# Patient Record
Sex: FEMALE | Race: WHITE | Employment: FULL TIME | URBAN - METROPOLITAN AREA
[De-identification: names, ages, dates, MRNs, and addresses within clinical notes are randomized per-mention and may not be internally consistent; named-entity substitution may affect disease eponyms.]

---

## 2020-12-28 ENCOUNTER — HOSPITAL ENCOUNTER (OUTPATIENT)
Age: 50
Discharge: HOME | End: 2020-12-28

## 2020-12-28 LAB — RUBV IGG SER QL: 173.7 IU/ML

## 2020-12-28 PROCEDURE — 86735 MUMPS ANTIBODY: CPT

## 2020-12-28 PROCEDURE — 86481 TB AG RESPONSE T-CELL SUSP: CPT

## 2020-12-28 PROCEDURE — 86765 RUBEOLA ANTIBODY: CPT

## 2020-12-28 PROCEDURE — 86762 RUBELLA ANTIBODY: CPT

## 2020-12-28 PROCEDURE — 86787 VARICELLA-ZOSTER ANTIBODY: CPT

## 2020-12-30 LAB
MEASLES ANTIBODY IGG: 4.15
MUV IGG SER QL: 2.03
VZV IGG SER QL IA: 2.84

## 2020-12-31 LAB — T-SPOT TB TEST: NORMAL

## 2021-04-18 PROBLEM — E06.3 HASHIMOTO'S THYROIDITIS: Status: ACTIVE | Noted: 2021-04-18

## 2021-04-18 PROBLEM — F90.0 ATTENTION DEFICIT HYPERACTIVITY DISORDER (ADHD), PREDOMINANTLY INATTENTIVE TYPE: Status: ACTIVE | Noted: 2021-04-18

## 2021-04-18 PROBLEM — Z82.49 FAMILY HISTORY OF BRAIN ANEURYSM: Status: ACTIVE | Noted: 2021-04-18

## 2021-04-18 PROBLEM — Z91.018 NUT ALLERGY: Status: ACTIVE | Noted: 2021-04-18

## 2021-04-18 PROBLEM — M54.16 LUMBAR BACK PAIN WITH RADICULOPATHY AFFECTING RIGHT LOWER EXTREMITY: Status: ACTIVE | Noted: 2017-10-15

## 2021-04-18 PROBLEM — Z82.69 FAMILY HISTORY OF SYSTEMIC LUPUS ERYTHEMATOSUS: Status: ACTIVE | Noted: 2021-04-18

## 2021-05-07 ENCOUNTER — NURSE TRIAGE (OUTPATIENT)
Dept: OTHER | Facility: CLINIC | Age: 51
End: 2021-05-07

## 2021-05-07 NOTE — TELEPHONE ENCOUNTER
Reason for Disposition   SEVERE dizziness (e.g., unable to stand, requires support to walk, feels like passing out now)    Answer Assessment - Initial Assessment Questions  1. DESCRIPTION: \"Describe your dizziness. \"      \"dizzy and disoriented, yesterday thought I was going to blackout\"    2. LIGHTHEADED: \"Do you feel lightheaded? \" (e.g., somewhat faint, woozy, weak upon standing)      Lightheaded, felt like was going to faint, had ringing in ear    3. VERTIGO: \"Do you feel like either you or the room is spinning or tilting? \" (i.e. vertigo)      No     4. SEVERITY: \"How bad is it? \"  \"Do you feel like you are going to faint? \" \"Can you stand and walk? \"    - MILD - walking normally    - MODERATE - interferes with normal activities (e.g., work, school)     - SEVERE - unable to stand, requires support to walk, feels like passing out now. Severe, when comes on does have to hold on to something to walk    5. ONSET:  \"When did the dizziness begin? \"      Tuesday     6. AGGRAVATING FACTORS: \"Does anything make it worse? \" (e.g., standing, change in head position)        7. HEART RATE: \"Can you tell me your heart rate? \" \"How many beats in 15 seconds? \"  (Note: not all patients can do this)        Standing    8. CAUSE: \"What do you think is causing the dizziness? \"      Unsure    9. RECURRENT SYMPTOM: \"Have you had dizziness before? \" If so, ask: \"When was the last time? \" \"What happened that time? \"      No     10. OTHER SYMPTOMS: \"Do you have any other symptoms? \" (e.g., fever, chest pain, vomiting, diarrhea, bleeding)        Nausea at times but not when dizzy, exhaustion and sleeping 14 hours a day. 11. PREGNANCY: \"Is there any chance you are pregnant? \" \"When was your last menstrual period? \"        No    Protocols used: DIZZINESS-ADULT-[place]    Received call from [facility] at pre-service center [facility]) [place] with Astrid.     Brief description of triage: see above    2nd level triage completed with Lyndsay Segundo Prema Galeana, NP; provider recommends patient be seen today. If no appts available pt to go to [facility] or walk in clinic. (Attempted to reach provider at PCP office 1st, spoke with Megan who stated MD was in with pt and would not be out for >10minutes. No other provider would be available to speak with writer.)    Care advice provided, patient verbalizes understanding; denies any other questions or concerns; instructed to call back for any new or worsening symptoms. Writer provided warm transfer to [facility] [place]. Attention Provider: Thank you for allowing me to participate in the care of your patient. The patient was connected to triage in response to information provided to the E[facility]. Please do not respond through this encounter as the response is not directed to a shared pool.

## 2021-07-28 ENCOUNTER — HOSPITAL ENCOUNTER (OUTPATIENT)
Dept: GENERAL RADIOLOGY | Facility: CLINIC | Age: 51
Discharge: HOME | End: 2021-07-30
Payer: COMMERCIAL

## 2021-07-28 ENCOUNTER — HOSPITAL ENCOUNTER (OUTPATIENT)
Age: 51
Setting detail: SPECIMEN
Discharge: HOME | End: 2021-07-28
Payer: COMMERCIAL

## 2021-07-28 ENCOUNTER — HOSPITAL ENCOUNTER (OUTPATIENT)
Facility: CLINIC | Age: 51
Discharge: HOME | End: 2021-07-30
Payer: COMMERCIAL

## 2021-07-28 DIAGNOSIS — E55.9 VITAMIN D DEFICIENCY: ICD-10-CM

## 2021-07-28 DIAGNOSIS — E78.41 ELEVATED LIPOPROTEIN(A): ICD-10-CM

## 2021-07-28 DIAGNOSIS — F90.0 ATTENTION DEFICIT HYPERACTIVITY DISORDER (ADHD), PREDOMINANTLY INATTENTIVE TYPE: ICD-10-CM

## 2021-07-28 DIAGNOSIS — E06.3 HASHIMOTO'S THYROIDITIS: ICD-10-CM

## 2021-07-28 DIAGNOSIS — M25.552 LEFT HIP PAIN: ICD-10-CM

## 2021-07-28 DIAGNOSIS — Z82.49 FAMILY HISTORY OF BRAIN ANEURYSM: ICD-10-CM

## 2021-07-28 DIAGNOSIS — M25.551 RIGHT HIP PAIN: ICD-10-CM

## 2021-07-28 DIAGNOSIS — Z86.39 HISTORY OF HYPERGLYCEMIA: ICD-10-CM

## 2021-07-28 LAB
ABSOLUTE EOS #: 0.61 K/UL (ref 0–0.44)
ABSOLUTE IMMATURE GRANULOCYTE: 0.05 K/UL (ref 0–0.3)
ABSOLUTE LYMPH #: 2.77 K/UL (ref 1.1–3.7)
ABSOLUTE MONO #: 0.54 K/UL (ref 0.1–1.2)
ALBUMIN SERPL-MCNC: 4 G/DL (ref 3.5–5.2)
ALBUMIN/GLOBULIN RATIO: 1.4 (ref 1–2.5)
ALP BLD-CCNC: 67 U/L (ref 35–104)
ALT SERPL-CCNC: 12 U/L (ref 5–33)
ANION GAP SERPL CALCULATED.3IONS-SCNC: 11 MMOL/L (ref 9–17)
AST SERPL-CCNC: 13 U/L
BASOPHILS # BLD: 1 % (ref 0–2)
BASOPHILS ABSOLUTE: 0.13 K/UL (ref 0–0.2)
BILIRUB SERPL-MCNC: 0.67 MG/DL (ref 0.3–1.2)
BUN BLDV-MCNC: 13 MG/DL (ref 6–20)
BUN/CREAT BLD: NORMAL (ref 9–20)
CALCIUM SERPL-MCNC: 8.8 MG/DL (ref 8.6–10.4)
CHLORIDE BLD-SCNC: 102 MMOL/L (ref 98–107)
CHOLESTEROL/HDL RATIO: 4.8
CHOLESTEROL: 246 MG/DL
CO2: 24 MMOL/L (ref 20–31)
CREAT SERPL-MCNC: 0.79 MG/DL (ref 0.5–0.9)
DIFFERENTIAL TYPE: ABNORMAL
EOSINOPHILS RELATIVE PERCENT: 6 % (ref 1–4)
ESTIMATED AVERAGE GLUCOSE: 103 MG/DL
GFR AFRICAN AMERICAN: >60 ML/MIN
GFR NON-AFRICAN AMERICAN: >60 ML/MIN
GFR SERPL CREATININE-BSD FRML MDRD: NORMAL ML/MIN/{1.73_M2}
GFR SERPL CREATININE-BSD FRML MDRD: NORMAL ML/MIN/{1.73_M2}
GLUCOSE BLD-MCNC: 87 MG/DL (ref 70–99)
HBA1C MFR BLD: 5.2 % (ref 4–6)
HCT VFR BLD CALC: 41.3 % (ref 36.3–47.1)
HDLC SERPL-MCNC: 51 MG/DL
HEMOGLOBIN: 12.9 G/DL (ref 11.9–15.1)
IMMATURE GRANULOCYTES: 1 %
LDL CHOLESTEROL: 171 MG/DL (ref 0–130)
LYMPHOCYTES # BLD: 28 % (ref 24–43)
MCH RBC QN AUTO: 29.9 PG (ref 25.2–33.5)
MCHC RBC AUTO-ENTMCNC: 31.2 G/DL (ref 28.4–34.8)
MCV RBC AUTO: 95.8 FL (ref 82.6–102.9)
MONOCYTES # BLD: 5 % (ref 3–12)
NRBC AUTOMATED: 0 PER 100 WBC
PDW BLD-RTO: 12.7 % (ref 11.8–14.4)
PLATELET # BLD: 319 K/UL (ref 138–453)
PLATELET ESTIMATE: ABNORMAL
PMV BLD AUTO: 10.4 FL (ref 8.1–13.5)
POTASSIUM SERPL-SCNC: 4.4 MMOL/L (ref 3.7–5.3)
RBC # BLD: 4.31 M/UL (ref 3.95–5.11)
RBC # BLD: ABNORMAL 10*6/UL
SEG NEUTROPHILS: 59 % (ref 36–65)
SEGMENTED NEUTROPHILS ABSOLUTE COUNT: 5.83 K/UL (ref 1.5–8.1)
SODIUM BLD-SCNC: 137 MMOL/L (ref 135–144)
TOTAL PROTEIN: 6.8 G/DL (ref 6.4–8.3)
TRIGL SERPL-MCNC: 120 MG/DL
TSH SERPL DL<=0.05 MIU/L-ACNC: 4.67 MIU/L (ref 0.3–5)
VITAMIN D 25-HYDROXY: 15.2 NG/ML (ref 30–100)
VLDLC SERPL CALC-MCNC: ABNORMAL MG/DL (ref 1–30)
WBC # BLD: 9.9 K/UL (ref 3.5–11.3)
WBC # BLD: ABNORMAL 10*3/UL

## 2021-07-28 PROCEDURE — 73502 X-RAY EXAM HIP UNI 2-3 VIEWS: CPT

## 2021-08-03 LAB
CHOLESTEROL/HDL RATIO: 4.6
CHOLESTEROL: 251 MG/DL
GLUCOSE BLD-MCNC: 106 MG/DL (ref 70–99)
HDLC SERPL-MCNC: 55 MG/DL
LDL CHOLESTEROL: 169 MG/DL (ref 0–130)
PATIENT FASTING?: ABNORMAL
TRIGL SERPL-MCNC: 137 MG/DL
VLDLC SERPL CALC-MCNC: ABNORMAL MG/DL (ref 1–30)

## 2021-08-24 ENCOUNTER — TELEPHONE (OUTPATIENT)
Dept: PAIN MANAGEMENT | Age: 51
End: 2021-08-24

## 2021-08-24 NOTE — TELEPHONE ENCOUNTER
Called pt to see if she would like to move her consult appointment on 09/10/2021 to 08/27/2021, pt did not answer, left msg to call the office.

## 2021-08-27 ENCOUNTER — INITIAL CONSULT (OUTPATIENT)
Dept: PAIN MANAGEMENT | Age: 51
End: 2021-08-27
Payer: COMMERCIAL

## 2021-08-27 VITALS
DIASTOLIC BLOOD PRESSURE: 67 MMHG | HEART RATE: 94 BPM | SYSTOLIC BLOOD PRESSURE: 112 MMHG | RESPIRATION RATE: 16 BRPM | BODY MASS INDEX: 30.51 KG/M2 | HEIGHT: 69 IN | WEIGHT: 206 LBS

## 2021-08-27 DIAGNOSIS — M25.551 BILATERAL HIP PAIN: ICD-10-CM

## 2021-08-27 DIAGNOSIS — M47.817 LUMBOSACRAL SPONDYLOSIS WITHOUT MYELOPATHY: ICD-10-CM

## 2021-08-27 DIAGNOSIS — M25.552 BILATERAL HIP PAIN: ICD-10-CM

## 2021-08-27 DIAGNOSIS — M54.16 LUMBAR RADICULOPATHY: Primary | ICD-10-CM

## 2021-08-27 PROCEDURE — 99204 OFFICE O/P NEW MOD 45 MIN: CPT | Performed by: PAIN MEDICINE

## 2021-08-27 ASSESSMENT — ENCOUNTER SYMPTOMS: BACK PAIN: 1

## 2021-08-27 NOTE — PROGRESS NOTES
HPI:     Back Pain  This is a chronic problem. The current episode started more than 1 year ago. The problem occurs constantly. The problem has been gradually worsening since onset. The pain is present in the lumbar spine. The quality of the pain is described as shooting and stabbing. The pain radiates to the right foot. The pain is at a severity of 4/10. The pain is mild. The pain is worse during the night. The symptoms are aggravated by standing, sitting and position. Stiffness is present all day. Associated symptoms include leg pain, numbness, tingling and weakness. She has tried analgesics, heat, home exercises, ice, NSAIDs and muscle relaxant (massage accupuncture) for the symptoms. The treatment provided mild relief. Pain in the low back down the right leg. Also complaining of bilateral hip pain. MRI of the lumbar spine with degenerative changes and disc bulging. X-ray of the hips with some mild degenerative changes and some subchondral cysts on the right. She has been doing physical therapy and massage for many years now. Continues to have lingering pain. Does take Lyrica with some benefit. She has had bursa injections in the past with excellent relief. Patient denies any new neurological symptoms. No bowel or bladder incontinence, no weakness, and no falling. Review of OARRS does not show any aberrant prescription behavior. No past medical history on file.     Past Surgical History:   Procedure Laterality Date    HYSTERECTOMY, VAGINAL         Allergies   Allergen Reactions    Morphine Hives and Swelling     Morphine      Peanut-Containing Drug Products Swelling     walnuts         Current Outpatient Medications:     montelukast (SINGULAIR) 10 MG tablet, Take 1 tablet by mouth nightly, Disp: 14 tablet, Rfl: 0    naproxen (NAPROSYN) 500 MG tablet, Take 1 tablet by mouth 2 times daily (with meals) for 15 days, Disp: 30 tablet, Rfl: 0    pregabalin (LYRICA) 75 MG capsule, Take 1 capsule by mouth 3 times daily for 30 days. Take 1 cap in am and 2 caps in PM, Disp: 90 capsule, Rfl: 0    vitamin D (ERGOCALCIFEROL) 1.25 MG (02894 UT) CAPS capsule, Take 1 tab every Monday and every Friday, Disp: 24 capsule, Rfl: 3    amphetamine-dextroamphetamine (ADDERALL) 20 MG tablet, Take 1 tablet by mouth 2 times daily for 30 days. , Disp: 60 tablet, Rfl: 0    spironolactone (ALDACTONE) 50 MG tablet, Take 1 tablet by mouth 2 times daily, Disp: 180 tablet, Rfl: 1    baclofen (LIORESAL) 10 MG tablet, Take 1-2 tablets by mouth 2 times daily as needed (PRN for back pain, stiffness), Disp: 90 tablet, Rfl: 0    ibuprofen (ADVIL;MOTRIN) 800 MG tablet, Take 1 tablet by mouth every 8 hours as needed for Pain, Disp: 180 tablet, Rfl: 1    levothyroxine (SYNTHROID) 200 MCG tablet, Take 1 tablet by mouth every morning (before breakfast), Disp: 90 tablet, Rfl: 1    Xnesyq-MjRkm-NdWkm-Meth-FA-DHA (PRENATE MINI) 18-0.6-0.4-350 MG CAPS, Take 1 capsule by mouth daily, Disp: , Rfl:     albuterol sulfate  (90 Base) MCG/ACT inhaler, Inhale into the lungs every 4 hours as needed, Disp: , Rfl:     gabapentin (NEURONTIN) 300 MG capsule, Take 1 capsule by mouth nightly for 30 days.  (Patient not taking: Reported on 8/27/2021), Disp: 30 capsule, Rfl: 0    EPINEPHrine (EPIPEN 2-DINO) 0.3 MG/0.3ML SOAJ injection, Inject 0.3 mLs into the muscle once for 1 dose Use as directed for allergic reaction, facial swelling, Disp: 0.3 mL, Rfl: 1    Family History   Problem Relation Age of Onset    Breast Cancer Mother     Other Father        Social History     Socioeconomic History    Marital status: Single     Spouse name: Not on file    Number of children: Not on file    Years of education: Not on file    Highest education level: Not on file   Occupational History    Not on file   Tobacco Use    Smoking status: Never Smoker    Smokeless tobacco: Never Used   Vaping Use    Vaping Use: Never used   Substance and Sexual Activity  Alcohol use: Yes    Drug use: Never    Sexual activity: Not on file   Other Topics Concern    Not on file   Social History Narrative    Not on file     Social Determinants of Health     Financial Resource Strain: Low Risk     Difficulty of Paying Living Expenses: Not hard at all   Food Insecurity: No Food Insecurity    Worried About Running Out of Food in the Last Year: Never true    [address] in the Last Year: Never true   Transportation Needs: No Transportation Needs    Lack of Transportation (Medical): No    Lack of Transportation (Non-Medical): No   Physical Activity:     Days of Exercise per Week:     Minutes of Exercise per Session:    Stress:     Feeling of Stress :    Social Connections:     Frequency of Communication with Friends and Family:     Frequency of Social Gatherings with Friends and Family:     Attends Oriental orthodox Services:     Active Member of Clubs or Organizations:     Attends Club or Organization Meetings:     Marital Status:    Intimate Partner Violence:     Fear of Current or Ex-Partner:     Emotionally Abused:     Physically Abused:     Sexually Abused:        Review of Systems:  Review of Systems   Musculoskeletal: Positive for back pain. Neurological: Positive for numbness, tingling and weakness. Physical Exam:  /67   Pulse 94   Resp 16   Ht 5' 9\" (1.753 m)   Wt 206 lb (93.4 kg)   Breastfeeding No   BMI 30.42 kg/m²     Physical Exam  Constitutional:       Appearance: Normal appearance. Pulmonary:      Effort: Pulmonary effort is normal.   Neurological:      Mental Status: She is alert.    Psychiatric:         Attention and Perception: Attention and perception normal.         Mood and Affect: Mood and affect normal.         Record/Diagnostics Review:    As above, I did independently review the imaging    Orders:  Orders Placed This Encounter   Procedures    Ambulatory referral to Orthopedic Surgery    [place] NJX AA&/STRD TFRML EPI LUMBAR/SACRAL 1 LEVEL    [place] NJX AA&/STRD TFRML EPI LUMBAR/SACRAL EA ADDL       Assessment:  1. Lumbar radiculopathy    2. Bilateral hip pain    3. Lumbosacral spondylosis without myelopathy        Treatment Plan:  DISCUSSION: Treatment options discussed with patient and all questions answered to patient's satisfaction. OARRS Review: Reviewed and acceptable for medications prescribed. TREATMENT OPTIONS:     Discussed different treatment options including continued conservative care such as physical therapy, chiropractic care, acupuncture. Discussed different interventional options such as epidural steroids or medial branch blocks. Also discussed neuromodulation in the form of spinal cord stimulation. Also discussed surgical evaluation. Pain in the low back and legs continues despite conservative measures, may benefit from epidural steroid injections, we'll try the Right L5 and S1 transforaminal approach x 2. In regards to the hips we will have her touch base with orthopedic surgeon, she has had excellent benefit with bursa injections in the past, would consider repeat bursa injections as well. Mehdi Villalba M.D. I have reviewed the chief complaint and history of present illness (including ROS and PFSH) and vital documentation by my staff and I agree with their documentation and have added where applicable.

## 2021-09-03 ENCOUNTER — HOSPITAL ENCOUNTER (OUTPATIENT)
Dept: PAIN MANAGEMENT | Facility: CLINIC | Age: 51
Discharge: HOME | End: 2021-09-03
Payer: COMMERCIAL

## 2021-09-03 VITALS
SYSTOLIC BLOOD PRESSURE: 125 MMHG | HEART RATE: 72 BPM | DIASTOLIC BLOOD PRESSURE: 78 MMHG | BODY MASS INDEX: 30.51 KG/M2 | RESPIRATION RATE: 18 BRPM | WEIGHT: 206 LBS | OXYGEN SATURATION: 98 % | HEIGHT: 69 IN | TEMPERATURE: 98.5 F

## 2021-09-03 DIAGNOSIS — R52 PAIN MANAGEMENT: ICD-10-CM

## 2021-09-03 PROCEDURE — 6360000002 HC RX W HCPCS: Performed by: PAIN MEDICINE

## 2021-09-03 PROCEDURE — 64483 NJX AA&/STRD TFRM EPI L/S 1: CPT | Performed by: PAIN MEDICINE

## 2021-09-03 PROCEDURE — 2500000003 HC RX 250 WO HCPCS: Performed by: PAIN MEDICINE

## 2021-09-03 PROCEDURE — 2580000003 HC RX 258: Performed by: PAIN MEDICINE

## 2021-09-03 PROCEDURE — 64483 NJX AA&/STRD TFRM EPI L/S 1: CPT

## 2021-09-03 PROCEDURE — 6360000004 HC RX CONTRAST MEDICATION: Performed by: PAIN MEDICINE

## 2021-09-03 PROCEDURE — 64484 NJX AA&/STRD TFRM EPI L/S EA: CPT | Performed by: PAIN MEDICINE

## 2021-09-03 RX ORDER — LIDOCAINE HYDROCHLORIDE 10 MG/ML
INJECTION, SOLUTION EPIDURAL; INFILTRATION; INTRACAUDAL; PERINEURAL
Status: COMPLETED | OUTPATIENT
Start: 2021-09-03 | End: 2021-09-03

## 2021-09-03 RX ORDER — DEXAMETHASONE SODIUM PHOSPHATE 10 MG/ML
INJECTION, SOLUTION INTRAMUSCULAR; INTRAVENOUS
Status: COMPLETED | OUTPATIENT
Start: 2021-09-03 | End: 2021-09-03

## 2021-09-03 RX ORDER — MIDAZOLAM HYDROCHLORIDE 2 MG/2ML
INJECTION, SOLUTION INTRAMUSCULAR; INTRAVENOUS
Status: COMPLETED | OUTPATIENT
Start: 2021-09-03 | End: 2021-09-03

## 2021-09-03 RX ADMIN — DEXAMETHASONE SODIUM PHOSPHATE 10 MG: 10 INJECTION, SOLUTION INTRAMUSCULAR; INTRAVENOUS at 11:20

## 2021-09-03 RX ADMIN — MIDAZOLAM HYDROCHLORIDE 2 MG: 1 INJECTION, SOLUTION INTRAMUSCULAR; INTRAVENOUS at 11:13

## 2021-09-03 RX ADMIN — IOHEXOL 10 ML: 180 INJECTION INTRAVENOUS at 11:20

## 2021-09-03 RX ADMIN — WATER 5 ML: 1 INJECTION INTRAMUSCULAR; INTRAVENOUS; SUBCUTANEOUS at 11:19

## 2021-09-03 RX ADMIN — LIDOCAINE HYDROCHLORIDE 5 ML: 10 INJECTION, SOLUTION EPIDURAL; INFILTRATION; INTRACAUDAL at 11:19

## 2021-09-03 ASSESSMENT — PAIN - FUNCTIONAL ASSESSMENT
PAIN_FUNCTIONAL_ASSESSMENT: 0-10
PAIN_FUNCTIONAL_ASSESSMENT: PREVENTS OR INTERFERES SOME ACTIVE ACTIVITIES AND ADLS

## 2021-09-03 ASSESSMENT — PAIN SCALES - GENERAL: PAINLEVEL_OUTOF10: 0

## 2021-09-03 NOTE — H&P
Pain Pre-Op H&P Note    Abigail Ariza MD    HPI: Skip Mireles  presents with back and leg pain. Past Medical History:   Diagnosis Date    Asthma        Past Surgical History:   Procedure Laterality Date    HYSTERECTOMY, VAGINAL         Family History   Problem Relation Age of Onset    Breast Cancer Mother     Other Father        Allergies   Allergen Reactions    Morphine Hives and Swelling     Morphine      Peanut-Containing Drug Products Swelling     walnuts         Current Outpatient Medications:     montelukast (SINGULAIR) 10 MG tablet, Take 1 tablet by mouth nightly, Disp: 14 tablet, Rfl: 0    naproxen (NAPROSYN) 500 MG tablet, Take 1 tablet by mouth 2 times daily (with meals) for 15 days, Disp: 30 tablet, Rfl: 0    pregabalin (LYRICA) 75 MG capsule, Take 1 capsule by mouth 3 times daily for 30 days. Take 1 cap in am and 2 caps in PM, Disp: 90 capsule, Rfl: 0    vitamin D (ERGOCALCIFEROL) 1.25 MG (80960 UT) CAPS capsule, Take 1 tab every Monday and every Friday, Disp: 24 capsule, Rfl: 3    amphetamine-dextroamphetamine (ADDERALL) 20 MG tablet, Take 1 tablet by mouth 2 times daily for 30 days. , Disp: 60 tablet, Rfl: 0    spironolactone (ALDACTONE) 50 MG tablet, Take 1 tablet by mouth 2 times daily, Disp: 180 tablet, Rfl: 1    baclofen (LIORESAL) 10 MG tablet, Take 1-2 tablets by mouth 2 times daily as needed (PRN for back pain, stiffness), Disp: 90 tablet, Rfl: 0    ibuprofen (ADVIL;MOTRIN) 800 MG tablet, Take 1 tablet by mouth every 8 hours as needed for Pain, Disp: 180 tablet, Rfl: 1    levothyroxine (SYNTHROID) 200 MCG tablet, Take 1 tablet by mouth every morning (before breakfast), Disp: 90 tablet, Rfl: 1    Qziyct-ChKgg-RgMev-Meth-FA-DHA (PRENATE MINI) 18-0.6-0.4-350 MG CAPS, Take 1 capsule by mouth daily, Disp: , Rfl:     albuterol sulfate  (90 Base) MCG/ACT inhaler, Inhale into the lungs every 4 hours as needed, Disp: , Rfl:     gabapentin (NEURONTIN) 300 MG capsule, Take

## 2021-09-03 NOTE — OP NOTE
Transforaminal Epidural Steroid Injection:  SURGEON: Agustina Carrillo MD    PRE-OP DIAGNOSIS: M54.17 (lumbosacral neuritis), M54.5 (low back pain)    POST-OP DIAGNOSIS: Same. PROCEDURE PERFORMED:  Right L5 and S1 Lumbar Transforaminal OSVALDO selective nerve root block. Physician confirmed and marked the surgical site. EBL: minimal    CONSENT: Patient has undergone the educational process with this procedure, is aware and fully understands the risks involved: potential damage to any and all body organs including possible bleeding, infection, and nerve injury, allergic reaction and headache. Patient also understands that the procedure will be undertaken in a safe, controlled and monitored setting. Patient recognizes that the benefits may include relief from pain and reduction in the oral use of medications. Patient agreed to proceed. The patient was counseled at length about the risks of demetri Covid-19 during their perioperative period and any recovery window from their procedure. The patient was made aware that demetri Covid-19  may worsen their prognosis for recovering from their procedure  and lend to a higher morbidity and/or mortality risk. All material risks, benefits, and reasonable alternatives including postponing the procedure were discussed. The patient does wish to proceed with the procedure at this time. PREP: The patient was placed in the prone position and padded appropriately. The injection site was prepped with chloroprep and draped appropriately. 5ml of 0.5% lidocaine was used to anesthetize the skin and subcutaneous tissue. PROCEDURE NOTE: A 22 gauge 3.5 inch Pencil-Point needle was then advanced to the Right L5 and S1 neuroforamen using a wide paramedian approach under fluoroscopic guidance. Aspiration was negative for blood, CSF and producing pain.  Contrast Medium: 1 ml of (omnipaque) contrast was then injected and the following was noted: appropriate spread of contrast along the nerve root sheath and adjacent epidural space 4mg Dexamethasone mixed with 1.5 ml of 0.5 % lidocaine was then injected into the nerve root sheath of each of the indicated levels. The needle was withdrawn by the physician and the nurse applied a sterile dressing. The patient tolerated the procedure well. No complications occured. Patient transferred to the recovery room in satisfatory condition. Appropriate written discharge instructions given to the patient.     Alvarez Dick MD

## 2021-09-16 ENCOUNTER — OFFICE VISIT (OUTPATIENT)
Dept: ORTHOPEDIC SURGERY | Age: 51
End: 2021-09-16
Payer: COMMERCIAL

## 2021-09-16 VITALS — RESPIRATION RATE: 18 BRPM | HEIGHT: 69 IN | WEIGHT: 206 LBS | BODY MASS INDEX: 30.51 KG/M2

## 2021-09-16 DIAGNOSIS — M16.10 HIP ARTHRITIS: ICD-10-CM

## 2021-09-16 DIAGNOSIS — M25.559 HIP PAIN: Primary | ICD-10-CM

## 2021-09-16 DIAGNOSIS — G89.29 CHRONIC MIDLINE LOW BACK PAIN WITHOUT SCIATICA: ICD-10-CM

## 2021-09-16 DIAGNOSIS — M51.36 LUMBAR DEGENERATIVE DISC DISEASE: ICD-10-CM

## 2021-09-16 DIAGNOSIS — M54.50 CHRONIC MIDLINE LOW BACK PAIN WITHOUT SCIATICA: ICD-10-CM

## 2021-09-16 PROBLEM — M51.369 LUMBAR DEGENERATIVE DISC DISEASE: Status: ACTIVE | Noted: 2021-09-16

## 2021-09-16 PROCEDURE — 99203 OFFICE O/P NEW LOW 30 MIN: CPT | Performed by: ORTHOPAEDIC SURGERY

## 2021-09-16 NOTE — PROGRESS NOTES
Patient ID: Radha Pickett is a 53 y.o. female    Chief Compliant:  Chief Complaint   Patient presents with    Hip Pain     bilateral        Diagnostic imaging:    AP lateral bilateral hips reviewed from [facility] patient likely with a very subtle hip dysplasia subchondral cystic formation right acetabulum joint spaces fairly well-maintained    MRI report from [facility] region some degenerative disc disease at L4-5 and L5 5 S1 likely some stenosis but did not sound overly impressive    Assessment and Plan:  1. Hip pain    2. Hip arthritis    3. Chronic midline low back pain without sciatica    4. Lumbar degenerative disc disease      Therapeutic right hip injection with IR    Follow up same day    HPI:  This is a 53 y.o. female who presents to the clinic today for bilateral hip evaluation. Patient reports that she has been experiencing right worse than left hip pain, which has been ongoing for the past year    She had lumbar epidural steroid injections a few weeks ago. 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 Past History:    Current Outpatient Medications:     montelukast (SINGULAIR) 10 MG tablet, Take 1 tablet by mouth nightly, Disp: 14 tablet, Rfl: 0    naproxen (NAPROSYN) 500 MG tablet, Take 1 tablet by mouth 2 times daily (with meals) for 15 days, Disp: 30 tablet, Rfl: 0    pregabalin (LYRICA) 75 MG capsule, Take 1 capsule by mouth 3 times daily for 30 days. Take 1 cap in am and 2 caps in PM, Disp: 90 capsule, Rfl: 0    vitamin D (ERGOCALCIFEROL) 1.25 MG (35745 UT) CAPS capsule, Take 1 tab every Monday and every Friday, Disp: 24 capsule, Rfl: 3    amphetamine-dextroamphetamine (ADDERALL) 20 MG tablet, Take 1 tablet by mouth 2 times daily for 30 days. , Disp: 60 tablet, Rfl: 0    spironolactone (ALDACTONE) 50 MG Worried About [address] in the Last Year: Never true    Neeraj of Food in the Last Year: Never true   Transportation Needs: No Transportation Needs    Lack of Transportation (Medical): No    Lack of Transportation (Non-Medical): No   Physical Activity:     Days of Exercise per Week:     Minutes of Exercise per Session:    Stress:     Feeling of Stress :    Social Connections:     Frequency of Communication with Friends and Family:     Frequency of Social Gatherings with Friends and Family:     Attends Hindu Services:     Active Member of Clubs or Organizations:     Attends Club or Organization Meetings:     Marital Status:    Intimate Partner Violence:     Fear of Current or Ex-Partner:     Emotionally Abused:     Physically Abused:     Sexually Abused:      Past Medical History:   Diagnosis Date    Asthma      Past Surgical History:   Procedure Laterality Date    HYSTERECTOMY, VAGINAL       Family History   Problem Relation Age of Onset    Breast Cancer Mother     Other Father         Physical Exam:  Vitals signs and nursing note reviewed. Constitutional:       Appearance: well-developed. HENT:      Head: Normocephalic and atraumatic. Nose: Nose normal.   Eyes:      Conjunctiva/sclera: Conjunctivae normal.   Neck:      Musculoskeletal: Normal range of motion and neck supple. Pulmonary:      Effort: Pulmonary effort is normal. No respiratory distress. Musculoskeletal:      Comments: Normal gait     Skin:     General: Skin is warm and dry. Neurological:      Mental Status: Alert and oriented to person, place, and time. Sensory: No sensory deficit. Psychiatric:         Behavior: Behavior normal.         Thought Content: Thought content normal.        Provider Attestation:  Thelma Lord personally performed the services described in this documentation. All medical record entries made by the scribe were at my direction and in my presence.  I have reviewed the chart and discharge instructions and agree that the records reflect my personal performance and is accurate and complete. Carlotta Maddox MD 9/16/21     Scribe Attestation:  By signing my name below, Antwan Mannles, attest that this documentation has been prepared under the direction and in the presence of Dr. Damaris Cavanaugh. Electronically signed: Faustina Barry, 9/16/21     Please note that this chart was generated using voice recognition Dragon dictation software. Although every effort was made to ensure the accuracy of this automated transcription, some errors in transcription may have occurred.

## 2021-09-17 ENCOUNTER — TELEPHONE (OUTPATIENT)
Dept: INTERVENTIONAL RADIOLOGY/VASCULAR | Age: 51
End: 2021-09-17

## 2021-10-04 ENCOUNTER — HOSPITAL ENCOUNTER (OUTPATIENT)
Dept: INTERVENTIONAL RADIOLOGY/VASCULAR | Age: 51
Discharge: HOME | End: 2021-10-06
Payer: COMMERCIAL

## 2021-10-04 DIAGNOSIS — M25.559 HIP PAIN: ICD-10-CM

## 2021-10-04 DIAGNOSIS — M16.10 HIP ARTHRITIS: ICD-10-CM

## 2021-10-04 PROCEDURE — 2500000003 HC RX 250 WO HCPCS: Performed by: ORTHOPAEDIC SURGERY

## 2021-10-04 PROCEDURE — 6360000004 HC RX CONTRAST MEDICATION: Performed by: ORTHOPAEDIC SURGERY

## 2021-10-04 PROCEDURE — 6360000002 HC RX W HCPCS: Performed by: ORTHOPAEDIC SURGERY

## 2021-10-04 PROCEDURE — 77002 NEEDLE LOCALIZATION BY XRAY: CPT | Performed by: RADIOLOGY

## 2021-10-04 PROCEDURE — 2709999900 IR ARTHR/ASP/INJ MAJOR JT/BURSA RIGHT WO US

## 2021-10-04 PROCEDURE — 20610 DRAIN/INJ JOINT/BURSA W/O US: CPT | Performed by: RADIOLOGY

## 2021-10-04 RX ORDER — BUPIVACAINE HYDROCHLORIDE 5 MG/ML
4 INJECTION, SOLUTION EPIDURAL; INTRACAUDAL ONCE
Status: COMPLETED | OUTPATIENT
Start: 2021-10-04 | End: 2021-10-04

## 2021-10-04 RX ORDER — METHYLPREDNISOLONE ACETATE 80 MG/ML
80 INJECTION, SUSPENSION INTRA-ARTICULAR; INTRALESIONAL; INTRAMUSCULAR; SOFT TISSUE ONCE
Status: COMPLETED | OUTPATIENT
Start: 2021-10-04 | End: 2021-10-04

## 2021-10-04 RX ORDER — LIDOCAINE HYDROCHLORIDE 10 MG/ML
4 INJECTION, SOLUTION INFILTRATION; PERINEURAL ONCE
Status: COMPLETED | OUTPATIENT
Start: 2021-10-04 | End: 2021-10-04

## 2021-10-04 RX ADMIN — BUPIVACAINE HYDROCHLORIDE 20 MG: 5 INJECTION, SOLUTION EPIDURAL; INTRACAUDAL; PERINEURAL at 10:23

## 2021-10-04 RX ADMIN — LIDOCAINE HYDROCHLORIDE 4 ML: 10 INJECTION, SOLUTION EPIDURAL; INFILTRATION; INTRACAUDAL at 10:22

## 2021-10-04 RX ADMIN — METHYLPREDNISOLONE ACETATE 80 MG: 80 INJECTION, SUSPENSION INTRA-ARTICULAR; INTRALESIONAL; INTRAMUSCULAR; SOFT TISSUE at 10:23

## 2021-10-04 RX ADMIN — IOHEXOL 50 ML: 240 INJECTION, SOLUTION INTRATHECAL; INTRAVASCULAR; INTRAVENOUS; ORAL at 09:47

## 2021-10-04 ASSESSMENT — PAIN SCALES - GENERAL: PAINLEVEL_OUTOF10: 2

## 2021-10-04 NOTE — PROGRESS NOTES
RT hip prepped draped. Numbed and accessed by Dr Lauren Diallo. Injected as per MAR.  Needle removed and band aid applied Tolerated well Verbalizes understanding of discharge instructions

## 2021-10-04 NOTE — BRIEF OP NOTE
Brief Postoperative Note    Shayla Cervantes  YOB: 1970  [id number]    Pre-operative Diagnosis: Right hip pain    Post-operative Diagnosis: Same    Procedure: Fluoro guided therapeutic right hip injection    Anesthesia: Local    Surgeons/Assistants: Shar    Estimated Blood Loss: less than 50     Complications: None    Specimens: Was Not Obtained      Electronically signed by Elham Abreu MD on 10/4/2021 at 1:07 PM

## 2021-11-23 ENCOUNTER — HOSPITAL ENCOUNTER (OUTPATIENT)
Dept: GENERAL RADIOLOGY | Facility: CLINIC | Age: 51
Discharge: HOME | End: 2021-11-25
Payer: COMMERCIAL

## 2021-11-23 ENCOUNTER — HOSPITAL ENCOUNTER (OUTPATIENT)
Facility: CLINIC | Age: 51
Discharge: HOME | End: 2021-11-25
Payer: COMMERCIAL

## 2021-11-23 DIAGNOSIS — M79.672 LEFT FOOT PAIN: ICD-10-CM

## 2021-11-23 DIAGNOSIS — M53.3 SACRAL BACK PAIN: ICD-10-CM

## 2021-11-23 DIAGNOSIS — M79.642 LEFT HAND PAIN: ICD-10-CM

## 2021-11-23 DIAGNOSIS — M79.671 RIGHT FOOT PAIN: ICD-10-CM

## 2021-11-23 DIAGNOSIS — M79.641 PAIN IN RIGHT HAND: ICD-10-CM

## 2021-11-23 PROCEDURE — 73130 X-RAY EXAM OF HAND: CPT

## 2021-11-23 PROCEDURE — 73630 X-RAY EXAM OF FOOT: CPT

## 2021-11-23 PROCEDURE — 72202 X-RAY EXAM SI JOINTS 3/> VWS: CPT

## 2021-11-29 ENCOUNTER — HOSPITAL ENCOUNTER (OUTPATIENT)
Age: 51
Setting detail: SPECIMEN
Discharge: HOME | End: 2021-11-29

## 2021-11-29 LAB
-: ABNORMAL
AMORPHOUS: ABNORMAL
BACTERIA: ABNORMAL
BILIRUBIN URINE: NEGATIVE
CASTS UA: ABNORMAL /LPF (ref 0–2)
COLOR: YELLOW
COMMENT UA: ABNORMAL
CREATININE URINE: 209.9 MG/DL (ref 28–217)
CRYSTALS, UA: ABNORMAL /HPF
EPITHELIAL CELLS UA: ABNORMAL /HPF (ref 0–5)
GLUCOSE URINE: NEGATIVE
HCT VFR BLD CALC: 39.3 % (ref 36.3–47.1)
HEMOGLOBIN: 12.2 G/DL (ref 11.9–15.1)
HIV AG/AB: NONREACTIVE
KETONES, URINE: NEGATIVE
LEUKOCYTE ESTERASE, URINE: NEGATIVE
MCH RBC QN AUTO: 30.7 PG (ref 25.2–33.5)
MCHC RBC AUTO-ENTMCNC: 31 G/DL (ref 28.4–34.8)
MCV RBC AUTO: 99 FL (ref 82.6–102.9)
MUCUS: ABNORMAL
NITRITE, URINE: NEGATIVE
NRBC AUTOMATED: 0 PER 100 WBC
OTHER OBSERVATIONS UA: ABNORMAL
PDW BLD-RTO: 13.9 % (ref 11.8–14.4)
PH UA: 5.5 (ref 5–8)
PLATELET # BLD: 366 K/UL (ref 138–453)
PMV BLD AUTO: 9.7 FL (ref 8.1–13.5)
PROTEIN UA: NEGATIVE
RBC # BLD: 3.97 M/UL (ref 3.95–5.11)
RBC UA: ABNORMAL /HPF (ref 0–2)
RENAL EPITHELIAL, UA: ABNORMAL /HPF
SPECIFIC GRAVITY UA: 1.03 (ref 1–1.03)
TOTAL PROTEIN, URINE: 22 MG/DL
TRICHOMONAS: ABNORMAL
TURBIDITY: ABNORMAL
URINE HGB: ABNORMAL
URINE TOTAL PROTEIN CREATININE RATIO: 0.1 (ref 0–0.2)
UROBILINOGEN, URINE: NORMAL
WBC # BLD: 14.7 K/UL (ref 3.5–11.3)
WBC UA: ABNORMAL /HPF (ref 0–5)
YEAST: ABNORMAL

## 2021-11-30 LAB
ALBUMIN (CALCULATED): 4.5 G/DL (ref 3.2–5.2)
ALBUMIN PERCENT: 66 % (ref 45–65)
ALBUMIN SERPL-MCNC: 4.3 G/DL (ref 3.5–5.2)
ALP BLD-CCNC: 69 U/L (ref 35–104)
ALPHA 1 PERCENT: 2 % (ref 3–6)
ALPHA 2 PERCENT: 10 % (ref 6–13)
ALPHA-1-GLOBULIN: 0.2 G/DL (ref 0.1–0.4)
ALPHA-2-GLOBULIN: 0.7 G/DL (ref 0.5–0.9)
ALT SERPL-CCNC: 14 U/L (ref 5–33)
AST SERPL-CCNC: 22 U/L
BETA GLOBULIN: 0.7 G/DL (ref 0.5–1.1)
BETA PERCENT: 10 % (ref 11–19)
C-REACTIVE PROTEIN: <3 MG/L (ref 0–5)
COMPLEMENT C3: 117 MG/DL (ref 90–180)
COMPLEMENT C4: 22 MG/DL (ref 10–40)
CREAT SERPL-MCNC: 0.81 MG/DL (ref 0.5–0.9)
GAMMA GLOBULIN %: 11 % (ref 9–20)
GAMMA GLOBULIN: 0.7 G/DL (ref 0.5–1.5)
GFR AFRICAN AMERICAN: >60 ML/MIN
GFR NON-AFRICAN AMERICAN: >60 ML/MIN
GFR SERPL CREATININE-BSD FRML MDRD: NORMAL ML/MIN/{1.73_M2}
GFR SERPL CREATININE-BSD FRML MDRD: NORMAL ML/MIN/{1.73_M2}
HEPATITIS B SURFACE ANTIGEN: NONREACTIVE
HEPATITIS C ANTIBODY: NONREACTIVE
PATHOLOGIST: ABNORMAL
PROTEIN ELECTROPHORESIS, SERUM: ABNORMAL
RHEUMATOID FACTOR: <10 IU/ML
SEDIMENTATION RATE, ERYTHROCYTE: 10 MM (ref 0–30)
THYROXINE, FREE: 1.15 NG/DL (ref 0.93–1.7)
TOTAL PROT. SUM,%: 99 % (ref 98–102)
TOTAL PROT. SUM: 6.8 G/DL (ref 6.3–8.2)
TOTAL PROTEIN: 6.7 G/DL (ref 6.4–8.3)
TSH SERPL DL<=0.05 MIU/L-ACNC: 6.9 MIU/L (ref 0.3–5)
URIC ACID: 4.3 MG/DL (ref 2.4–5.7)
VITAMIN D 25-HYDROXY: 16.6 NG/ML (ref 30–100)

## 2021-12-01 LAB
ANTI DNA DOUBLE STRANDED: 2.7 IU/ML
ANTI-NUCLEAR ANTIBODY (ANA): NEGATIVE
CCP IGG ANTIBODIES: 1.8 U/ML (ref 0–7)
COMPLEMENT TOTAL (CH50): 39.2 U/ML (ref 38.7–89.9)
ENA ANTIBODIES SCREEN: 0.1 U/ML
G-6-PD, QUANT: 13.4 U/G HB (ref 9.9–16.6)
HLA B27: NEGATIVE
QUANTI TB GOLD PLUS: NEGATIVE
QUANTI TB1 MINUS NIL: 0 IU/ML (ref 0–0.34)
QUANTI TB2 MINUS NIL: 0 IU/ML (ref 0–0.34)
QUANTIFERON MITOGEN: 0.92 IU/ML
QUANTIFERON NIL: 0.03 IU/ML

## 2021-12-03 ENCOUNTER — OFFICE VISIT (OUTPATIENT)
Dept: INTERNAL MEDICINE CLINIC | Age: 51
End: 2021-12-03
Payer: COMMERCIAL

## 2021-12-03 VITALS
BODY MASS INDEX: 30.96 KG/M2 | SYSTOLIC BLOOD PRESSURE: 116 MMHG | OXYGEN SATURATION: 97 % | HEIGHT: 69 IN | WEIGHT: 209 LBS | DIASTOLIC BLOOD PRESSURE: 72 MMHG | HEART RATE: 73 BPM

## 2021-12-03 DIAGNOSIS — M54.16 LUMBAR BACK PAIN WITH RADICULOPATHY AFFECTING RIGHT LOWER EXTREMITY: ICD-10-CM

## 2021-12-03 DIAGNOSIS — Z12.11 SCREENING FOR MALIGNANT NEOPLASM OF COLON: ICD-10-CM

## 2021-12-03 DIAGNOSIS — F90.0 ATTENTION DEFICIT HYPERACTIVITY DISORDER (ADHD), PREDOMINANTLY INATTENTIVE TYPE: ICD-10-CM

## 2021-12-03 DIAGNOSIS — Z00.00 ENCOUNTER FOR MEDICAL EXAMINATION TO ESTABLISH CARE: Primary | ICD-10-CM

## 2021-12-03 DIAGNOSIS — H92.02 ACUTE OTALGIA, LEFT: ICD-10-CM

## 2021-12-03 DIAGNOSIS — E06.3 HASHIMOTO'S THYROIDITIS: ICD-10-CM

## 2021-12-03 PROCEDURE — 99204 OFFICE O/P NEW MOD 45 MIN: CPT | Performed by: NURSE PRACTITIONER

## 2021-12-03 RX ORDER — FLUTICASONE PROPIONATE 50 MCG
2 SPRAY, SUSPENSION (ML) NASAL DAILY
Qty: 16 G | Refills: 0
Start: 2021-12-03

## 2021-12-03 RX ORDER — LEVOTHYROXINE SODIUM 175 UG/1
175 TABLET ORAL
Qty: 90 TABLET | Refills: 1 | Status: SHIPPED | OUTPATIENT
Start: 2021-12-03

## 2021-12-03 RX ORDER — DEXTROAMPHETAMINE SACCHARATE, AMPHETAMINE ASPARTATE, DEXTROAMPHETAMINE SULFATE AND AMPHETAMINE SULFATE 5; 5; 5; 5 MG/1; MG/1; MG/1; MG/1
20 TABLET ORAL 2 TIMES DAILY
Qty: 60 TABLET | Refills: 0 | Status: SHIPPED | OUTPATIENT
Start: 2021-12-03 | End: 2022-01-15 | Stop reason: SDUPTHER

## 2021-12-03 RX ORDER — CETIRIZINE HYDROCHLORIDE 10 MG/1
10 TABLET ORAL DAILY
Qty: 30 TABLET | Refills: 0
Start: 2021-12-03 | End: 2022-01-02

## 2021-12-03 RX ORDER — IBUPROFEN 800 MG/1
800 TABLET ORAL EVERY 8 HOURS PRN
Qty: 180 TABLET | Refills: 1 | Status: SHIPPED | OUTPATIENT
Start: 2021-12-03 | End: 2022-01-31

## 2021-12-03 ASSESSMENT — ENCOUNTER SYMPTOMS
COLOR CHANGE: 0
SORE THROAT: 0
TROUBLE SWALLOWING: 0
RHINORRHEA: 1
WHEEZING: 0
CHEST TIGHTNESS: 0
DIARRHEA: 0
NAUSEA: 0
VOMITING: 0
SHORTNESS OF BREATH: 0
COUGH: 0
CONSTIPATION: 0
BACK PAIN: 1
ABDOMINAL PAIN: 0

## 2021-12-03 ASSESSMENT — PATIENT HEALTH QUESTIONNAIRE - PHQ9
SUM OF ALL RESPONSES TO PHQ9 QUESTIONS 1 & 2: 0
2. FEELING DOWN, DEPRESSED OR HOPELESS: 0
1. LITTLE INTEREST OR PLEASURE IN DOING THINGS: 0
SUM OF ALL RESPONSES TO PHQ QUESTIONS 1-9: 0

## 2021-12-03 NOTE — PROGRESS NOTES
Visit Information    Have you changed or started any medications since your last visit including any over-the-counter medicines, vitamins, or herbal medicines? no   Are you having any side effects from any of your medications? -  no  Have you stopped taking any of your medications? Is so, why? -  no    Have you seen any other physician or provider since your last visit? Yes - Records Obtained  Have you had any other diagnostic tests since your last visit? Yes - Records Obtained  Have you been seen in the emergency room and/or had an admission to a hospital since we last saw you? No  Have you had your routine dental cleaning in the past 6 months? no    Have you activated your [employer] account? If not, what are your barriers? Yes     Patient Care Team:  TAMARA Rivera Do, CNP as PCP - General (Nurse Practitioner)    Medical History Review  Past Medical, Family, and Social History reviewed and does contribute to the patient presenting condition    Health Maintenance   Topic Date Due    DTaP/Tdap/Td vaccine (1 - Tdap) Never done    Colon cancer screen colonoscopy  Never done    Shingles Vaccine (1 of 2) Never done    Breast cancer screen  01/14/2022    COVID-19 Vaccine (3 - Booster for Shah Peter series) 05/02/2022    Diabetes screen  07/28/2024    Lipid screen  08/03/2026    Flu vaccine  Completed    Hepatitis C screen  Completed    HIV screen  Completed    Hepatitis A vaccine  Aged Out    Hepatitis B vaccine  Aged Out    Hib vaccine  Aged Out    Meningococcal (ACWY) vaccine  Aged Out    Pneumococcal 0-64 years Vaccine  Aged Out         [id number] Patient Note/History and Physical    Date of patient's visit: 12/3/2021    Name:  Fior Dobson      YOB: 1970    Patient Care Team:  TAMARA Rivera Do, CNP as PCP - General (Nurse Practitioner)    REASON FOR VISIT:   Establish care. HISTORY OF PRESENTING ILLNESS:    History was obtained from the patient.      Kelsey ARMENDARIZ History     Socioeconomic History    Marital status: Single     Spouse name: None    Number of children: None    Years of education: None    Highest education level: None   Occupational History    None   Tobacco Use    Smoking status: Never Smoker    Smokeless tobacco: Never Used   Vaping Use    Vaping Use: Never used   Substance and Sexual Activity    Alcohol use: Yes     Comment: OCCASIONAL    Drug use: Never    Sexual activity: None   Other Topics Concern    None   Social History Narrative    None     Social Determinants of Health     Financial Resource Strain: Low Risk     Difficulty of Paying Living Expenses: Not hard at all   Food Insecurity: No Food Insecurity    Worried About Running Out of Food in the Last Year: Never true    Neeraj of Food in the Last Year: Never true   Transportation Needs: No Transportation Needs    Lack of Transportation (Medical): No    Lack of Transportation (Non-Medical):  No   Physical Activity:     Days of Exercise per Week: Not on file    Minutes of Exercise per Session: Not on file   Stress:     Feeling of Stress : Not on file   Social Connections:     Frequency of Communication with Friends and Family: Not on file    Frequency of Social Gatherings with Friends and Family: Not on file    Attends Jainism Services: Not on file    Active Member of [employer] or Organizations: Not on file    Attends Club or Organization Meetings: Not on file    Marital Status: Not on file   Intimate Partner Violence:     Fear of Current or Ex-Partner: Not on file    Emotionally Abused: Not on file    Physically Abused: Not on file    Sexually Abused: Not on file   Housing Stability:     Unable to Pay for Housing in the Last Year: Not on file    Number of [place] in the Last Year: Not on file    Unstable Housing in the Last Year: Not on file         REVIEW OF SYSTEMS:   Review of Systems   Constitutional: Negative for chills, diaphoresis, fatigue, fever and unexpected weight change. HENT: Positive for congestion, ear pain and rhinorrhea. Negative for postnasal drip, sneezing, sore throat and trouble swallowing. Eyes: Negative for visual disturbance. Respiratory: Negative for cough, chest tightness, shortness of breath and wheezing. Cardiovascular: Negative for chest pain. Gastrointestinal: Negative for abdominal pain, constipation, diarrhea, nausea and vomiting. Endocrine: Negative for polydipsia, polyphagia and polyuria. Genitourinary: Negative for difficulty urinating, dysuria, flank pain, frequency and urgency. Musculoskeletal: Positive for arthralgias (follows with rheumatology for r/o lupus and fibromyalgia), back pain and myalgias. Skin: Negative for color change and rash. Neurological: Negative for dizziness, tremors, syncope, weakness and numbness. Psychiatric/Behavioral: Negative for confusion, decreased concentration (well controlled with adderall), hallucinations and suicidal ideas. PHYSICAL EXAM:      Vitals:    12/03/21 0953   BP: 116/72   Pulse: 73   SpO2: 97%   Weight: 209 lb (94.8 kg)   Height: 5' 9\" (1.753 m)       Physical Exam  Vitals reviewed. Constitutional:       Appearance: Normal appearance. HENT:      Head: Normocephalic. Right Ear: Tympanic membrane, ear canal and external ear normal.      Left Ear: Tympanic membrane, ear canal and external ear normal.   Cardiovascular:      Rate and Rhythm: Normal rate and regular rhythm. Pulses: Normal pulses. Heart sounds: Normal heart sounds. Pulmonary:      Effort: Pulmonary effort is normal.      Breath sounds: Normal breath sounds. Abdominal:      General: Bowel sounds are normal.      Palpations: Abdomen is soft. Tenderness: There is no right CVA tenderness or left CVA tenderness. Musculoskeletal:         General: No swelling, tenderness or deformity. Normal range of motion. Skin:     General: Skin is warm and dry.    Neurological: General: No focal deficit present. Mental Status: She is alert and oriented to person, place, and time. Psychiatric:         Mood and Affect: Mood normal.         Behavior: Behavior normal.         Thought Content: Thought content normal.         Judgment: Judgment normal.          LABORATORY FINDINGS:    CBC:   Lab Results   Component Value Date    WBC 14.7 11/29/2021    HGB 12.2 11/29/2021     11/29/2021     BMP:    Lab Results   Component Value Date     07/28/2021    K 4.4 07/28/2021     07/28/2021    CO2 24 07/28/2021    BUN 13 07/28/2021    CREATININE 0.81 11/29/2021    GLUCOSE 106 08/03/2021     Hemoglobin A1C:   Lab Results   Component Value Date    LABA1C 5.2 07/28/2021     Lipid profile:   Lab Results   Component Value Date    CHOL 251 08/03/2021    TRIG 137 08/03/2021    HDL 55 08/03/2021     Thyroid functions:   Lab Results   Component Value Date    TSH 6.90 11/29/2021      Hepatic functions:   Lab Results   Component Value Date    ALT 14 11/29/2021    AST 22 11/29/2021    PROT 6.7 11/29/2021    BILITOT 0.67 07/28/2021    LABALBU 4.3 11/29/2021       ASSESSMENT AND PLAN:     1. Encounter for medical examination to establish care  - previous labs reviewed and discussed with patient    2. Acute otalgia, left  - treat seasonal allergies   - fluticasone (FLONASE) 50 MCG/ACT nasal spray; 2 sprays by Each Nostril route daily  Dispense: 16 g; Refill: 0  - cetirizine (ZYRTEC) 10 MG tablet; Take 1 tablet by mouth daily  Dispense: 30 tablet; Refill: 0    3. Hashimoto's thyroiditis  - elevated TSH, reduce synthroid, uncontrolled thyroid may be source of hair loss  - levothyroxine (SYNTHROID) 175 MCG tablet; Take 1 tablet by mouth every morning (before breakfast)  Dispense: 90 tablet; Refill: 1  - TSH With Reflex Ft4; Future    4.  Attention deficit hyperactivity disorder (ADHD), predominantly inattentive type  - well controlled with medication, on current dose for over 10 years  - amphetamine-dextroamphetamine (ADDERALL) 20 MG tablet; Take 1 tablet by mouth 2 times daily for 30 days. Dispense: 60 tablet; Refill: 0    5. Lumbar back pain with radiculopathy affecting right lower extremity  - ibuprofen (ADVIL;MOTRIN) 800 MG tablet; Take 1 tablet by mouth every 8 hours as needed for Pain  Dispense: 180 tablet; Refill: 1    6. Screening for malignant neoplasm of colon  - Cologuard (For External Results Only); Future      INSTRUCTIONS:   Return in about 6 weeks (around 1/14/2022). Kelsey received counseling onthe following healthy behaviors: nutrition, exercise and medication adherence    Reviewed prior labs and healthmaintenance. Discussed use, benefit, and side effects of prescribed medications. Barriers tomedication compliance addressed. All patient questions answered. Patient voiced understanding. Patient given educational materials - see patient instructions    TAMARA Bettencourt - CNP   SARIKA WILBURN [facility]  12/3/2021, 11:10 AM    Please note that this chart was generated using voice recognition Dragon dictation software. Although every effort was made to ensure the accuracy of this automatedtranscription, some errors in transcription may have occurred.

## 2021-12-20 ENCOUNTER — OFFICE VISIT (OUTPATIENT)
Dept: INTERNAL MEDICINE CLINIC | Age: 51
End: 2021-12-20
Payer: COMMERCIAL

## 2021-12-20 ENCOUNTER — NURSE TRIAGE (OUTPATIENT)
Dept: OTHER | Facility: CLINIC | Age: 51
End: 2021-12-20

## 2021-12-20 VITALS
BODY MASS INDEX: 31.46 KG/M2 | HEIGHT: 69 IN | WEIGHT: 212.4 LBS | DIASTOLIC BLOOD PRESSURE: 88 MMHG | SYSTOLIC BLOOD PRESSURE: 120 MMHG

## 2021-12-20 DIAGNOSIS — M16.11 PRIMARY OSTEOARTHRITIS OF RIGHT HIP: Primary | ICD-10-CM

## 2021-12-20 PROCEDURE — 99213 OFFICE O/P EST LOW 20 MIN: CPT | Performed by: INTERNAL MEDICINE

## 2021-12-20 RX ORDER — HYDROCODONE BITARTRATE AND ACETAMINOPHEN 5; 325 MG/1; MG/1
1 TABLET ORAL EVERY 8 HOURS PRN
Qty: 15 TABLET | Refills: 0 | Status: SHIPPED | OUTPATIENT
Start: 2021-12-20 | End: 2021-12-25

## 2021-12-20 NOTE — PROGRESS NOTES
Subjective:      Patient ID: Kristan Kahn is a 49 y.o. female. HPI       HPI   1) Location/Symptom - Right Knee and Hip Pain   2) Timing/Onset: Couple of Months , had Injection in Right Hip by Dr Liz Riggs   3) Context/Setting: was in [facility], did lots of walking   4) Quality: dull aching   5) Duration: continuous   6) Modifying Factors: Worse with standing and walking   7) Severity: moderate   Xray right hip 7/21    Moderate asymmetric degenerative changes of the right hip       Has DJD and Disc Bulge in back with spinal stenosis   Patient is requesting referral to a new orthopedic surgeon. Review of Systems   Constitutional: Negative for activity change, appetite change, chills, diaphoresis, fatigue and fever. HENT: Negative for congestion, dental problem, drooling and ear discharge. Eyes: Negative for pain, discharge, redness and itching. Respiratory: Negative for apnea, cough, choking, chest tightness and shortness of breath. Cardiovascular: Negative for chest pain and leg swelling. Gastrointestinal: Negative for abdominal distention, abdominal pain, blood in stool, constipation and diarrhea. Endocrine: Negative for cold intolerance and heat intolerance. Genitourinary: Negative for difficulty urinating, dysuria, enuresis, flank pain and frequency. Musculoskeletal: Positive for arthralgias (Right hip pain, right knee pain. ). Negative for back pain, gait problem and joint swelling. Skin: Negative for color change, pallor and rash. Neurological: Negative for dizziness, facial asymmetry, light-headedness, numbness and headaches. Psychiatric/Behavioral: Negative for agitation, behavioral problems, confusion, decreased concentration and dysphoric mood. Objective:   Physical Exam  Constitutional:       Appearance: She is well-developed. She is obese. She is not diaphoretic. HENT:      Head: Normocephalic and atraumatic. Mouth/Throat:      Pharynx: No oropharyngeal exudate.    Eyes: General: No scleral icterus. Right eye: No discharge. Left eye: No discharge. Conjunctiva/sclera: Conjunctivae normal.      Pupils: Pupils are equal, round, and reactive to light. Neck:      Thyroid: No thyromegaly. Vascular: No JVD. Trachea: No tracheal deviation. Cardiovascular:      Rate and Rhythm: Normal rate. Heart sounds: Normal heart sounds. No murmur heard. No gallop. Pulmonary:      Effort: Pulmonary effort is normal. No respiratory distress. Breath sounds: Normal breath sounds. No stridor. No wheezing or rales. Chest:      Chest wall: No tenderness. Abdominal:      General: Bowel sounds are normal. There is no distension. Palpations: Abdomen is soft. Tenderness: There is no abdominal tenderness. There is no guarding or rebound. Musculoskeletal:         General: Tenderness (Positive Madan test on Right side ) present. Normal range of motion. Cervical back: Normal range of motion and neck supple. Neurological:      Mental Status: She is alert and oriented to person, place, and time. Assessment / Plan:   1. Primary osteoarthritis of right hip  - [facility]  - HYDROcodone-acetaminophen (NORCO) 5-325 MG per tablet; Take 1 tablet by mouth every 8 hours as needed for Pain for up to 5 days. Intended supply: 30 days  Dispense: 15 tablet; Refill: C/ Kassie 47, DO, Orthopedic Surgery, [address]      · Return in about 6 weeks (around 1/31/2022). · Reviewed prior labs and health maintenance. · Discussed use, benefit, and side effects of prescribed medications. Barriers to medication compliance addressed. All patient questions answered. Pt voiced understanding. Gerrianne Cranker, MD [facility]  12/26/2021, 5:54 PM    Please note that this chart was generated using voice recognition Dragon dictation software.   Although every effort was made to ensure the accuracy of this automated transcription, some errors in transcription may have occurred. Visit Information    Have you changed or started any medications since your last visit including any over-the-counter medicines, vitamins, or herbal medicines? no   Are you having any side effects from any of your medications? -  no  Have you stopped taking any of your medications? Is so, why? -  no    Have you seen any other physician or provider since your last visit? No  Have you had any other diagnostic tests since your last visit? Yes - Records Requested  Have you been seen in the emergency room and/or had an admission to a hospital since we last saw you? No  Have you had your routine dental cleaning in the past 6 months? no    Have you activated your [employer] account? If not, what are your barriers?  Yes     Patient Care Team:  TAMARA Rivera Do, CNP as PCP - General (Nurse Practitioner)  TAMARA Rivera Do, CNP as PCP - [facility] Sandhya Arrieta Provider    Medical History Review  Past Medical, Family, and Social History reviewed and does not contribute to the patient presenting condition    Health Maintenance   Topic Date Due    DTaP/Tdap/Td vaccine (1 - Tdap) Never done    Colon cancer screen colonoscopy  Never done    Shingles Vaccine (1 of 2) Never done    Breast cancer screen  01/14/2022    COVID-19 Vaccine (3 - Booster for Shah Peter series) 05/02/2022    Diabetes screen  07/28/2024    Lipid screen  08/03/2026    Flu vaccine  Completed    Hepatitis C screen  Completed    HIV screen  Completed    Hepatitis A vaccine  Aged Out    Hepatitis B vaccine  Aged Out    Hib vaccine  Aged Out    Meningococcal (ACWY) vaccine  Aged Out    Pneumococcal 0-64 years Vaccine  Aged Out

## 2021-12-20 NOTE — TELEPHONE ENCOUNTER
Received call from [address] at [facility] with [employer] Complaint. Subjective: Caller states \"swelling in right knee\"     Current Symptoms: swelling in right knee, pain,    Onset: 1 month ago; gradual    Associated Symptoms: NA    Pain Severity: 8/10; sharp, throbbing; constant    Temperature: denies  n/a    What has been tried: Motrin 800mg, very little relief, knee brace     LMP: NA Pregnant: NA    Recommended disposition: be seen in the office today, Did advise of U[facility] or walk in clinic if no appointments available. Care advice provided, patient verbalizes understanding; denies any other questions or concerns; instructed to call back for any new or worsening symptoms. Writer provided warm transfer to [facility] for appointment scheduling     Attention Provider: Thank you for allowing me to participate in the care of your patient. The patient was connected to triage in response to information provided to the ECC/PSC. Please do not respond through this encounter as the response is not directed to a shared pool.           Reason for Disposition   SEVERE pain (e.g., excruciating, unable to walk)    Protocols used: KNEE PAIN-ADULT-OH

## 2021-12-25 ENCOUNTER — APPOINTMENT (OUTPATIENT)
Dept: GENERAL RADIOLOGY | Age: 51
End: 2021-12-25
Payer: COMMERCIAL

## 2021-12-25 ENCOUNTER — HOSPITAL ENCOUNTER (EMERGENCY)
Age: 51
Discharge: HOME | End: 2021-12-26
Attending: EMERGENCY MEDICINE
Payer: COMMERCIAL

## 2021-12-25 ENCOUNTER — ANCILLARY PROCEDURE (OUTPATIENT)
Dept: EMERGENCY DEPT | Age: 51
End: 2021-12-25
Payer: COMMERCIAL

## 2021-12-25 DIAGNOSIS — M25.551 HIP PAIN, ACUTE, RIGHT: ICD-10-CM

## 2021-12-25 DIAGNOSIS — M25.561 ACUTE PAIN OF RIGHT KNEE: Primary | ICD-10-CM

## 2021-12-25 DIAGNOSIS — Z82.49 FAMILY HISTORY OF BRAIN ANEURYSM: ICD-10-CM

## 2021-12-25 LAB
ANION GAP SERPL CALCULATED.3IONS-SCNC: 14 MMOL/L (ref 9–17)
BUN BLDV-MCNC: 13 MG/DL (ref 6–20)
BUN/CREAT BLD: ABNORMAL (ref 9–20)
C-REACTIVE PROTEIN: 4.1 MG/L (ref 0–5)
CALCIUM SERPL-MCNC: 9.7 MG/DL (ref 8.6–10.4)
CHLORIDE BLD-SCNC: 100 MMOL/L (ref 98–107)
CO2: 23 MMOL/L (ref 20–31)
CREAT SERPL-MCNC: 0.87 MG/DL (ref 0.5–0.9)
GFR AFRICAN AMERICAN: >60 ML/MIN
GFR NON-AFRICAN AMERICAN: >60 ML/MIN
GFR SERPL CREATININE-BSD FRML MDRD: ABNORMAL ML/MIN/{1.73_M2}
GFR SERPL CREATININE-BSD FRML MDRD: ABNORMAL ML/MIN/{1.73_M2}
GLUCOSE BLD-MCNC: 111 MG/DL (ref 70–99)
POTASSIUM SERPL-SCNC: 3.9 MMOL/L (ref 3.7–5.3)
SODIUM BLD-SCNC: 137 MMOL/L (ref 135–144)
URIC ACID: 4 MG/DL (ref 2.4–5.7)

## 2021-12-25 PROCEDURE — 99284 EMERGENCY DEPT VISIT MOD MDM: CPT

## 2021-12-25 PROCEDURE — 80048 BASIC METABOLIC PNL TOTAL CA: CPT

## 2021-12-25 PROCEDURE — 2500000003 HC RX 250 WO HCPCS: Performed by: STUDENT IN AN ORGANIZED HEALTH CARE EDUCATION/TRAINING PROGRAM

## 2021-12-25 PROCEDURE — 86140 C-REACTIVE PROTEIN: CPT

## 2021-12-25 PROCEDURE — 85652 RBC SED RATE AUTOMATED: CPT

## 2021-12-25 PROCEDURE — 96372 THER/PROPH/DIAG INJ SC/IM: CPT

## 2021-12-25 PROCEDURE — 84550 ASSAY OF BLOOD/URIC ACID: CPT

## 2021-12-25 PROCEDURE — 6360000002 HC RX W HCPCS: Performed by: STUDENT IN AN ORGANIZED HEALTH CARE EDUCATION/TRAINING PROGRAM

## 2021-12-25 PROCEDURE — 96374 THER/PROPH/DIAG INJ IV PUSH: CPT

## 2021-12-25 PROCEDURE — 76882 US LMTD JT/FCL EVL NVASC XTR: CPT

## 2021-12-25 PROCEDURE — 73562 X-RAY EXAM OF KNEE 3: CPT

## 2021-12-25 PROCEDURE — 85025 COMPLETE CBC W/AUTO DIFF WBC: CPT

## 2021-12-25 PROCEDURE — 6370000000 HC RX 637 (ALT 250 FOR IP): Performed by: STUDENT IN AN ORGANIZED HEALTH CARE EDUCATION/TRAINING PROGRAM

## 2021-12-25 PROCEDURE — 20605 DRAIN/INJ JOINT/BURSA W/O US: CPT

## 2021-12-25 PROCEDURE — 73502 X-RAY EXAM HIP UNI 2-3 VIEWS: CPT

## 2021-12-25 RX ORDER — FENTANYL CITRATE 50 UG/ML
50 INJECTION, SOLUTION INTRAMUSCULAR; INTRAVENOUS ONCE
Status: COMPLETED | OUTPATIENT
Start: 2021-12-25 | End: 2021-12-26

## 2021-12-25 RX ORDER — BUPIVACAINE HYDROCHLORIDE 5 MG/ML
30 INJECTION, SOLUTION EPIDURAL; INTRACAUDAL ONCE
Status: COMPLETED | OUTPATIENT
Start: 2021-12-25 | End: 2021-12-25

## 2021-12-25 RX ORDER — FENTANYL CITRATE 50 UG/ML
100 INJECTION, SOLUTION INTRAMUSCULAR; INTRAVENOUS ONCE
Status: DISCONTINUED | OUTPATIENT
Start: 2021-12-25 | End: 2021-12-25

## 2021-12-25 RX ORDER — ORPHENADRINE CITRATE 30 MG/ML
60 INJECTION INTRAMUSCULAR; INTRAVENOUS ONCE
Status: COMPLETED | OUTPATIENT
Start: 2021-12-25 | End: 2021-12-25

## 2021-12-25 RX ORDER — ACETAMINOPHEN 500 MG
1000 TABLET ORAL ONCE
Status: COMPLETED | OUTPATIENT
Start: 2021-12-25 | End: 2021-12-25

## 2021-12-25 RX ADMIN — BUPIVACAINE HYDROCHLORIDE 150 MG: 5 INJECTION, SOLUTION PERINEURAL at 22:43

## 2021-12-25 RX ADMIN — ACETAMINOPHEN 1000 MG: 500 TABLET ORAL at 20:50

## 2021-12-25 RX ADMIN — ORPHENADRINE CITRATE 60 MG: 60 INJECTION INTRAMUSCULAR; INTRAVENOUS at 22:01

## 2021-12-25 ASSESSMENT — ENCOUNTER SYMPTOMS
SHORTNESS OF BREATH: 0
ABDOMINAL PAIN: 0
SORE THROAT: 0
RHINORRHEA: 0
DIARRHEA: 0
CONSTIPATION: 0
COUGH: 0

## 2021-12-25 ASSESSMENT — PAIN DESCRIPTION - PAIN TYPE
TYPE: ACUTE PAIN

## 2021-12-25 ASSESSMENT — PAIN SCALES - GENERAL
PAINLEVEL_OUTOF10: 8
PAINLEVEL_OUTOF10: 7
PAINLEVEL_OUTOF10: 9
PAINLEVEL_OUTOF10: 0

## 2021-12-25 ASSESSMENT — PAIN DESCRIPTION - LOCATION
LOCATION: HIP;KNEE
LOCATION: HIP;KNEE
LOCATION: LEG

## 2021-12-25 ASSESSMENT — PAIN DESCRIPTION - ORIENTATION
ORIENTATION: RIGHT

## 2021-12-25 NOTE — Clinical Note
Maryann Veronica was seen and treated in our emergency department on 12/25/2021. She may return to work on 12/27/2021. If you have any questions or concerns, please don't hesitate to call.       Arlene Pelletier, DO

## 2021-12-26 VITALS
TEMPERATURE: 97.5 F | OXYGEN SATURATION: 95 % | DIASTOLIC BLOOD PRESSURE: 77 MMHG | RESPIRATION RATE: 18 BRPM | SYSTOLIC BLOOD PRESSURE: 135 MMHG | HEART RATE: 88 BPM

## 2021-12-26 LAB
ABSOLUTE EOS #: 0 K/UL (ref 0–0.44)
ABSOLUTE IMMATURE GRANULOCYTE: 0.15 K/UL (ref 0–0.3)
ABSOLUTE LYMPH #: 0.59 K/UL (ref 1.1–3.7)
ABSOLUTE MONO #: 0.15 K/UL (ref 0.1–1.2)
BASOPHILS # BLD: 1 % (ref 0–2)
BASOPHILS ABSOLUTE: 0.15 K/UL (ref 0–0.2)
DIFFERENTIAL TYPE: ABNORMAL
EOSINOPHILS RELATIVE PERCENT: 0 % (ref 1–4)
HCT VFR BLD CALC: 43.1 % (ref 36.3–47.1)
HEMOGLOBIN: 13.9 G/DL (ref 11.9–15.1)
IMMATURE GRANULOCYTES: 1 %
LYMPHOCYTES # BLD: 4 % (ref 24–43)
MCH RBC QN AUTO: 31 PG (ref 25.2–33.5)
MCHC RBC AUTO-ENTMCNC: 32.3 G/DL (ref 28.4–34.8)
MCV RBC AUTO: 96 FL (ref 82.6–102.9)
MONOCYTES # BLD: 1 % (ref 3–12)
MORPHOLOGY: NORMAL
NRBC AUTOMATED: 0 PER 100 WBC
PDW BLD-RTO: 12.9 % (ref 11.8–14.4)
PLATELET # BLD: 313 K/UL (ref 138–453)
PLATELET ESTIMATE: ABNORMAL
PMV BLD AUTO: 9.6 FL (ref 8.1–13.5)
RBC # BLD: 4.49 M/UL (ref 3.95–5.11)
RBC # BLD: ABNORMAL 10*6/UL
SEDIMENTATION RATE, ERYTHROCYTE: 29 MM (ref 0–30)
SEG NEUTROPHILS: 93 % (ref 36–65)
SEGMENTED NEUTROPHILS ABSOLUTE COUNT: 13.76 K/UL (ref 1.5–8.1)
WBC # BLD: 14.8 K/UL (ref 3.5–11.3)
WBC # BLD: ABNORMAL 10*3/UL

## 2021-12-26 PROCEDURE — 6370000000 HC RX 637 (ALT 250 FOR IP): Performed by: STUDENT IN AN ORGANIZED HEALTH CARE EDUCATION/TRAINING PROGRAM

## 2021-12-26 PROCEDURE — 6360000002 HC RX W HCPCS: Performed by: STUDENT IN AN ORGANIZED HEALTH CARE EDUCATION/TRAINING PROGRAM

## 2021-12-26 RX ORDER — CYCLOBENZAPRINE HCL 5 MG
5 TABLET ORAL 2 TIMES DAILY PRN
Qty: 5 TABLET | Refills: 0 | Status: SHIPPED | OUTPATIENT
Start: 2021-12-26 | End: 2022-01-25

## 2021-12-26 RX ORDER — PREDNISONE 20 MG/1
40 TABLET ORAL DAILY
Qty: 10 TABLET | Refills: 0 | Status: SHIPPED | OUTPATIENT
Start: 2021-12-26 | End: 2021-12-31

## 2021-12-26 RX ORDER — PREDNISONE 20 MG/1
60 TABLET ORAL ONCE
Status: COMPLETED | OUTPATIENT
Start: 2021-12-26 | End: 2021-12-26

## 2021-12-26 RX ADMIN — PREDNISONE 60 MG: 20 TABLET ORAL at 00:18

## 2021-12-26 RX ADMIN — FENTANYL CITRATE 50 MCG: 50 INJECTION INTRAMUSCULAR; INTRAVENOUS at 00:18

## 2021-12-26 ASSESSMENT — ENCOUNTER SYMPTOMS
CHEST TIGHTNESS: 0
ABDOMINAL DISTENTION: 0
COUGH: 0
CHOKING: 0
BLOOD IN STOOL: 0
DIARRHEA: 0
EYE ITCHING: 0
APNEA: 0
ABDOMINAL PAIN: 0
EYE PAIN: 0
COLOR CHANGE: 0
CONSTIPATION: 0
EYE DISCHARGE: 0
BACK PAIN: 0
SHORTNESS OF BREATH: 0
EYE REDNESS: 0

## 2021-12-26 ASSESSMENT — PAIN SCALES - GENERAL: PAINLEVEL_OUTOF10: 8

## 2021-12-26 NOTE — ED PROVIDER NOTES
[address]   Emergency Department  Emergency Medicine Attending Sign-out     Care of Justin Harvey was assumed from previous attending Dr. Alesia Meade and is being seen for Leg Pain (pain raidiates from knee to groin)  . The patient's initial evaluation and plan have been discussed with the prior provider who initially evaluated the patient. Attestation  I was available and discussed any additional care issues that arose and coordinated the management plans with the resident(s) caring for the patient during my duty period. Any areas of disagreement with resident's documentation of care or procedures are noted on the chart. I was personally present for the key portions of any/all procedures, during my duty period. I have documented in the chart those procedures where I was not present during the key portions. BRIEF PATIENT SUMMARY/MDM COURSE PER INITIAL PROVIDER:   RECENT VITALS:     Temp: 97.5 °F (36.4 °C),  Pulse: 88, Resp: 18, BP: 135/77, SpO2: 95 %    This patient is a 49 y.o. Female with atraumatic knee pain in a patient w/ h/o rheumatoid. Patient does have significant swelling but no erythema or warmth. Per initial physician, arthrocentesis attempted but unsuccesful and therefore checking basic labs., If inflammatory labs elevated plan is to reach out to ortho.      DIAGNOSTICS/MEDICATIONS:     MEDICATIONS GIVEN:  ED Medication Orders (From admission, onward)    Start Ordered     Status Ordering Provider    12/26/21 0030 12/26/21 0015  predniSONE (DELTASONE) tablet 60 mg  ONCE         Last MAR action: Given - by Eve Richardson on 12/26/21 at [address], [facility]    12/25/21 2315 12/25/21 2305  fentaNYL (SUBLIMAZE) injection 50 mcg  ONCE         Last MAR action: Given - by [facility] KAVIN MARTIN on 12/26/21 at [address], [facility]    12/25/21 2145 12/25/21 2144  bupivacaine (MARCAINE) 0.5 % injection 150 mg  ONCE         Last MAR action: Given - by Eve Richardson on 12/25/21 at [facility] 9881, [place] F    12/25/21 2145 12/25/21 2144  orphenadrine (NORFLEX) injection 60 mg  ONCE         Last MAR action: Given - by Olivia Ramon on 12/25/21 at [facility] 930, [address] F    12/25/21 2100 12/25/21 2048  acetaminophen (TYLENOL) tablet 1,000 mg  ONCE         Last MAR action: Given - by Olivia Ramon on 12/25/21 at [facility] [address] Reviewed   CBC WITH AUTO DIFFERENTIAL - Abnormal; Notable for the following components:       Result Value    WBC 14.8 (*)     Immature Granulocytes 1 (*)     Seg Neutrophils 93 (*)     Lymphocytes 4 (*)     Monocytes 1 (*)     Eosinophils % 0 (*)     Segs Absolute 13.76 (*)     Absolute Lymph # 0.59 (*)     All other components within normal limits   BASIC METABOLIC PANEL W/ REFLEX TO MG FOR LOW K - Abnormal; Notable for the following components:    Glucose 111 (*)     All other components within normal limits   SEDIMENTATION RATE   C-REACTIVE PROTEIN   URIC ACID       RADIOLOGY  XR HIP RIGHT (2-3 VIEWS)    Result Date: 12/25/2021  EXAMINATION: TWO XRAY VIEWS OF THE RIGHT HIP 12/25/2021 8:10 pm COMPARISON: None. HISTORY: ORDERING SYSTEM PROVIDED HISTORY: worsening right hip pain TECHNOLOGIST PROVIDED HISTORY: worsening right hip pain Reason for Exam: pain to rt hip and rt knee hx of steroid injection FINDINGS: Frontal and lateral view radiographs of the right hip were obtained. Bone mineralization is normal.  There is no evidence of acute or healing fracture or destructive osseous abnormality. Joint relationships are maintained. There is moderate right hip osteoarthritis in a superolateral distribution, noting osteophyte formation with productive change and joint space narrowing. No appreciable soft tissue abnormality. Moderate right hip osteoarthritis. No acute fracture or hip dislocation. XR KNEE RIGHT (3 VIEWS)    Result Date: 12/25/2021  EXAMINATION: THREE XRAY VIEWS OF THE RIGHT KNEE 12/25/2021 8:10 pm COMPARISON: None.  HISTORY: ORDERING SYSTEM PROVIDED HISTORY: worsening pain, previous steroid injection, pain at medial and lateral aspect TECHNOLOGIST PROVIDED HISTORY: worsening pain, previous steroid injection, pain at medial and lateral aspect Reason for Exam: pain to rt hip and rt knee hx of steroid injection FINDINGS: Frontal, lateral, and oblique view radiographs of the rightknee were obtained. Bone mineralization is normal.  The osseous structures are intact without acute fracture or destructive abnormality. Joint relationships are maintained. No significant degenerative findings. No joint effusion. No appreciable soft tissue abnormality. Unremarkable right knee. POC US EXTREMITY NON VASC LIMITED    Result Date: 12/25/2021  POCUS_Soft_Tissue:     Exam Information:         Exam type:  Clinically indicated     Indication(s) for Exam:         The exam was performed with the following indications[de-identified]  Soft tissue pain         Other Indication(s):  knee pain     Views obtained/location & Images Saved for These Views:         Multiple sonographic views were obtained in the following specific location:  Right knee     Findings:         Exam of the above structures revealed the following findings[de-identified]  Normal exam     Interpretation:         Normal limited soft tissue ultrasound     Confirmatory study:         What confirmatory study was done?:  Not applicable Electronically signed by Monie Jones on Saturday, December 25, 2021 at 11:18 PM      OUTSTANDING TASKS / ADDITIONAL COMMENTS   1. Labs    Paitents ESR and CRP wnl. Decision to d/c with prednisone burst and f/u with rheumatology.      Sj Harmon MD  Emergency Medicine Attending  [facility]        Nidia Roman MD  12/26/21 2825

## 2021-12-26 NOTE — ED NOTES
Bed: 06  Expected date:   Expected time:   Means of arrival:   Comments:     Shruthi Espitia RN  12/25/21 2037

## 2021-12-26 NOTE — ED PROVIDER NOTES
[facility]     Emergency Department     Faculty Note/ Attestation      Pt Name: Brayan Granados                                       MRN: [id number]  [place] 1970  Date of evaluation: 12/25/2021    Patients PCP:    TAMARA Dee - CNP    Attestation  I performed a history and physical examination of the patient and discussed management with the resident. I reviewed the residents note and agree with the documented findings and plan of care. Any areas of disagreement are noted on the chart. I was personally present for the key portions of any procedures. I have documented in the chart those procedures where I was not present during the key portions. I have reviewed the emergency nurses triage note. I agree with the chief complaint, past medical history, past surgical history, allergies, medications, social and family history as documented unless otherwise noted below. For Physician Assistant/ Nurse Practitioner cases/documentation I have personally evaluated this patient and have completed at least one if not all key elements of the E/M (history, physical exam, and MDM). Additional findings are as noted. Initial Screens:        Oli Coma Scale  Eye Opening: Spontaneous  Best Verbal Response: Oriented  Best Motor Response: Obeys commands  Oli Coma Scale Score: 15    Vitals:    Vitals:    12/25/21 1951   BP: 109/71   Pulse: 85   Resp: 18   Temp: 97.5 °F (36.4 °C)   TempSrc: Oral   SpO2: 99%       CHIEF COMPLAINT       Chief Complaint   Patient presents with    Leg Pain     pain raidiates from knee to groin       The pt is a 48 YO F with hypothyroid and chronic low back pian with radiculopathy and chronic R hip pain. The pt had received a steroid injection of the knee and hip and has increasing pain since that time. There is no trauma pain has worsened woke this AM increasing pain of the R knee and R hip.           EMERGENCY DEPARTMENT COURSE: -------------------------  BP: 109/71, Temp: 97.5 °F (36.4 °C), Pulse: 85, Resp: 18  Physical Exam  Constitutional:       Appearance: She is well-developed. She is not diaphoretic. HENT:      Head: Normocephalic and atraumatic. Right Ear: External ear normal.      Left Ear: External ear normal.   Eyes:      General: No scleral icterus. Right eye: No discharge. Left eye: No discharge. Neck:      Trachea: No tracheal deviation. Pulmonary:      Effort: Pulmonary effort is normal. No respiratory distress. Breath sounds: No stridor. Musculoskeletal:      Cervical back: Normal range of motion. Comments: Decreased ROM and pain with tenderness of the knee. Skin:     General: Skin is warm and dry. Neurological:      Mental Status: She is alert and oriented to person, place, and time. Coordination: Coordination normal.   Psychiatric:         Behavior: Behavior normal.         Comments  The patient has a traumatic knee pain with decreased ROM and a steroid injection 5 weeks prior will need a knee tap. ED Course as of 12/25/21 2331   Sat Dec 25, 2021   2105 Patient's rheumatologist is Dr. Truddie Crigler per patient  [MA]   2130 XR KNEE RIGHT (3 VIEWS)  FINDINGS:  Frontal, lateral, and oblique view radiographs of the rightknee were obtained.     Bone mineralization is normal.  The osseous structures are intact without  acute fracture or destructive abnormality. Joint relationships are  maintained. No significant degenerative findings. No joint effusion. No  appreciable soft tissue abnormality.     IMPRESSION:  Unremarkable right knee. [MA]   2139 XR HIP RIGHT (2-3 VIEWS)  FINDINGS:  Frontal and lateral view radiographs of the right hip were obtained.     Bone mineralization is normal.  There is no evidence of acute or healing  fracture or destructive osseous abnormality. Joint relationships are  maintained.   There is moderate right hip osteoarthritis in a superolateral  distribution, noting osteophyte formation with productive change and joint  space narrowing. No appreciable soft tissue abnormality.     IMPRESSION:  Moderate right hip osteoarthritis. No acute fracture or hip dislocation. [MA]   2140 Discuss results with patient. Bed side performed do see a small fluid collection discussed with patient that we will do an arthrocentesis and sent for testing [MA]   2303 Attempted knee aspiration and was able to reach the joint capsule although there was no fluid able to be aspirated. Did provide 1 cc of bupivacaine for relief. Due to patient having continued significant pain will obtain lab work to include CBC, BMP, CRP, sed rate, uric acid. Obtain IV access. Will provide fentanyl for symptomatic relief  [MA]      ED Course User Index  [MA] Paras Hankins DO   Patient signed out to Dr. Ravi Smith to follow the inflamitory markers if elevated will need ortho consult    Ronda Sadler DO,, DO, RDMS.   Attending Emergency Physician          Ronda Sadler DO  12/25/21 4721

## 2021-12-26 NOTE — ED PROVIDER NOTES
[facility]  ED  Emergency Department Encounter  Emergency Medicine Resident     Pt Name: Devonda Phalen  MRN: [id number]  [place] 1970  Date of evaluation: 12/25/21  PCP:  TAMARA Almaguer CNP    CHIEF COMPLAINT       Chief Complaint   Patient presents with    Leg Pain     pain raidiates from knee to groin       HISTORY OFPRESENT ILLNESS  (Location/Symptom, Timing/Onset, Context/Setting, Quality, Duration, Modifying Letta Little.)      Devonda Phalen is a 49 y.o. female with past medical history significant for hypothyroidism and chronic right back pain with chronic right hip and right knee pain which has been worsening over the past 5 weeks. Patient received a steroid injection in her right knee and right hip per her rheumatologist 5 weeks ago which normally relieves her pain although her pain has been increasing over the past 5 weeks since then. This evening patient states that she awoke with worsening knee pain and hip pain. States that the pain is sharp in nature, moderate to severe in nature. Did take Tylenol at home with minimal relief. Patient states that Tylenol normally relieves her pain. This morning she did attempt to take a Norco as well which did not touch her pain. States that deep achy sharp pain in the right knee and right hip. Patient was at work although came in to be evaluated because she is unable to bear weight on the knee or hip. Is having difficulty ambulating. Did not have any injury that she knows, no known trauma. Has recently traveled to [place] in which she flew although does not have any calf tenderness, has had some calf cramping. Denies any numbness or tingling in the right foot with exception of pinky toes which is chronic due to lumbar radiculopathy which patient has had previously. Denies any weakness of the foot. PAST MEDICAL / SURGICAL / SOCIAL / FAMILY HISTORY      has a past medical history of Asthma.      has a past surgical history that includes Hysterectomy, vaginal and bladder suspension. Social:  reports that she has never smoked. She has never used smokeless tobacco. She reports current alcohol use. She reports that she does not use drugs. Family Hx:   Family History   Problem Relation Age of Onset    Breast Cancer Mother     Other Father         Allergies:  Morphine and [place] [macadamia nut oil]    Home Medications:  Prior to Admission medications    Medication Sig Start Date End Date Taking? Authorizing Provider   predniSONE (DELTASONE) 20 MG tablet Take 2 tablets by mouth daily for 5 days 12/26/21 12/31/21 Yes Jeremiah Cheers, DO   cyclobenzaprine (FLEXERIL) 5 MG tablet Take 1 tablet by mouth 2 times daily as needed for Muscle spasms 12/26/21  Yes Jeremiah Cheers, DO   levothyroxine (SYNTHROID) 175 MCG tablet Take 1 tablet by mouth every morning (before breakfast) 12/3/21   TAMARA Bush CNP   fluticasone [facility]) 50 MCG/ACT nasal spray 2 sprays by Each Nostril route daily 12/3/21   TAMARA Bush CNP   cetirizine (ZYRTEC) 10 MG tablet Take 1 tablet by mouth daily 12/3/21 1/2/22  TAMARA Bush CNP   amphetamine-dextroamphetamine (ADDERALL) 20 MG tablet Take 1 tablet by mouth 2 times daily for 30 days.  12/3/21 1/2/22  TAMARA Bush CNP   ibuprofen (ADVIL;MOTRIN) 800 MG tablet Take 1 tablet by mouth every 8 hours as needed for Pain 12/3/21   TAMARA Bush CNP   naproxen (NAPROSYN) 500 MG tablet Take 1 tablet by mouth 2 times daily (with meals) for 15 days 8/19/21 12/20/21  TAMARA Oleary CNP   vitamin D (ERGOCALCIFEROL) 1.25 MG (13361 UT) CAPS capsule Take 1 tab every Monday and every Friday 8/11/21   TAMARA Oleary CNP   baclofen (LIORESAL) 10 MG tablet Take 1-2 tablets by mouth 2 times daily as needed (PRN for back pain, stiffness) 7/14/21   TAMARA Oleary CNP   EPINEPHrine (EPIPEN 2-DINO) 0.3 MG/0.3ML SOAJ injection Inject 0.3 mLs into the muscle once for 1 dose Use as directed for allergic reaction, facial swelling 4/18/21 12/3/21  Fabrizio Rousseau, APRN - CNP   Flullf-VsLdz-IjLoo-Meth-FA-DHA (PRENATE MINI) 18-0.6-0.4-350 MG CAPS Take 1 capsule by mouth daily 3/25/21   Historical Provider, MD   albuterol sulfate  (90 Base) MCG/ACT inhaler Inhale into the lungs every 4 hours as needed    Historical Provider, MD       REVIEW OFSYSTEMS    (2-9 systems for level 4, 10 or more for level 5)      Review of Systems   Constitutional: Negative for activity change, chills and fever. HENT: Negative for congestion, rhinorrhea and sore throat. Respiratory: Negative for cough and shortness of breath. Cardiovascular: Negative for chest pain and palpitations. Gastrointestinal: Negative for abdominal pain, constipation and diarrhea. Genitourinary:        No Loss of bowel or bladder continence   Musculoskeletal:        Right knee and hip pain   Skin: Negative for rash and wound. Neurological: Negative for dizziness and headaches. PHYSICAL EXAM   (up to 7 for level 4, 8 or more forlevel 5)      INITIAL VITALS:   Vitals:    12/26/21 0000   BP: 135/77   Pulse:    Resp:    Temp:    SpO2: 95%        Physical Exam  Constitutional:       General: She is not in acute distress. Appearance: She is not ill-appearing, toxic-appearing or diaphoretic. HENT:      Head: Normocephalic and atraumatic. Nose: Nose normal.      Mouth/Throat:      Mouth: Mucous membranes are moist.      Pharynx: Oropharynx is clear. Eyes:      General:         Right eye: No discharge. Left eye: No discharge. Extraocular Movements: Extraocular movements intact. Pupils: Pupils are equal, round, and reactive to light. Cardiovascular:      Rate and Rhythm: Normal rate and regular rhythm. Pulses: Normal pulses. Heart sounds: Normal heart sounds. Pulmonary:      Effort: Pulmonary effort is normal. No respiratory distress.       Breath sounds: Normal breath sounds. Musculoskeletal:      Cervical back: Normal range of motion. No rigidity. Right lower leg: No edema. Left lower leg: No edema. Comments: Left lower extremity within normal limits, no acute abnormalities. Right lower extremity notable for significant tenderness palpation of the lateral and medial aspect of the knee joint. Mild effusion noted, no erythema or edema noted. Patient painful through range of motion. Unable to bear weight on the right knee. Patient right hip is atraumatic, no significant tenderness to palpation painful through range of motion of right hip as well. Dorsalis pedis and posterior tibial pulse 2+ and palpable. Able to move all 5 toes. Good capillary refill in all 5 toes. Ankle able to move through full range of motion. Skin:     General: Skin is warm and dry. Neurological:      General: No focal deficit present. Mental Status: She is alert and oriented to person, place, and time. Psychiatric:         Mood and Affect: Mood normal.           DIFFERENTIAL  DIAGNOSIS       DDX: Septic joint versus crystal arthropathy versus arthritis versus fracture versus dislocation versus    Initial MDM/Plan: 49 y.o. female who presents with reports of 5 weeks of worsening right knee and right hip pain since patient received steroid injection per her rheumatologist. Upon my initial examination patient is resting comfortably in the cot, in no acute distress, no respiratory distress, speaking full sentences. Vital signs upon arrival are within normal limits including patient being afebrile, nontachycardic, nontachypneic, nontoxic-appearing. Patient has a GCS of 15 is alert, oriented, answering all questions appropriately. Obtain x-ray imaging to rule out occult fracture or dislocation or osseous abnormality. Pending negative imaging will discuss with patient plan for arthrocentesis for concerns for septic joint.       EMERGENCY DEPARTMENT COURSE:  ED Course as of 12/26/21 0058 American >60   GFR Comment        GFR Staging NOT REPORTED  negative [MA]   0056 Discussed with laboratory work-up with patient. Discussed that there is no signs of infection currently. Will provide prednisone burst for concerns for rheumatic flare. Discussed strict follow-up with primary care and rheumatologist.  Patient is compliant and understanding of this. Strict return precautions provided. Patient expresses understanding of discharge instructions and is able to repeat strict return precautions back to me. Patient discharged in stable condition after remained vitally stable throughout emergency department stay. [MA]      ED Course User Index  [MA] Jaime Ugarte,         DIAGNOSTIC RESULTS / EMERGENCYDEPARTMENT COURSE / MDM     LABS:  Labs Reviewed   CBC WITH AUTO DIFFERENTIAL - Abnormal; Notable for the following components:       Result Value    WBC 14.8 (*)     Immature Granulocytes 1 (*)     Seg Neutrophils 93 (*)     Lymphocytes 4 (*)     Monocytes 1 (*)     Eosinophils % 0 (*)     Segs Absolute 13.76 (*)     Absolute Lymph # 0.59 (*)     All other components within normal limits   BASIC METABOLIC PANEL W/ REFLEX TO MG FOR LOW K - Abnormal; Notable for the following components:    Glucose 111 (*)     All other components within normal limits   SEDIMENTATION RATE   C-REACTIVE PROTEIN   URIC ACID         RADIOLOGY:  XR HIP RIGHT (2-3 VIEWS)    Result Date: 12/25/2021  EXAMINATION: TWO XRAY VIEWS OF THE RIGHT HIP 12/25/2021 8:10 pm COMPARISON: None. HISTORY: ORDERING SYSTEM PROVIDED HISTORY: worsening right hip pain TECHNOLOGIST PROVIDED HISTORY: worsening right hip pain Reason for Exam: pain to rt hip and rt knee hx of steroid injection FINDINGS: Frontal and lateral view radiographs of the right hip were obtained. Bone mineralization is normal.  There is no evidence of acute or healing fracture or destructive osseous abnormality. Joint relationships are maintained.   There is moderate right hip osteoarthritis in a superolateral distribution, noting osteophyte formation with productive change and joint space narrowing. No appreciable soft tissue abnormality. Moderate right hip osteoarthritis. No acute fracture or hip dislocation. XR KNEE RIGHT (3 VIEWS)    Result Date: 12/25/2021  EXAMINATION: THREE XRAY VIEWS OF THE RIGHT KNEE 12/25/2021 8:10 pm COMPARISON: None. HISTORY: ORDERING SYSTEM PROVIDED HISTORY: worsening pain, previous steroid injection, pain at medial and lateral aspect TECHNOLOGIST PROVIDED HISTORY: worsening pain, previous steroid injection, pain at medial and lateral aspect Reason for Exam: pain to rt hip and rt knee hx of steroid injection FINDINGS: Frontal, lateral, and oblique view radiographs of the rightknee were obtained. Bone mineralization is normal.  The osseous structures are intact without acute fracture or destructive abnormality. Joint relationships are maintained. No significant degenerative findings. No joint effusion. No appreciable soft tissue abnormality. Unremarkable right knee.      POC US EXTREMITY NON VASC LIMITED    Result Date: 12/25/2021  POCUS_Soft_Tissue:     Exam Information:         Exam type:  Clinically indicated     Indication(s) for Exam:         The exam was performed with the following indications[de-identified]  Soft tissue pain         Other Indication(s):  knee pain     Views obtained/location & Images Saved for These Views:         Multiple sonographic views were obtained in the following specific location:  Right knee     Findings:         Exam of the above structures revealed the following findings[de-identified]  Normal exam     Interpretation:         Normal limited soft tissue ultrasound     Confirmatory study:         What confirmatory study was done?:  Not applicable Electronically signed by Milena Larkin on Saturday, December 25, 2021 at 11:18 PM            PROCEDURES:  Arthrocentesis    Date/Time: 12/25/2021 11:09 PM  Performed by: Slime Turcios   Authorized by: Astrid Gtz DO     Consent:     Consent obtained:  Verbal    Consent given by:  Patient    Risks discussed:  Bleeding, infection, pain and incomplete drainage    Alternatives discussed:  No treatment  Location:     Location:  Knee    Knee:  R knee  Anesthesia (see MAR for exact dosages): Anesthesia method:  Local infiltration    Local anesthetic:  Bupivacaine 0.5% w/o epi  Procedure details:     Preparation: Patient was prepped and draped in usual sterile fashion      Needle gauge: 21 G. Ultrasound guidance: visualized with ultrasound. Approach:  Inferior    Aspirate amount:  0 cc     Intraarticular injection: injected 1 cc of bupivicaine     Post-procedure details:     Dressing:  Adhesive bandage    Patient tolerance of procedure: Tolerated well, no immediate complications  Comments:      Was unable to obtain aspirate to be tested despite successfully entering joint space, 1 cc of bupivacaine injected into the joint for local relief          CONSULTS:  None      FINAL IMPRESSION      1. Acute pain of right knee    2. Hip pain, acute, right          DISPOSITION / PLAN     DISPOSITION  Decision to discharge       PATIENT REFERRED TO:  TAMARA Santo - CNP  [place] [facility] [address] [place] [address]  [phone]    Call   As needed    [facility]  [address] [place] [address]  [phone]    As needed, If symptoms worsen    [facility] Ortiz  [address], [address] [facility]    Call   for post emergency department follow up with your Rheumatologist      DISCHARGE MEDICATIONS:  New Prescriptions    CYCLOBENZAPRINE (FLEXERIL) 5 MG TABLET    Take 1 tablet by mouth 2 times daily as needed for Muscle spasms    PREDNISONE (DELTASONE) 20 MG TABLET    Take 2 tablets by mouth daily for 5 days       Arlene Pelletier DO  Emergency Medicine Resident    (Please note that portions of this note were completed with a voice recognition

## 2021-12-29 ENCOUNTER — OFFICE VISIT (OUTPATIENT)
Dept: INTERNAL MEDICINE CLINIC | Age: 51
End: 2021-12-29
Payer: COMMERCIAL

## 2021-12-29 ENCOUNTER — HOSPITAL ENCOUNTER (OUTPATIENT)
Dept: PHYSICAL THERAPY | Age: 51
Setting detail: THERAPIES SERIES
Discharge: HOME | End: 2021-12-29
Payer: COMMERCIAL

## 2021-12-29 VITALS
SYSTOLIC BLOOD PRESSURE: 118 MMHG | HEART RATE: 83 BPM | OXYGEN SATURATION: 95 % | DIASTOLIC BLOOD PRESSURE: 84 MMHG | WEIGHT: 202 LBS | BODY MASS INDEX: 29.83 KG/M2 | TEMPERATURE: 97.9 F

## 2021-12-29 DIAGNOSIS — M25.561 CHRONIC PAIN OF RIGHT KNEE: ICD-10-CM

## 2021-12-29 DIAGNOSIS — M16.11 PRIMARY OSTEOARTHRITIS OF RIGHT HIP: Primary | ICD-10-CM

## 2021-12-29 DIAGNOSIS — G89.29 CHRONIC PAIN OF RIGHT KNEE: ICD-10-CM

## 2021-12-29 PROCEDURE — 99213 OFFICE O/P EST LOW 20 MIN: CPT | Performed by: NURSE PRACTITIONER

## 2021-12-29 PROCEDURE — 97162 PT EVAL MOD COMPLEX 30 MIN: CPT

## 2021-12-29 PROCEDURE — 1111F DSCHRG MED/CURRENT MED MERGE: CPT | Performed by: NURSE PRACTITIONER

## 2021-12-29 RX ORDER — HYDROCODONE BITARTRATE AND ACETAMINOPHEN 5; 325 MG/1; MG/1
1 TABLET ORAL EVERY 4 HOURS PRN
Qty: 42 TABLET | Refills: 0 | Status: SHIPPED | OUTPATIENT
Start: 2021-12-29 | End: 2022-01-05

## 2021-12-29 NOTE — PROGRESS NOTES
Visit Information    Have you changed or started any medications since your last visit including any over-the-counter medicines, vitamins, or herbal medicines? no   Have you stopped taking any of your medications? Is so, why? -  no  Are you having any side effects from any of your medications? - no    Have you seen any other physician or provider since your last visit?  no   Have you had any other diagnostic tests since your last visit? yes -    Have you been seen in the emergency room and/or had an admission in a hospital since we last saw you?  yes -    Have you had your routine dental cleaning in the past 6 months?  yes -      Do you have an active MyChart account? If no, what is the barrier? Yes    Patient Care Team:  TAMARA Santo CNP as PCP - General (Nurse Practitioner)  TAMARA Santo CNP as PCP - [facility]    Medical History Review  Past Medical, Family, and Social History reviewed and does not contribute to the patient presenting condition    Health Maintenance   Topic Date Due    DTaP/Tdap/Td vaccine (1 - Tdap) Never done    Colon cancer screen colonoscopy  Never done    Shingles Vaccine (1 of 2) Never done    Breast cancer screen  01/14/2022    COVID-19 Vaccine (3 - Booster for Shah Peter series) 05/02/2022    Diabetes screen  07/28/2024    Lipid screen  08/03/2026    Flu vaccine  Completed    Hepatitis C screen  Completed    HIV screen  Completed    Hepatitis A vaccine  Aged Out    Hepatitis B vaccine  Aged Out    Hib vaccine  Aged Out    Meningococcal (ACWY) vaccine  Aged Out    Pneumococcal 0-64 years Vaccine  Aged Out            Post-Discharge Transitional Care Management Services or Hospital Follow Up      Alicia Arriaga   YOB: 1970    Date of Office Visit:  12/29/2021  Date of Hospital Admission: 12/25/21  Date of Hospital Discharge: 12/26/21  Risk of hospital readmission (high >=14%.  Medium >=10%) :No data recorded    Care management risk score Rising risk (score 2-5) and Complex Care (Scores >=6): 0     Non face to face  following discharge, date last encounter closed (first attempt may have been earlier): *No documented post hospital discharge outreach found in the last 14 days    Call initiated 2 business days of discharge: *No response recorded in the last 14 days    Patient Active Problem List   Diagnosis    Nut allergy    Attention deficit hyperactivity disorder (ADHD), predominantly inattentive type    Hashimoto's thyroiditis    Family history of brain aneurysm    Family history of systemic lupus erythematosus    Chronic midline low back pain without sciatica    Hip pain    Hip arthritis    Lumbar degenerative disc disease       Allergies   Allergen Reactions    Morphine Hives and Swelling     Morphine      [place] [Macadamia Nut Oil] Swelling       Medications listed as ordered at the time of discharge from hospital     Medication List          Accurate as of December 29, 2021  9:54 AM. If you have any questions, ask your nurse or doctor. START taking these medications    HYDROcodone-acetaminophen 5-325 MG per tablet  Commonly known as: Lorcet  Take 1 tablet by mouth every 4 hours as needed for Pain for up to 7 days. Intended supply: 7 days. Take lowest dose possible to manage pain  Started by: TAMARA Mauro CNP        CONTINUE taking these medications    albuterol sulfate  (90 Base) MCG/ACT inhaler     amphetamine-dextroamphetamine 20 MG tablet  Commonly known as: ADDERALL  Take 1 tablet by mouth 2 times daily for 30 days.      baclofen 10 MG tablet  Commonly known as: LIORESAL  Take 1-2 tablets by mouth 2 times daily as needed (PRN for back pain, stiffness)     cetirizine 10 MG tablet  Commonly known as: ZYRTEC  Take 1 tablet by mouth daily     cyclobenzaprine 5 MG tablet  Commonly known as: FLEXERIL  Take 1 tablet by mouth 2 times daily as needed for Muscle spasms     EPINEPHrine 0.3 MG/0.3ML Soaj injection  Commonly known as: EpiPen 2-Romeo  Inject 0.3 mLs into the muscle once for 1 dose Use as directed for allergic reaction, facial swelling     fluticasone 50 MCG/ACT nasal spray  Commonly known as: FLONASE  2 sprays by Each Nostril route daily     ibuprofen 800 MG tablet  Commonly known as: ADVIL;MOTRIN  Take 1 tablet by mouth every 8 hours as needed for Pain     levothyroxine 175 MCG tablet  Commonly known as: SYNTHROID  Take 1 tablet by mouth every morning (before breakfast)     naproxen 500 MG tablet  Commonly known as: NAPROSYN  Take 1 tablet by mouth 2 times daily (with meals) for 15 days     predniSONE 20 MG tablet  Commonly known as: DELTASONE  Take 2 tablets by mouth daily for 5 days     Prenate Mini 18-0.6-0.4-350 MG Caps     vitamin D 1.25 MG (80654 UT) Caps capsule  Commonly known as: ERGOCALCIFEROL  Take 1 tab every Monday and every Friday           Where to Get Your Medications      You can get these medications from any pharmacy    Bring a paper prescription for each of these medications  · HYDROcodone-acetaminophen 5-325 MG per tablet           Medications marked \"taking\" at this time  Outpatient Medications Marked as Taking for the 12/29/21 encounter (Office Visit) with TAMARA Dee CNP   Medication Sig Dispense Refill    HYDROcodone-acetaminophen (LORCET) 5-325 MG per tablet Take 1 tablet by mouth every 4 hours as needed for Pain for up to 7 days. Intended supply: 7 days.  Take lowest dose possible to manage pain 42 tablet 0    predniSONE (DELTASONE) 20 MG tablet Take 2 tablets by mouth daily for 5 days 10 tablet 0    cyclobenzaprine (FLEXERIL) 5 MG tablet Take 1 tablet by mouth 2 times daily as needed for Muscle spasms 5 tablet 0    levothyroxine (SYNTHROID) 175 MCG tablet Take 1 tablet by mouth every morning (before breakfast) 90 tablet 1    fluticasone (FLONASE) 50 MCG/ACT nasal spray 2 sprays by Each Nostril route daily 16 g 0    cetirizine (ZYRTEC) 10 MG tablet Take 1 standing on leg. She is scheduled for PT later today. Patient is also scheduled with orthopedic (Dr. Sharol Lombard) on 1/3/21. Planned follow up with rheumatologist,  Dr. Lizbet Hercules is 1/15/21. A comprehensive review of systems was negative except for what was noted in the HPI. Vitals:    12/29/21 0916   BP: 118/84   Pulse: 83   Temp: 97.9 °F (36.6 °C)   SpO2: 95%   Weight: 202 lb (91.6 kg)     Body mass index is 29.83 kg/m². Wt Readings from Last 3 Encounters:   12/29/21 202 lb (91.6 kg)   12/20/21 212 lb 6.4 oz (96.3 kg)   12/03/21 209 lb (94.8 kg)     BP Readings from Last 3 Encounters:   12/29/21 118/84   12/26/21 135/77   12/20/21 120/88        Physical Exam:  General Appearance: alert and oriented to person, place and time, well developed and well- nourished, in no acute distress  Skin: warm and dry, no rash or erythema  Head: normocephalic and atraumatic  Eyes: pupils equal, round, and reactive to light, extraocular eye movements intact, conjunctivae normal  ENT: tympanic membrane, external ear and ear canal normal bilaterally, nose without deformity, nasal mucosa and turbinates normal without polyps  Neck: supple and non-tender without mass, no thyromegaly or thyroid nodules, no cervical lymphadenopathy  Pulmonary/Chest: clear to auscultation bilaterally- no wheezes, rales or rhonchi, normal air movement, no respiratory distress  Cardiovascular: normal rate, regular rhythm, normal S1 and S2, no murmurs, rubs, clicks, or gallops, distal pulses intact, no carotid bruits  Abdomen: soft, non-tender, non-distended, normal bowel sounds, no masses or organomegaly  Extremities: no cyanosis, clubbing or edema  Musculoskeletal: + Madan test R hip, impaired ROM and altered gait. Endorses lateral patellar tenderness. Mild swelling noted, no erythema appreciated      Assessment/Plan:  1.  Primary osteoarthritis of right hip  - continue PT and follow up with ortho  - HYDROcodone-acetaminophen (LORCET) 5-325 MG per tablet; Take 1 tablet by mouth every 4 hours as needed for Pain for up to 7 days. Intended supply: 7 days. Take lowest dose possible to manage pain  Dispense: 42 tablet; Refill: 0  - VT DISCHARGE MEDS RECONCILED W/ CURRENT OUTPATIENT MED LIST    2.  Chronic pain of right knee  - planned follow up with rheumatologist and orthopedic        Medical Decision Making: moderate complexity

## 2021-12-29 NOTE — PROGRESS NOTES
[facility] Outpatient Physical Therapy  [address] [place] [address]. [address]         Phone: [phone]       Fax: [phone]    Physical Therapy Lower Extremity Evaluation    Date:  2021  Patient: Dayami Lockwood  : 1970  MRN: [id number]  Physician: Josiane Álvarez MD     Insurance: [email or web address] [facility] 75 EMP  Medical Diagnosis: Primary OA of right hip (M16.11)   Clinical Diagnosis: (R) hip pain with mobility deficits   Onset date:  2021 Next Dr's appt.: TBD  Visit Count:   Cancel/No Show: 0/0    Subjective:   CC: (R) hip and knee pain   HPI: (2021) Patient stated that her current symptoms within the (R) knee developed in 2021 while working. No isolated incident or trauma reported. Early in the month of November, she stated that her (R) hip pain began to develop. Followed up with rheumatologist at the end of November/pain injection within the (R) hip joint and (R) knee joint/pain became worse in both joints. She took a family vacation in [place] x 1 week/increase pain even more so. Followed up with PCP on 2021 upon her return from [place]. Referral to orthropedics 2021. ER @ [facility] on 2021 - X-ray and US on the (R) knee/attempt to drain the knee but unsuccessful. Saw PCP/NP this morning and currently off work for 10 days. PMHx: [] Unremarkable [] Diabetes [] HTN  [] Pacemaker   [] MI/Heart Problems [] Cancer [] Arthritis [x] Other: Asthma              [] Refer to full medical chart  In EPIC     Comorbidities:   [] Obesity [] Dialysis  [] Other:   [x] Asthma/COPD [] Dementia [] Other:   [] Stroke [] Sleep apnea [] Other:   [] Vascular disease [] Rheumatic disease [] Other:     Tests: [x] X-Ray: (R) knee - Unremarkable right knee.   (R) hip - Moderate right hip osteoarthritis.  No acute fracture or hip dislocation    [] MRI:  [] Other: Medications: [] Refer to full medical record [] None [] Other:  Allergies:      [] Refer to full medical record [] None [] Other:    Function:  Hand Dominance  [x] Right  [] Left  Working:  [] Normal Duty  [] Light Duty  [x] Off D/T Condition  [] Retired    [] Not Employed    []  Disability  [] Other:           Return to work:   Job/ADL Description: Pharmacy Technician @ [employer]      ADL/IADL Previous level of function Current level of function Who currently assists the patient with task   Bathing  [x] Independent  [] Assist [x] Independent  [] Assist    Dress/grooming [x] Independent  [] Assist [x] Independent  [] Assist    Transfer/mobility [x] Independent  [] Assist [x] Independent  [] Assist    Feeding [x] Independent  [] Assist [x] Independent  [] Assist    Toileting [x] Independent  [] Assist [x] Independent  [] Assist    Driving [x] Independent  [] Assist [x] Independent  [] Assist    Housekeeping [x] Independent  [] Assist [x] Independent  [] Assist    Grocery shop/meal prep [x] Independent  [] Assist [x] Independent  [] Assist      Lives alone in a one story house with one step outside without handrails. Gait Prior level of function Current level of function    [x] Independent  [] Assist [x] Independent  [] Assist   Device: [x] Independent [x] Independent    [] Straight Cane [] Quad cane [] Straight Cane [] Quad cane    [] Standard walker [] Rolling walker   [] 4 wheeled walker [] Standard walker [] Rolling walker   [] 4 wheeled walker    [] Wheelchair [] Wheelchair     Pain:  [x] Yes  [] No Location: (R) hip anterior region/lateral region   Pain Rating: (0-10 scale) 8/2/8 current/10 with pain medication.  Constant,stabbing,throbbing, shooting, sharp and achy   Pain altered Tx:  [] Yes  [x] No  Action:  Symptoms:  [] Improving [x] Worsening [] Same  Better:  [x] AM    [] PM    [x] Sit    [] Rise/Sit    []Stand    [] Walk    [] Lying    [] Other:  Worse: [] AM    [x] PM    [] Sit    [x] Rise/Sit    [x]Stand    [x] Walk    [x] Lying    [x] Bend                             [] Valsalva    [] Other:  Sleep: [] OK    [x] Disturbed - currently sleeping in a recliner    Pain:  [x] Yes  [] No Location: (R) knee lateral region and anterior/inferior region of the patella. Pain Rating: (0-10 scale) 7/2/5 current/10  Pain altered Tx:  [] Yes  [x] No  Action:  Symptoms:  [] Improving [x] Worsening [] Same  Better:  [x] AM    [] PM    [x] Sit    [] Rise/Sit    []Stand    [] Walk    [] Lying    [] Other:  Worse: [] AM    [] PM    [] Sit    [x] Rise/Sit    [x]Stand    [x] Walk    [x] Lying    [x] Bend                             [] Valsalva    [] Other:  Sleep: [] OK    [x] Disturbed - currently sleeping in a recliner    Objective  Observation/Palpation   Normal Deficit Comments   Observation [] [x] More weight on the (L) than (R) with (R) knee flexed on the (R) forefoot(R) genu varus slight ,    Posture  [] []    Palpation [] [x] Tender to the touch (R) greater trochanter region and anterior/groin region. Tender to the touch lateral joint line of the knee and anterior/inferior region of the patella.    Edema [] []    Scar [] []    Other [] []      Range of Motion  Spine Range of Motion  Lumbar  WNL in all planes     Right Lower Extremity - Active ROM  Hip flexion  100 with pain @ end range  Hip extension  WNL  Hip abduction  15 with pain @ end range  Hip adduction  10 with pain @ end range  Hip ER  WNL   Hip IR  10 with pain @ end range  Knee flexion  115 with pain @ end range  Knee extension  Lacking 30 with pain @ end range  Dorsiflexion  WNL   Plantarflexion  WNL   Inversion  WNL   Eversion  WNL     Right Lower Extremity - Passive ROM  Hip flexion  Unable to go beyond active motion   Hip abduction  Unable to go beyond active motion   Hip adduction  Unable to go beyond active motion   Hip IR  Unable to go beyond active motion   Knee flexion  Unable to go beyond active motion   Knee extension  Unable to go beyond active motion     Left Lower Extremity - Active ROM  Hip flexion  WNL   Hip extension  WNL  Hip abduction  WNL   Hip adduction  WNL   Hip ER  WNL   Hip IR  WNL   Knee flexion  WNL   Knee extension  WNL   Dorsiflexion  WNL   Plantarflexion  WNL   Inversion  WNL   Eversion  WNL     Strength  Right Lower Extremity - Strength  Hip flexion  2/5 MMT  Hip extension  3/5 MMT  Hip abduction  2/5 MMT  Hip adduction  2/5 MMT  Hip ER  3/5 MMT  Hip IR  2/5 MMT  Knee flexion  2/5 MMT  Knee extension  2/5 MMT  Dorsiflexion  5/5 MMT  Plantarflexion  5/5 MMT  Inversion  5/5 MMT  Eversion  5/5 MMT    Left Lower Extremity - Strength  Hip flexion  5/5 MMT  Hip extension  5/5 MMT  Hip abduction  5/5 MMT  Hip adduction  5/5 MMT  Hip ER  5/5 MMT  Hip IR  5/5 MMT  Knee flexion  5/5 MMT  Knee extension  5/5 MMT  Dorsiflexion  5/5 MMT  Plantarflexion  5/5 MMT  Inversion  5/5 MMT  Eversion  5/5 MMT    Additional Measures    Normal Deficit Comments   Sensation   [] []    Reflexes   [] []    Gross motor   [] []    Flexibility    [] [] Hamstring/quad tightness - N/A given limited motion(s) at the knee and hip with pain at end range    Special Tests   [] [x] (+) JHONATAN Test on the (R) hip    Other   [] []      Gait Assessment  Assistive device: None   Comments: Limited (R) hip/knee motion noted     Balance  N/A - unequal weight bearing noted with double limb support    Assessment  Patient presented with a moderate to severe irritability within the (R) hip. Limited active motion with the exception of hip extension and ER. Unable to passively move the joint past the active motions. (+) JHONATAN test was noted. These clinical findings are indicative of (+) hip OA which correspond to plain films taken. No other underlying issues were apparent. However, given the increased pain level it was difficult to fully assess. Plan to further assess the joint mechanics as the symptoms improve.  Patient would continue to benefit from skilled services that included an aquatic program to help \"unload\" the joint to improve her pain level and motion. Problems  [x] ? Pain: 8/10 (R) hip     [x] ? ROM: (R) hip actively/unable to assess passively     [x] ? Strength:  2/5 MMT    [x] ? Function:  LEFS = 48 points   [x] ? Balance: N/A  [x] Postural Deviations: more weight on the (L) than (R) with (R) knee flexed on the (R) forefoot, (R) genu varus slight    [x] Gait Deviations: limited (R) hip and knee motions  [] Other:      Goals  STG: (to be met in 9 treatments)  1. Pt will report an average pain level of  6-7/10 within the (R) hip at rest/ADLs. 2. Pt will demonstrate improved motion within all involved planes to assist with (I) function. LTG: (to be met in 18 treatments)  1. Pt will report an average pain level of  4-5/10 within the (R) hip at rest/ADLs. 2. Pt will demonstrate full motion within all involved planes to assist with (I) function. 3. Pt will demonstrate improved strength within all involved planes to assist with (I) function. 4. LEFS improved by 9 points (Christophe Herron [employer] 112)  5. Pt will demonstrate independence with her home exercise program.             Patient goals: Decreased pain, increase ROM     Functional Assessment Used: LEFS  Current Status: Score: 48 points   Goal Status: Score: 57 points     Evaluation Complexity: Moderate     History: Asthma  Exam: Pain level 8/10, limited motion(s) within the (R) hip   Clinical Presentation: Pt's symptoms are evolving  Barriers to Learning: None    Rehab Potential:  [x] Good  [] Fair  [] Poor   Suggested Professional Referral:  [x] No  [] Yes:  Barriers to Goal Achievement[de-identified]  [x] No  [] Yes:  Domestic Concerns:  [x] No  [] Yes:    Pt. Education:  [x] Plans/Goals, Risks/Benefits discussed  [] Home exercise program    Method of Education: [] Verbal  [] Demo  [] Written  Comprehension of Education:  [] Verbalizes understanding. [] Demonstrates understanding. [] Needs Review. [] Demonstrates/verbalizes understanding of HEP/Ed previously given.     Treatment Plan:  [] Therapeutic Exercise   86421  [] Iontophoresis: 4 mg/mL Dexamethasone Sodium Phosphate  mAmin  13147   [] Therapeutic Activity  05381 [] Vasopneumatic cold with compression  99162    [] Gait Training   21347 [] Ultrasound   49309   [] Neuromuscular Re-education  47071 [] Electrical Stimulation Unattended  09936   [] Manual Therapy  74100 [] Electrical Stimulation Attended  88972   [x] Instruction in HEP  [] Lumbar/Cervical Traction  60736   [x] Aquatic Therapy   70767 [] Cold/hotpack    [] Massage   24361      [] Dry Needling, 1 or 2 muscles  10230   [] Biofeedback, first 15 minutes   38280  [] Biofeedback, additional 15 minutes   08993 [] Dry Needling, 3 or more muscles  28330     []  Medication allergies reviewed for use of    Dexamethasone Sodium Phosphate 4mg/ml     with iontophoresis treatments. Pt is not allergic. Frequency:  2 x/week for 18 visits    Todays Treatment:  Modalities:   Precautions:  Exercises:  Exercise Reps/ Time Weight/ Level Comments                                 Other:    Specific Instructions for next treatment: Initiate an aquatic program for pain control/motion. Treatment Charges: Mins Units   [x] Evaluation       []  Low       [x]  Moderate       []  High 60 1   []  Modalities     []  Ther Exercise     []  Manual Therapy     []  Ther Activities     []  Aquatics     []  Neuromuscular     []  Gait Training     []  Dry Needling           1-2 muscles     []  Dry Needling           3 or more          muscles     [] Vasocompression     []  Other         TOTAL TREATMENT TIME: 60    Time in: 2626  Time Out: 9090    Electronically signed by: Ashley Mann PT DPT    Physician Signature:________________________________Date:__________________  By signing above or cosigning this note, I have reviewed this plan of care and certify a need for medically necessary rehabilitation services.      *PLEASE SIGN ABOVE AND FAX BACK ALL PAGES*

## 2022-01-03 ENCOUNTER — OFFICE VISIT (OUTPATIENT)
Dept: ORTHOPEDIC SURGERY | Age: 52
End: 2022-01-03
Payer: COMMERCIAL

## 2022-01-03 VITALS — HEIGHT: 69 IN | WEIGHT: 212 LBS | BODY MASS INDEX: 31.4 KG/M2

## 2022-01-03 DIAGNOSIS — M16.10 HIP ARTHRITIS: Primary | ICD-10-CM

## 2022-01-03 DIAGNOSIS — Z01.818 PRE-OP TESTING: ICD-10-CM

## 2022-01-03 PROCEDURE — 99214 OFFICE O/P EST MOD 30 MIN: CPT | Performed by: ORTHOPAEDIC SURGERY

## 2022-01-03 ASSESSMENT — ENCOUNTER SYMPTOMS
NAUSEA: 0
CONSTIPATION: 0
DIARRHEA: 0
COUGH: 0

## 2022-01-03 NOTE — PROGRESS NOTES
[address]  [address] [facility] Mely Leblanc  Dept: [phone]  Dept Fax: [phone]        Ambulatory Follow Up      Subjective:   Elray Goldberg is a 49y.o. year old female who presents to our office today for routine followup regarding her   1. Hip arthritis    2. Pre-op testing    . Chief Complaint   Patient presents with    Follow-up     R hip pain        HPI-Patient is here today for a second opinion of right hip pain. Patient has been experiencing right worse than left hip pain, which has been ongoing for the past year. 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 Patient has exhausted all non-operative treatment with physical therapy, home exercise program, and activity modification. She feels like its time to proceed with a surgery due to her pain. Review of Systems   Constitutional: Negative for chills and fever. Respiratory: Negative for cough. Gastrointestinal: Negative for constipation, diarrhea and nausea. Musculoskeletal: Positive for arthralgias (right hip). Negative for gait problem, joint swelling and myalgias. Neurological: Negative for dizziness, weakness and numbness. I have reviewed the CC, HPI, ROS, PMH, FHX, Social History, and if not present in this note, I have reviewed in the patient's chart. I agree with the documentation provided by other staff and have reviewed their documentation prior to providing my signature indicating agreement. Objective :   Ht 5' 9\" (1.753 m)   Wt 212 lb (96.2 kg)   BMI 31.31 kg/m²  Body mass index is 31.31 kg/m². General: Elray Goldberg is a 49 y.o. female who is alert and oriented and sitting comfortably in our office. Ortho Exam  MS:  Patient ambulates with mild shortening weightbearing phase and antalgic gait to the Right lower extremity. There is no erythema, warmth, skin lesions, signs of infection. Positive hip logroll and Stinchfield test is noted. Patient has full range of motion of the Right knee and ankle. Motor, sensory, and vascular examination to the Right lower extremity is intact without focal deficits. Patient has full range of motion of the lumbosacral spine. Neuro: alert and oriented to person and place. Eyes: Extra-ocular muscles intact  Mouth: Oral mucosa moist. No perioral lesions  Pulm: Respirations unlabored and regular. Symmetric chest excursion without outward deformity is noted. Skin: warm, well perfused  Psych:   Patient has good fund of knowledge and displays understanging of exam, diagnosis, and plan. Radiology:     XR HIP RIGHT (2-3 VIEWS)    Result Date: 12/25/2021  EXAMINATION: TWO XRAY VIEWS OF THE RIGHT HIP 12/25/2021 8:10 pm COMPARISON: None. HISTORY: ORDERING SYSTEM PROVIDED HISTORY: worsening right hip pain TECHNOLOGIST PROVIDED HISTORY: worsening right hip pain Reason for Exam: pain to rt hip and rt knee hx of steroid injection FINDINGS: Frontal and lateral view radiographs of the right hip were obtained. Bone mineralization is normal.  There is no evidence of acute or healing fracture or destructive osseous abnormality. Joint relationships are maintained. There is moderate right hip osteoarthritis in a superolateral distribution, noting osteophyte formation with productive change and joint space narrowing. No appreciable soft tissue abnormality. Moderate right hip osteoarthritis. No acute fracture or hip dislocation. XR KNEE RIGHT (3 VIEWS)    Result Date: 12/25/2021  EXAMINATION: THREE XRAY VIEWS OF THE RIGHT KNEE 12/25/2021 8:10 pm COMPARISON: None.  HISTORY: ORDERING SYSTEM PROVIDED HISTORY: worsening pain, previous steroid injection, pain at medial and lateral aspect TECHNOLOGIST PROVIDED HISTORY: worsening pain, previous steroid injection, pain at medial and lateral aspect Reason for Exam: pain to rt hip and rt knee hx of steroid injection FINDINGS: Frontal, lateral, and oblique view radiographs of the rightknee were obtained. Bone mineralization is normal.  The osseous structures are intact without acute fracture or destructive abnormality. Joint relationships are maintained. No significant degenerative findings. No joint effusion. No appreciable soft tissue abnormality. Unremarkable right knee. POC US EXTREMITY NON VASC LIMITED    Result Date: 12/25/2021  POCUS_Soft_Tissue:     Exam Information:         Exam type:  Clinically indicated     Indication(s) for Exam:         The exam was performed with the following indications[de-identified]  Soft tissue pain         Other Indication(s):  knee pain     Views obtained/location & Images Saved for These Views:         Multiple sonographic views were obtained in the following specific location:  Right knee     Findings:         Exam of the above structures revealed the following findings[de-identified]  Normal exam     Interpretation:         Normal limited soft tissue ultrasound     Confirmatory study:         What confirmatory study was done?:  Not applicable Electronically signed by Carly Pelaez on Saturday, December 25, 2021 at 11:18 PM      Assessment:      1. Hip arthritis    2. Pre-op testing       Plan:      Discussed etiology and natural history of right hip arthritis. The treatment options may include oral anti-inflammatories, bracing, injections, advanced imaging, activity modification, physical therapy and/or surgical intervention. The patient would like to proceed with right total hip repalcement. The patient will follow up two weeks post op. Will keep her off work in the meantime. We discussed that the patient should call us with any concerns or questions. Follow up:Return for Two weeks post op. No orders of the defined types were placed in this encounter.         Orders Placed This Encounter   Procedures    MRSA DNA Probe, Nasal Bilateral Nares     Standing Status:   Future     Standing Expiration Date:   1/5/2023    XR CHEST (2 VW)     Standing Status:   Future     Standing Expiration Date:   1/24/2023     Order Specific Question:   Reason for exam:     Answer:   pre-op    Hemoglobin A1C     Standing Status:   Future     Standing Expiration Date:   1/4/2023    Comprehensive Metabolic Panel     Standing Status:   Future     Standing Expiration Date:   5/4/2022    CBC     Standing Status:   Future     Standing Expiration Date:   1/5/2023    Urinalysis     Standing Status:   Future     Standing Expiration Date:   1/5/2023    EKG 12 Lead     Standing Status:   Future     Standing Expiration Date:   1/5/2023     Order Specific Question:   Reason for Exam?     Answer:   Pre-op     I, Oleg Reynoso RN am scribing for and in the presence of Dr. Nahum Jackman  1/4/2022 1:08 PM    I have reviewed and made changes accordingly to the work scribed by Oleg Reynoso Rn. The documentation accurately reflects work and decisions made by me. I have also reviewed documentation completed by clinical staff. Nahum Jackman DO, [facility]  1/4/2022 1:08 PM    This note is created with the assistance of a speech recognition program.  While intending to generate a document that actually reflects the content of the visit, the document can still have some errors including those of syntax and sound a like substitutions which may escape proof reading.  In such instances, actual meaning can be extrapolated by contextual diversion      All details of the procedure, as well as risks, benefits and alternatives, including the option of non operative versus operative treatment were discussed.   The patient understands that risks of the surgery include but are not limited to: bleeding, malunion/nonunion, loss of fixation, loss of reduction, hardware failure, angular or rotational deformity, length discrepancy, limp, transfusion, skin blistering or breakdown, progressive post traumatic degenerative joint disease, possible need for further surgery, bone grafting, infection, nerve injury, paralysis, numbness, blood vessel injury, excessive scaring, wound complication or breakdown, failure of symptoms to improve or actual deterioration in condition, significant acute and/or chronic pain, possible need for amputation, permanent loss of motion, and permanent loss of function. As well as the general complications of anesthesia, which include but are not limited to: myocardial infarction and/or heart attack, stroke, multi organ system failure or even possible death, prolonged hospital stay, blood clots, pulmonary embolism, abnormal reaction to medication, visual and neurological disturbances, constipation, ischemic bowel, bowel obstruction, bowel perforation, ileus and mental status changes. No guarantees were made.       Electronically signed by Baldev Oakley DO, FAOAO on 1/4/2022 at 1:08 PM

## 2022-01-03 NOTE — LETTER
Dr. Courtney Arechiga  [address] [facility] [address]  Dept: [phone]  Dept Fax: [phone]        1/4/22    Patient: Aden Boateng  YOB: 1970    Dear TAMARA Garcia CNP,    I had the pleasure of seeing one of your patients, Ra Devlin today in the office. Below are the relevant portions of my assessment and plan of care. IMPRESSION:  1. Hip arthritis    2. Pre-op testing      PLAN:  Discussed etiology and natural history of right hip arthritis. The treatment options may include oral anti-inflammatories, bracing, injections, advanced imaging, activity modification, physical therapy and/or surgical intervention. The patient would like to proceed with right total hip repalcement. The patient will follow up two weeks post op. Will keep her off work in the meantime. We discussed that the patient should call us with any concerns or questions. Thank you for allowing me to participate in the care of this patient. I will keep you updated on this patient's follow up and I look forward to serving you and your patients again in the future. Please don't hesitate to contact me at my mobile number [phone].         Ronal Antonio, Dennie Pata

## 2022-01-04 NOTE — PROGRESS NOTES
[address] [facility] J Exercise Log  Aquatic, Hip & DLS Program- Phase 1    Date of Eval: 12/29/2021                            Primary PT: Nichole Johnson PT, DPT  Diagnosis: Primary OA of right hip (M16.11)   Things to Focus On (goals): pain control, ROM    Surgical Precautions:  Medical Precautions: asthma   [] C-9 dates  [] Occ Med   [] Medicare     Name: Caden Caceres       Date        Visit #        Walk F/L/R        Marching        Squats        Step-Ups F/L        Step Down F/L        Heel-toe raises        SLR F/L/R        Hip/Knee Flex/Ext        F/L Lunges                Kickboard Ex. Iso Abd.         Push-pull        Paddling                UE Format:        Horiz Abd/Add        IR/ER (wipers)        Alt Flex/Ext        Alt Press Down        Abd/Add                [place]:        [facility]        X-Country                Balance        SLS                Stretches        Achllies        Hamstring                Cool Down        Pain Rating

## 2022-01-07 RX ORDER — ONDANSETRON 4 MG/1
4 TABLET, ORALLY DISINTEGRATING ORAL 3 TIMES DAILY PRN
Qty: 21 TABLET | Refills: 0 | Status: SHIPPED | OUTPATIENT
Start: 2022-01-07 | End: 2022-01-13 | Stop reason: SDUPTHER

## 2022-01-11 ENCOUNTER — HOSPITAL ENCOUNTER (OUTPATIENT)
Dept: PHYSICAL THERAPY | Age: 52
Setting detail: THERAPIES SERIES
Discharge: HOME | End: 2022-01-11

## 2022-01-11 NOTE — FLOWSHEET NOTE
[x] [facility]) - [facility]  [address] [facility] [address]  [place]: [phone]   F: [phone]     Physical Therapy Cancel/No Show note    Date: 2022  Patient: Byron Santana  : 1970  MRN: [id number]    Visit Count:   Cancels/No Shows to date: 0/3    For today's appointment patient:    []  Cancelled    [] Rescheduled appointment    [x] No-show     Reason given by patient:    []  Patient ill    []  Conflicting appointment    [] No transportation      [] Conflict with work    [] No reason given    [] Weather related    [] [id number]    [x] Other:      Comments:   Called patient to leave patient message and voice mail box is full. Patient will be DC due to 3 \"no shows\" for therapy.       [] Next appointment was confirmed    Electronically signed by: Robbin Bernabe PTA

## 2022-01-11 NOTE — PROGRESS NOTES
[x] [facility]) @ [facility]  [address] [facility] [address] , [address] [place] [facility]  Phone [phone]  Fax [phone]    Physical Therapy Discharge Note    Date: 2022      Patient: Shelda Spurling  : 1970  MRN:  [id number]  Physician: Radha Sykes MD      Diagnosis: Primary OA of right hip (M16.11)  Onset Date: 2021    Rehab Diagnosis: (R) hip pain with mobility deficits   Total visits attended: 1  Cancels/No shows: 0/3  Date of initial visit: 2021                  [] Patient recovered from conditions. Treatment goals were met. [] Patient received maximum benefit. No further therapy indicated at this time. [] Patient demonstrated improvement from condition with  ** Of  ** Short term goals met. []Patient demonstrated improvement from condition with **   Of **  Long term goals met. [] Patient to continue exercise/home instructions independently. [] Therapy interrupted due to:    [x] Patient has 2 or more no shows/cancels, is discontinued per our policy. [] Patient has completed prescribed number of treatment sessions. [] Other:      Pain level at evaluation was    8   /10 and at discharge was   8    /10    It Is My Understanding That The:  [] Patient returned to work. [] Patient demonstrated improved level of function. [] Patient returned to previous functional level. [] Patient's current functional status is unknown due to no-shows  [] Other:     Recommendations/Comments: Three consecutive \"no-shows\" following her evaluation. Pt was contacted regarding this matter but did not return our call. No treatment was provided.      Treatment Included:     [] Therapeutic Exercise   51162  [] Iontophoresis: 4 mg/mL Dexamethasone Sodium Phosphate  mAmin  36170   [] Therapeutic Activity  89116 [] Vasopneumatic cold with compression  55876    [] Gait Training   [phone] [] Ultrasound   [id number]   [] Neuromuscular Re-education  26739 [] Electrical Stimulation Unattended  19895   [] Manual Therapy  13623 [] Electrical Stimulation Attended  [id number]   [] Instruction in HEP  [] Lumbar/Cervical Traction  [id number]   [] Aquatic Therapy   [id number] [] Cold/hotpack    [] Massage   [id number]      [] Dry Needling, 1 or 2 muscles  97037   [] Biofeedback, first 15 minutes   31006  [] Biofeedback, additional 15 minutes   43095 [] Dry Needling, 3 or more muscles  96829                If you have any questions or concerns regarding this patient's care, please contact us.    Thank you for your referral.      Electronically signed by: Dheeraj Argueta PT  DPT

## 2022-01-13 ENCOUNTER — TELEMEDICINE (OUTPATIENT)
Dept: INTERNAL MEDICINE CLINIC | Age: 52
End: 2022-01-13
Payer: COMMERCIAL

## 2022-01-13 DIAGNOSIS — R05.8 PRODUCTIVE COUGH: ICD-10-CM

## 2022-01-13 DIAGNOSIS — U07.1 COVID-19 VIRUS INFECTION: Primary | ICD-10-CM

## 2022-01-13 DIAGNOSIS — J45.909 ASTHMA, UNSPECIFIED ASTHMA SEVERITY, UNSPECIFIED WHETHER COMPLICATED, UNSPECIFIED WHETHER PERSISTENT: ICD-10-CM

## 2022-01-13 DIAGNOSIS — R11.0 NAUSEA: ICD-10-CM

## 2022-01-13 DIAGNOSIS — R21 RASH: ICD-10-CM

## 2022-01-13 PROCEDURE — 99213 OFFICE O/P EST LOW 20 MIN: CPT | Performed by: NURSE PRACTITIONER

## 2022-01-13 RX ORDER — ONDANSETRON 4 MG/1
4 TABLET, ORALLY DISINTEGRATING ORAL 3 TIMES DAILY PRN
Qty: 30 TABLET | Refills: 0 | Status: SHIPPED | OUTPATIENT
Start: 2022-01-13 | End: 2022-04-12 | Stop reason: SDUPTHER

## 2022-01-13 RX ORDER — BENZONATATE 100 MG/1
CAPSULE ORAL
COMMUNITY
Start: 2022-01-05 | End: 2022-01-25

## 2022-01-13 RX ORDER — PREDNISONE 20 MG/1
40 TABLET ORAL DAILY
Qty: 10 TABLET | Refills: 0 | Status: SHIPPED | OUTPATIENT
Start: 2022-01-13 | End: 2022-01-18

## 2022-01-13 RX ORDER — GUAIFENESIN 600 MG/1
600 TABLET, EXTENDED RELEASE ORAL 2 TIMES DAILY
Qty: 30 TABLET | Refills: 0 | Status: SHIPPED | OUTPATIENT
Start: 2022-01-13 | End: 2022-01-25

## 2022-01-13 ASSESSMENT — ENCOUNTER SYMPTOMS
SINUS PRESSURE: 1
VOMITING: 1
ABDOMINAL PAIN: 0
SORE THROAT: 0
NAUSEA: 1
WHEEZING: 0
CHEST TIGHTNESS: 0
DIARRHEA: 1
TROUBLE SWALLOWING: 0
COUGH: 1

## 2022-01-13 NOTE — PATIENT INSTRUCTIONS
Patient Education        Learning About Coronavirus [phone]  What is coronavirus (COVID-19)? COVID-19 is a disease caused by a type of coronavirus. This illness was first found in December 2019. It has since spread worldwide. Coronaviruses are a large group of viruses. They cause the common cold. They also cause more serious illnesses like Middle East respiratory syndrome (MERS) and severe acute respiratory syndrome (SARS). COVID-19 is caused by a novel coronavirus. That means it's a new type that has not been seen in people before. What are the symptoms? COVID-19 symptoms may include:  · Fever. · Cough. · Trouble breathing. · Chills or repeated shaking with chills. · Muscle and body aches. · Headache. · Sore throat. · New loss of taste or smell. · Vomiting. · Diarrhea. In severe cases, COVID-19 can cause pneumonia and make it hard to breathe without help from a machine. It can cause death. How is it diagnosed? COVID-19 is diagnosed with a viral test. This may also be called a PCR test or antigen test. It looks for evidence of the virus in your breathing passages or lungs (respiratory system). The test is most often done on a sample from the nose, throat, or lungs. It's sometimes done on a sample of saliva. One way a sample is collected is by putting a long swab into the back of your nose. How is it treated? Mild cases of COVID-19 can be treated at home. Serious cases need treatment in the hospital. Treatment may include medicines to reduce symptoms, plus breathing support such as oxygen therapy or a ventilator. Some people may be placed on their belly to help their oxygen levels. Treatments that may help people who have COVID-19 include:  Antiviral medicines. These medicines treat viral infections. Remdesivir is an example. Immune-based therapy. These medicines help the immune system fight COVID-19. Examples include monoclonal antibodies. Blood thinners.   These medicines help prevent blood clots. People with severe illness are at risk for blood clots. How can you protect yourself and others? · Get vaccinated. · Avoid sick people. · Cover your mouth with a tissue when you cough or sneeze. · Wash your hands often, especially after you cough or sneeze. Use soap and water, and scrub for at least 20 seconds. If soap and water aren't available, use an alcohol-based hand . · Avoid touching your mouth, nose, and eyes. Be sure to follow all instructions from the [facility] and your local health authorities. Here are some examples of specific precautions you may need to take. · If you are not fully vaccinated:  ? Wear a mask if you have to go to public areas. ? Avoid crowds and try to stay at least 6 feet away from other people. · If you have been exposed to the virus and are not fully vaccinated:  ? Stay home. Don't go to school, work, or public areas. And don't use public transportation, ride-shares, or taxis unless you have no choice. ? Wear a mask if you have to go to public areas, like the pharmacy. · Even if you're fully vaccinated, there's still a small chance you can get and spread COVID-19. If you live in an area where COVID-19 is spreading quickly, wear a mask if you have to go to indoor public areas. You might also want to wear a mask in crowded outdoor areas if you:  ? Have certain health conditions. ? Live with someone who has a compromised immune system. ? Live with someone who is not fully vaccinated. · If you have been exposed and you are fully vaccinated:  ? Talk to your doctor. You may need a COVID-19 test.  ? Wear a mask in public indoor spaces for 14 days or until you test negative for COVID-19. If you're sick:  · Leave your home only if you need to get medical care. But call the doctor's office first so they know you're coming. And wear a mask. · Wear a mask whenever you're around other people. · Limit contact with pets and people in your home.  If possible, stay in a separate bedroom and use a separate bathroom. · Clean and disinfect your home every day. Use household  and disinfectant wipes or sprays. Take special care to clean things that you touch with your hands. How can you self-isolate when you have COVID-19? If you have COVID-19, there are things you can do to help avoid spreading the virus to others. · Limit contact with people in your home. If possible, stay in a separate bedroom and use a separate bathroom. · Wear a mask when you are around other people. · If you have to leave home, avoid crowds and try to stay at least 6 feet away from other people. · Avoid contact with pets and other animals. · Cover your mouth and nose with a tissue when you cough or sneeze. Then throw it in the trash right away. · Wash your hands often, especially after you cough or sneeze. Use soap and water, and scrub for at least 20 seconds. If soap and water aren't available, use an alcohol-based hand . · Don't share personal household items. These include bedding, towels, cups and glasses, and eating utensils. · [address] in the warmest water allowed for the fabric type, and dry it completely. It's okay to wash other people's laundry with yours. · Clean and disinfect your home. Use household  and disinfectant wipes or sprays. When should you call for help? Call 911 anytime you think you may need emergency care. For example, call if you have life-threatening symptoms, such as:    · You have severe trouble breathing. (You can't talk at all.)     · You have constant chest pain or pressure.     · You are severely dizzy or lightheaded.     · You are confused or can't think clearly.     · You have pale, gray, or blue-colored skin or lips.     · You pass out (lose consciousness) or are very hard to wake up. Call your doctor now or seek immediate medical care if:    · You have moderate trouble breathing.  (You can't speak a full sentence.)     · You are coughing up blood (more than about 1 teaspoon).     · You have signs of low blood pressure. These include feeling lightheaded; being too weak to stand; and having cold, pale, clammy skin. Watch closely for changes in your health, and be sure to contact your doctor if:    · Your symptoms get worse.     · You are not getting better as expected.     · You have new or worse symptoms of anxiety, depression, nightmares, or flashbacks. Call before you go to the doctor's office. Follow their instructions. And wear a mask. Current as of: July 1, 2021               Content Version: 13.1  © 2006-2021 [employer]. Care instructions adapted under license by [facility] [address] If you have questions about a medical condition or this instruction, always ask your healthcare professional. [address] any warranty or liability for your use of this information.

## 2022-01-13 NOTE — PROGRESS NOTES
2022    TELEHEALTH EVALUATION -- Audio/Visual (During RGENU-71 public health emergency)    HPI:    Claudene Easterly (:  1970) has requested an audio/video evaluation for the following concern(s):      Last   diagnosed with Covid, results reviewed in Care Everywhere, symptoms are still bad and she has developed an itchy rash on back. She has tried Benadryl cream and hydrocortisone cream, oral Benadryl with no effect. No new products. Other symptoms include cough productive of thick yellow sputum, N/V/D, body aches, low grade fever around 99. She is using zofran which helps, would like refill. States she is drinking a lot of Gatorade. Review of Systems   Constitutional: Positive for fatigue and fever (T99). Negative for chills. HENT: Positive for congestion and sinus pressure. Negative for sore throat and trouble swallowing. Respiratory: Positive for cough. Negative for chest tightness and wheezing. Yellow sputum   Cardiovascular: Negative for chest pain, palpitations and leg swelling. Gastrointestinal: Positive for diarrhea, nausea and vomiting. Negative for abdominal pain. Musculoskeletal: Positive for myalgias. Skin: Positive for rash. Neurological: Positive for headaches. Prior to Visit Medications    Medication Sig Taking?  Authorizing Provider   guaiFENesin (MUCINEX) 600 MG extended release tablet Take 1 tablet by mouth 2 times daily for 15 days Yes TAMARA Galarza CNP   predniSONE (DELTASONE) 20 MG tablet Take 2 tablets by mouth daily for 5 days Yes TAMARA Galarza CNP   ondansetron (ZOFRAN-ODT) 4 MG disintegrating tablet Take 1 tablet by mouth 3 times daily as needed for Nausea or Vomiting Yes TAMARA Galarza CNP   benzonatate (TESSALON) 100 MG capsule take 1 capsule by mouth every 6 hours if needed for cough  Historical Provider, MD   cyclobenzaprine (FLEXERIL) 5 MG tablet Take 1 tablet by mouth 2 times daily as needed for Muscle spasms Julio Cesar Aleman,    levothyroxine (SYNTHROID) 175 MCG tablet Take 1 tablet by mouth every morning (before breakfast)  TAMARA Clark - CNP   fluticasone (FLONASE) 50 MCG/ACT nasal spray 2 sprays by Each Nostril route daily  TAMARA Clark - CNP   amphetamine-dextroamphetamine (ADDERALL) 20 MG tablet Take 1 tablet by mouth 2 times daily for 30 days.   TAMARA Clark - CNP   ibuprofen (ADVIL;MOTRIN) 800 MG tablet Take 1 tablet by mouth every 8 hours as needed for Pain  TAMARA Clark - CNP   vitamin D (ERGOCALCIFEROL) 1.25 MG (46547 UT) CAPS capsule Take 1 tab every Monday and every Friday  TAMARA Gustafson - CNP   baclofen (LIORESAL) 10 MG tablet Take 1-2 tablets by mouth 2 times daily as needed (PRN for back pain, stiffness)  TAMARA Gustafson CNP   EPINEPHrine (EPIPEN 2-DINO) 0.3 MG/0.3ML SOAJ injection Inject 0.3 mLs into the muscle once for 1 dose Use as directed for allergic reaction, facial swelling  TAMARA Gustafson - CNP   Iivxik-EiZld-TkRpi-Meth-FA-DHA (PRENATE MINI) 18-0.6-0.4-350 MG CAPS Take 1 capsule by mouth daily  Historical Provider, MD   albuterol sulfate  (90 Base) MCG/ACT inhaler Inhale into the lungs every 4 hours as needed  Historical Provider, MD       Social History     Tobacco Use    Smoking status: Never Smoker    Smokeless tobacco: Never Used   Vaping Use    Vaping Use: Never used   Substance Use Topics    Alcohol use: Yes     Comment: OCCASIONAL    Drug use: Never        Allergies   Allergen Reactions    Morphine Hives and Swelling     Morphine      [place] [Macadamia Nut Oil] Swelling   ,   Past Medical History:   Diagnosis Date    Asthma    ,   Social History     Tobacco Use    Smoking status: Never Smoker    Smokeless tobacco: Never Used   Vaping Use    Vaping Use: Never used   Substance Use Topics    Alcohol use: Yes     Comment: OCCASIONAL    Drug use: Never       PHYSICAL EXAMINATION:  Patient was not physically examined as this is a virtual visit. Physical findings limited and were observed by video. [ INSTRUCTIONS:  \"[x]\" Indicates a positive item  \"[]\" Indicates a negative item  -- DELETE ALL ITEMS NOT EXAMINED]  Vital Signs: (As obtained by patient/caregiver or practitioner observation)    Patient-Reported Vitals 1/13/2022   Patient-Reported Weight 209   Patient-Reported Height 59   Patient-Reported Pulse 90   Patient-Reported Temperature 99.0   Patient-Reported SpO2 97        Constitutional: [x] Appears well-developed and well-nourished [] No apparent distress      [x] Abnormal- obese  Mental status  [x] Alert and awake  [] Oriented to person/place/time []Able to follow commands      Eyes:  EOM    []  Normal  [] Abnormal-  Sclera  [x]  Normal  [] Abnormal -         Discharge [x]  None visible  [] Abnormal -    HENT:   [x] Normocephalic, atraumatic. [] Abnormal   [] Mouth/Throat: Mucous membranes are moist.     External Ears [x] Normal  [] Abnormal-     Neck: [x] No visualized mass     Pulmonary/Chest: [] Respiratory effort normal.  [x] No visualized signs of difficulty breathing or respiratory distress        [x] Abnormal- coughing   Musculoskeletal:   [] Normal gait with no signs of ataxia         [x] Normal range of motion of neck        [] Abnormal-       Neurological:        [x] No Facial Asymmetry (Cranial nerve 7 motor function) (limited exam to video visit)          [] No gaze palsy        [] Abnormal-         Skin:        [x] No significant exanthematous lesions or discoloration noted on facial skin         [x] Abnormal- erythematous rash to back, appears flat         Psychiatric:       [x] Normal Affect [] No Hallucinations        [] Abnormal-     Other pertinent observable physical exam findings-     ASSESSMENT/PLAN:  Visit Diagnoses and Associated Orders     COVID-19 virus infection    -  Primary    Advised on continued symptomatic treatment, rest, fluids.     predniSONE (DELTASONE) 20 MG tablet [2996]           Rash Likely viral rash, but will give short course of Prednisone. predniSONE (DELTASONE) 20 MG tablet [6496]           Nausea        Advised to continue drinking fluids, use zofran prn    ondansetron (ZOFRAN-ODT) 4 MG disintegrating tablet [11850]           Productive cough        Add Mucinex, advised to drink plenty of fluids    guaiFENesin (MUCINEX) 600 MG extended release tablet [85602]           Asthma, unspecified asthma severity, unspecified whether complicated, unspecified whether persistent        predniSONE (DELTASONE) 20 MG tablet [6496]           ORDERS WITHOUT AN ASSOCIATED DIAGNOSIS    benzonatate (TESSALON) 100 MG capsule [988]            Return if symptoms worsen or fail to improve. Ana Huff is a 49 y.o. female being evaluated by a Virtual Visit (video visit) encounter to address concerns as mentioned above. A caregiver was present when appropriate. Due to this being a TeleHealth encounter (During [address] emergency), evaluation of the following organ systems was limited: Vitals/Constitutional/EENT/Resp/CV/GI//MS/Neuro/Skin/Heme-Lymph-Imm. Pursuant to the emergency declaration under the [address] [place] [address], [address] [place] [address] and the [employer] and [employer] Act, this Virtual Visit was conducted with patient's (and/or legal guardian's) consent, to reduce the patient's risk of exposure to COVID-19 and provide necessary medical care. The patient (and/or legal guardian) has also been advised to contact this office for worsening conditions or problems, and seek emergency medical treatment and/or call 911 if deemed necessary. Patient identification was verified at the start of the visit: Yes    Total time spent on this encounter: 21 minutes    Services were provided through a video synchronous discussion virtually to substitute for in-person clinic visit.  Patient and provider were located at their individual homes.    --Marysol Dwyer, TAMARA - CNP on 1/13/2022 at 12:01 PM    An electronic signature was used to authenticate this note.

## 2022-01-15 DIAGNOSIS — F90.0 ATTENTION DEFICIT HYPERACTIVITY DISORDER (ADHD), PREDOMINANTLY INATTENTIVE TYPE: ICD-10-CM

## 2022-01-17 RX ORDER — DEXTROAMPHETAMINE SACCHARATE, AMPHETAMINE ASPARTATE, DEXTROAMPHETAMINE SULFATE AND AMPHETAMINE SULFATE 5; 5; 5; 5 MG/1; MG/1; MG/1; MG/1
20 TABLET ORAL 2 TIMES DAILY
Qty: 60 TABLET | Refills: 0 | Status: SHIPPED | OUTPATIENT
Start: 2022-01-17 | End: 2022-03-04 | Stop reason: SDUPTHER

## 2022-01-17 NOTE — TELEPHONE ENCOUNTER
Called patient and left  Voice mail that script has been printed and signed and ready for  in the office.

## 2022-01-20 RX ORDER — SODIUM CHLORIDE, SODIUM LACTATE, POTASSIUM CHLORIDE, CALCIUM CHLORIDE 600; 310; 30; 20 MG/100ML; MG/100ML; MG/100ML; MG/100ML
1000 INJECTION, SOLUTION INTRAVENOUS CONTINUOUS
Status: CANCELLED | OUTPATIENT
Start: 2022-01-20

## 2022-01-25 ENCOUNTER — HOSPITAL ENCOUNTER (OUTPATIENT)
Dept: GENERAL RADIOLOGY | Age: 52
Discharge: HOME | End: 2022-01-27
Payer: COMMERCIAL

## 2022-01-25 ENCOUNTER — HOSPITAL ENCOUNTER (OUTPATIENT)
Dept: PREADMISSION TESTING | Age: 52
Discharge: HOME | End: 2022-01-29
Payer: COMMERCIAL

## 2022-01-25 VITALS
OXYGEN SATURATION: 100 % | TEMPERATURE: 97.3 F | SYSTOLIC BLOOD PRESSURE: 123 MMHG | RESPIRATION RATE: 20 BRPM | BODY MASS INDEX: 30.81 KG/M2 | HEIGHT: 69 IN | HEART RATE: 83 BPM | DIASTOLIC BLOOD PRESSURE: 80 MMHG | WEIGHT: 208 LBS

## 2022-01-25 DIAGNOSIS — Z01.818 PRE-OP TESTING: ICD-10-CM

## 2022-01-25 LAB
-: ABNORMAL
ALBUMIN SERPL-MCNC: 4.5 G/DL (ref 3.5–5.2)
ALBUMIN/GLOBULIN RATIO: 1.5 (ref 1–2.5)
ALP BLD-CCNC: 75 U/L (ref 35–104)
ALT SERPL-CCNC: 21 U/L (ref 5–33)
AMORPHOUS: ABNORMAL
ANION GAP SERPL CALCULATED.3IONS-SCNC: 12 MMOL/L (ref 9–17)
AST SERPL-CCNC: 14 U/L
BACTERIA: ABNORMAL
BILIRUB SERPL-MCNC: 0.51 MG/DL (ref 0.3–1.2)
BILIRUBIN URINE: NEGATIVE
BUN BLDV-MCNC: 13 MG/DL (ref 6–20)
BUN/CREAT BLD: ABNORMAL (ref 9–20)
CALCIUM SERPL-MCNC: 9.5 MG/DL (ref 8.6–10.4)
CASTS UA: ABNORMAL /LPF (ref 0–2)
CASTS UA: ABNORMAL /LPF (ref 0–2)
CHLORIDE BLD-SCNC: 97 MMOL/L (ref 98–107)
CO2: 25 MMOL/L (ref 20–31)
COLOR: YELLOW
COMMENT UA: ABNORMAL
CREAT SERPL-MCNC: 0.86 MG/DL (ref 0.5–0.9)
CRYSTALS, UA: ABNORMAL /HPF
EPITHELIAL CELLS UA: ABNORMAL /HPF (ref 0–5)
ESTIMATED AVERAGE GLUCOSE: 117 MG/DL
GFR AFRICAN AMERICAN: >60 ML/MIN
GFR NON-AFRICAN AMERICAN: >60 ML/MIN
GFR SERPL CREATININE-BSD FRML MDRD: ABNORMAL ML/MIN/{1.73_M2}
GFR SERPL CREATININE-BSD FRML MDRD: ABNORMAL ML/MIN/{1.73_M2}
GLUCOSE BLD-MCNC: 94 MG/DL (ref 70–99)
GLUCOSE URINE: NEGATIVE
HBA1C MFR BLD: 5.7 % (ref 4–6)
HCT VFR BLD CALC: 43.7 % (ref 36.3–47.1)
HEMOGLOBIN: 13.5 G/DL (ref 11.9–15.1)
KETONES, URINE: NEGATIVE
LEUKOCYTE ESTERASE, URINE: NEGATIVE
MCH RBC QN AUTO: 30.1 PG (ref 25.2–33.5)
MCHC RBC AUTO-ENTMCNC: 30.9 G/DL (ref 28.4–34.8)
MCV RBC AUTO: 97.3 FL (ref 82.6–102.9)
MUCUS: ABNORMAL
NITRITE, URINE: NEGATIVE
NRBC AUTOMATED: 0 PER 100 WBC
OTHER OBSERVATIONS UA: ABNORMAL
PDW BLD-RTO: 13.2 % (ref 11.8–14.4)
PH UA: 5.5 (ref 5–8)
PLATELET # BLD: 348 K/UL (ref 138–453)
PMV BLD AUTO: 8.9 FL (ref 8.1–13.5)
POTASSIUM SERPL-SCNC: 4 MMOL/L (ref 3.7–5.3)
PROTEIN UA: NEGATIVE
RBC # BLD: 4.49 M/UL (ref 3.95–5.11)
RBC UA: ABNORMAL /HPF (ref 0–2)
RENAL EPITHELIAL, UA: ABNORMAL /HPF
SODIUM BLD-SCNC: 134 MMOL/L (ref 135–144)
SPECIFIC GRAVITY UA: 1.03 (ref 1–1.03)
TOTAL PROTEIN: 7.6 G/DL (ref 6.4–8.3)
TRICHOMONAS: ABNORMAL
TURBIDITY: ABNORMAL
URINE HGB: NEGATIVE
UROBILINOGEN, URINE: NORMAL
WBC # BLD: 11 K/UL (ref 3.5–11.3)
WBC UA: ABNORMAL /HPF (ref 0–5)
YEAST: ABNORMAL

## 2022-01-25 PROCEDURE — 87641 MR-STAPH DNA AMP PROBE: CPT

## 2022-01-25 PROCEDURE — 93005 ELECTROCARDIOGRAM TRACING: CPT

## 2022-01-25 PROCEDURE — 71046 X-RAY EXAM CHEST 2 VIEWS: CPT

## 2022-01-25 PROCEDURE — 85027 COMPLETE CBC AUTOMATED: CPT

## 2022-01-25 PROCEDURE — 83036 HEMOGLOBIN GLYCOSYLATED A1C: CPT

## 2022-01-25 PROCEDURE — 80053 COMPREHEN METABOLIC PANEL: CPT

## 2022-01-25 PROCEDURE — 81001 URINALYSIS AUTO W/SCOPE: CPT

## 2022-01-25 PROCEDURE — 36415 COLL VENOUS BLD VENIPUNCTURE: CPT

## 2022-01-25 RX ORDER — HYDROCODONE BITARTRATE AND ACETAMINOPHEN 5; 325 MG/1; MG/1
1 TABLET ORAL EVERY 6 HOURS PRN
Status: ON HOLD | COMMUNITY
End: 2022-02-14 | Stop reason: HOSPADM

## 2022-01-25 ASSESSMENT — PAIN DESCRIPTION - LOCATION: LOCATION: HIP

## 2022-01-25 ASSESSMENT — PAIN SCALES - GENERAL: PAINLEVEL_OUTOF10: 6

## 2022-01-25 ASSESSMENT — PAIN DESCRIPTION - ONSET: ONSET: ON-GOING

## 2022-01-25 ASSESSMENT — PAIN DESCRIPTION - FREQUENCY: FREQUENCY: CONTINUOUS

## 2022-01-25 ASSESSMENT — PAIN DESCRIPTION - DESCRIPTORS: DESCRIPTORS: CONSTANT;ACHING

## 2022-01-25 ASSESSMENT — PAIN - FUNCTIONAL ASSESSMENT: PAIN_FUNCTIONAL_ASSESSMENT: PREVENTS OR INTERFERES WITH ALL ACTIVE AND SOME PASSIVE ACTIVITIES

## 2022-01-25 ASSESSMENT — PAIN DESCRIPTION - PAIN TYPE: TYPE: CHRONIC PAIN

## 2022-01-25 ASSESSMENT — PAIN DESCRIPTION - ORIENTATION: ORIENTATION: RIGHT

## 2022-01-25 ASSESSMENT — PAIN DESCRIPTION - PROGRESSION: CLINICAL_PROGRESSION: GRADUALLY WORSENING

## 2022-01-25 NOTE — H&P
History and Physical    Pt Name: Elida Castillo  MRN: [id number]  YOB: 1970  Date of evaluation: 1/25/2022  Primary Care Physician: TAMARA Pinto CNP    SUBJECTIVE:   History of Chief Complaint:    Elida Castillo is a 49 y.o. female who presents for PAT appointment. Patient complains of right hip pain, arthritis. She has failed conservative treatment to include injections that only provided short term relief and has opted for surgical intervention. Patient has been scheduled for HIP TOTAL ARTHROPLASTY - RIGHT. Allergies  is allergic to morphine and [place]nut [macadamia nut oil]. Medications  Prior to Admission medications    Medication Sig Start Date End Date Taking? Authorizing Provider   HYDROcodone-acetaminophen (NORCO) 5-325 MG per tablet Take 1 tablet by mouth every 6 hours as needed for Pain. Yes Historical Provider, MD   amphetamine-dextroamphetamine (ADDERALL) 20 MG tablet Take 1 tablet by mouth 2 times daily for 30 days.  1/17/22 2/16/22 Yes Suze Dominguez MD   ondansetron (ZOFRAN-ODT) 4 MG disintegrating tablet Take 1 tablet by mouth 3 times daily as needed for Nausea or Vomiting 1/13/22  Yes TAMARA Bliss CNP   levothyroxine (SYNTHROID) 175 MCG tablet Take 1 tablet by mouth every morning (before breakfast) 12/3/21  Yes TAMARA Pinto CNP   fluticasone (FLONASE) 50 MCG/ACT nasal spray 2 sprays by Each Nostril route daily 12/3/21  Yes TAMARA Pinto CNP   ibuprofen (ADVIL;MOTRIN) 800 MG tablet Take 1 tablet by mouth every 8 hours as needed for Pain 12/3/21  Yes TAMARA Pinto CNP   vitamin D (ERGOCALCIFEROL) 1.25 MG (22630 UT) CAPS capsule Take 1 tab every Monday and every Friday  Patient taking differently: Take 50,000 Units by mouth Take 1 tab every Monday and every Friday 8/11/21  Yes TAMARA Mcmanus CNP   baclofen (LIORESAL) 10 MG tablet Take 1-2 tablets by mouth 2 times daily as needed (PRN for back pain, stiffness) 7/14/21  Yes Idalia Miller TAMARA - CNP   Hdzdly-HdFgm-UqGzb-Meth-FA-DHA (PRENATE MINI) 18-0.6-0.4-350 MG CAPS Take 1 capsule by mouth daily 3/25/21  Yes Historical Provider, MD   albuterol sulfate  (90 Base) MCG/ACT inhaler Inhale into the lungs every 4 hours as needed   Yes Historical Provider, MD   EPINEPHrine (EPIPEN 2-DINO) 0.3 MG/0.3ML SOAJ injection Inject 0.3 mLs into the muscle once for 1 dose Use as directed for allergic reaction, facial swelling 4/18/21 12/3/21  TAMARA Currie CNP     Past Medical History    has a past medical history of Asthma, COVID-19, COVID-19, Hip arthritis, Hypothyroidism, Lumbar disc disease, Lupus ([address] Under care of team, Under care of team, and Under care of team.  Past Surgical History   has a past surgical history that includes Hysterectomy, vaginal; bladder suspension; and Hysterectomy. Social History   reports that she has never smoked. She has never used smokeless tobacco.    reports current alcohol use. rare/social   reports no history of drug use. Marital Status single  Children 2  Occupation [employer]  Family History  Family Status   Relation Name Status    Mother  (Not Specified)    Father  Other        brain aneurysm      family history includes Breast Cancer in her mother; Other in her father.     Review of Systems:  CONSTITUTIONAL:   negative for fevers, chills, fatigue and malaise    EYES:   negative for double vision, blurred vision and photophobia    HEENT:   negative for tinnitus, epistaxis and sore throat     RESPIRATORY:   negative for cough, shortness of breath, wheezing     CARDIOVASCULAR:   negative for chest pain, palpitations, syncope, edema     GASTROINTESTINAL:   negative for nausea, vomiting     GENITOURINARY:   negative for incontinence     MUSCULOSKELETAL:   +arthralgias    NEUROLOGICAL:   Negative for weakness and tingling  negative for headaches and dizziness     PSYCHIATRIC:   negative for anxiety       OBJECTIVE:   VITALS:  height is 5' 9\" (1.753 m) and weight is 208 lb (94.3 kg). Her temporal temperature is 97.3 °F (36.3 °C). Her blood pressure is 123/80 and her pulse is 83. Her respiration is 20 and oxygen saturation is 100%. CONSTITUTIONAL:alert & oriented x 3, no acute distress. Friendly. SKIN:  Warm and dry, no rashes on exposed areas of skin   HEAD:  Normocephalic, atraumatic   EYES: EOMs intact. PERRL. EARS:  Equal bilaterally, no edema or thickening, skin is intact without lumps or lesions. No discharge. Hearing grossly WNL. NOSE:  Nares patent. No rhinorrhea   MOUTH/THROAT:  Mucous membranes moist, tongue is pink, uvula midline, teeth appear to be intact, s/p recent right upper dental extraction- area without erythema, edema or drainage   NECK:supple, full ROM  LUNGS: Respirations even and non-labored. Clear to auscultation bilaterally, no wheezes, rales, or rhonchi. CARDIOVASCULAR: Regular rate and rhythm, no murmurs/rubs/gallops   ABDOMEN: soft, non-tender, non-distended, bowel sounds active x 4   EXTREMITIES: No edema bilateral lower extremities. No varicosities bilateral lower extremities. NEUROLOGIC: CN II-XII are grossly intact. Gait not assessed.      Testing:   EK22  Labs pending: drawn 2022   Chest XRay:  22  MRSA nasal swab: 22  IMPRESSIONS:   Right hip arthritis  PLANS:   HIP TOTAL ARTHROPLASTY - RIGHT    TAMARA MASCORRO CNP  Electronically signed 2022 at 9:53 AM

## 2022-01-25 NOTE — PROGRESS NOTES
Anesthesia Focused Assessment    Hx of anesthesia complications:  no  Family hx of anesthesia complications:  no      Prior + Covid-19 test? 1/5/22  Symptoms/Hospitalization?:cough, body aches, fatigue, headache, vomiting, temp 99, \"covid rash\" for which she completed 5 day steroid  Patient vaccinated: yes  Also had covid at beginning of pandemic, reports she was more ill then. (Not vaccinated yet at that time)      STOP-BANG Sleep Apnea Questionnaire    SNORE loudly (heard through closed doors)? No  TIRED, fatigued, sleepy during daytime? No  OBSERVED stopping breathing during sleep? No  High blood PRESSURE or being treated? No    BMI over 35? No  AGE over 48? No  NECK circumference over 16\"? No  GENDER (male)? No             Total zero  High risk 5-8  Intermediate risk 3-4  Low risk 0-2    ----------------------------------------------------------------------------------------------------------------------  EARL:                                             no  If yes, machine?:                              Type 1 DM:                                   no  T2DM:                                           no    Coronary Artery Disease:             no  Hypertension:                               no  Defib / AICD / Pacemaker:           no    Renal Failure:                               no  If yes, on dialysis? :                           Active smoker:                              no  Drinks Alcohol:                              no  Illicit drugs:                                    no    Dentition:                                      S/p recent dental extraction on the right. Dentist: Sulma Garay. Cleared. Past Medical History:   Diagnosis Date    Asthma     COVID-19 01/05/2022    cough, body aches, fatigue, headache, vomiting, temp 99. X 6 DAYS   TESTED AT [place] [address].     COVID-19 10/2020    symptoms X 19 days    Hip arthritis     Hypothyroidism     Lumbar disc disease  [facility]upus ([facility] [address]     Under care of team 01/25/2022    PCP - Haydee Chen CNP - LAST VISIT -  1/11/22    Under care of team 01/25/2022    RHEUMATOLOGY - DR. Hannah Jain - LASY VISIT 12/2021    Under care of team 01/25/2022    ORTHO - DR. BROWN - LAST VISIT 1/2022     Patient was evaluated in PAT & anesthesia guidelines were applied. NPO guidelines, medication instructions were reviewed with patient. Time of arrival per surgeon    Patient was sent for post PAT anesthesia interview for covid + history. ADDENDUM: Per anesthesia, ok to proceed with surgery as scheduled. PCP clearance pending. Has appt 1/31/22. Appt with rheumatology 1/26/22 for clearance also.    [facility]- Dr. Faviola Rivera)                                                                                              TAMARA Pastrana - CNP  Electronically signed 1/25/2022 at 9:53 AM

## 2022-01-26 ENCOUNTER — TELEPHONE (OUTPATIENT)
Dept: INTERNAL MEDICINE CLINIC | Age: 52
End: 2022-01-26

## 2022-01-26 LAB
EKG ATRIAL RATE: 74 BPM
EKG P AXIS: 50 DEGREES
EKG P-R INTERVAL: 160 MS
EKG Q-T INTERVAL: 400 MS
EKG QRS DURATION: 82 MS
EKG QTC CALCULATION (BAZETT): 444 MS
EKG R AXIS: -9 DEGREES
EKG T AXIS: 30 DEGREES
EKG VENTRICULAR RATE: 74 BPM
MRSA, DNA, NASAL: NEGATIVE
SPECIMEN DESCRIPTION: NORMAL

## 2022-01-26 PROCEDURE — 93010 ELECTROCARDIOGRAM REPORT: CPT | Performed by: INTERNAL MEDICINE

## 2022-01-26 NOTE — TELEPHONE ENCOUNTER
Medical surgical clearance request received 01/26/22    Surgeon: DR Tano Queen    Procedure: Hip total arthroplasty right    Date of Procedure: 02/08/2022  Last appt: 12/29/21    Next appt: 1/31/2022    PATs received:    [x] yes   [] no

## 2022-01-31 ENCOUNTER — OFFICE VISIT (OUTPATIENT)
Dept: INTERNAL MEDICINE CLINIC | Age: 52
End: 2022-01-31
Payer: COMMERCIAL

## 2022-01-31 VITALS
HEIGHT: 69 IN | HEART RATE: 89 BPM | BODY MASS INDEX: 31.93 KG/M2 | WEIGHT: 215.6 LBS | SYSTOLIC BLOOD PRESSURE: 110 MMHG | DIASTOLIC BLOOD PRESSURE: 78 MMHG | OXYGEN SATURATION: 98 %

## 2022-01-31 DIAGNOSIS — M16.10 HIP ARTHRITIS: Primary | ICD-10-CM

## 2022-01-31 DIAGNOSIS — Z01.818 PREOPERATIVE CLEARANCE: Primary | ICD-10-CM

## 2022-01-31 DIAGNOSIS — E06.3 HASHIMOTO'S THYROIDITIS: ICD-10-CM

## 2022-01-31 DIAGNOSIS — M16.10 HIP ARTHRITIS: ICD-10-CM

## 2022-01-31 DIAGNOSIS — E55.9 VITAMIN D DEFICIENCY: ICD-10-CM

## 2022-01-31 DIAGNOSIS — R73.03 PREDIABETES: ICD-10-CM

## 2022-01-31 DIAGNOSIS — Z12.31 ENCOUNTER FOR SCREENING MAMMOGRAM FOR BREAST CANCER: ICD-10-CM

## 2022-01-31 PROCEDURE — 99214 OFFICE O/P EST MOD 30 MIN: CPT | Performed by: NURSE PRACTITIONER

## 2022-01-31 RX ORDER — ASCORBIC ACID, CHOLECALCIFEROL, .ALPHA.-TOCOPHEROL ACETATE, DL-, PYRIDOXINE HYDROCHLORIDE, FOLIC ACID, CYANOCOBALAMIN, BIOTIN, CALCIUM CARBONATE, FERROUS ASPARTO GLYCINATE, IRON, POTASSIUM IODIDE, MAGNESIUM OXIDE, DOCONEXENT AND LOWBUSH BLUEBERRY 60; 1000; 10; 26; 400; 13; 280; 80; 9; 9; 150; 25; 350; 25; 600 MG/1; [IU]/1; [IU]/1; MG/1; UG/1; UG/1; UG/1; MG/1; MG/1; MG/1; UG/1; MG/1; MG/1; MG/1; UG/1
1 CAPSULE, GELATIN COATED ORAL DAILY
Qty: 30 CAPSULE | Refills: 2 | Status: SHIPPED | OUTPATIENT
Start: 2022-01-31 | End: 2022-04-12

## 2022-01-31 RX ORDER — ERGOCALCIFEROL 1.25 MG/1
CAPSULE ORAL
Qty: 30 CAPSULE | Refills: 1 | Status: SHIPPED | OUTPATIENT
Start: 2022-01-31 | End: 2022-07-14 | Stop reason: DRUGHIGH

## 2022-01-31 RX ORDER — AMOXICILLIN 500 MG/1
CAPSULE ORAL
COMMUNITY
Start: 2022-01-20 | End: 2022-01-31

## 2022-01-31 ASSESSMENT — ENCOUNTER SYMPTOMS
WHEEZING: 0
CHEST TIGHTNESS: 0
DIARRHEA: 0
SHORTNESS OF BREATH: 0
NAUSEA: 0
CONSTIPATION: 0
COUGH: 0
SORE THROAT: 0
RHINORRHEA: 0
TROUBLE SWALLOWING: 0
ABDOMINAL PAIN: 0
COLOR CHANGE: 0
VOMITING: 0

## 2022-01-31 NOTE — PROGRESS NOTES
Visit Information    Have you changed or started any medications since your last visit including any over-the-counter medicines, vitamins, or herbal medicines? no   Are you having any side effects from any of your medications? -  no  Have you stopped taking any of your medications? Is so, why? -  no    Have you seen any other physician or provider since your last visit? Yes - Records Requested  Have you had any other diagnostic tests since your last visit? Yes - Records Requested  Have you been seen in the emergency room and/or had an admission to a hospital since we last saw you? No  Have you had your routine dental cleaning in the past 6 months? no    Have you activated your [employer] account? If not, what are your barriers?  Yes     Patient Care Team:  TAMARA Seals CNP as PCP - General (Nurse Practitioner)  TAMARA Seals CNP as PCP [facility]    Medical History Review  Past Medical, Family, and Social History reviewed and does not contribute to the patient presenting condition    Health Maintenance   Topic Date Due    Pneumococcal 0-64 years Vaccine (1 of 2 - PPSV23) Never done    DTaP/Tdap/Td vaccine (1 - Tdap) Never done    Colon cancer screen colonoscopy  Never done    Shingles Vaccine (1 of 2) Never done    Breast cancer screen  01/14/2022    COVID-19 Vaccine (3 - Booster for [place] series) 04/02/2022    Depression Screen  12/03/2022    A1C test (Diabetic or Prediabetic)  01/25/2023    Lipid screen  08/03/2026    Flu vaccine  Completed    Hepatitis C screen  Completed    HIV screen  Completed    Hepatitis A vaccine  Aged Out    Hepatitis B vaccine  Aged Out    Hib vaccine  Aged Out    Meningococcal (ACWY) vaccine  Aged Out         [address] [address]  Dept: [phone]  Dept Fax: [phone]    OfficeProgress/Follow Up Note  Date of patient's visit: 1/31/2022  Patient's Name:  Ab Lyles Date of (ZOFRAN-ODT) 4 MG disintegrating tablet Take 1 tablet by mouth 3 times daily as needed for Nausea or Vomiting 30 tablet 0    levothyroxine (SYNTHROID) 175 MCG tablet Take 1 tablet by mouth every morning (before breakfast) 90 tablet 1    fluticasone (FLONASE) 50 MCG/ACT nasal spray 2 sprays by Each Nostril route daily 16 g 0    baclofen (LIORESAL) 10 MG tablet Take 1-2 tablets by mouth 2 times daily as needed (PRN for back pain, stiffness) 90 tablet 0    EPINEPHrine (EPIPEN 2-DINO) 0.3 MG/0.3ML SOAJ injection Inject 0.3 mLs into the muscle once for 1 dose Use as directed for allergic reaction, facial swelling 0.3 mL 1    albuterol sulfate  (90 Base) MCG/ACT inhaler Inhale into the lungs every 4 hours as needed       No current facility-administered medications for this visit. ALLERGIES:      Allergies   Allergen Reactions    Morphine Hives and Swelling     Morphine      [place] [Macadamia Nut Oil] Swelling         SOCIAL HISTORY    Reviewed and no change from previous record. Jarvis Low  reports that she has never smoked. She has never used smokeless tobacco.    FAMILY HISTORY:    Reviewed and No change from previous visit    REVIEW OF SYSTEMS:    Review of Systems   Constitutional: Negative for chills, diaphoresis, fatigue, fever and unexpected weight change. HENT: Negative for congestion, postnasal drip, rhinorrhea, sneezing, sore throat and trouble swallowing. Eyes: Negative for visual disturbance. Respiratory: Negative for cough, chest tightness, shortness of breath and wheezing. Cardiovascular: Negative for chest pain. Gastrointestinal: Negative for abdominal pain, constipation, diarrhea, nausea and vomiting. Endocrine: Negative for polydipsia, polyphagia and polyuria. Genitourinary: Negative for difficulty urinating, dysuria, flank pain, frequency and urgency. Musculoskeletal: Positive for arthralgias (R hip, R knee) and gait problem.    Skin: Negative for color change and rash.   Neurological: Negative for dizziness, tremors, syncope, weakness and numbness. Psychiatric/Behavioral: Negative for confusion and hallucinations. PHYSICAL EXAM:      Vitals:    01/31/22 0931   BP: 110/78   Site: Left Upper Arm   Position: Sitting   Pulse: 89   SpO2: 98%   Weight: 215 lb 9.6 oz (97.8 kg)   Height: 5' 9\" (1.753 m)     BP Readings from Last 3 Encounters:   01/31/22 110/78   01/25/22 123/80   12/29/21 118/84      Wt Readings from Last 3 Encounters:   01/31/22 215 lb 9.6 oz (97.8 kg)   01/25/22 208 lb (94.3 kg)   01/03/22 212 lb (96.2 kg)       Physical Exam  Vitals reviewed. Constitutional:       Appearance: Normal appearance. HENT:      Head: Normocephalic. Cardiovascular:      Rate and Rhythm: Normal rate and regular rhythm. Pulses: Normal pulses. Heart sounds: Normal heart sounds. Pulmonary:      Effort: Pulmonary effort is normal.      Breath sounds: Normal breath sounds. Abdominal:      General: Bowel sounds are normal.      Palpations: Abdomen is soft. Musculoskeletal:         General: No swelling, tenderness or deformity. Skin:     General: Skin is warm and dry. Neurological:      General: No focal deficit present. Mental Status: She is alert and oriented to person, place, and time. Psychiatric:         Mood and Affect: Mood normal.         Behavior: Behavior normal.         Thought Content:  Thought content normal.         Judgment: Judgment normal.          LABORATORY FINDINGS:    CBC:  Lab Results   Component Value Date    WBC 11.0 01/25/2022    HGB 13.5 01/25/2022     01/25/2022       BMP:    Lab Results   Component Value Date     01/25/2022    K 4.0 01/25/2022    CL 97 01/25/2022    CO2 25 01/25/2022    BUN 13 01/25/2022    CREATININE 0.86 01/25/2022    GLUCOSE 94 01/25/2022       HEMOGLOBIN A1C:   Lab Results   Component Value Date    LABA1C 5.7 01/25/2022       FASTING LIPID PANEL:  Lab Results   Component Value Date    CHOL 251 (H) 08/03/2021    HDL 55 08/03/2021    TRIG 137 08/03/2021       ASSESSMENT AND PLAN:      1. Preoperative clearance/ 2. Hip arthritis  - planned R total hip arthroplasty with Dr. Wilber Gipson 2/8/22  -  Patient is cleared with intermediate risk  - RCRI calculation class 1 risk, 3.9%    3. Hashimoto's thyroiditis  - stable, compliant with synthroid 175 mcg    4. Prediabetes  - HgA1C 5.7  - educated on importance of healthy low carb diet and daily exercise as tolerated to help reduce risk of DM    5. Vitamin D deficiency  - Ycobqz-SlQqm-XqEwi-Meth-FA-DHA (PRENATE MINI) 18-0.6-0.4-350 MG CAPS; Take 1 capsule by mouth daily  Dispense: 30 capsule; Refill: 2  - vitamin D (ERGOCALCIFEROL) 1.25 MG (21017 UT) CAPS capsule; Take 1 tab every Monday and every Friday  Dispense: 30 capsule; Refill: 1    6. Encounter for screening mammogram for breast cancer  - [facility] Digital Screen Bilateral [[id number]]; Future      FOLLOW UP AND INSTRUCTIONS:   Return if symptoms worsen or fail to improve. Kelsey received counseling on the following healthy behaviors: nutrition, exercise and medication adherence    Discussed use, benefit, and side effects of prescribed medications. Barriers to medication compliance addressed. All patient questions answered. Patient voiced understanding. Patient given educational materials - see patient instructions    TAMARA Geronimo - CNP   [facility]  1/31/2022, 11:04 AM    Please note that this chart was generated using voice recognition Dragon dictation software. Although everyeffort was made to ensure the accuracy of this automated transcription, some errors in transcription may have occurred.

## 2022-02-01 ENCOUNTER — TELEPHONE (OUTPATIENT)
Dept: ORTHOPEDIC SURGERY | Age: 52
End: 2022-02-01

## 2022-02-01 NOTE — TELEPHONE ENCOUNTER
If her dentist, oral surgeon feel she would be super low infection risk by delaying the molar then it would be fine to proceed with joint replacement.

## 2022-02-01 NOTE — TELEPHONE ENCOUNTER
Patient is scheduled for [place] 2/8/22 she went to the dentist and had a tooth pulled they referred her to see an oral surgeon for a back molar that is cracked and has a filling in it to be pulled. She hasn't been working and doesn't have the money to get it done right now and doesn't want this to delay her surgery. She needs to be able to work to pay for it and has not been working right now. She is wanting to know if she still can have surgery?

## 2022-02-04 ENCOUNTER — PATIENT MESSAGE (OUTPATIENT)
Dept: INTERNAL MEDICINE CLINIC | Age: 52
End: 2022-02-04

## 2022-02-14 ENCOUNTER — ANESTHESIA (OUTPATIENT)
Dept: OPERATING ROOM | Age: 52
End: 2022-02-14
Payer: COMMERCIAL

## 2022-02-14 ENCOUNTER — HOSPITAL ENCOUNTER (OUTPATIENT)
Age: 52
Discharge: HOME | End: 2022-02-15
Attending: ORTHOPAEDIC SURGERY | Admitting: ORTHOPAEDIC SURGERY
Payer: COMMERCIAL

## 2022-02-14 ENCOUNTER — APPOINTMENT (OUTPATIENT)
Dept: GENERAL RADIOLOGY | Age: 52
End: 2022-02-14
Attending: ORTHOPAEDIC SURGERY
Payer: COMMERCIAL

## 2022-02-14 ENCOUNTER — ANESTHESIA EVENT (OUTPATIENT)
Dept: OPERATING ROOM | Age: 52
End: 2022-02-14
Payer: COMMERCIAL

## 2022-02-14 VITALS — DIASTOLIC BLOOD PRESSURE: 55 MMHG | SYSTOLIC BLOOD PRESSURE: 99 MMHG | OXYGEN SATURATION: 100 %

## 2022-02-14 DIAGNOSIS — Z96.641 S/P TOTAL RIGHT HIP ARTHROPLASTY: ICD-10-CM

## 2022-02-14 DIAGNOSIS — G89.18 POST-OP PAIN: Primary | ICD-10-CM

## 2022-02-14 PROBLEM — M16.11 ARTHRITIS OF RIGHT HIP: Status: ACTIVE | Noted: 2021-09-16

## 2022-02-14 LAB
ABO/RH: NORMAL
ANTIBODY SCREEN: NEGATIVE
ARM BAND NUMBER: NORMAL
EXPIRATION DATE: NORMAL

## 2022-02-14 PROCEDURE — 6370000000 HC RX 637 (ALT 250 FOR IP): Performed by: ANESTHESIOLOGY

## 2022-02-14 PROCEDURE — 86850 RBC ANTIBODY SCREEN: CPT

## 2022-02-14 PROCEDURE — 6360000002 HC RX W HCPCS: Performed by: ORTHOPAEDIC SURGERY

## 2022-02-14 PROCEDURE — 7100000001 HC PACU RECOVERY - ADDTL 15 MIN: Performed by: ORTHOPAEDIC SURGERY

## 2022-02-14 PROCEDURE — 3700000001 HC ADD 15 MINUTES (ANESTHESIA): Performed by: ORTHOPAEDIC SURGERY

## 2022-02-14 PROCEDURE — 6360000002 HC RX W HCPCS: Performed by: ANESTHESIOLOGY

## 2022-02-14 PROCEDURE — 2580000003 HC RX 258: Performed by: STUDENT IN AN ORGANIZED HEALTH CARE EDUCATION/TRAINING PROGRAM

## 2022-02-14 PROCEDURE — 86901 BLOOD TYPING SEROLOGIC RH(D): CPT

## 2022-02-14 PROCEDURE — 6370000000 HC RX 637 (ALT 250 FOR IP): Performed by: STUDENT IN AN ORGANIZED HEALTH CARE EDUCATION/TRAINING PROGRAM

## 2022-02-14 PROCEDURE — 97530 THERAPEUTIC ACTIVITIES: CPT

## 2022-02-14 PROCEDURE — 2580000003 HC RX 258: Performed by: ANESTHESIOLOGY

## 2022-02-14 PROCEDURE — 3209999900 FLUORO FOR SURGICAL PROCEDURES

## 2022-02-14 PROCEDURE — 36415 COLL VENOUS BLD VENIPUNCTURE: CPT

## 2022-02-14 PROCEDURE — 3700000000 HC ANESTHESIA ATTENDED CARE: Performed by: ORTHOPAEDIC SURGERY

## 2022-02-14 PROCEDURE — 73502 X-RAY EXAM HIP UNI 2-3 VIEWS: CPT

## 2022-02-14 PROCEDURE — C1776 JOINT DEVICE (IMPLANTABLE): HCPCS | Performed by: ORTHOPAEDIC SURGERY

## 2022-02-14 PROCEDURE — 86900 BLOOD TYPING SEROLOGIC ABO: CPT

## 2022-02-14 PROCEDURE — C1713 ANCHOR/SCREW BN/BN,TIS/BN: HCPCS | Performed by: ORTHOPAEDIC SURGERY

## 2022-02-14 PROCEDURE — 6360000002 HC RX W HCPCS: Performed by: NURSE ANESTHETIST, CERTIFIED REGISTERED

## 2022-02-14 PROCEDURE — 6360000002 HC RX W HCPCS

## 2022-02-14 PROCEDURE — 2720000010 HC SURG SUPPLY STERILE: Performed by: ORTHOPAEDIC SURGERY

## 2022-02-14 PROCEDURE — 3600000015 HC SURGERY LEVEL 5 ADDTL 15MIN: Performed by: ORTHOPAEDIC SURGERY

## 2022-02-14 PROCEDURE — 27130 TOTAL HIP ARTHROPLASTY: CPT | Performed by: ORTHOPAEDIC SURGERY

## 2022-02-14 PROCEDURE — 1200000000 HC SEMI PRIVATE

## 2022-02-14 PROCEDURE — 2500000003 HC RX 250 WO HCPCS: Performed by: NURSE ANESTHETIST, CERTIFIED REGISTERED

## 2022-02-14 PROCEDURE — 7100000000 HC PACU RECOVERY - FIRST 15 MIN: Performed by: ORTHOPAEDIC SURGERY

## 2022-02-14 PROCEDURE — 6360000002 HC RX W HCPCS: Performed by: STUDENT IN AN ORGANIZED HEALTH CARE EDUCATION/TRAINING PROGRAM

## 2022-02-14 PROCEDURE — 97166 OT EVAL MOD COMPLEX 45 MIN: CPT

## 2022-02-14 PROCEDURE — 2709999900 HC NON-CHARGEABLE SUPPLY: Performed by: ORTHOPAEDIC SURGERY

## 2022-02-14 PROCEDURE — 3600000005 HC SURGERY LEVEL 5 BASE: Performed by: ORTHOPAEDIC SURGERY

## 2022-02-14 PROCEDURE — 2500000003 HC RX 250 WO HCPCS: Performed by: STUDENT IN AN ORGANIZED HEALTH CARE EDUCATION/TRAINING PROGRAM

## 2022-02-14 PROCEDURE — 97162 PT EVAL MOD COMPLEX 30 MIN: CPT

## 2022-02-14 DEVICE — FEMORAL HEAD Ø 28 SIZE M
Type: IMPLANTABLE DEVICE | Site: HIP | Status: FUNCTIONAL
Brand: MECTACER BIOLOX DELTA FEMORAL BALL HEAD

## 2022-02-14 DEVICE — HC PE LINER 28 / DME
Type: IMPLANTABLE DEVICE | Site: HIP | Status: FUNCTIONAL
Brand: DOUBLE MOBILITY LINER

## 2022-02-14 DEVICE — DOUBLE MOBILITY ACETABULAR SHELL Ø50
Type: IMPLANTABLE DEVICE | Site: HIP | Status: FUNCTIONAL
Brand: MPACT DOUBLE MOBILITY SHELLS

## 2022-02-14 DEVICE — MASTERLOC CEMENTLESS TI COATED LAT STEM # 6
Type: IMPLANTABLE DEVICE | Site: HIP | Status: FUNCTIONAL
Brand: MASTERLOC FEMORAL STEMS

## 2022-02-14 RX ORDER — PHENYLEPHRINE HCL IN 0.9% NACL 1 MG/10 ML
SYRINGE (ML) INTRAVENOUS PRN
Status: DISCONTINUED | OUTPATIENT
Start: 2022-02-14 | End: 2022-02-14 | Stop reason: SDUPTHER

## 2022-02-14 RX ORDER — ALBUTEROL SULFATE 90 UG/1
2 AEROSOL, METERED RESPIRATORY (INHALATION) EVERY 4 HOURS PRN
Status: DISCONTINUED | OUTPATIENT
Start: 2022-02-14 | End: 2022-02-15 | Stop reason: HOSPADM

## 2022-02-14 RX ORDER — OXYCODONE HYDROCHLORIDE AND ACETAMINOPHEN 5; 325 MG/1; MG/1
1 TABLET ORAL EVERY 6 HOURS PRN
Qty: 28 TABLET | Refills: 0 | Status: SHIPPED | OUTPATIENT
Start: 2022-02-14 | End: 2022-02-21

## 2022-02-14 RX ORDER — ONDANSETRON 2 MG/ML
INJECTION INTRAMUSCULAR; INTRAVENOUS
Status: COMPLETED
Start: 2022-02-14 | End: 2022-02-14

## 2022-02-14 RX ORDER — TRANEXAMIC ACID 100 MG/ML
INJECTION, SOLUTION INTRAVENOUS
Status: DISCONTINUED
Start: 2022-02-14 | End: 2022-02-14

## 2022-02-14 RX ORDER — SODIUM CHLORIDE 0.9 % (FLUSH) 0.9 %
10 SYRINGE (ML) INJECTION EVERY 12 HOURS SCHEDULED
Status: DISCONTINUED | OUTPATIENT
Start: 2022-02-14 | End: 2022-02-14 | Stop reason: HOSPADM

## 2022-02-14 RX ORDER — SCOLOPAMINE TRANSDERMAL SYSTEM 1 MG/1
1 PATCH, EXTENDED RELEASE TRANSDERMAL ONCE
Status: DISCONTINUED | OUTPATIENT
Start: 2022-02-14 | End: 2022-02-14

## 2022-02-14 RX ORDER — ASPIRIN 81 MG/1
81 TABLET ORAL 2 TIMES DAILY
Qty: 84 TABLET | Refills: 0 | Status: SHIPPED | OUTPATIENT
Start: 2022-02-14 | End: 2022-07-14

## 2022-02-14 RX ORDER — OXYCODONE HYDROCHLORIDE 5 MG/1
10 TABLET ORAL EVERY 6 HOURS PRN
Status: DISCONTINUED | OUTPATIENT
Start: 2022-02-14 | End: 2022-02-15 | Stop reason: HOSPADM

## 2022-02-14 RX ORDER — SCOLOPAMINE TRANSDERMAL SYSTEM 1 MG/1
1 PATCH, EXTENDED RELEASE TRANSDERMAL ONCE
Status: DISCONTINUED | OUTPATIENT
Start: 2022-02-14 | End: 2022-02-15 | Stop reason: HOSPADM

## 2022-02-14 RX ORDER — PROMETHAZINE HYDROCHLORIDE 25 MG/ML
6.25 INJECTION, SOLUTION INTRAMUSCULAR; INTRAVENOUS
Status: DISCONTINUED | OUTPATIENT
Start: 2022-02-14 | End: 2022-02-14 | Stop reason: HOSPADM

## 2022-02-14 RX ORDER — FENTANYL CITRATE 50 UG/ML
50 INJECTION, SOLUTION INTRAMUSCULAR; INTRAVENOUS EVERY 5 MIN PRN
Status: DISCONTINUED | OUTPATIENT
Start: 2022-02-14 | End: 2022-02-14 | Stop reason: HOSPADM

## 2022-02-14 RX ORDER — ACETAMINOPHEN 500 MG
1000 TABLET ORAL
Status: DISCONTINUED | OUTPATIENT
Start: 2022-02-14 | End: 2022-02-14 | Stop reason: HOSPADM

## 2022-02-14 RX ORDER — MIDAZOLAM HYDROCHLORIDE 1 MG/ML
INJECTION INTRAMUSCULAR; INTRAVENOUS PRN
Status: DISCONTINUED | OUTPATIENT
Start: 2022-02-14 | End: 2022-02-14 | Stop reason: SDUPTHER

## 2022-02-14 RX ORDER — TRAMADOL HYDROCHLORIDE 50 MG/1
50 TABLET ORAL
Status: DISCONTINUED | OUTPATIENT
Start: 2022-02-14 | End: 2022-02-14

## 2022-02-14 RX ORDER — ASPIRIN 81 MG/1
81 TABLET ORAL 2 TIMES DAILY
Status: DISCONTINUED | OUTPATIENT
Start: 2022-02-14 | End: 2022-02-15 | Stop reason: HOSPADM

## 2022-02-14 RX ORDER — SODIUM CHLORIDE, SODIUM LACTATE, POTASSIUM CHLORIDE, CALCIUM CHLORIDE 600; 310; 30; 20 MG/100ML; MG/100ML; MG/100ML; MG/100ML
INJECTION, SOLUTION INTRAVENOUS CONTINUOUS
Status: DISCONTINUED | OUTPATIENT
Start: 2022-02-14 | End: 2022-02-14

## 2022-02-14 RX ORDER — LIDOCAINE HYDROCHLORIDE 20 MG/ML
INJECTION, SOLUTION EPIDURAL; INFILTRATION; INTRACAUDAL; PERINEURAL PRN
Status: DISCONTINUED | OUTPATIENT
Start: 2022-02-14 | End: 2022-02-14 | Stop reason: SDUPTHER

## 2022-02-14 RX ORDER — VANCOMYCIN HYDROCHLORIDE 1 G/20ML
INJECTION, POWDER, LYOPHILIZED, FOR SOLUTION INTRAVENOUS PRN
Status: DISCONTINUED | OUTPATIENT
Start: 2022-02-14 | End: 2022-02-14 | Stop reason: HOSPADM

## 2022-02-14 RX ORDER — DOCUSATE SODIUM 100 MG/1
100 CAPSULE, LIQUID FILLED ORAL 2 TIMES DAILY PRN
Qty: 60 CAPSULE | Refills: 0 | Status: SHIPPED | OUTPATIENT
Start: 2022-02-14 | End: 2022-04-12

## 2022-02-14 RX ORDER — GABAPENTIN 400 MG/1
800 CAPSULE ORAL ONCE
Status: DISCONTINUED | OUTPATIENT
Start: 2022-02-14 | End: 2022-02-14

## 2022-02-14 RX ORDER — SODIUM CHLORIDE 0.9 % (FLUSH) 0.9 %
10 SYRINGE (ML) INJECTION EVERY 12 HOURS SCHEDULED
Status: DISCONTINUED | OUTPATIENT
Start: 2022-02-14 | End: 2022-02-15 | Stop reason: HOSPADM

## 2022-02-14 RX ORDER — DEXAMETHASONE SODIUM PHOSPHATE 10 MG/ML
10 INJECTION, SOLUTION INTRAMUSCULAR; INTRAVENOUS ONCE
Status: DISCONTINUED | OUTPATIENT
Start: 2022-02-14 | End: 2022-02-14

## 2022-02-14 RX ORDER — ONDANSETRON 2 MG/ML
4 INJECTION INTRAMUSCULAR; INTRAVENOUS
Status: COMPLETED | OUTPATIENT
Start: 2022-02-14 | End: 2022-02-14

## 2022-02-14 RX ORDER — SODIUM CHLORIDE 0.9 % (FLUSH) 0.9 %
10 SYRINGE (ML) INJECTION PRN
Status: DISCONTINUED | OUTPATIENT
Start: 2022-02-14 | End: 2022-02-15 | Stop reason: HOSPADM

## 2022-02-14 RX ORDER — LIDOCAINE HYDROCHLORIDE 10 MG/ML
1 INJECTION, SOLUTION EPIDURAL; INFILTRATION; INTRACAUDAL; PERINEURAL
Status: DISCONTINUED | OUTPATIENT
Start: 2022-02-14 | End: 2022-02-14 | Stop reason: HOSPADM

## 2022-02-14 RX ORDER — ACETAMINOPHEN 325 MG/1
650 TABLET ORAL EVERY 6 HOURS
Status: DISCONTINUED | OUTPATIENT
Start: 2022-02-14 | End: 2022-02-15 | Stop reason: HOSPADM

## 2022-02-14 RX ORDER — FENTANYL CITRATE 50 UG/ML
25 INJECTION, SOLUTION INTRAMUSCULAR; INTRAVENOUS EVERY 5 MIN PRN
Status: DISCONTINUED | OUTPATIENT
Start: 2022-02-14 | End: 2022-02-14 | Stop reason: HOSPADM

## 2022-02-14 RX ORDER — SODIUM CHLORIDE 9 MG/ML
INJECTION, SOLUTION INTRAVENOUS CONTINUOUS
Status: DISCONTINUED | OUTPATIENT
Start: 2022-02-14 | End: 2022-02-15 | Stop reason: HOSPADM

## 2022-02-14 RX ORDER — VANCOMYCIN HYDROCHLORIDE 1 G/20ML
INJECTION, POWDER, LYOPHILIZED, FOR SOLUTION INTRAVENOUS
Status: DISCONTINUED
Start: 2022-02-14 | End: 2022-02-14

## 2022-02-14 RX ORDER — DEXTROAMPHETAMINE SACCHARATE, AMPHETAMINE ASPARTATE, DEXTROAMPHETAMINE SULFATE AND AMPHETAMINE SULFATE 5; 5; 5; 5 MG/1; MG/1; MG/1; MG/1
20 TABLET ORAL 2 TIMES DAILY
Status: DISCONTINUED | OUTPATIENT
Start: 2022-02-14 | End: 2022-02-15 | Stop reason: HOSPADM

## 2022-02-14 RX ORDER — GABAPENTIN 300 MG/1
300 CAPSULE ORAL NIGHTLY
Status: DISCONTINUED | OUTPATIENT
Start: 2022-02-14 | End: 2022-02-15 | Stop reason: HOSPADM

## 2022-02-14 RX ORDER — SODIUM CHLORIDE 9 MG/ML
25 INJECTION, SOLUTION INTRAVENOUS PRN
Status: DISCONTINUED | OUTPATIENT
Start: 2022-02-14 | End: 2022-02-14 | Stop reason: HOSPADM

## 2022-02-14 RX ORDER — SODIUM CHLORIDE 9 MG/ML
INJECTION, SOLUTION INTRAVENOUS CONTINUOUS
Status: DISCONTINUED | OUTPATIENT
Start: 2022-02-14 | End: 2022-02-14

## 2022-02-14 RX ORDER — SODIUM CHLORIDE 9 MG/ML
25 INJECTION, SOLUTION INTRAVENOUS PRN
Status: DISCONTINUED | OUTPATIENT
Start: 2022-02-14 | End: 2022-02-15 | Stop reason: HOSPADM

## 2022-02-14 RX ORDER — TRAMADOL HYDROCHLORIDE 50 MG/1
50 TABLET ORAL EVERY 8 HOURS
Status: DISCONTINUED | OUTPATIENT
Start: 2022-02-14 | End: 2022-02-14

## 2022-02-14 RX ORDER — KETOROLAC TROMETHAMINE 30 MG/ML
15 INJECTION, SOLUTION INTRAMUSCULAR; INTRAVENOUS EVERY 6 HOURS
Status: DISCONTINUED | OUTPATIENT
Start: 2022-02-14 | End: 2022-02-15 | Stop reason: HOSPADM

## 2022-02-14 RX ORDER — CELECOXIB 200 MG/1
200 CAPSULE ORAL
Status: DISCONTINUED | OUTPATIENT
Start: 2022-02-14 | End: 2022-02-14

## 2022-02-14 RX ORDER — OXYCODONE HYDROCHLORIDE 5 MG/1
5 TABLET ORAL EVERY 6 HOURS PRN
Status: DISCONTINUED | OUTPATIENT
Start: 2022-02-14 | End: 2022-02-15 | Stop reason: HOSPADM

## 2022-02-14 RX ORDER — PROPOFOL 10 MG/ML
INJECTION, EMULSION INTRAVENOUS CONTINUOUS PRN
Status: DISCONTINUED | OUTPATIENT
Start: 2022-02-14 | End: 2022-02-14 | Stop reason: SDUPTHER

## 2022-02-14 RX ORDER — SODIUM CHLORIDE 0.9 % (FLUSH) 0.9 %
10 SYRINGE (ML) INJECTION PRN
Status: DISCONTINUED | OUTPATIENT
Start: 2022-02-14 | End: 2022-02-14 | Stop reason: HOSPADM

## 2022-02-14 RX ADMIN — TRANEXAMIC ACID 1000 MG: 1 INJECTION, SOLUTION INTRAVENOUS at 14:21

## 2022-02-14 RX ADMIN — FENTANYL CITRATE 50 MCG: 50 INJECTION INTRAMUSCULAR; INTRAVENOUS at 15:59

## 2022-02-14 RX ADMIN — ACETAMINOPHEN 650 MG: 325 TABLET ORAL at 21:10

## 2022-02-14 RX ADMIN — HYDROMORPHONE HYDROCHLORIDE 0.5 MG: 1 INJECTION, SOLUTION INTRAMUSCULAR; INTRAVENOUS; SUBCUTANEOUS at 22:18

## 2022-02-14 RX ADMIN — Medication 100 MCG: at 14:07

## 2022-02-14 RX ADMIN — Medication 100 MCG: at 13:42

## 2022-02-14 RX ADMIN — HYDROMORPHONE HYDROCHLORIDE 0.5 MG: 1 INJECTION, SOLUTION INTRAMUSCULAR; INTRAVENOUS; SUBCUTANEOUS at 18:19

## 2022-02-14 RX ADMIN — FENTANYL CITRATE 50 MCG: 50 INJECTION INTRAMUSCULAR; INTRAVENOUS at 15:40

## 2022-02-14 RX ADMIN — TRANEXAMIC ACID 1000 MG: 1 INJECTION, SOLUTION INTRAVENOUS at 12:58

## 2022-02-14 RX ADMIN — GABAPENTIN 300 MG: 300 CAPSULE ORAL at 21:11

## 2022-02-14 RX ADMIN — KETOROLAC TROMETHAMINE 15 MG: 30 INJECTION, SOLUTION INTRAMUSCULAR; INTRAVENOUS at 17:18

## 2022-02-14 RX ADMIN — CEFAZOLIN SODIUM 2000 MG: 10 INJECTION, POWDER, FOR SOLUTION INTRAVENOUS at 21:10

## 2022-02-14 RX ADMIN — ACETAMINOPHEN 1000 MG: 500 TABLET ORAL at 10:12

## 2022-02-14 RX ADMIN — OXYCODONE HYDROCHLORIDE 10 MG: 5 TABLET ORAL at 23:33

## 2022-02-14 RX ADMIN — Medication 50 MCG: at 13:34

## 2022-02-14 RX ADMIN — PROPOFOL 50 MCG/KG/MIN: 10 INJECTION, EMULSION INTRAVENOUS at 12:57

## 2022-02-14 RX ADMIN — SODIUM CHLORIDE: 9 INJECTION, SOLUTION INTRAVENOUS at 17:12

## 2022-02-14 RX ADMIN — Medication 100 MCG: at 13:46

## 2022-02-14 RX ADMIN — CEFAZOLIN 2000 MG: 10 INJECTION, POWDER, FOR SOLUTION INTRAVENOUS at 12:53

## 2022-02-14 RX ADMIN — ASPIRIN 81 MG: 81 TABLET, COATED ORAL at 21:11

## 2022-02-14 RX ADMIN — Medication 200 MCG: at 13:55

## 2022-02-14 RX ADMIN — FENTANYL CITRATE 50 MCG: 50 INJECTION INTRAMUSCULAR; INTRAVENOUS at 16:35

## 2022-02-14 RX ADMIN — OXYCODONE HYDROCHLORIDE 10 MG: 5 TABLET ORAL at 17:18

## 2022-02-14 RX ADMIN — ONDANSETRON 4 MG: 2 INJECTION, SOLUTION INTRAMUSCULAR; INTRAVENOUS at 16:40

## 2022-02-14 RX ADMIN — SODIUM CHLORIDE, POTASSIUM CHLORIDE, SODIUM LACTATE AND CALCIUM CHLORIDE: 600; 310; 30; 20 INJECTION, SOLUTION INTRAVENOUS at 12:35

## 2022-02-14 RX ADMIN — MIDAZOLAM 2 MG: 1 INJECTION INTRAMUSCULAR; INTRAVENOUS at 12:36

## 2022-02-14 RX ADMIN — LIDOCAINE HYDROCHLORIDE 50 MG: 20 INJECTION, SOLUTION EPIDURAL; INFILTRATION; INTRACAUDAL; PERINEURAL at 12:57

## 2022-02-14 RX ADMIN — ONDANSETRON 4 MG: 2 INJECTION, SOLUTION INTRAMUSCULAR; INTRAVENOUS at 13:00

## 2022-02-14 RX ADMIN — ONDANSETRON 4 MG: 2 INJECTION INTRAMUSCULAR; INTRAVENOUS at 16:40

## 2022-02-14 RX ADMIN — Medication 150 MCG: at 13:49

## 2022-02-14 ASSESSMENT — PULMONARY FUNCTION TESTS
PIF_VALUE: 0
PIF_VALUE: 1
PIF_VALUE: 0
PIF_VALUE: 1
PIF_VALUE: 0
PIF_VALUE: 1
PIF_VALUE: 0
PIF_VALUE: 1
PIF_VALUE: 0
PIF_VALUE: 1

## 2022-02-14 ASSESSMENT — PAIN SCALES - GENERAL
PAINLEVEL_OUTOF10: 7
PAINLEVEL_OUTOF10: 5
PAINLEVEL_OUTOF10: 5
PAINLEVEL_OUTOF10: 7
PAINLEVEL_OUTOF10: 0
PAINLEVEL_OUTOF10: 8
PAINLEVEL_OUTOF10: 7
PAINLEVEL_OUTOF10: 10
PAINLEVEL_OUTOF10: 7
PAINLEVEL_OUTOF10: 10
PAINLEVEL_OUTOF10: 4
PAINLEVEL_OUTOF10: 4

## 2022-02-14 ASSESSMENT — PAIN DESCRIPTION - PAIN TYPE
TYPE: SURGICAL PAIN
TYPE: SURGICAL PAIN
TYPE: CHRONIC PAIN

## 2022-02-14 ASSESSMENT — PAIN DESCRIPTION - ORIENTATION
ORIENTATION: RIGHT

## 2022-02-14 ASSESSMENT — ENCOUNTER SYMPTOMS: SHORTNESS OF BREATH: 0

## 2022-02-14 ASSESSMENT — PAIN DESCRIPTION - FREQUENCY: FREQUENCY: CONTINUOUS

## 2022-02-14 ASSESSMENT — PAIN DESCRIPTION - LOCATION
LOCATION: HIP

## 2022-02-14 ASSESSMENT — PAIN DESCRIPTION - DESCRIPTORS
DESCRIPTORS: ACHING;BURNING;SHARP
DESCRIPTORS: SHARP;ACHING

## 2022-02-14 ASSESSMENT — PAIN DESCRIPTION - ONSET: ONSET: SUDDEN

## 2022-02-14 NOTE — H&P
History and Physical Update    Pt Name: Tahir Cook  MRN: [id number]  YOB: 1970  Date of evaluation: 2/14/2022      [x] I have reviewed the H&P found in Epic by Stephanie Yung CNP dated 1/25/22 used for an Interval History and Physical note. [x] I have examined Tahir Cook a 49 y.o., female who is scheduled for an anterior approach right total hip arthroplasty by Dr. Yosvany Early due to right hip arthritis. The patient denies health changes since her appointment with Stephanie Yung CNP on 1/25/22. Pt denies fever, chills, productive cough, SOB, chest pain, open sores, rashes, and wounds. Pt denies history of diabetes. A1C was 5.7% on 1/25/22. Motrin was last taken 2 weeks ago. Vitamin D was last taken on 2/11/22. Prenate mini was last taken on 2/12/22. \"Patient is cleared with intermediate risk\" per progress note by Sissy Loaiza CNP dated 1/31/22 in Epic. Vital signs: /76   Pulse 89   Temp 97.7 °F (36.5 °C) (Temporal)   Resp 16   Ht 5' 9\" (1.753 m)   Wt 208 lb (94.3 kg)   SpO2 99%   BMI 30.72 kg/m²      Allergies:  Morphine and [place] [macadamia nut oil]    Past medical history, surgical history, social history, and family history were reviewed and updated in EPIC as indicated. Medications:    Prior to Admission medications    Medication Sig Start Date End Date Taking? Authorizing Provider   vitamin D (ERGOCALCIFEROL) 1.25 MG (08468 UT) CAPS capsule Take 1 tab every Monday and every Friday 1/31/22  Yes TAMARA Benton CNP   HYDROcodone-acetaminophen (NORCO) 5-325 MG per tablet Take 1 tablet by mouth every 6 hours as needed for Pain.    Yes Historical Provider, MD   levothyroxine (SYNTHROID) 175 MCG tablet Take 1 tablet by mouth every morning (before breakfast) 12/3/21  Yes TAMARA Benton CNP   albuterol sulfate  (90 Base) MCG/ACT inhaler Inhale into the lungs every 4 hours as needed   Yes Historical Provider, MD Ctklbw-JrCkj-QeZcu-Meth-FA-DHA (PRENATE MINI) 18-0.6-0.4-350 MG CAPS Take 1 capsule by mouth daily 1/31/22   TAMARA Moya CNP   amphetamine-dextroamphetamine (ADDERALL) 20 MG tablet Take 1 tablet by mouth 2 times daily for 30 days. 1/17/22 2/16/22  Alayna Vivas MD   ondansetron (ZOFRAN-ODT) 4 MG disintegrating tablet Take 1 tablet by mouth 3 times daily as needed for Nausea or Vomiting 1/13/22   TAMARA Harvey CNP   fluticasone (FLONASE) 50 MCG/ACT nasal spray 2 sprays by Each Nostril route daily 12/3/21   TAMARA Moya CNP   baclofen (LIORESAL) 10 MG tablet Take 1-2 tablets by mouth 2 times daily as needed (PRN for back pain, stiffness) 7/14/21   TAMARA Cheatham CNP   EPINEPHrine (EPIPEN 2-DINO) 0.3 MG/0.3ML SOAJ injection Inject 0.3 mLs into the muscle once for 1 dose Use as directed for allergic reaction, facial swelling 4/18/21 1/31/22  TAMARA Cheatham CNP       This is a 49 y.o. female who is pleasant, cooperative, alert and oriented x 3, in no acute distress. Obese. Heart: Regular rate and rhythm without murmur, gallop, or rub. Lungs: Normal respiratory effort, unlabored, and clear to auscultation without wheezes or rales bilaterally. Abdomen: Soft, nontender, nondistended with active bowel sounds. Pedal pulses: are palpable bilaterally. Labs:  Recent Labs     01/25/22  1002   HGB 13.5   HCT 43.7   WBC 11.0   MCV 97.3      *   K 4.0   CL 97*   CO2 25   BUN 13   CREATININE 0.86   GLUCOSE 94   AST 14   ALT 21   LABALBU 4.5       No results for input(s): COVID19 in the last 720 hours.       TAMARA Swanson CNP   Electronically signed 2/14/2022 at 9:47 AM    TAMARA Gutierrez CNP   Nurse Practitioner   General Surgery   H&P      Signed   Date of Service:  1/25/2022  8:30 AM         Related encounter: Pre-Admission Testing Visit from 1/25/2022 in [facility] Collapse All          Show:Clear all  [x]Manual[x]Template[x]Copied    Added by:  [x]TAMARA Ford CNP      []Héctor for details    History and Physical     Pt Name: Latoya Licea  MRN: [id number]  YOB: 1970  Date of evaluation: 1/25/2022  Primary Care Physician: TAMARA Gr CNP     SUBJECTIVE:   History of Chief Complaint:    Latoya Licea is a 49 y.o. female who presents for PAT appointment. Patient complains of right hip pain, arthritis. She has failed conservative treatment to include injections that only provided short term relief and has opted for surgical intervention. Patient has been scheduled for HIP TOTAL ARTHROPLASTY - RIGHT. Allergies  is allergic to morphine and [place] [macadamia nut oil]. Medications  Home Medications           Prior to Admission medications    Medication Sig Start Date End Date Taking? Authorizing Provider   HYDROcodone-acetaminophen (NORCO) 5-325 MG per tablet Take 1 tablet by mouth every 6 hours as needed for Pain. Yes Historical Provider, MD   amphetamine-dextroamphetamine (ADDERALL) 20 MG tablet Take 1 tablet by mouth 2 times daily for 30 days.  1/17/22 2/16/22 Yes Greyson Chacko MD   ondansetron (ZOFRAN-ODT) 4 MG disintegrating tablet Take 1 tablet by mouth 3 times daily as needed for Nausea or Vomiting 1/13/22   Yes TAMARA Leal CNP   levothyroxine (SYNTHROID) 175 MCG tablet Take 1 tablet by mouth every morning (before breakfast) 12/3/21   Yes TAMARA Gr CNP   fluticasone (FLONASE) 50 MCG/ACT nasal spray 2 sprays by Each Nostril route daily 12/3/21   Yes TAMARA Gr CNP   ibuprofen (ADVIL;MOTRIN) 800 MG tablet Take 1 tablet by mouth every 8 hours as needed for Pain 12/3/21   Yes TAMARA Gr CNP   vitamin D (ERGOCALCIFEROL) 1.25 MG (94262 UT) CAPS capsule Take 1 tab every Monday and every Friday  Patient taking differently: Take 50,000 Units by mouth Take 1 tab every Monday and every Friday 8/11/21   Yes TAMARA Perry CNP baclofen (LIORESAL) 10 MG tablet Take 1-2 tablets by mouth 2 times daily as needed (PRN for back pain, stiffness) 7/14/21   Yes TAMARA Leblanc CNP   Qwrknd-AtLss-AqWcw-Meth-FA-DHA (PRENATE MINI) 18-0.6-0.4-350 MG CAPS Take 1 capsule by mouth daily 3/25/21   Yes Historical Provider, MD   albuterol sulfate  (90 Base) MCG/ACT inhaler Inhale into the lungs every 4 hours as needed     Yes Historical Provider, MD   EPINEPHrine (EPIPEN 2-DINO) 0.3 MG/0.3ML SOAJ injection Inject 0.3 mLs into the muscle once for 1 dose Use as directed for allergic reaction, facial swelling 4/18/21 12/3/21   TAMARA Leblanc CNP         Past Medical History    has a past medical history of Asthma, COVID-19, COVID-19, Hip arthritis, Hypothyroidism, Lumbar disc disease, Lupus ([address] Under care of team, Under care of team, and Under care of team.  Past Surgical History   has a past surgical history that includes Hysterectomy, vaginal; bladder suspension; and Hysterectomy. Social History   reports that she has never smoked. She has never used smokeless tobacco.    reports current alcohol use. rare/social   reports no history of drug use. Marital Status single  Children 2  Occupation pharmacy tech  Family History        Family Status   Relation Name Status    Mother   (Not Specified)    Father   Other         brain aneurysm       family history includes Breast Cancer in her mother; Other in her father.      Review of Systems:  CONSTITUTIONAL:   negative for fevers, chills, fatigue and malaise    EYES:   negative for double vision, blurred vision and photophobia    HEENT:   negative for tinnitus, epistaxis and sore throat     RESPIRATORY:   negative for cough, shortness of breath, wheezing     CARDIOVASCULAR:   negative for chest pain, palpitations, syncope, edema     GASTROINTESTINAL:   negative for nausea, vomiting     GENITOURINARY:   negative for incontinence     MUSCULOSKELETAL:   +arthralgias    NEUROLOGICAL:   Negative for weakness and tingling  negative for headaches and dizziness     PSYCHIATRIC:   negative for anxiety        OBJECTIVE:   VITALS:  height is 5' 9\" (1.753 m) and weight is 208 lb (94.3 kg). Her temporal temperature is 97.3 °F (36.3 °C). Her blood pressure is 123/80 and her pulse is 83. Her respiration is 20 and oxygen saturation is 100%. CONSTITUTIONAL:alert & oriented x 3, no acute distress. Friendly. SKIN:  Warm and dry, no rashes on exposed areas of skin   HEAD:  Normocephalic, atraumatic   EYES: EOMs intact. PERRL. EARS:  Equal bilaterally, no edema or thickening, skin is intact without lumps or lesions. No discharge. Hearing grossly WNL. NOSE:  Nares patent. No rhinorrhea   MOUTH/THROAT:  Mucous membranes moist, tongue is pink, uvula midline, teeth appear to be intact, s/p recent right upper dental extraction- area without erythema, edema or drainage   NECK:supple, full ROM  LUNGS: Respirations even and non-labored. Clear to auscultation bilaterally, no wheezes, rales, or rhonchi. CARDIOVASCULAR: Regular rate and rhythm, no murmurs/rubs/gallops   ABDOMEN: soft, non-tender, non-distended, bowel sounds active x 4   EXTREMITIES: No edema bilateral lower extremities. No varicosities bilateral lower extremities. NEUROLOGIC: CN II-XII are grossly intact. Gait not assessed.       Testing:   EK22  Labs pending: drawn 2022   Chest XRay:  22  MRSA nasal swab: 22  IMPRESSIONS:   Right hip arthritis  PLANS:   HIP TOTAL ARTHROPLASTY - RIGHT     TAMARA MASCORRO - CNP  Electronically signed 2022 at 9:53 AM               Cosigned by: York Bamberger, DO at 2022 10:10 AM       Revision History                            Routing History

## 2022-02-14 NOTE — ANESTHESIA PRE PROCEDURE
Department of Anesthesiology  Preprocedure Note       Name:  Vivian Escobedo   Age:  49 y.o.  :  1970                                          MRN:  [id number]         Date:  2022      Surgeon: Ok Floor):  Daniella Manzo DO    Procedure: Procedure(s):  RIGHT HIP TOTAL ARTHROPLASTY ANTERIOR APPROACH - MEDACTA    Medications prior to admission:   Prior to Admission medications    Medication Sig Start Date End Date Taking? Authorizing Provider   vitamin D (ERGOCALCIFEROL) 1.25 MG (94297 UT) CAPS capsule Take 1 tab every Monday and every 22  Yes TAMARA Geronimo CNP   HYDROcodone-acetaminophen (NORCO) 5-325 MG per tablet Take 1 tablet by mouth every 6 hours as needed for Pain. Yes Historical Provider, MD   levothyroxine (SYNTHROID) 175 MCG tablet Take 1 tablet by mouth every morning (before breakfast) 12/3/21  Yes TAMARA Geronimo CNP   albuterol sulfate  (90 Base) MCG/ACT inhaler Inhale into the lungs every 4 hours as needed   Yes Historical Provider, MD   Mncusj-PeKip-GiLzu-Meth-FA-DHA (PRENATE MINI) 18-0.6-0.4-350 MG CAPS Take 1 capsule by mouth daily 22   TAMARA Geronimo CNP   amphetamine-dextroamphetamine (ADDERALL) 20 MG tablet Take 1 tablet by mouth 2 times daily for 30 days.  22  Yvette Goldstein MD   ondansetron (ZOFRAN-ODT) 4 MG disintegrating tablet Take 1 tablet by mouth 3 times daily as needed for Nausea or Vomiting 22   TAMARA Rowe CNP   fluticasone (FLONASE) 50 MCG/ACT nasal spray 2 sprays by Each Nostril route daily 12/3/21   TAMARA Geronimo CNP   baclofen (LIORESAL) 10 MG tablet Take 1-2 tablets by mouth 2 times daily as needed (PRN for back pain, stiffness) 21   TAMARA Ramesh CNP   EPINEPHrine (EPIPEN 2-DINO) 0.3 MG/0.3ML SOAJ injection Inject 0.3 mLs into the muscle once for 1 dose Use as directed for allergic reaction, facial swelling 21  TAMARA Ramesh - CNP       Current medications:    Current Facility-Administered Medications   Medication Dose Route Frequency Provider Last Rate Last Admin    0.9 % sodium chloride infusion   IntraVENous Continuous Nas Quiles MD        lactated ringers infusion   IntraVENous Continuous Nas Quiles MD        sodium chloride flush 0.9 % injection 10 mL  10 mL IntraVENous 2 times per day Valery Walker MD        sodium chloride flush 0.9 % injection 10 mL  10 mL IntraVENous PRN Valery Walker MD        0.9 % sodium chloride infusion  25 mL IntraVENous PRN Valery Walker MD        lidocaine PF 1 % injection 1 mL  1 mL IntraDERmal Once PRN Valery Walker MD        acetaminophen (TYLENOL) tablet 1,000 mg  1,000 mg Oral 30 Min Pre-Op Dharmesh Boards, DO        celecoxib (CELEBREX) capsule 200 mg  200 mg Oral 30 Min Pre-Op Dharmesh Boards, DO        dexamethasone (PF) (DECADRON) injection 10 mg  10 mg IntraVENous Once Dharmesh Boards, DO        gabapentin (NEURONTIN) capsule 800 mg  800 mg Oral Once Dharmesh Boards, DO        scopolamine (TRANSDERM-SCOP) transdermal patch 1 patch  1 patch TransDERmal Once Dharmesh Boards, DO        traMADol Mary Alice Body) tablet 50 mg  50 mg Oral 30 Min Pre-Op Dharmesh Boards, DO        tranexamic acid (CYKLOKAPRON) 1,000 mg in dextrose 5 % 100 mL IVPB  1,000 mg IntraVENous Once Dharmesh Boards, DO        tranexamic acid (CYKLOKAPRON) 1,000 mg in dextrose 5 % 100 mL IVPB  1,000 mg IntraVENous Once Dharmesh Boards, DO           Allergies:     Allergies   Allergen Reactions    Morphine Hives and Swelling     Morphine      [place] [Macadamia Nut Oil] Swelling       Problem List:    Patient Active Problem List   Diagnosis Code    Nut allergy Z91.018    Attention deficit hyperactivity disorder (ADHD), predominantly inattentive type F90.0    Hashimoto's thyroiditis E06.3    Family history of brain aneurysm Z82.49    Family history of systemic lupus erythematosus Z82.69    Chronic midline low back pain without sciatica M54.50, G89.29    Hip pain M25.559    Hip arthritis M16.10    Lumbar degenerative disc disease M51.36    Prediabetes R73.03       Past Medical History:        Diagnosis Date    Asthma     COVID-19 2022    cough, body aches, fatigue, headache, vomiting, temp 99. X 6 DAYS   TESTED AT [address] [address]  COVID-19 10/2020    symptoms X 19 days    Hip arthritis     Hypothyroidism     Lumbar disc disease     Lupus ([address]     Under care of team 2022    PCP - Gabriela Dixon CNP - LAST VISIT -  22    Under care of team 2022    RHEUMATOLOGY - DR. Mary Iqbal - [facility] VISIT 2021    Under care of team 2022    ORTHO - DR. BROWN - LAST VISIT 2022       Past Surgical History:        Procedure Laterality Date    BLADDER SUSPENSION      at 31 yo during hysterectomy    HYSTERECTOMY      HYSTERECTOMY, VAGINAL      at 31 yo for h/o prolapsed uterus       Social History:    Social History     Tobacco Use    Smoking status: Never Smoker    Smokeless tobacco: Never Used   Substance Use Topics    Alcohol use: Yes     Comment: \"A COUPLE ONCE A MONTH OR SO.\"                                Counseling given: Not Answered      Vital Signs (Current):   Vitals:    22 0915   BP: 134/76   Pulse: 89   Resp: 16   Temp: 97.7 °F (36.5 °C)   TempSrc: Temporal   SpO2: 99%   Weight: 208 lb (94.3 kg)   Height: 5' 9\" (1.753 m)                                              BP Readings from Last 3 Encounters:   22 134/76   22 110/78   22 123/80       NPO Status: Time of last liquid consumption:                         Time of last solid consumption:                         Date of last liquid consumption: 22                        Date of last solid food consumption: 22    BMI:   Wt Readings from Last 3 Encounters:   22 208 lb (94.3 kg)   22 215 lb 9.6 oz (97.8 kg)   22 208 lb (94.3 kg)     Body mass index is 30.72 kg/m².     CBC:   Lab Results Component Value Date    WBC 11.0 01/25/2022    RBC 4.49 01/25/2022    HGB 13.5 01/25/2022    HCT 43.7 01/25/2022    MCV 97.3 01/25/2022    RDW 13.2 01/25/2022     01/25/2022       CMP:   Lab Results   Component Value Date     01/25/2022    K 4.0 01/25/2022    CL 97 01/25/2022    CO2 25 01/25/2022    BUN 13 01/25/2022    CREATININE 0.86 01/25/2022    GFRAA >60 01/25/2022    LABGLOM >60 01/25/2022    GLUCOSE 94 01/25/2022    PROT 7.6 01/25/2022    CALCIUM 9.5 01/25/2022    BILITOT 0.51 01/25/2022    ALKPHOS 75 01/25/2022    AST 14 01/25/2022    ALT 21 01/25/2022       POC Tests: No results for input(s): POCGLU, POCNA, POCK, POCCL, POCBUN, POCHEMO, POCHCT in the last 72 hours. Coags: No results found for: PROTIME, INR, APTT    HCG (If Applicable): No results found for: PREGTESTUR, PREGSERUM, HCG, HCGQUANT     ABGs: No results found for: PHART, PO2ART, NFJ6JHY, OHC3JRB, BEART, X3QVBOQV     Type & Screen (If Applicable):  No results found for: LABABO, LABRH    Drug/Infectious Status (If Applicable):  Lab Results   Component Value Date    HEPCAB NONREACTIVE 11/29/2021       COVID-19 Screening (If Applicable): No results found for: COVID19        Anesthesia Evaluation    Airway: Mallampati: I  TM distance: >3 FB   Neck ROM: full  Mouth opening: > = 3 FB Dental:          Pulmonary:   (+) asthma:     (-) shortness of breath                           Cardiovascular:                      Neuro/Psych:   (+) neuromuscular disease:,             GI/Hepatic/Renal:             Endo/Other:    (+) : arthritis:., .                 Abdominal:             Vascular:           Other Findings:             Anesthesia Plan      spinal     ASA 3                                 Ana Cronin MD   2/14/2022

## 2022-02-14 NOTE — PROGRESS NOTES
Occupational Therapy   Occupational Therapy Initial Assessment  Date: 2022   Patient Name: Linsey Rodriguez  MRN: [id number]     : 1970    RN reports patient is medically stable for therapy treatment this date. Chart reviewed prior to treatment and patient is agreeable for therapy. All lines intact and patient positioned comfortably at end of treatment. All patient needs addressed prior to ending therapy session. Date of Service: 2022    Discharge Recommendations:     OT Equipment Recommendations  Equipment Needed: Yes (CTA)    Assessment   Performance deficits / Impairments: Decreased functional mobility ; Decreased ADL status; Decreased endurance;Decreased high-level IADLs;Decreased sensation;Decreased safe awareness;Decreased balance;Decreased cognition;Decreased posture  Assessment: Pt will require a reassessment of functional mobility and ADL transfers, pt unable to attempt at this time d/t uncontrolled pain and low BP. Pt currently would benefit from continued skilled OT Services while IP to increase I and safety during functional mobility  Prognosis: Good  Decision Making: Medium Complexity  OT Education: OT Role;Transfer Training;Energy Conservation;Plan of Care;ADL Adaptive Strategies;Precautions  Patient Education: fall prevention/call light use, OT POC, OT role in acute care safety in function, benefits of being oob, pursed lip breathing tech, recommendations for continued therapy  REQUIRES OT FOLLOW UP: Yes  Activity Tolerance  Activity Tolerance: Patient limited by pain;Treatment limited secondary to medical complications (free text)  Activity Tolerance: poor, pt limited by excruciating pain and low BP at this time in PACU. Therapist came back to room after pt transfers to new room on floor, pt complaining of uncontrolled pain, writhing in bed, RN in room to give pain medication, not appropriate for therapy/further mobility at this time.   Safety Devices  Safety Devices in place: Yes  Type of devices: Nurse notified; Bed alarm in place;Gait belt;Call light within reach; Patient at risk for falls; Left in bed (RN in room at end of session)           Patient Diagnosis(es): The primary encounter diagnosis was Post-op pain. A diagnosis of S/P total right hip arthroplasty was also pertinent to this visit. has a past medical history of Asthma, COVID-19, COVID-19, Hip arthritis, Hypothyroidism, Lumbar disc disease, Lupus ([address] Under care of team, Under care of team, and Under care of team.   has a past surgical history that includes Hysterectomy, vaginal; bladder suspension; and Hysterectomy. R [place] - Dr. Galindo Wero       Restrictions  Restrictions/Precautions  Restrictions/Precautions: General Precautions,Weight Bearing,Fall Risk  Required Braces or Orthoses?: No  Lower Extremity Weight Bearing Restrictions  Right Lower Extremity Weight Bearing: Weight Bearing As Tolerated  Position Activity Restriction  Other position/activity restrictions: L UE IV, B thigh high, No SLR, No external roatation, No Hip extension    Subjective   General  Chart Reviewed: Yes  Patient assessed for rehabilitation services?: Yes  Family / Caregiver Present: No  Subjective  Subjective: Pt resting in bed in PACU, agreeable to OT eval. Completed MMT, and social functional portion on eval, pt complaining of excruciating pain and has low BP, RN in room to give pain medications reporting that pt will be transferred to room on the floor. Attempted to see pt once in room on the floor, pt reporting increased uncontrolled pain at this time. RN in room giving pain medication, not appropriate for therapy at this time. Patient Currently in Pain: Yes  Comments / Details: Pt reporting uncontrolled pain in R hip, did not rate.  RN aware    Social/Functional History  Social/Functional History  Lives With: Alone (Son will be staying with her as needed)  Type of Home: Apartment  Home Layout: One level  Home Access: Stairs to enter without rails  Entrance Stairs - Number of Steps: 1  Bathroom Shower/Tub: Tub/Shower unit  Bathroom Toilet: Standard  Bathroom Equipment: Grab bars in shower (vanity is close for support as needed)  Home Equipment: Rolling walker,4 wheeled walker  ADL Assistance: [address]: [address] [facility] Responsibilities: Yes  Ambulation Assistance: Independent  Transfer Assistance: Independent  Active : Yes  Occupation: Full time employment  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 Functional Mobility  Functional Mobility Comments: Not safe or appropriate at this time         ADL  Feeding: Setup  Grooming: Setup;Stand by assistance (seated)  UE Bathing: Setup;Stand by assistance  LE Bathing: Setup; Moderate assistance  UE Dressing: Setup;Minimal assistance (to tie/adjust hosp gown seated on EOB)  LE Dressing: Setup;Maximum assistance (to donning B socks supine in bed)  Toileting: Maximum assistance  Additional Comments: Pt is currently limited by severe pain at this time. Tone RUE  RUE Tone: Normotonic  Tone LUE  LUE Tone: Normotonic  Coordination  Movements Are Fluid And Coordinated: Yes     Bed mobility  Scooting: Stand by assistance  Comment: Pt's BP assessed supine this date prior to attempts at mobility. BP was 109/65mmHg (map 78). Pt attempted scooting up in bed following supine ther ex, pt demonstrating good use of BUE for assist throughout.  Goal was to get sitting EOB this date, however pt attempted initiation of bed mobility and reported excruciating pain in RLE, requesting pain meds prior to further mobility. Writer notifed RN and agreed w/ pt to reassess following transfer to floor and once pt received more pain meds. Transfers  Sit to stand: Unable to assess  Stand to sit: Unable to assess  Transfer Comments: Not safe or appropriate at this time, low BP and uncontrolled pain. Cognition  Overall Cognitive Status: Exceptions  Arousal/Alertness: Appropriate responses to stimuli  Following Commands: Follows multistep commands with repitition; Follows multistep commands with increased time  Attention Span: Appears intact  Memory: Appears intact  Safety Judgement: Decreased awareness of need for assistance;Decreased awareness of need for safety  Insights: Decreased awareness of deficits  Initiation: Requires cues for some  Sequencing: Requires cues for some        Sensation  Overall Sensation Status: Impaired (Pt reports chronic numbness/tingling in lateral RLE from knee down d/t chronic back issues)        LUE AROM (degrees)  LUE AROM : WFL  RUE AROM (degrees)  RUE AROM : WFL  LUE Strength  Gross LUE Strength: WFL  LUE Strength Comment: ~ 4/5  RUE Strength  Gross RUE Strength: WFL  RUE Strength Comment: ~ 4/5                   Plan   Plan  Times per week: 5-6x/wk 1-2x/day as Robel Olivares  Current Treatment Recommendations: Strengthening,Endurance Training,Balance Training,Functional Mobility Training,Safety Education & Training,Pain Management,Home Management Training,Self-Care / ADL,Equipment Evaluation, Education, & procurement,Patient/Caregiver Education & Training                          AM-PAC Score        AM-PAC Inpatient Daily Activity Raw Score: 16 (02/14/22 1740)  AM-PAC Inpatient ADL T-Scale Score : 35.96 (02/14/22 1740)  ADL Inpatient CMS 0-100% Score: 53.32 (02/14/22 1740)  ADL Inpatient CMS G-Code Modifier : CK (02/14/22 1740)        Goals  Short term goals  Time Frame for Short term goals: By discharge, pt to demo  Short term goal 1: tolerance for reassessment of functional mobility and ADL transfers when appropriate to add goal to POC. Short term goal 2: bed mobility to SBA with use of bedrails as needed. Short term goal 3: UB ADLs to Set up and LB ADLs to Min A with use of AD/AE as needed. Short term goal 4: toileting to CGA with use of AD/grab bars as needed. Short term goal 5: increased B UE strength by 1/2 grade to assist with functional tasks/I with B UE HEP with use of handouts as needed. Long term goals  Long term goal 1: Pt to be I with fall prevention education, EC/WS tech, recommendations for AE, Moises hose wear/car and safe car transfers with use of handouts as needed. Patient Goals   Patient goals : To reduce pain and go home!        Therapy Time   Individual Concurrent Group Co-treatment   Time In 8778         Time Out 1632         Minutes 27          tx time: 13 min          Karen Meade, OT

## 2022-02-14 NOTE — PROGRESS NOTES
Physical Therapy    Facility/Department: [facility]  Initial Assessment - Day of Surgery     NAME: Alexei Marr  : 1970  MRN: [id number]    Date of Service: 2022   RN reports patient is medically stable for therapy treatment this date. Chart reviewed prior to treatment and patient is agreeable for therapy. All lines intact and patient positioned comfortably at end of treatment. All patient needs addressed prior to ending therapy session. H&P: R [place] - anterior approach 22, Dr. Leon Apo     Discharge Recommendations: CTA   Patient would benefit from continued therapy after discharge   PT Equipment Recommendations  Equipment Needed: No (Pt had RW delivered.)    Assessment   Body structures, Functions, Activity limitations: Decreased functional mobility ; Decreased ROM; Decreased strength;Decreased endurance; Increased pain  Assessment: Pt tolerated PT eval poor. Writer checked on pt twice this date, pt unable to participate in functional mobility d/t significant pain in RLE. Pt writhing in pain and pale in color upon ck back this evening. Pt educated on importance of performing ther ex throughout the night w/ fair return demo. Pt will require a reassessment in the morning to address all mobility. Pt would benefit from continued skilled physical therapy in order to address ROM and strength deficits s/p R [place]. Specific instructions for Next Treatment: REASSESS MOBILITY  Prognosis: Good  Decision Making: Medium Complexity  PT Education: Goals;PT Role;Plan of Care;General Safety; Energy Conservation;Home Exercise Program;Precautions  Patient Education: Pt educated on: purpose of acute PT eval, importance of continued mobility throughout admission, general safety awareness, [place] supine ther ex, [place] anterior approach precautions, paced breathing for pain control, pursed lip breathing, and PT POC. Pt verbalized fair understanding.   REQUIRES PT FOLLOW UP: Yes  Activity Tolerance  Activity Tolerance: Treatment limited secondary to medical complications (free text); Patient limited by pain  Activity Tolerance: Writer checked back w/ pt upon arrival to floor and RN Vazquez Julio reporting pt still in excruciating pain and going to be receiving more pain meds at this time. RN states pt not appropriate for therapy upon ck back, states pt will be staying overnight. Writer spoke w/ pt upon ck back to update pt to PT POC and pt pale in color, writhing in pain. RN present. Patient Diagnosis(es): The primary encounter diagnosis was Post-op pain. A diagnosis of S/P total right hip arthroplasty was also pertinent to this visit. has a past medical history of Asthma, COVID-19, COVID-19, Hip arthritis, Hypothyroidism, Lumbar disc disease, Lupus ([facility] [address] Under care of team, Under care of team, and Under care of team.   has a past surgical history that includes Hysterectomy, vaginal; bladder suspension; and Hysterectomy. Restrictions  Restrictions/Precautions  Restrictions/Precautions: General Precautions,Weight Bearing,Fall Risk  Required Braces or Orthoses?: No  Lower Extremity Weight Bearing Restrictions  Right Lower Extremity Weight Bearing: Weight Bearing As Tolerated  Position Activity Restriction  Other position/activity restrictions: L UE IV, B thigh high, No SLR, No external roatation, No Hip extension  Vision/Hearing  Vision: Within Functional Limits  Hearing: Within functional limits     Subjective  General  Patient assessed for rehabilitation services?: Yes  Response To Previous Treatment: Not applicable  Family / Caregiver Present: Yes (friend present at bedside throughout)  Follows Commands: Within Functional Limits  General Comment  Comments: RN and pt agreeable to therapy. Pt supine in bed upon arrival.  Pt pleasant and cooperative throughout. Subjective  Subjective: Pt reporting 7/10 pain VAS in RLE at time of PT ARNIE longoria notified.   Pain Screening  Patient Currently in Pain: Yes Orientation  Orientation  Overall Orientation Status: Within Functional Limits  Social/Functional History  Social/Functional History  Lives With: Alone (Son will be staying with her as needed)  Type of Home: Apartment  Home Layout: One level  Home Access: Stairs to enter without rails  Entrance Stairs - Number of Steps: 1  Bathroom Shower/Tub: Tub/Shower unit  Bathroom Toilet: Standard  Bathroom Equipment: Grab bars in shower (vanity is close for support as needed)  Home Equipment: Rolling walker,4 wheeled walker  ADL Assistance: Independent  Homemaking Assistance: Independent  Homemaking Responsibilities: Yes  Ambulation Assistance: Independent  Transfer Assistance: Independent  Active : Yes  Occupation: Full time employment  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  Additional Comments: Pt denies any recent falls  Cognition   Cognition  Overall Cognitive Status: Exceptions  Arousal/Alertness: Appropriate responses to stimuli  Following Commands: Follows multistep commands with repitition; Follows multistep commands with increased time  Attention Span: Appears intact  Memory: Appears intact  Safety Judgement: Decreased awareness of need for assistance;Decreased awareness of need for safety  Insights: Decreased awareness of deficits  Initiation: Requires cues for some  Sequencing: Requires cues for some    Objective     Observation/Palpation  Observation: water resistant dressing on R hip  Edema: none    PROM RLE (degrees)  RLE PROM: WFL  AROM RLE (degrees)  RLE General AROM: exceptions: hip 0-40 degrees, knee WFL, ankle WFL  AROM LLE (degrees)  LLE AROM : WFL  AROM RUE (degrees)  RUE AROM : WFL  AROM LUE (degrees)  LUE AROM : WFL  Strength RLE  Comment: Quad set+, glute set+  Strength LLE  Strength LLE: WFL  Comment: Grossly 4+/5  Strength RUE  Comment: See OT assessment for detail  Strength LUE  Comment: See OT assessment for detail  Tone RLE  RLE Tone: Normotonic  Tone LLE  LLE Tone: Normotonic  Motor Control  Gross Motor?: WFL  Sensation  Overall Sensation Status: Impaired (Pt reports chronic numbness/tingling in lateral RLE from knee down d/t chronic back issues)  Bed mobility  Scooting: Stand by assistance  Comment: Pt's BP assessed supine this date prior to attempts at mobility. BP was 109/65mmHg (map 78). Pt attempted scooting up in bed following supine ther ex, pt demonstrating good use of BUE for assist throughout. Goal was to get sitting EOB this date, however pt attempted initiation of bed mobility and reported excruciating pain in RLE, requesting pain meds prior to further mobility. Writer notifed RN and agreed w/ pt to reassess following transfer to floor and once pt received more pain meds. Transfers  Comment: DIAMOND this date. Pt in excruciating pain in RLE, not appropriate for OOB activity at this time. Ambulation  Ambulation?: No (DIAMOND this date. Pt in excruciating pain in RLE, not appropriate for OOB activity at this time.)  Stairs/Curb  Stairs?: No     Balance  Comments: DIAMOND balance this date d/t excruciating pain in RLE. Exercises  Quad Sets: x 10  Heelslides: x 10  Gluteal Sets: x 10  Ankle Pumps: x 10  Comments: Pt requiring mod verbal cueing for proper pacing and paced breathing for pain control throughout ther ex this date w/ fair return demo. Pt tolerated ther ex poor, limited by significant pain.      Plan   Plan  Times per week: BID; 7x/week  Specific instructions for Next Treatment: REASSESS MOBILITY  Current Treatment Recommendations: Strengthening,ROM,Balance Training,Functional Mobility Training,Transfer Training,Endurance Training,Gait Training,Stair training,Safety Education & Training,Home Exercise Program,Equipment Evaluation, Education, & procurement,Patient/Caregiver Education & Training,Neuromuscular Re-education,Pain Management  Safety Devices  Type of devices: Bed alarm in place,Call light within reach,Nurse notified,Left in bed  Restraints  Initially in place: No    AM-PAC Score  AM-PAC Inpatient Mobility Raw Score : 12 (02/14/22 1721)  AM-PAC Inpatient T-Scale Score : 35.33 (02/14/22 1721)  Mobility Inpatient CMS 0-100% Score: 68.66 (02/14/22 1721)  Mobility Inpatient CMS G-Code Modifier : CL (02/14/22 1721)        Goals  Short term goals  Time Frame for Short term goals: 12 visits  Short term goal 1: Pt to demonstrate bed mobility Evelyne  Short term goal 2: REASSESS TRANSFERS/GAIT WHEN APPROPRIATE  Short term goal 3: Pt to be indep w/ [place] HEP  Patient Goals   Patient goals : Less pain       Therapy Time   Individual Concurrent Group Co-treatment   Time In 1625         Time Out 1655         Minutes 30           Treatment time: 10 minutes       Co-treatment with OT warranted first time up day of surgery. Cotx due to potential risk of decreased sensation, muscle control and proprioception from spinal epidural and/or regional block. Decreased safety and independence requiring 2 skilled therapy professionals to address individual discipline's goals.      Steph Hall, PT

## 2022-02-14 NOTE — PROGRESS NOTES
Faxed over order for FWW to [facility] at [facility] and pt does not have DME coverage and the FWW would be $125 OOP. Pt stated she has a walker at home. INSURANCE ID NUMBER WAS INCORRECT AND HAS BEEN CORRECTED. THE FWW IS COVERED AND WILL BE DELIVERED TODAY.

## 2022-02-14 NOTE — ANESTHESIA PROCEDURE NOTES
Spinal Block    Patient location during procedure: OR  Start time: 2/14/2022 12:40 PM  End time: 2/14/2022 12:50 PM  Reason for block: primary anesthetic  Staffing  Performed: anesthesiologist   Anesthesiologist: Mariel Mendoza MD  Preanesthetic Checklist  Completed: patient identified, IV checked, site marked, risks and benefits discussed, surgical consent, monitors and equipment checked, pre-op evaluation, timeout performed, anesthesia consent given, oxygen available and patient being monitored  Spinal Block  Patient position: sitting  Prep: Betadine  Patient monitoring: continuous pulse ox and frequent blood pressure checks  Approach: midline  Location: L2/L3  Guidance: paresthesia technique  Provider prep: sterile gloves and mask  Local infiltration: lidocaine  Agent: bupivacaine  Dose: 1.8  Dose: 1.8  Needle  Needle type: Pencan   Needle gauge: 24 G  Needle length: 4 in  Assessment  Sensory level: T8  Swirl obtained: Yes  CSF: clear  Attempts: 2  Hemodynamics: stable

## 2022-02-14 NOTE — OP NOTE
Operative Note      Patient: Kyle Salazar  YOB: 1970  MRN: [id number]    Date of Procedure: 2/14/2022    Pre-Op Diagnosis: Right hip osteoarthritis    Post-Op Diagnosis: Right hip osteoarthritis       Procedure(s):  RIGHT HIP TOTAL ARTHROPLASTY ANTERIOR APPROACH - [facility]    Surgeon(s):  Kavon Franks DO    Assistant:   Resident: Dinh Amaral DO    Anesthesia: General    Estimated Blood Loss (mL): 600 mL    Fluids: 800 mL crystalloid    Complications: None    Specimens:   * No specimens in log *    Implants:  Implant Name Type Inv. Item Serial No.  Lot No. LRB No. Used Action   SHELL ACET OD50MM LNR DME DBL MOBILITY MPACT - [id number]  SHELL ACET OD50MM LNR DME DBL MOBILITY MPACT  [employer] [facility] [id number] Right 1 Implanted   LINER ACET SZ DME SHELL 50MM HD 28MM HIP HIGHCROSS DBL - [id number]  LINER ACET SZ DME SHELL 50MM HD 28MM HIP HIGHCROSS DBL  [employer] [facility] [id number] Right 1 Implanted   STEM FEM SZ 6 LAT HIP MECTAGRIP CEMENTLESS FLAT DBL TAPR - [id number]  STEM FEM SZ 6 LAT HIP MECTAGRIP CEMENTLESS FLAT DBL TAPR  [employer] [facility] [id number] Right 1 Implanted   HEAD FEM SZ M [id number] FOR QUADRA HIP SYS BIOLOX DELT - [id number]  HEAD FEM SZ M [id number] FOR QUADRA HIP SYS BIOLOX DELT  [employer] [facility] [id number] Right 1 Implanted         Drains: * No LDAs found *    Findings: Right hip osteoarthritis    Detailed Description of Procedure:     OPERATIVE SUMMARY: The patient was met in the preoperative holding area where  written consent was confirmed, the operative site was marked, and all  questions were answered to the patient and family's satisfaction. A spinal block was performed by anesthesia prior to the procedure. They were then taken to the operative  suite and laid on the table in supine position. The operative leg was then placed into a  modified table attachment which is proprietary specific for the implant  company.  The other leg was rested on a Isaacs stand followed by a perineal post. All bony prominences were  appropriately protected. The operative  site was then prepped and draped in sterile fashion. Tranexamic acid and  antibiotics were administered prior to incision. A standard operative timeout  was then performed with all team members present and in agreement. An  Incision of the skin and subcutaneous fat was then made using a #10 blade beginning approximately 2 cm  distal and 2cm lateral to the ASIS which coursed towards the lateral  aspect of the knee over and along the trajectory of the TFL. Downs elevator was utilized to expose  the boundaries of the tensor fascia dominick. Using a new #10 blade we then gently incised  the fascia down to the muscle fibers at which point Allis clamps were attached  to the fascia itself and finger dissection was performed to lateralize the  TFL from the medial aspect of the investing fascia. We then encountered the interval  between the rectus femoris and the gluteus medius. The ascending branch of the lateral  circumflex artery and 2 veins were identified and coagulated using aquamantis. Precapsular fat pad of  the proximal femur was excised. Appropriate retractors were placed and the capsule was  exposed. A standard capsulectomy was performed. Retractors were then placed  intra-articular about the femoral neck. Adequate visualization of the proximal femur and the  shoulders of the femoral neck were exposed. A reciprocating saw was then  positioned along the femoral neck and positioning confirmed by fluoroscopy. Our proximal femoral neck cut was then performed under direct visualization. A second cut was made to allow for a napkin ring excision of the femoral neck  and ease of removal of the femoral head. The acetabulum was then exposed and appropriate retractors were  placed to allow adequate exposure. Labrum and pulvinar tissue was excised using bovie cautery.  Successive reaming was performed to medialize and expand the acetabulum with appropriate version and inclination  using fluoroscopy. Excellent bleeding bone was found after a final reamer and a cup 1mm larger was chosen for implantation. We elected to use a dual mobility cup. The cup was then inserted into place using an  and a  mallet. Excellent pitch change was appreciated once the cup was fully seated. We then turned our attention to the stem placement. Appropriate retractors were placed and a superior and inferior release of the  capsule was performed along the femur. Staged capsular relaease was performed to allow adequate mobility and visualization of the proximal femur, with digital confirmation ensuring no impingement of the greater trochanter posterior to the acetabulum. After appropriate external rotation the leg was brought into extension and  adduction which allowed excellent visualization of the proximal femur. A box  osteotome and rattail canal finder allowed canal identification and initial lateralization. Successive broaches were inserted by mallet with careful attention to anterversion appropriate coronal alignment with some further lateralization. Appropriate sized stem was identified when the trial would no longer advanced and a trial reduction was then performed. Flouroscopy was utilized to confirm implant postiioning, leg length, and offset. The leg was removed from the foot attachement and stability was confirmed after assessing extremes of motion and shuck test. Thourough irrigation was performed and final implants were placed with final radiographic confirmation of appropriate positioning and neck length. A 3 minute  dilute Betadine lavage was performed followed by thorough irrigation. A second  dose of tranexamic acid was then administered by the anesthesia team. Layered closure was performed beginning with vicryl and running barbed suture to the TFL fascia. Subcutaneous  Tissue was closed using a 0 Vicryl followed by a 2-0 running Stratafix.  Dermabond skin  glue was then applied followed by a silver island dressing.  The patient was then safely transferred to the hospital bed and taken to PACU in stable condition without complications.      Implants: Medacta size 6 femoral stem, Size 50 dual mobility acetabular cup, size 50 outer shell femoral head, size 28 inner shell femoral head     Dr. Kishan Pacheco was present and participating for the entirety of the case.      --------------------------------------------------------------  Kwan Dickey DO, PGY-5  [facility] [address] [facility]   2:51 PM 02/14/22    Electronically signed by Kwan Dickey DO, DO on 2/14/2022 at 2:44 PM

## 2022-02-14 NOTE — DISCHARGE INSTR - COC
Continuity of Care Form    Patient Name: Zita Ruff   :  1970  MRN:  [id number]    Admit date:  2022  Discharge date:  2/15/22    Code Status Order: No Order   Advance Directives:   [facility] Documentation       Date/Time Healthcare Directive Type of Healthcare Directive Copy in [address] [facility] [address] Agent's Name Healthcare Agent's Phone Number    [phone] No, patient does not have an advance directive for healthcare treatment -- -- -- -- --            Admitting Physician:  Leila Workman DO  PCP: TAMARA Myrick - CNP    Discharging Nurse: Chantelle Spear RN  [address] Unit/Room#: [address] [facility]  Discharging Unit Phone Number: [phone]    Emergency Contact:   Extended Emergency Contact Information  Primary Emergency Contact:  Nazia Jordan, [address] Phone: [phone]  Relation: Child    Past Surgical History:  Past Surgical History:   Procedure Laterality Date    BLADDER SUSPENSION      at 29 yo during hysterectomy    HYSTERECTOMY      HYSTERECTOMY, VAGINAL      at 29 yo for h/o prolapsed uterus       Immunization History:   Immunization History   Administered Date(s) Administered    COVID-19, Pfizer Purple top, DILUTE for use, 12+ yrs, 30mcg/0.3mL dose 10/12/2021, 2021    Influenza Virus Vaccine 10/28/2014, 2019, 2020, 11/10/2021       Active Problems:  Patient Active Problem List   Diagnosis Code    Nut allergy Z91.018    Attention deficit hyperactivity disorder (ADHD), predominantly inattentive type F90.0    Hashimoto's thyroiditis E06.3    Family history of brain aneurysm Z82.49    Family history of systemic lupus erythematosus Z82.69    Chronic midline low back pain without sciatica M54.50, G89.29    Hip pain M25.559    Hip arthritis M16.10    Lumbar degenerative disc disease M51.36    Prediabetes R73.03       Isolation/Infection:   Isolation            No Isolation          Patient Infection Status None to display            Nurse Assessment:  Last Vital Signs: /76   Pulse 89   Temp 97.7 °F (36.5 °C) (Temporal)   Resp 16   SpO2 99%     Last documented pain score (0-10 scale):    Last Weight:   Wt Readings from Last 1 Encounters:   01/31/22 215 lb 9.6 oz (97.8 kg)     Mental Status:  oriented, alert, coherent, logical, and thought processes intact    IV Access:  - None    Nursing Mobility/ADLs:  Walking   Assisted  Transfer  Assisted  Bathing  Assisted  Dressing  Assisted  Toileting  Assisted  Feeding  Independent  Med Admin  Independent  Med Delivery   whole    Wound Care Documentation and Therapy:        Elimination:  Continence: Bowel: Yes  Bladder: Yes  Urinary Catheter: None   Colostomy/Ileostomy/Ileal Conduit: No       Date of Last BM: 2/14/22  No intake or output data in the 24 hours ending 02/14/22 0928  No intake/output data recorded. Safety Concerns: At Risk for Falls    Impairments/Disabilities:      None    Nutrition Therapy:  Current Nutrition Therapy:   - Oral Diet:  General    Routes of Feeding: Oral  Liquids: Thin Liquids  Daily Fluid Restriction: no  Last Modified Barium Swallow with Video (Video Swallowing Test): not done    Treatments at the Time of Hospital Discharge:   Respiratory Treatments: NA  Oxygen Therapy:  is not on home oxygen therapy. Ventilator:    - No ventilator support    Rehab Therapies: Physical Therapy  Weight Bearing Status/Restrictions: No weight bearing restirctions  Other Medical Equipment (for information only, NOT a DME order):  walker  Other Treatments: physical therapy 2-3 times a week.     Patient's personal belongings (please select all that are sent with patient):  None    RN SIGNATURE:  Electronically signed by Ramon Underwood RN on 2/15/22 at 9:38 AM EST    CASE MANAGEMENT/SOCIAL WORK SECTION    Inpatient Status Date: ***    Readmission Risk Assessment Score:  Readmission Risk              Risk of Unplanned Readmission:  0 Discharging to Facility/ Agency   Name: [address] [place] [address], [facility]. [phone]      Dialysis Facility (if applicable)   Name:  Address:  Dialysis Schedule:  Phone:  Fax:    / signature: Electronically signed by Ree Peralta RN on 2/14/22 at 9:28 AM EST    PHYSICIAN SECTION    Prognosis: Good    Condition at Discharge: Stable    Rehab Potential (if transferring to Rehab): Good    Recommended Labs or Other Treatments After Discharge: Weight-bear as tolerated right lower extremity. Keep right hip incision clean and dry. Physical therapy and Occupational Therapy to evaluate and treat. Physician Certification: I certify the above information and transfer of Agatha Ramirez  is necessary for the continuing treatment of the diagnosis listed and that she requires Home Care for less 30 days.      Update Admission H&P: No change in H&P    PHYSICIAN SIGNATURE:  Electronically signed by Lorrie Murillo DO on 2/15/22 at 1:50 PM EST

## 2022-02-14 NOTE — PLAN OF CARE
Problem: Pain:  Goal: Pain level will decrease  Description: Pain level will decrease  Outcome: Ongoing  Goal: Control of acute pain  Description: Control of acute pain  Outcome: Ongoing  Goal: Control of chronic pain  Description: Control of chronic pain  Outcome: Ongoing     Problem: Discharge Planning:  Goal: Knowledge of discharge instructions  Description: Knowledge of discharge instructions  Outcome: Ongoing     Problem: Infection - Surgical Site:  Goal: Signs of wound healing will improve  Description: Signs of wound healing will improve  Outcome: Ongoing     Problem: Mobility - Impaired:  Goal: Achieve maximum mobility level  Description: Achieve maximum mobility level  Outcome: Ongoing     Problem: Pain - Acute:  Goal: Pain level will decrease  Description: Pain level will decrease  Outcome: Ongoing

## 2022-02-14 NOTE — ANESTHESIA PROCEDURE NOTES
Spinal Block    Patient location during procedure: OR  Start time: 2/14/2022 12:50 PM  End time: 2/14/2022 12:56 PM  Reason for block: primary anesthetic  Staffing  Anesthesiologist: Lucian Yeung MD  Preanesthetic Checklist  Completed: patient identified, IV checked, site marked, risks and benefits discussed, surgical consent, monitors and equipment checked, pre-op evaluation, timeout performed, anesthesia consent given, oxygen available and patient being monitored  Spinal Block  Patient position: sitting  Prep: Betadine  Patient monitoring: cardiac monitor, continuous pulse ox and frequent blood pressure checks  Approach: right paramedian  Location: L3/L4  Provider prep: mask and sterile gloves  Local infiltration: lidocaine  Agent: bupivacaine  Dose: 1.8  Dose: 1.8  Needle  Needle type: pencil-tip   Needle gauge: 25 G  Assessment  CSF: clear  Hemodynamics: stable

## 2022-02-14 NOTE — ANESTHESIA POSTPROCEDURE EVALUATION
Department of Anesthesiology  Postprocedure Note    Patient: Dahiana Mcgovern  MRN: [id number]  YOB: 1970  Date of evaluation: 2/14/2022  Time:  4:53 PM     Procedure Summary     Date: 02/14/22 Room / Location: [address] - INPATIENT    Anesthesia Start: 7765 Anesthesia Stop: 5840    Procedure: RIGHT HIP TOTAL ARTHROPLASTY ANTERIOR APPROACH - [facility] (Right Hip) Diagnosis: (DX RIGHT HIP ARTHRITIS)    Surgeons: Emma Bhat DO Responsible Provider: Mini Small MD    Anesthesia Type: Spinal ASA Status: 3          Anesthesia Type: Spinal    Genny Phase I: Genny Score: 7    Genny Phase II:      Last vitals: Reviewed and per EMR flowsheets.        Anesthesia Post Evaluation    Complications: no

## 2022-02-14 NOTE — PLAN OF CARE
PROTOCOLS  NURSING IMPLEMENTED    TOTAL JOINT DVT/PE  VENOUS THROMBOEMBOLISM PROPHYLAXIS  (Nursing Automatically Implement)    Ahmed Hamman  [id number]  [unfilled]  2/13/22    YES DVT RISK FACTOR SCORE YES MAJOR BLEEDING RISK FACTORS SCORE     [x] 48years old or greater (1)   [] Hx. Easy Bleeding (1)      [] Heart failure (2)   [] NSAID Use in Last 5 Days (2)      [] Varicose veins - Hx. (1)   [] Gastrointestinal or Genitourinary bleeding in Last 14 Days (2)      [] Myocardial Infarction - Hx. (1)         [] Cancer - Hx. (2)         [] Atrial fibrillation - Hx. (1)         [] Ischemic Stroke - Hx. (1)         [] Diabetes Mellitus - Hx. (1)         [] Previous DVT/PE - Hx.  (2)         [] Hormone Replacement Therapy (1)         [] Obesity (1)         [] Paralysis (1)         [] Pregnancy (1)         [] Smoking (1)                   [] Thromophilia (1)   []   Mild to Moderate Bleeding (2)      [x] Total Hip Arthroplasty (1)   [] Active Bleeding (4)      [] Family history of PE or DVT? (4) (Consider the following labs to test for presence of inhibitor deficiency state:) Factor V Leiden, Prothrombin Gene Mutation, Protein S Deficiency, Protien C Deficiency, Antithrombin Deficiency   [] Malignant Hypertension (2)        [] Thrombocytopenia 20k to 100k (2)        [] Thrombocytopenia less than 20k (4)        [] Bleeding Diathesis (4)        [] \"Bloody Stick\" Epidural or Spinal (2)     TOTAL DVT SCORE   TOTAL BLEEDING SCORE      [x] CLASS A   Standard Risk DVT (0-3)    [x] CLASS X Standard Risk Bleeding (0-4)      [] CLASS B Elevated Risk DVT (greater than 3)    [] CLASS Y High Risk Bleeding (greater than 4)     FINAL MATRIX (e.g. AY)       *If allergic to ASA use Warfarin  *BY patient consider no treatment  **Consider venous filter with high risk PE  **If on Coumadin pre-op, then restart night of surgery      [x]  DVT Prophylaxis: Class AX, AY    Ecotrin 81 mg by mouth BID starting day of surgery for 6 weeks for all total joints. (If allergic to aspirin, give eliquis 2.5mg BID X 14 days (knees) or 35 days  (hips) starting first day postop at 0600). []  DVT Prophylaxis:  Class BX (delicia choice)    []Eliquis 2.5mg BID x 14 days (knees) or 35 days (hips) starting first day postop      at 0600      [] Lovenox 40 mg subcu daily starting first day postop at 0600 x 14 days                             (knees) or 35 days (hips)    Ecotrin 81 mg PO BID-start when Lovenox is finished. (Teach injection prior to discharge.)       [] Xarelto 10 mg by mouth daily starting first day postop at 0600     14 days (knees) or 35 days (hips). If creatinine clearance less than 30 mL/min, give Lovenox 30 mg subcu   Daily starting first day postop at 0600. Ecotrin 81 mg PO BID-starting   when Lovenox is finished. (Teach injection prior to      discharge.)  (For discharge fill throughout the 10 mg daily with no    refills.)      []  DVT Prophylaxis:  Class BY (delicia choice)               []  Eliquis 2.5mg BID x 14 days (knees) or 35 days (hips) starting first day                              postop at 0600        []  Ecotrin 81 mg PO BID x 6weeks (all joints)      []  Lovenox 40mg SQ daily to start at 0600 first day post-Op day. 14 days for knees, 35 days for hips    Ecotrin 81 mg PO BID - start when Lovenox is finished. (Teach injection prior to discharge.)       [] Xarelto 10 mg PO daily starting first day Post-Op at 0600    14 days (knees) or 35 days (hips)    If Creatinine Clearance less than 30ml/min, give Lovenox 30 mg    SQ daily starting first day Post-Op at 0600 x 14 days (knees) or 35   days (hips). Ecotrin 81 mg PO BID - start when Lovenox is finished.     (Teach injection prior to discharge.)  (For discharge fill throughout the   10 mg daily #32 with no refills.)      Electronically signed by Tam Larose DO, DO on 2/13/2022 at 10:26 PM

## 2022-02-15 VITALS
DIASTOLIC BLOOD PRESSURE: 52 MMHG | SYSTOLIC BLOOD PRESSURE: 94 MMHG | OXYGEN SATURATION: 93 % | HEART RATE: 75 BPM | HEIGHT: 69 IN | WEIGHT: 208 LBS | TEMPERATURE: 98.4 F | RESPIRATION RATE: 12 BRPM | BODY MASS INDEX: 30.81 KG/M2

## 2022-02-15 LAB
ABSOLUTE EOS #: 0.1 K/UL (ref 0–0.44)
ABSOLUTE IMMATURE GRANULOCYTE: 0.07 K/UL (ref 0–0.3)
ABSOLUTE LYMPH #: 2.23 K/UL (ref 1.1–3.7)
ABSOLUTE MONO #: 0.89 K/UL (ref 0.1–1.2)
ALBUMIN SERPL-MCNC: 3.4 G/DL (ref 3.5–5.2)
ALBUMIN/GLOBULIN RATIO: ABNORMAL (ref 1–2.5)
ALP BLD-CCNC: 55 U/L (ref 35–104)
ALT SERPL-CCNC: 13 U/L (ref 5–33)
ANION GAP SERPL CALCULATED.3IONS-SCNC: 9 MMOL/L (ref 9–17)
AST SERPL-CCNC: 22 U/L
BASOPHILS # BLD: 1 % (ref 0–2)
BASOPHILS ABSOLUTE: 0.08 K/UL (ref 0–0.2)
BILIRUB SERPL-MCNC: 0.65 MG/DL (ref 0.3–1.2)
BUN BLDV-MCNC: 12 MG/DL (ref 6–20)
BUN/CREAT BLD: 16 (ref 9–20)
CALCIUM SERPL-MCNC: 7.9 MG/DL (ref 8.6–10.4)
CHLORIDE BLD-SCNC: 101 MMOL/L (ref 98–107)
CO2: 25 MMOL/L (ref 20–31)
CREAT SERPL-MCNC: 0.74 MG/DL (ref 0.5–0.9)
DIFFERENTIAL TYPE: ABNORMAL
EOSINOPHILS RELATIVE PERCENT: 1 % (ref 1–4)
GFR AFRICAN AMERICAN: >60 ML/MIN
GFR NON-AFRICAN AMERICAN: >60 ML/MIN
GFR SERPL CREATININE-BSD FRML MDRD: ABNORMAL ML/MIN/{1.73_M2}
GFR SERPL CREATININE-BSD FRML MDRD: ABNORMAL ML/MIN/{1.73_M2}
GLUCOSE BLD-MCNC: 110 MG/DL (ref 70–99)
HCT VFR BLD CALC: 28.2 % (ref 36.3–47.1)
HEMOGLOBIN: 8.9 G/DL (ref 11.9–15.1)
IMMATURE GRANULOCYTES: 1 %
LYMPHOCYTES # BLD: 18 % (ref 24–43)
MCH RBC QN AUTO: 30.9 PG (ref 25.2–33.5)
MCHC RBC AUTO-ENTMCNC: 31.6 G/DL (ref 28.4–34.8)
MCV RBC AUTO: 97.9 FL (ref 82.6–102.9)
MONOCYTES # BLD: 7 % (ref 3–12)
NRBC AUTOMATED: 0 PER 100 WBC
PDW BLD-RTO: 13.4 % (ref 11.8–14.4)
PLATELET # BLD: 247 K/UL (ref 138–453)
PLATELET ESTIMATE: ABNORMAL
PMV BLD AUTO: 9.2 FL (ref 8.1–13.5)
POTASSIUM SERPL-SCNC: 4.1 MMOL/L (ref 3.7–5.3)
RBC # BLD: 2.88 M/UL (ref 3.95–5.11)
RBC # BLD: ABNORMAL 10*6/UL
SEG NEUTROPHILS: 72 % (ref 36–65)
SEGMENTED NEUTROPHILS ABSOLUTE COUNT: 9.01 K/UL (ref 1.5–8.1)
SODIUM BLD-SCNC: 135 MMOL/L (ref 135–144)
TOTAL PROTEIN: 5.7 G/DL (ref 6.4–8.3)
WBC # BLD: 12.4 K/UL (ref 3.5–11.3)
WBC # BLD: ABNORMAL 10*3/UL

## 2022-02-15 PROCEDURE — 6360000002 HC RX W HCPCS: Performed by: STUDENT IN AN ORGANIZED HEALTH CARE EDUCATION/TRAINING PROGRAM

## 2022-02-15 PROCEDURE — 6360000002 HC RX W HCPCS: Performed by: ORTHOPAEDIC SURGERY

## 2022-02-15 PROCEDURE — 97530 THERAPEUTIC ACTIVITIES: CPT

## 2022-02-15 PROCEDURE — 36415 COLL VENOUS BLD VENIPUNCTURE: CPT

## 2022-02-15 PROCEDURE — 97110 THERAPEUTIC EXERCISES: CPT

## 2022-02-15 PROCEDURE — 2580000003 HC RX 258: Performed by: STUDENT IN AN ORGANIZED HEALTH CARE EDUCATION/TRAINING PROGRAM

## 2022-02-15 PROCEDURE — 6370000000 HC RX 637 (ALT 250 FOR IP): Performed by: STUDENT IN AN ORGANIZED HEALTH CARE EDUCATION/TRAINING PROGRAM

## 2022-02-15 PROCEDURE — 80053 COMPREHEN METABOLIC PANEL: CPT

## 2022-02-15 PROCEDURE — 97116 GAIT TRAINING THERAPY: CPT

## 2022-02-15 PROCEDURE — 85025 COMPLETE CBC W/AUTO DIFF WBC: CPT

## 2022-02-15 PROCEDURE — 97535 SELF CARE MNGMENT TRAINING: CPT

## 2022-02-15 RX ADMIN — OXYCODONE HYDROCHLORIDE 10 MG: 5 TABLET ORAL at 10:31

## 2022-02-15 RX ADMIN — ACETAMINOPHEN 650 MG: 325 TABLET ORAL at 07:55

## 2022-02-15 RX ADMIN — HYDROMORPHONE HYDROCHLORIDE 0.5 MG: 1 INJECTION, SOLUTION INTRAMUSCULAR; INTRAVENOUS; SUBCUTANEOUS at 02:38

## 2022-02-15 RX ADMIN — HYDROMORPHONE HYDROCHLORIDE 0.5 MG: 1 INJECTION, SOLUTION INTRAMUSCULAR; INTRAVENOUS; SUBCUTANEOUS at 06:40

## 2022-02-15 RX ADMIN — KETOROLAC TROMETHAMINE 15 MG: 30 INJECTION, SOLUTION INTRAMUSCULAR; INTRAVENOUS at 02:35

## 2022-02-15 RX ADMIN — LEVOTHYROXINE SODIUM 175 MCG: 0.15 TABLET ORAL at 06:40

## 2022-02-15 RX ADMIN — SODIUM CHLORIDE: 9 INJECTION, SOLUTION INTRAVENOUS at 00:15

## 2022-02-15 RX ADMIN — ASPIRIN 81 MG: 81 TABLET, COATED ORAL at 07:55

## 2022-02-15 RX ADMIN — OXYCODONE HYDROCHLORIDE 10 MG: 5 TABLET ORAL at 04:27

## 2022-02-15 RX ADMIN — ACETAMINOPHEN 650 MG: 325 TABLET ORAL at 02:35

## 2022-02-15 RX ADMIN — KETOROLAC TROMETHAMINE 15 MG: 30 INJECTION, SOLUTION INTRAMUSCULAR; INTRAVENOUS at 09:46

## 2022-02-15 RX ADMIN — CEFAZOLIN SODIUM 2000 MG: 10 INJECTION, POWDER, FOR SOLUTION INTRAVENOUS at 04:29

## 2022-02-15 ASSESSMENT — PAIN DESCRIPTION - PROGRESSION
CLINICAL_PROGRESSION: GRADUALLY IMPROVING
CLINICAL_PROGRESSION: GRADUALLY WORSENING

## 2022-02-15 ASSESSMENT — PAIN SCALES - GENERAL
PAINLEVEL_OUTOF10: 5
PAINLEVEL_OUTOF10: 5
PAINLEVEL_OUTOF10: 4
PAINLEVEL_OUTOF10: 4
PAINLEVEL_OUTOF10: 2
PAINLEVEL_OUTOF10: 7
PAINLEVEL_OUTOF10: 3
PAINLEVEL_OUTOF10: 2
PAINLEVEL_OUTOF10: 4
PAINLEVEL_OUTOF10: 4

## 2022-02-15 ASSESSMENT — PAIN DESCRIPTION - DESCRIPTORS
DESCRIPTORS: ACHING;BURNING
DESCRIPTORS: ACHING;BURNING

## 2022-02-15 ASSESSMENT — PAIN DESCRIPTION - ORIENTATION
ORIENTATION: RIGHT

## 2022-02-15 ASSESSMENT — PAIN DESCRIPTION - PAIN TYPE
TYPE: SURGICAL PAIN

## 2022-02-15 ASSESSMENT — PAIN - FUNCTIONAL ASSESSMENT
PAIN_FUNCTIONAL_ASSESSMENT: PREVENTS OR INTERFERES SOME ACTIVE ACTIVITIES AND ADLS
PAIN_FUNCTIONAL_ASSESSMENT: PREVENTS OR INTERFERES SOME ACTIVE ACTIVITIES AND ADLS

## 2022-02-15 ASSESSMENT — PAIN DESCRIPTION - ONSET
ONSET: SUDDEN
ONSET: SUDDEN

## 2022-02-15 ASSESSMENT — PAIN DESCRIPTION - FREQUENCY
FREQUENCY: CONTINUOUS
FREQUENCY: CONTINUOUS

## 2022-02-15 ASSESSMENT — PAIN DESCRIPTION - LOCATION
LOCATION: HIP

## 2022-02-15 NOTE — CARE COORDINATION
Met with patient and friend at bedside. Lives alone and son will be staying with pt for a few days. Pt works full time as pharmacy tech at [employer]. V's. Ortho Coordinator's note reviewed. POD #1 rt total hip. Walker at bedside. Pt will D/C home today with [facility] and [facility] informed of D/C. Office will pull PEARL from [employer] op appointment at Dr. Camillia Goltz office is 2-21 at 10:45am.    The Plan for Transition of Care is related to the following treatment goals: PT/OT S/P rt hip replacement    The Patient and/or patient representative friend was provided with a choice of provider and agrees   with the discharge plan. [x] Yes [] No    Freedom of choice list was provided with basic dialogue that supports the patient's individualized plan of care/goals, treatment preferences and shares the quality data associated with the providers.  [x] Yes [] No

## 2022-02-15 NOTE — PROGRESS NOTES
Occupational Therapy  Facility/Department: [facility]  Daily Treatment Note  NAME: Steven Chopra  : 1970  MRN: [id number]    Date of Service: 2/15/2022    Discharge Recommendations:  Patient would benefit from continued therapy after discharge   Due to recent hospitalization and medical condition, pt would benefit from additional therapy at time of discharge to ensure safety. Please refer to the AM-PAC score for current functional status. Assessment   Performance deficits / Impairments: Decreased functional mobility ; Decreased ADL status; Decreased endurance;Decreased high-level IADLs;Decreased sensation;Decreased safe awareness;Decreased balance;Decreased cognition;Decreased posture  Assessment: Pt progressing towards goals and pain is well controlled. Pt needs a moderate amount of assist with LB care and is IND with UB care. Pt is SBA with transfers/mobility using RW. Pt would benefit from continued skilled OT services to increase I and safety during functional tasks to return [facility] at [facility] as able. Prognosis: Good  OT Education: Home Exercise Program;Precautions;Transfer Training;Energy Conservation  Patient Education: benefits of being oob, pursed lip breathing tech, recommendations for continued therapy, ll prevention/call light use, OT POC, OT role in acute care,safety in function  Access Code: [email or web address]  URL: [email or web address]  Date: 02/15/2022  Prepared by:    Exercises  Seated Elbow Flexion with Self-Anchored Resistance - 1 x daily - 7 x weekly - 3 sets - 10 reps  Seated Shoulder Horizontal Abduction with Resistance - 1 x daily - 7 x weekly - 3 sets - 10 reps  Seated Shoulder Diagonal Pulls with Resistance - 1 x daily - 7 x weekly - 3 sets - 10 reps  Seated Shoulder Flexion with Self-Anchored Resistance - 1 x daily - 7 x weekly - 3 sets - 10 reps  Seated Bilateral Shoulder External Rotation with Resistance - 1 x daily - 7 x weekly - 3 sets - 10 reps    Patient Education  Total Hip Replacement Handout  Anterior Hip Precautions Handout  Anterior Hip Precautions  Pain Management  Activity and Movement    Access Code: [email or web address]  URL: [email or web address]  Date: 02/15/2022  Prepared by:    Patient Education  Understanding Energy Conservation  Energy Conservation During Daily Tasks  How to Prevent Falls  Check for Safety    REQUIRES OT FOLLOW UP: Yes  Activity Tolerance  Activity Tolerance: Patient Tolerated treatment well  Activity Tolerance: Good  Safety Devices  Type of devices: Left in bed;Nurse notified;Call light within reach;Gait belt         Patient Diagnosis(es): The primary encounter diagnosis was Post-op pain. A diagnosis of S/P total right hip arthroplasty was also pertinent to this visit. has a past medical history of Asthma, COVID-19, COVID-19, Hip arthritis, Hypothyroidism, Lumbar disc disease, Lupus ([address] Under care of team, Under care of team, and Under care of team.   has a past surgical history that includes Hysterectomy, vaginal; bladder suspension; and Hysterectomy. Restrictions  Restrictions/Precautions  Restrictions/Precautions: General Precautions,Weight Bearing,Fall Risk,Up as Tolerated  Required Braces or Orthoses?: No  Lower Extremity Weight Bearing Restrictions  Right Lower Extremity Weight Bearing: Weight Bearing As Tolerated  Position Activity Restriction  Other position/activity restrictions: L UE IV, B thigh high, No SLR, No external roatation, No Hip extension  Subjective   General  Chart Reviewed: Yes  Patient assessed for rehabilitation services?: Yes  Response to previous treatment: Patient with no complaints from previous session  Family / Caregiver Present: No  Subjective  Subjective: Pt in chair agreeable to shower. Orientation  Orientation  Overall Orientation Status: Within Normal Limits  Objective    ADL  UE Bathing: Setup; Independent  LE Bathing: Setup;Minimal assistance  UE Dressing: Setup; Independent  LE Dressing: Setup; Moderate assistance  Additional Comments: Pt showered this date using [address] HEX 4 surgical soap. Balance  Sitting Balance: Independent  Standing Balance: Stand by assistance  Functional Mobility  Functional - Mobility Device: Rolling Walker  Activity: To/from bathroom  Assist Level: Stand by assistance  Functional Mobility Comments: Min cues for safety, proper step sequence. Shower Transfers  Shower - Transfer From: Refugia Gambler - Transfer Type: To and From  Shower - Transfer To: Shower seat with back  Shower - Technique: Ambulating  Shower Transfers: Stand by assistance;Contact Guard  Shower Transfers Comments: Min cues for [employer].   Bed mobility  Sit to Supine: Modified independent  Transfers  Stand Step Transfers: Stand by assistance  Sit to stand: Stand by assistance  Stand to sit: Stand by assistance  Transfer Comments: Min cues for safety                       Cognition  Overall Cognitive Status: [facility]  Safety Judgement: Decreased awareness of need for assistance;Decreased awareness of need for safety  Insights: Decreased awareness of deficits     Perception  Overall Perceptual Status: WFL                 LUE AROM (degrees)  LUE AROM : WFL  RUE AROM (degrees)  RUE AROM : WFL                 Plan   Plan  Times per week: 5-6x/wk 1-2x/day as [facility]  Current Treatment Recommendations: Strengthening,Endurance Training,Balance Training,Functional Mobility Training,Safety Education & Training,Pain Management,Home Management Training,Self-Care / ADL,Equipment Evaluation, Education, & procurement,Patient/Caregiver Education & Training           AM-PAC Score        AM-PAC Inpatient Daily Activity Raw Score: 20 (02/15/22 0912)  AM-PAC Inpatient ADL T-Scale Score : 42.03 (02/15/22 0912)  ADL Inpatient CMS 0-100% Score: 38.32 (02/15/22 0912)  ADL Inpatient CMS G-Code Modifier : CJ (02/15/22 0912)    Goals  Short term goals  Time Frame for Short term goals: By discharge, pt to demo  Short term goal 1: ADL transfers and functional mobility to Mod I with use of AD as needed. Short term goal 2: bed mobility to [employer] use of bedrails as needed. Short term goal 3: UB ADLs to Set up and LB ADLs to Min A with use of AD/AE as needed. Short term goal 4: toileting to CGA with use of AD/grab bars as needed. Short term goal 5: increased B UE strength by 1/2 grade to assist with functional tasks/I with B UE HEP with use of handouts as needed. Long term goals  Long term goal 1: Pt to be I with fall prevention education, EC/WS tech, recommendations for AE, Moises hose wear/car and safe car transfers with use of handouts as needed. Patient Goals   Patient goals : To reduce pain and go home!        Therapy Time   Individual Concurrent Group Co-treatment   Time In 0900         Time Out 0944         Minutes 1 [facility] 270, KAREN

## 2022-02-15 NOTE — CARE COORDINATION
DC Planning     Notified Brandie from [facility] pt should dc home today. Home care agency will pull data electronically. Elizabeth Gonsales NEEDS SIGNED.

## 2022-02-15 NOTE — PROGRESS NOTES
Patient ambulated to the bathroom with walker and one assist.  Patient tolerated it well. Will continue to monitor.

## 2022-02-15 NOTE — FLOWSHEET NOTE
Brief visit with patient. Patient reports doing \"much better\"   offered ministry of presence and prayer. Patient expressed gratitude.  available as needed for emotional support and spiritual care. 02/15/22 1143   Encounter Summary   Services provided to: Patient   Referral/Consult From: [facility] Family members   Continue Visiting   (2/15/22)   Complexity of Encounter Low   Length of Encounter 15 minutes   Routine   Type Follow up   Assessment Calm; Approachable; Hopeful   Intervention Prayer;Sustaining presence/ Ministry of presence   Outcome Expressed gratitude

## 2022-02-15 NOTE — PROGRESS NOTES
Physical Therapy    Facility/Department: [facility]  Initial Assessment - POD #1    NAME: Vivian Escobedo  : 1970  MRN: [id number]    Date of Service: 2/15/2022   RN Dulce Maria Ferrer reports patient is medically stable for therapy treatment this date. Chart reviewed prior to treatment and patient is agreeable for therapy. All lines intact and patient positioned comfortably at end of treatment. All patient needs addressed prior to ending therapy session. H&P: R BROOKLYN, anterior approach 22 - Dr. Wilber Gipson     Discharge Recommendations: Safe for discharge home   Patient would benefit from continued therapy after discharge     PT Equipment Recommendations  Equipment Needed: No (Pt has RW)    Assessment   Body structures, Functions, Activity limitations: Decreased functional mobility ; Decreased ROM; Decreased strength;Decreased endurance; Increased pain;Decreased safe awareness;Decreased balance  Assessment: Reassessment performed this date to assess mobility. Pt tolerated session well and demonstrating good steadiness throughout ambulation and stair negotiation this date. Pt safe for discharge home this date, RN notified. Pt would benefit from continued skilled physical therapy to address ROM, strength, and gait deficits s/p R BROOK[place]N in order to return to PLOF. Prognosis: Good  Decision Making: Medium Complexity  PT Education: Goals;PT Role;Plan of Care;General Safety; Energy Conservation;Home Exercise Program;Precautions  Patient Education: Pt educated on: purpose of acute PT eval, importance of continued mobility throughout admission, general safety awareness, BROOKLYN supine ther ex, BROOK[place]N anterior approach precautions, paced breathing for pain control, pursed lip breathing, safe transfers and ambulation w/ RW, proper stair negotiation pattern s/p R BR[place]N, and PT POC. Pt verbalized fair understanding.   REQUIRES PT FOLLOW UP: Yes  Activity Tolerance  Activity Tolerance: Patient Tolerated treatment well       Patient Diagnosis(es): The primary encounter diagnosis was Post-op pain. A diagnosis of S/P total right hip arthroplasty was also pertinent to this visit. has a past medical history of Asthma, COVID-19, COVID-19, Hip arthritis, Hypothyroidism, Lumbar disc disease, Lupus ([address] Under care of team, Under care of team, and Under care of team.   has a past surgical history that includes Hysterectomy, vaginal; bladder suspension; Hysterectomy; and Total hip arthroplasty (Right, 2/14/2022). Restrictions  Restrictions/Precautions  Restrictions/Precautions: General Precautions,Weight Bearing,Fall Risk  Required Braces or Orthoses?: No  Lower Extremity Weight Bearing Restrictions  Right Lower Extremity Weight Bearing: Weight Bearing As Tolerated  Position Activity Restriction  Other position/activity restrictions: L UE IV, B thigh high, No SLR, No external roatation, No Hip extension  Vision/Hearing  Vision: Within Functional Limits  Hearing: Within functional limits     Subjective  General  Patient assessed for rehabilitation services?: Yes  Response To Previous Treatment: Not applicable  Family / Caregiver Present: No  Follows Commands: Within Functional Limits  General Comment  Comments: RN and pt agreeable to therapy. Pt supine in bed upon arrival.  Pt pleasant and cooperative throughout.   Subjective  Subjective: Pt reporting feeling much better this date, denies pain at time of PT eval.  Pain Screening  Patient Currently in Pain: Yes        Orientation  Orientation  Overall Orientation Status: Within Functional Limits  Social/Functional History  Social/Functional History  Lives With: Alone (Son will be staying with her as needed)  Type of Home: Apartment  Home Layout: One level  Home Access: Stairs to enter without rails  Entrance Stairs - Number of Steps: 1  Bathroom Shower/Tub: Tub/Shower unit  Bathroom Toilet: Standard  Bathroom Equipment: Grab bars in shower (vanity is close for support as needed)  Home Equipment: Rolling walker,4 wheeled walker  ADL Assistance: [address]: Independent  Homemaking Responsibilities: Yes  Ambulation Assistance: Independent  Transfer Assistance: Independent  Active : Yes  Occupation: Full time employment  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[address] [place] [address]: Watching Tv  Additional Comments: Pt denies any recent falls  Cognition   Cognition  Overall Cognitive Status: WFL  Safety Judgement: Decreased awareness of need for assistance;Decreased awareness of need for safety    Objective     Observation/Palpation  Posture: Good  Observation: water resistant dressing on R hip    PROM RLE (degrees)  RLE PROM: WFL  AROM RLE (degrees)  RLE AROM: WFL  AROM LLE (degrees)  LLE AROM : WFL  AROM RUE (degrees)  RUE AROM : WFL  AROM LUE (degrees)  LUE AROM : WFL  Strength RLE  Comment: Quad set+, glute set+  Strength LLE  Strength LLE: WFL  Comment: Grossly 4+/5  Strength RUE  Comment: See OT assessment for detail  Strength LUE  Comment: See OT assessment for detail  Tone RLE  RLE Tone: Normotonic  Tone LLE  LLE Tone: Normotonic  Motor Control  Gross Motor?: WFL  Sensation  Overall Sensation Status: Impaired (Pt reports chronic numbness/tingling in lateral RLE from knee down d/t chronic back issues)  Bed mobility  Supine to Sit: Stand by assistance  Sit to Supine:  (Not assessed this date. Pt retired in bedside chair upon writer's exit.)  Scooting: Stand by assistance  Comment: Pt w/o significant difficulty throughout bed mobility this date, demonstrating good ability to maintain No SLR precautions throughout w/ min verbal cueing. Pt denying any dizziness/lightheadedness upon sitting EOB this date. Transfers  Sit to Stand: Contact guard assistance  Stand to sit: Contact guard assistance  Comment: Pt performed 3 STS transfers throughout session this date demonstrating good steadiness throughout.   Pt requiring min verbal cueing for proper hand placement throughout transfers w/ RW w/ good return demo. Upon standing EOB initially, pt performed pre-gait standing marches to assess quad control prior to initiating ambulation w/ good steadiness noted. Pt denying any dizziness/lightheadedness upon standing. Pt demonstrating fair eccentric quad control throughout stand>sit transfers this date w/ min verbal cueing. Ambulation  Ambulation?: Yes  More Ambulation?: No  Ambulation 1  Surface: level tile  Device: Rolling Walker  Assistance: Contact guard assistance  Quality of Gait: step-to pattern, slow sandra  Gait Deviations: Slow Sandra;Decreased step height  Distance: 150ft x 2  Comments: Pt ambulating w/ fair steadiness this date demonstrating step-to pattern w/ good return of proper gait sequence s/p R BROOK[place]N. Pt requiring min verbal cueing to maintain OSIEL within RW w/ good return demo. Stairs/Curb  Stairs?: Yes  Stairs  # Steps : 10  Stairs Height: 6\"  Rails: Bilateral  Device: No Device  Assistance: Contact guard assistance  Comment: Pt ascending/descending 10 steps this date w/ B handrails demonstrating good return of proper stair negotiation pattern s/p R BROOK[place]N. Balance  Posture: Good  Sitting - Static: Good  Sitting - Dynamic: Good;-  Standing - Static: Fair;+  Standing - Dynamic: Fair;+  Single Leg Stance R Le  Single Leg Stance L Le  Comments: Standing balance assessed w/ RW  Exercises  Quad Sets: x 10  Heelslides: x 10  Gluteal Sets: x 10  Ankle Pumps: x 10  Comments: Pt demonstrating good return of ther ex this date w/ good tolerance throughout.      Plan   Plan  Times per week: BID; 7x/week  Specific instructions for Next Treatment: REASSESS MOBILITY  Current Treatment Recommendations: Strengthening,ROM,Balance Training,Functional Mobility Training,Transfer Training,Endurance Training,Gait Training,Stair training,Safety Education & Training,Home Exercise Program,Equipment Evaluation, Education, & procurement,Patient/Caregiver Education & Mona Esteban Re-education,Pain Management  Safety Devices  Type of devices: Call light within reach,Gait belt,Nurse notified,Left in chair  Restraints  Initially in place: No    AM-PAC Score  AM-PAC Inpatient Mobility Raw Score : 20 (02/15/22 1106)  AM-PAC Inpatient T-Scale Score : 47.67 (02/15/22 1106)  Mobility Inpatient CMS 0-100% Score: 35.83 (02/15/22 1106)  Mobility Inpatient CMS G-Code Modifier : CJ (02/15/22 1106)       Functional Outcome Measure-   Single Leg Stance Test:  0 sec. (<5 sec.= fall risk)    Goals  Short term goals  Time Frame for Short term goals: 12 visits  Short term goal 1: Pt to demonstrate bed mobility Evelyne  Short term goal 2: Pt to demonstrate transfers w/ RW Evelyne  Short term goal 3: Pt to ambulate at least 150ft w/ RW Evelyne  Short term goal 4: Pt to ascend/descend 2 steps w/o handrails CGA  Short term goal 5: Pt to be indep w/ BROOKLYN HEP  Patient Goals   Patient goals :  To go home       Therapy Time   Individual Concurrent Group Co-treatment   Time In 0805         Time Out 2183         Minutes 29+10 = 39 total            Additional 10 minutes added for chart review and RN communication     Treatment time: 23 minutes     Olesya Alvarenga PT

## 2022-02-15 NOTE — PROGRESS NOTES
Occupational Therapy   Occupational Therapy Re-Assessment  Date: 2/15/2022   Patient Name: Aden Boateng  MRN: [id number]     : 1970    RN Rios Campos reports patient is medically stable for therapy treatment this date. Chart reviewed prior to treatment and patient is agreeable for therapy. All lines intact and patient positioned comfortably at end of treatment. All patient needs addressed prior to ending therapy session. Date of Service: 2/15/2022    Assessment   Performance deficits / Impairments: Decreased functional mobility ; Decreased ADL status; Decreased endurance;Decreased high-level IADLs;Decreased sensation;Decreased safe awareness;Decreased balance;Decreased cognition;Decreased posture  Assessment: Pt would benefit from continued skilled OT services to increase I and safety during functional tasks to return home at [facility] as able. Prognosis: Good  OT Education: OT Role;Transfer Training;Energy Conservation;Plan of Care;ADL Adaptive Strategies;Precautions  Patient Education: benefits of being oob, pursed lip breathing tech, recommendations for continued therapy, ll prevention/call light use, OT POC, OT role in acute care,safety in function  REQUIRES OT FOLLOW UP: Yes  Activity Tolerance  Activity Tolerance: Patient Tolerated treatment well  Activity Tolerance: Good  Safety Devices  Type of devices: Nurse notified;Gait belt;Call light within reach; Patient at risk for falls; Left in chair           Patient Diagnosis(es): The primary encounter diagnosis was Post-op pain. A diagnosis of S/P total right hip arthroplasty was also pertinent to this visit. has a past medical history of Asthma, COVID-19, COVID-19, Hip arthritis, Hypothyroidism, Lumbar disc disease, Lupus ([facility] [address] Under care of team, Under care of team, and Under care of team.   has a past surgical history that includes Hysterectomy, vaginal; bladder suspension; and Hysterectomy.      PER H&P:  R [place] - anterior approach 22, Dr. Frank Luong Restrictions  Restrictions/Precautions  Restrictions/Precautions: General Precautions,Weight Bearing,Fall Risk  Required Braces or Orthoses?: No  Lower Extremity Weight Bearing Restrictions  Right Lower Extremity Weight Bearing: Weight Bearing As Tolerated  Position Activity Restriction  Other position/activity restrictions: L UE IV, B thigh high, No SLR, No external roatation, No Hip extension    Subjective   General  Chart Reviewed: Yes  Patient assessed for rehabilitation services?: Yes  Family / Caregiver Present: No  Subjective  Subjective: Pt resting in bed, agreeable to OT eval  Patient Currently in Pain: Denies  Comments / Details: Pt reporting pain is well controlled this AM.    Social/Functional History  Social/Functional History  Lives With: Alone (Son will be staying with her as needed)  Type of Home: Apartment  Home Layout: One level  Home Access: Stairs to enter without rails  Entrance Stairs - Number of Steps: 1  Bathroom Shower/Tub: Tub/Shower unit  Bathroom Toilet: Standard  Bathroom Equipment: Grab bars in shower (vanity is close for support as needed)  Home Equipment: Rolling walker,4 wheeled walker  ADL Assistance: Independent  Homemaking Assistance: Independent  Homemaking Responsibilities: Yes  Ambulation Assistance: Independent  Transfer Assistance: Independent  Active : Yes  Occupation: Full time employment  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[place] [address]: Watching Tv  Additional Comments: Pt denies any recent falls       Objective   Vision: Within Functional Limits  Hearing: Within functional limits    Orientation  Overall Orientation Status: Within Functional Limits  Observation/Palpation  Posture: Good  Observation: water resistant dressing on R hip  Edema: slight in R LE       Balance  Sitting Balance: Stand by assistance  Standing Balance: Contact guard assistance (with RW, Min A x2 for first  AM after sx)  Standing Balance  Time: standing ~ 5-6 min  Activity: functional mobility, stairs  Comment: Pt completed 5 steps x2 with B handrails and Min x2 staff assist for safety and balance. Pt verbalized/demo'd good understanding of step sequencing on stairs. Functional Mobility  Functional - Mobility Device: Rolling Walker  Activity:  (bed to door, door down hallway to therapy gym, up and down stair tree and back to room to bedside chair)  Assist Level: Contact guard assistance  Functional Mobility Comments: Pt given Min verbal cues/tactile assist for RW safety, step sequence with RW, pacing self, scanning environment, and upright posture all to increase safety. Toilet Transfers  Toilet Transfer: Unable to assess  Toilet Transfers Comments: Pt with no needs at this time       ADL  Feeding: Setup  Grooming: Setup;Stand by assistance (seated)  UE Bathing: Setup;Stand by assistance  LE Bathing: Moderate assistance;Setup  UE Dressing: Setup;Minimal assistance  LE Dressing: Setup; Moderate assistance (Pt able to assist with donning B Moises bradleye long sitting in bed once up past ankle. Max A for donning B socks)  Toileting: Minimal assistance       Tone RUE  RUE Tone: Normotonic  Tone LUE  LUE Tone: Normotonic  Coordination  Movements Are Fluid And Coordinated: Yes        Bed mobility  Rolling to Right: Stand by assistance  Supine to Sit: Stand by assistance  Sit to Supine: Unable to assess (Pt agreeable to sitting in bedside chair at end of session)  Scooting: Stand by assistance  Comment: Pt with good use of bedrails, Min verbal cues for maintaining No SLR Precautions, pacing self all to increase ease/safety. Transfers  Sit to stand: Contact guard assistance  Stand to sit: Contact guard assistance  Transfer Comments: Pt given Min verbal cues for pacing self, controlled stand to sit, squaring self/AD up to surface and reaching back prior to sitting, RW safety all to increase safety and reduce risk of falls.         Cognition  Overall Cognitive Status: [facility] Judgement: Decreased awareness of need for assistance;Decreased awareness of need for safety  Insights: Decreased awareness of deficits        Sensation  Overall Sensation Status: Impaired (Pt reports chronic numbness/tingling in lateral RLE from knee down d/t chronic back issues)        LUE AROM (degrees)  LUE AROM : WFL  RUE AROM (degrees)  RUE AROM : WFL  LUE Strength  Gross LUE Strength: WFL  LUE Strength Comment: ~ 4/5  RUE Strength  Gross RUE Strength: WFL  RUE Strength Comment: ~ 4/5                   Plan   Plan  Times per week: 5-6x/wk 1-2x/day as Beatriz Zarate  Current Treatment Recommendations: Strengthening,Endurance Training,Balance Training,Functional Mobility Training,Safety Education & Training,Pain Management,Home Management Training,Self-Care / ADL,Equipment Evaluation, Education, & procurement,Patient/Caregiver Education & Training                          AM-PAC Score        AM-[facility] Inpatient Daily Activity Raw Score: 17 (02/15/22 0906)  AM-PAC Inpatient ADL T-Scale Score : 37.26 (02/15/22 0906)  ADL Inpatient CMS 0-100% Score: 50.11 (02/15/22 0906)  ADL Inpatient CMS G-Code Modifier : CK (02/15/22 0906)        Goals  Short term goals  Time Frame for Short term goals: By discharge, pt to demo  Short term goal 1: ADL transfers and functional mobility to Mod I with use of AD as needed. (modified at reassessment by Jt Kelsey)  Short term goal 2: bed mobility to Mod Iwith use of bedrails as needed. (Modified at reassessment by Jt Kelsey)  Short term goal 3: UB ADLs to Set up and LB ADLs to Min A with use of AD/AE as needed. Short term goal 4: toileting to CGA with use of AD/grab bars as needed. Short term goal 5: increased B UE strength by 1/2 grade to assist with functional tasks/I with B UE HEP with use of handouts as needed.   Long term goals  Long term goal 1: Pt to be I with fall prevention education, EC/WS tech, recommendations for AE, Moises hose wear/car and safe car transfers with use of handouts as needed. Patient Goals   Patient goals : To reduce pain and go home! Therapy Time   Individual Concurrent Group Co-treatment   Time In 0744         Time Out 0823         Minutes 39          tx time: 25 min     Co-treatment with PT warranted first time up day of surgery. Cotx due to potential risk of decreased sensation, muscle control and proprioception from spinal epidural and/or regional block. Decreased safety and independence requiring 2 skilled therapy professionals to address individual discipline's goals.           Alexandru Pace, OT

## 2022-02-15 NOTE — PROGRESS NOTES
Orthopedic Progress Note    Patient:  Linsey Rodriguez  YOB: 1970     49 y.o. female    Subjective:  Patient seen and examined  No complaints or concerns  No issue overnight  Pain controlled  Denies fever, HA, CP, SOB    Vitals reviewed, afebrile    Objective:   Vitals:    02/15/22 1030   BP: (!) 94/52   Pulse: 75   Resp: 12   Temp: 98.4 °F (36.9 °C)   SpO2: 93%     Gen: NAD, cooperative     Cardiovascular: regular rate, no dependent edema    Respiratory: No acute respiratory distress    RLE: Optifoam dressing c/d/i. Thresa Abed Able to ROM hip 0 - 70 degrees without pain. Compartments soft. 2+ DP pulse. TA/EHL/FHL/GS motor intact. Deep and Superficial Peroneal/Saphenous/Sural SILT. Recent Labs     02/15/22  0316   WBC 12.4*   HGB 8.9*   HCT 28.2*         K 4.1   BUN 12   CREATININE 0.74   GLUCOSE 110*      Meds: aspirin    See rec for complete list    Impression 49 y.o. female being seen for    - Right total hip arthroplasty, POD1     Plan    - Hb: 8.9 (2/15)   - Weightbearing status: weight bearing as tolerated  - Complete post-op antibiotics  - Keep extremity elevated, clean, dry, and intact. Maintain optifoam dressing on until follow up  - Diet: general diet   - Strict ice and elevation for pain/swelling  - DVT ppx: 81 mg BID    - Pain control PO/IV Medication. Attempt to Wean IV medications.    - Encourage Incentive Spirometry use  - Work with PT/OT for mobilization  - Plan for discharge today; follow up with Dr. Luis Angel Alexander as scheduled    Electronically signed by Bre Kennedy DO 11:31 AM 2/15/2022

## 2022-02-15 NOTE — PROGRESS NOTES
Ortho Coordinator Daily Rounding Note    Admission Date:   2/14/2022 Jeremiah Jane is a 49 y.o.  female    Post Op Day # 1 RTHA    Labs:         Recent Labs     02/15/22  0316   WBC 12.4*   HGB 8.9*          Recent Labs     02/15/22  0316      K 4.1      CO2 25   BUN 12   CREATININE 0.74   GLUCOSE 110*         Met with:     Patient  Patient's LOC:   alert and oriented X3  Patient progress   GOOD  Patient's Pain:   Patient rates pain 4  Oral Intake:    good  Output:    adequate   [place] in:   no  ON-Q:    no    Walker/DME:  Walker/DME needed:  Yes  DME needed:    walker   DME Agency:    ORDERED AND DELIVERED TO PT 02/14/2022 AND AT BEDSIDE. Anticoagulation:  Anticoagulation:  81MG ASA BIS  Prescription in chart:  yes     Continuity of Care: The [address] form is on the chart. The [address] form is not signed by the physician. Discharge Planning:  Confirmed with patient that discharge plan is Home. Agency/Facility chosen: [address] [place] [address]. Awaiting pre-certification no  Anticipated discharge date: 02/15/2022. PT WAS JUST OUT OF THE SHOWER WITH OT, DOING VERY WELL, WILL DISCHARGE HOME TODAY. Patient's questions and concerns were answered. Actively listened and provided reassurance.      Electronically signed by: Michael Goodman RN on 2/15/2022 at 10:27 AM

## 2022-02-15 NOTE — PLAN OF CARE
Problem: Pain:  Goal: Pain level will decrease  Description: Pain level will decrease  2/15/2022 1442 by Jung Jane RN  Outcome: Completed  2/15/2022 0733 by Jung Jane RN  Outcome: Ongoing  Goal: Control of acute pain  Description: Control of acute pain  2/15/2022 1442 by Jung Jane RN  Outcome: Completed  2/15/2022 0733 by Jung Jane RN  Outcome: Ongoing  Goal: Control of chronic pain  Description: Control of chronic pain  2/15/2022 1442 by Jung Jane RN  Outcome: Completed  2/15/2022 0733 by Jung Jane RN  Outcome: Ongoing     Problem: Discharge Planning:  Goal: Knowledge of discharge instructions  Description: Knowledge of discharge instructions  2/15/2022 1442 by Jung Jane RN  Outcome: Completed  2/15/2022 0733 by Jung Jane RN  Outcome: Ongoing     Problem: Infection - Surgical Site:  Goal: Signs of wound healing will improve  Description: Signs of wound healing will improve  2/15/2022 1442 by Jung Jane RN  Outcome: Completed  2/15/2022 0733 by Jung Jane RN  Outcome: Ongoing     Problem: Mobility - Impaired:  Goal: Achieve maximum mobility level  Description: Achieve maximum mobility level  2/15/2022 1442 by Jung Jane RN  Outcome: Completed  2/15/2022 0733 by Jung Jane RN  Outcome: Ongoing     Problem: Pain - Acute:  Goal: Pain level will decrease  Description: Pain level will decrease  2/15/2022 1442 by Jung Jane RN  Outcome: Completed  2/15/2022 0733 by Jung Jane RN  Outcome: Ongoing     Problem: Falls - Risk of:  Goal: Will remain free from falls  Description: Will remain free from falls  2/15/2022 1442 by Jung Jane RN  Outcome: Completed  2/15/2022 0733 by Jung Jane RN  Outcome: Ongoing  Goal: Absence of physical injury  Description: Absence of physical injury  2/15/2022 1442 by Jung Jane RN  Outcome: Completed  2/15/2022 0733 by Jung Jane RN  Outcome: Ongoing

## 2022-02-15 NOTE — PLAN OF CARE
Problem: Pain:  Goal: Pain level will decrease  Description: Pain level will decrease  2/15/2022 0733 by Cassidy Yeh RN  Outcome: Ongoing  2/14/2022 2207 by Cecelia Daniel RN  Outcome: Ongoing  Goal: Control of acute pain  Description: Control of acute pain  2/15/2022 0733 by Cassidy Yeh RN  Outcome: Ongoing  2/14/2022 2207 by Cecelia Daniel RN  Outcome: Ongoing  Goal: Control of chronic pain  Description: Control of chronic pain  2/15/2022 0733 by Cassidy Yeh RN  Outcome: Ongoing  2/14/2022 2207 by Cecelia Daniel RN  Outcome: Ongoing     Problem: Discharge Planning:  Goal: Knowledge of discharge instructions  Description: Knowledge of discharge instructions  Outcome: Ongoing     Problem: Infection - Surgical Site:  Goal: Signs of wound healing will improve  Description: Signs of wound healing will improve  Outcome: Ongoing     Problem: Mobility - Impaired:  Goal: Achieve maximum mobility level  Description: Achieve maximum mobility level  Outcome: Ongoing     Problem: Pain - Acute:  Goal: Pain level will decrease  Description: Pain level will decrease  2/15/2022 0733 by Cassidy Yeh RN  Outcome: Ongoing  2/14/2022 2207 by Cecelia Daniel RN  Outcome: Ongoing     Problem: Falls - Risk of:  Goal: Will remain free from falls  Description: Will remain free from falls  Outcome: Ongoing  Goal: Absence of physical injury  Description: Absence of physical injury  Outcome: Ongoing

## 2022-02-16 ENCOUNTER — CARE COORDINATION (OUTPATIENT)
Dept: OTHER | Facility: CLINIC | Age: 52
End: 2022-02-16

## 2022-02-16 NOTE — CARE COORDINATION
[facility] Transitions Initial Follow Up Call    Call within 2 business days of discharge: Yes    Patient: Loan Aragon Patient : 1970   MRN: [id number]  Reason for Admission: Arthritis of right hip; s/p right total hip arthroplasty  Discharge Date: 2/15/22 RARS: Readmission Risk Score: 9.7 ( )      Last Discharge [facility]       Complaint Diagnosis Description Type Department Provider    22  Post-op pain . .. Admission (Discharged) [facility], DO           Spoke with: Patient, Fiordaliza Almanza    Transitions of Care Initial Call    Was this an external facility discharge? No Discharge Facility: [address] [place] [address] to be reviewed by the provider   Additional needs identified to be addressed with provider: No  none             Method of communication with provider : none      Advance Care Planning:   Does patient have an Advance Directive: not on file. Was this a readmission? No  Patient stated reason for admission: right hip replacement  Patients top risk factors for readmission: medical condition-pre-diabetes; s/p total right hip replacement    Care Transition Nurse (CTN) contacted the patient by telephone to perform post hospital discharge assessment. Verified name and  with patient as identifiers. Provided introduction to self, and explanation of the CTN role. CTN reviewed discharge instructions, medical action plan and red flags with patient who verbalized understanding. Patient given an opportunity to ask questions and does not have any further questions or concerns at this time. Were discharge instructions available to patient? Yes. Reviewed appropriate site of care based on symptoms and resources available to patient including: PCP, Specialist, Benefits related nurse triage line, When to call 911 and [address]. The patient agrees to contact the PCP office for questions related to their healthcare.      Medication reconciliation was performed with patient, who verbalizes understanding of administration of home medications. Advised obtaining a 90-day supply of all daily and as-needed medications. Covid Risk Education     Educated patient about risk for severe COVID-19 due to risk factors according to CDC guidelines. ACM reviewed discharge instructions, medical action plan and red flag symptoms with the patient who verbalized understanding. Discussed COVID vaccination status: Yes. Education provided on COVID-19 vaccination as appropriate. Discussed exposure protocols and quarantine with CDC Guidelines. Patient was given an opportunity to verbalize any questions and concerns and agrees to contact ACM or health care provider for questions related to their healthcare. Reviewed and educated patient on any new and changed medications related to discharge diagnosis. Was patient discharged with a pulse oximeter? No     Pt states she is getting there; states just finished PT, so pain level is 7/10 and is due soon for percocet. ACM educated pt to take pain med approx 1 hour prior to therapy. Pt reports is taking percocet every 6 hours now for pain. Pt states surgical site dressing is in place, dry and intact; denies any s/sx of infection: fever, increases pain, redness, swelling, warmth, drainage at incision. Pt denies any calf tenderness, redness or swelling; ACM educated pt on s/sx of dvt and to notify surgeon. Pt states she has been walking every hour; denies any falls; and keeps leg elevated and using ice every 2 hours; ACM encouraged pt to use ice hourly to decrease the post-op swelling. Pt states PT will be coming 3x per week, with first visit today; denies needs for OT. Pt states has been using spirometer every 2hours; ACM encouraged pt to use every hour and educated pt on s/sx of PE, including chest pain or shortness of breath and to notify surgeon/pcp.  Pt has mark hose and states took them off to sleep last night and has not put them on yet today. Pt states she is staying along and her friend is coming over soon to assist her to get mark hose on. Pt denies any problems with urination;states has not had bowel movement since surgery. ACM educated pt on use of colace, fiber rich foods, increased water intake and pt verbalized understanding. Pt states she has had some postop nausea and appetite not the best; is taking zofran with relief; ACM encouraged pt to eat 3-5 small meals per day. Pt states she will be off work through May 8, but hopes to return sooner; has completed mela paperwork. Pt denies any further questions, needs or concerns; is agreeable to [facility] follow up outreach. AC provided contact information. Plan for follow-up call in 1-2 days based on severity of symptoms and risk factors.   Plan for next call: symptom management-pain, s/sx of infection   self management-bowel movements, nausea, nutritional/fluid intake  medication management-any changed meds or med questions      Non-face-to-face services provided:  Scheduled appointment with Lizzy nicholas f/u appt with surgeon on 2/21  Obtained and reviewed discharge summary and/or continuity of care documents  Education of patient/family/caregiver/guardian to support self-management-red flags and appropriate sites of care, including PCP, specialist, nurse access line, when to go to urgent care/ED and when to call 911 for life threatening emergencies and pt verbalized understanding  Assessment and support for treatment adherence and medication management-ACM reviewed meds with pt and answered all questions    Care Transitions 24 Hour Call    Do you have any ongoing symptoms?: Yes  Patient-reported symptoms: Pain (Comment: Right hip pain)  Do you have a copy of your discharge instructions?: Yes  Do you have all of your prescriptions and are they filled?: Yes  Have you been contacted by a [address]?: No  Have you scheduled your follow up appointment?: Yes  How are you going to get to your appointment?: Car - family or friend to transport  Were you discharged with any Home Care or Post Acute Services: Yes  Post Acute Services: Home Health (Comment: PT 3x week)  Patient DME: Julianna Thompson, Other  Other Patient DME: Pt states she is getting a shower chair today and will be ordering a toilet riser and reacher device  Do you have support at home?: Alone  Do you feel like you have everything you need to keep you well at home?: Yes  Are you an active caregiver in your home?: No  Care Transitions Interventions     Other Services:  (Comment: 2/16-sent pt handouts on fiber/high iron foods)        Goals      Conditions and Symptoms      I will schedule office visits, as directed by my provider. I will keep my appointment or reschedule if I have to cancel. I will notify my provider of any barriers to my plan of care. I will notify my provider of any symptoms that indicate a worsening of my condition. Barriers: lack of education  Plan for overcoming my barriers: work with ACM  Confidence: 8/10  Anticipated Goal Completion Date: 3/16/22    2/16/22 - Pt has f/u appt with surgeon on 2/21/22              Follow Up  Future Appointments   Date Time Provider Nataliya Caal   2/21/2022 10:45 AM Jessica Sarkar PA-C ORTHO [address] MSN, RN   Ambulatory Care Manager  Associate Care Management  Cell [phone]  [email or web address] Include Location In Plan?: No Detail Level: Zone Include Location In Plan?: Yes

## 2022-02-17 DIAGNOSIS — M16.11 ARTHRITIS OF RIGHT HIP: Primary | ICD-10-CM

## 2022-02-18 ENCOUNTER — CARE COORDINATION (OUTPATIENT)
Dept: OTHER | Facility: CLINIC | Age: 52
End: 2022-02-18

## 2022-02-18 NOTE — CARE COORDINATION
[facility] Transitions Follow Up Call    2022    Patient: Pattie Livingston  Patient : 1970   MRN: [id number]  Reason for Admission: Arthritis of right hip; s/p right total hip arthroplasty  Discharge Date: 2/15/22 RARS: Readmission Risk Score: 9.7 ( )    Spoke with: noone    ACM attempted to reach patient for follow up call regarding inpatient discharge 2/15/22. HIPAA compliant message left requesting a return phone call at patients convenience. Will continue to follow. Follow Up  Future Appointments   Date Time Provider Nataliya Caal   2022 10:45 AM LONNIE Romano [address] MSN, RN   Ambulatory Care Manager  Associate Care Management  Cell [phone]  [email or web address]

## 2022-02-21 ENCOUNTER — OFFICE VISIT (OUTPATIENT)
Dept: ORTHOPEDIC SURGERY | Age: 52
End: 2022-02-21

## 2022-02-21 VITALS — BODY MASS INDEX: 30.81 KG/M2 | HEIGHT: 69 IN | WEIGHT: 208 LBS

## 2022-02-21 DIAGNOSIS — Z96.641 S/P TOTAL RIGHT HIP ARTHROPLASTY: ICD-10-CM

## 2022-02-21 DIAGNOSIS — M16.11 ARTHRITIS OF RIGHT HIP: Primary | ICD-10-CM

## 2022-02-21 PROCEDURE — 99024 POSTOP FOLLOW-UP VISIT: CPT | Performed by: PHYSICIAN ASSISTANT

## 2022-02-21 RX ORDER — HYDROCODONE BITARTRATE AND ACETAMINOPHEN 5; 325 MG/1; MG/1
1 TABLET ORAL EVERY 8 HOURS PRN
Qty: 21 TABLET | Refills: 0 | Status: SHIPPED | OUTPATIENT
Start: 2022-02-21 | End: 2022-02-21

## 2022-02-21 RX ORDER — HYDROCODONE BITARTRATE AND ACETAMINOPHEN 5; 325 MG/1; MG/1
1 TABLET ORAL EVERY 8 HOURS PRN
Qty: 21 TABLET | Refills: 0 | Status: SHIPPED | OUTPATIENT
Start: 2022-02-21 | End: 2022-02-28

## 2022-02-21 ASSESSMENT — ENCOUNTER SYMPTOMS
VOMITING: 0
COLOR CHANGE: 0
SHORTNESS OF BREATH: 0
COUGH: 0

## 2022-02-21 NOTE — PROGRESS NOTES
[address]  [address] [facility] [address] [address]  Dept: [phone]  Dept Fax: [phone]        Postoperative follow-up note    Subjective:   Nicholas June is a 49y.o. year old female who presents to our office today for postoperative followup regarding her   1. Arthritis of right hip    2. S/P total right hip arthroplasty        Chief Complaint   Patient presents with    Post-Op Check     02/14/2022 - RT [place] anterior approach       Date of Surgery: 2/14/2022   1 week post-op    Nicholas June  is a 49y.o. year old female who presents to our office today for postoperative follow up after having undergone a right total hip arthroplasty with the anterior approach completed by Dr. Jen Hernández DO on 14 2022 2/14/2022. The patient denies fevers, chills, nausea, vomiting, diarrhea. The patient has started home physical therapy. Patient was initially scheduled for right total hip arthroplasty on 2/8/2022 but it was postponed until 2/14/2022 therefore she is only 1 week postoperative at this time. Patient notes that she is doing very well and has been taking Percocet 5-325 mg 1 tablet 2-3 times daily for her postoperative discomfort. She notes that she is completing home physical therapy at this time. Patient states that she would like to transition to a medication that is less potent if possible. She denies significant discomfort at this time. She denies numbness and or tingling in the right lower extremity at this time. Review of Systems   Constitutional: Negative for activity change and fever. HENT: Negative for sneezing. Respiratory: Negative for cough and shortness of breath. Cardiovascular: Negative for chest pain. Gastrointestinal: Negative for vomiting. Musculoskeletal: Positive for arthralgias (Mild right hip). Negative for joint swelling and myalgias. Skin: Negative for color change. Neurological: Negative for weakness and numbness. Psychiatric/Behavioral: Negative for sleep disturbance. Objective :   General: Milagro Greenfield is a 49 y.o. female who is alert and oriented and sitting comfortably in our office. Ortho Exam  MS: The patient ambulates with a walker and a moderate limp noted to the Right lower extremity. The patient arrived with the surgical dressings intact on the anterior aspect of the Right hip. After removal of the dressings, evaluation of the Right hip reveals a healing incision with mild eschar and superficial skin glue intact. There is no erythema, ecchymosis, incision drainage, new skin lesions or signs of infection present on the Right hip. Localized swelling noted over the TFL muscle on the lateral aspect of the hip. The patient is able to complete a seated march test with moderate weakness noted to the surgical leg. There is mild lower extremity edema noted on Right . There is no tenderness palpation noted to the calf on the affected leg. Negative Homans' sign. Sensation is intact to light touch to the Right lower extremity without focal deficits present. DP pulse 2+, PT pulse 2+. The skin is noted to be warm with brisk capillary refill on the Right foot. Neuro: alert. oriented  Eyes: Extra-ocular muscles intact  Mouth: Oral mucosa moist. No perioral lesions  Pulm: Respirations unlabored and regular. Skin: warm, well perfused  Psych:   Patient has good fund of knowledge and displays understanging of exam, diagnosis, and plan. Radiology:    XR HIP RIGHT (2-3 VIEWS)    Result Date: 2/21/2022  History:   Status post Left  total hip arthroplasty Findings:    Low AP pelvis, AP Left  hip, cross table lateral xrays Left hip done in the office today shows total hip arthroplasty in good position without signs complication. Leg lengths are approximate. Components are in good position without signs of loosening.   No evidence of fracture, subluxation, dislocation, radiopaque foreign body, radiopaque foreign tumor is noted. Impression: Left total hip arthroplasty in good position without complications noted. XR HIP 2-3 VW W PELVIS RIGHT    Result Date: 2/14/2022  EXAMINATION: ONE XRAY VIEW OF THE PELVIS AND TWO XRAY VIEWS RIGHT HIP 2/14/2022 3:43 pm COMPARISON: Two-view study of the right hip from 12/25/2021 HISTORY: ORDERING SYSTEM PROVIDED HISTORY: Post op, in PACU TECHNOLOGIST PROVIDED HISTORY: Post op, in PACU Reason for Exam: Post op Right Hip. Best cross table lateral.  Mult attempts made. FINDINGS: Orthopedic hardware in place status post right total hip arthroplasty. Hardware appears to be in satisfactory position; assessment is somewhat limited on the cross-table lateral view. Adjacent soft tissue lucency noted. Somewhat limited cross-table lateral; status post right total hip arthroplasty, as above. Assessment:      1. Arthritis of right hip    2. S/P total right hip arthroplasty         Plan:       The patient is currently 1 week postoperative after undergoing a right total hip arthroplasty with the anterior approach with Dr. Shelby Conley DO on 2/14/2022. I personally reviewed the x-ray images from today of the patient's right and compared them to post-operative pictures  from 2/14/2022. The right hip hardware is in place with proper alignment and no signs of loosening. At this time I would like the patient to continue home physical therapy working on range of motion, lower extremity strengthing and gait training over the next 4 weeks. The patient was given a referral to begin outpatient physical therapy in approximately 2 to 3 weeks. She notes that she would like to continue home therapy for short amount of time until she feels more comfortable and has transportation to help her get to therapy.       The patient was instructed not to submerge her right lower extremity in any bodies of water until the surgical incisions are completely healed in approximately 4 weeks. The patient was instructed to place an ABD pad along the proximal aspect of the surgical incision to limit skin friction from the abdominal pannus and the proximal aspect of the incision. The patient can shower but was instructed to keep the surgical incisions clean and dry otherwise. The patient may continue taking prescribed pain medication and beginning to decrease the number of doses per day and bridge with Ibuprofen or Tylenol as needed. At the patient's request she was given Norco instead of a refill of the Percocet. Medication was sent to her pharmacy today. The patient will follow-up in 4 weeks with Dr. Aki Olivares or sooner if needed. The patient was instructed to call the office with any questions or concerns. She voiced her understanding. Follow up: Return in about 4 weeks (around 3/21/2022) for post-operative follow up with Dr Aki Olivares. Orders Placed This Encounter   Medications    DISCONTD: HYDROcodone-acetaminophen (NORCO) 5-325 MG per tablet     Sig: Take 1 tablet by mouth every 8 hours as needed for Pain for up to 7 days. Dispense:  21 tablet     Refill:  0     Reduce doses taken as pain becomes manageable    HYDROcodone-acetaminophen (NORCO) 5-325 MG per tablet     Sig: Take 1 tablet by mouth every 8 hours as needed for Pain for up to 7 days.      Dispense:  21 tablet     Refill:  0     Reduce doses taken as pain becomes manageable       Orders Placed This Encounter   Procedures   [address].     Referral Priority:   Routine     Referral Type:   Eval and Treat     Referral Reason:   Specialty Services Required     Requested Specialty:   Physical Therapy     Number of Visits Requested:   1       This note is created with the assistance of a speech recognition program.  While intending to generate a document that actually reflects the content of the visit, the document can still have some errors including those of syntax and sound a like substitutions which may escape proof reading. In such instances, actual meaning can be extrapolated by contextual diversion.      Electronically signed by Dayanara Chopra PA-C on 2/21/2022 at 5:24 PM

## 2022-02-23 ENCOUNTER — CARE COORDINATION (OUTPATIENT)
Dept: OTHER | Facility: CLINIC | Age: 52
End: 2022-02-23

## 2022-02-23 NOTE — CARE COORDINATION
[facility] Transitions Follow Up Call    2022    Patient: Any Riding  Patient : 1970   MRN: [id number]  Reason for Admission: Arthritis of right hip; s/p right total hip arthroplasty  Discharge Date: 2/15/22 RARS: Readmission Risk Score: 9.7 ( )    Spoke with: Patient    Care Transitions Follow Up Call    Needs to be reviewed by the provider   Additional needs identified to be addressed with provider: No  none             Method of communication with provider : none      Care Transition Nurse (CTN) contacted the patient by telephone to follow up after admission on 22. Verified name and  with patient as identifiers. Addressed changes since last contact: medications-Percocet discontinued; [address] started  DME-purchased toilet riser and reach device  PT-in home 3x week for one more week, then outpt PT  Discussed follow-up appointments. If no appointment was previously scheduled, appointment scheduling offered: pt had f/u appt  with Ortho. Is follow up appointment scheduled within 7 days of discharge? Yes. Pt states she is doing well; pain level 3/10, relief with use of motrin; pt reports she does take norco 1 hour prior to PT. Pt states has PT in home 3x week for 1 more week, then will have outpt PT. Pt reports is ambulating every hour. Pt states is having some knee pain, but denies any swelling; ACM encouraged to elevate and ice and to discuss with PT. Pt states she has transitioned from use of walker to cane and is doing well with this. Pt reports had ortho f/u appt on  and xrays showed good alignment and healing well. Pt reports continuing to use spirometer 2x per day; ACM encouraged deep breathing exercises every hour. Pt reports is not using mark hose, as they are difficult to put on. ACM educated on walking throughout day and on s/sx of blood clot and when to contact surgeon and pt verbalized understanding. Pt denies any issues with urination.  Pt reports she is eating well; denies any nausea/vomiting. Pt states she did have some issues with constipation; took senekot with relief; ACM educated on high fiber foods and increased water intake. Pt states she has red rash on her back; surgeon changed pt from percocet to norco, but stated it could be due to pre-surgical wash; ACM encouraged pt to use cool wash cloths an cool water in shower and to use benadryl if needed. Pt denies any further questions, needs or concerns; is agreeable to [facility] follow up outreach. Advance Care Planning:   Does patient have an Advance Directive: not on file. CTN reviewed discharge instructions, medical action plan and red flags with patient and discussed any barriers to care and/or understanding of plan of care after discharge. Discussed appropriate site of care based on symptoms and resources available to patient including: PCP, Specialist, Benefits related nurse triage line, When to call 911 and [address]. The patient agrees to contact the PCP office for questions related to their healthcare. Patients top risk factors for readmission: medical condition-pre-diabetes; s/p total right hip replacement  Interventions to address risk factors: Scheduled appointment with Specialist-had f/u appt 2/21 with Ortho; next appt 3/21, Education of patient/family/caregiver/guardian to support self-management-red flags and appropriate sites of care, including PCP, specialist, nurse access line, when to go to urgent care/ED and when to call 911 for life threatening emergencies and pt verbalized understanding and Assessment and support for treatment adherence and medication management-pt denies any questions on meds      Non-[facility] follow up appointment(s): none known    CTN provided contact information for future needs. Plan for follow-up call in 7-10 days based on severity of symptoms and risk factors.   Plan for next call: symptom management-right hip and knee pain  self management-bowel movement, s/sx of surgical site infection, rash on back  medication management-any changes in meds or questions    Care Transitions Subsequent and Final Call    Subsequent and Final Calls  Do you have any ongoing symptoms?: Yes  Onset of Patient-reported symptoms: Other  Patient-reported symptoms: Pain  Have your medications changed?: Yes  Patient Reports: Discontinued percocet and started Norco, as had rash on my back  Do you have any questions related to your medications?: No  Do you currently have any active services?: Yes  Are you currently active with any services?: Home Health  Do you have any needs or concerns that I can assist you with?: No  Identified Barriers: None  Care Transitions Interventions     Other Services:  (Comment: 2/16-sent pt handouts on fiber/high iron foods)   Other Interventions:         Goals      Conditions and Symptoms      I will schedule office visits, as directed by my provider. I will keep my appointment or reschedule if I have to cancel. I will notify my provider of any barriers to my plan of care. I will notify my provider of any symptoms that indicate a worsening of my condition. Barriers: lack of education  Plan for overcoming my barriers: work with ACM  Confidence: 8/10  Anticipated Goal Completion Date: 3/16/22    2/16/22 - Pt has f/u appt with surgeon on 2/21/22 2/23/22 - Pt had f/u appt 2/21; next appt 3/21              Follow Up  Future Appointments   Date Time Provider Nataliya Caal   3/21/2022  9:30 AM Yosvany Early  [address] Dee HOGUE, RN   Ambulatory Care Manager  Associate Care Management  Cell [phone]  [email or web address]

## 2022-02-24 NOTE — PROGRESS NOTES
Physician Progress Note      PATIENT:               Ivone Bradford  CSN #:                  [id number]  :                       1970  ADMIT DATE:       2022 9:03 AM  DISCH DATE:        2/15/2022 2:44 PM  RESPONDING  PROVIDER #:        Janice King          QUERY TEXT:    Pt admitted following a right total hip replacement. Pt noted to have   decreasing hemoglobin levels. If possible, please document in the progress   notes and discharge summary if you are evaluating and/or treating any of the   following: The medical record reflects the following:  Risk Factors: Post OR  Clinical Indicators: Pre-op Hgb of 13.5, Post-op Hgb of 8.9, EBL of 600cc  Treatment: IVF, labs, monitoring    Thank you,  Mary Johnson RN  Options provided:  -- Acute blood loss anemia  -- Postoperative acute blood loss anemia  -- Dilutional anemia  -- Other - I will add my own diagnosis  -- Disagree - Not applicable / Not valid  -- Disagree - Clinically unable to determine / Unknown  -- Refer to Clinical Documentation Reviewer    PROVIDER RESPONSE TEXT:    This patient has postoperative acute blood loss anemia. Query created by:  Shemar Gómez on 2/15/2022 6:20 AM      Electronically signed by:  Janice King 2022 8:07 AM

## 2022-03-02 ENCOUNTER — OFFICE VISIT (OUTPATIENT)
Dept: DERMATOLOGY | Age: 52
End: 2022-03-02
Payer: COMMERCIAL

## 2022-03-02 ENCOUNTER — CARE COORDINATION (OUTPATIENT)
Dept: OTHER | Facility: CLINIC | Age: 52
End: 2022-03-02

## 2022-03-02 ENCOUNTER — OFFICE VISIT (OUTPATIENT)
Dept: ORTHOPEDIC SURGERY | Age: 52
End: 2022-03-02

## 2022-03-02 VITALS
BODY MASS INDEX: 32.17 KG/M2 | OXYGEN SATURATION: 99 % | DIASTOLIC BLOOD PRESSURE: 78 MMHG | SYSTOLIC BLOOD PRESSURE: 115 MMHG | TEMPERATURE: 97.6 F | WEIGHT: 217.2 LBS | HEIGHT: 69 IN | HEART RATE: 89 BPM

## 2022-03-02 VITALS — HEIGHT: 69 IN | BODY MASS INDEX: 30.81 KG/M2 | WEIGHT: 208 LBS

## 2022-03-02 DIAGNOSIS — D48.9 NEOPLASM OF UNCERTAIN BEHAVIOR: Primary | ICD-10-CM

## 2022-03-02 DIAGNOSIS — M16.11 ARTHRITIS OF RIGHT HIP: Primary | ICD-10-CM

## 2022-03-02 DIAGNOSIS — Z96.641 S/P TOTAL RIGHT HIP ARTHROPLASTY: ICD-10-CM

## 2022-03-02 PROCEDURE — 99203 OFFICE O/P NEW LOW 30 MIN: CPT | Performed by: PHYSICIAN ASSISTANT

## 2022-03-02 PROCEDURE — 11104 PUNCH BX SKIN SINGLE LESION: CPT | Performed by: PHYSICIAN ASSISTANT

## 2022-03-02 RX ORDER — IBUPROFEN 800 MG/1
800 TABLET ORAL EVERY 6 HOURS PRN
COMMUNITY

## 2022-03-02 RX ORDER — CETIRIZINE HYDROCHLORIDE 10 MG/1
10 TABLET ORAL 2 TIMES DAILY
COMMUNITY

## 2022-03-02 RX ORDER — LIDOCAINE HYDROCHLORIDE AND EPINEPHRINE 10; 10 MG/ML; UG/ML
20 INJECTION, SOLUTION INFILTRATION; PERINEURAL ONCE
Status: DISCONTINUED | OUTPATIENT
Start: 2022-03-02 | End: 2022-04-12

## 2022-03-02 RX ORDER — TRIAMCINOLONE ACETONIDE 1 MG/G
CREAM TOPICAL
Qty: 454 G | Refills: 1 | Status: SHIPPED | OUTPATIENT
Start: 2022-03-02 | End: 2022-07-14

## 2022-03-02 ASSESSMENT — ENCOUNTER SYMPTOMS
COUGH: 0
SHORTNESS OF BREATH: 0
VOMITING: 0
COLOR CHANGE: 0

## 2022-03-02 NOTE — CARE COORDINATION
[facility] Transitions Follow Up Call    3/2/2022    Patient: Aden Boateng  Patient : 1970   MRN: [id number]  Reason for Admission: Arthritis of right hip; s/p right total hip arthroplasty  Discharge Date: 2/15/22 RARS: Readmission Risk Score: 9.7 ( )    Spoke with: Patient    Care Transitions Follow Up Call    Needs to be reviewed by the provider   Additional needs identified to be addressed with provider: No  none             Method of communication with provider : none      Care Transition Nurse (CTN) contacted the patient by telephone to follow up after admission on 22. Verified name and  with patient as identifiers. Addressed changes since last contact: medications-started Zyrtec 2x daily and cream for rash  PT-will be moving to outpt PT next week, 3/7  rash has spread to chest, abdomen and back; Went to surgeon and dermatology today  Discussed follow-up appointments. If no appointment was previously scheduled, appointment scheduling offered: Yes and pt had f/u appts with surgeon and dermatology today, 3/2/22. Is follow up appointment scheduled within 7 days of discharge? Yes. Advance Care Planning:   Does patient have an Advance Directive: not on file. CTN reviewed discharge instructions, medical action plan and red flags with patient and discussed any barriers to care and/or understanding of plan of care after discharge. Discussed appropriate site of care based on symptoms and resources available to patient including: PCP, Specialist, Benefits related nurse triage line, When to call 911 and [employer]. The patient agrees to contact the PCP office for questions related to their healthcare.      Patients top risk factors for readmission: medical condition-pre-diabetes; s/p total right hip replacement  Interventions to address risk factors: Scheduled appointment with Cecilia Garcia had f/u appt 3/2, Education of patient/family/caregiver/guardian to support self-management-ACM educated pt on red flags and appropriate sites of care, including PCP, specialist, nurse access line, when to go to urgent care/ED and when to call 911 for life threatening emergencies and pt verbalized understanding and Assessment and support for treatment adherence and medication management-reviewed new meds with pt, who denied any questions      Non-[facility] follow up appointment(s): none known    Pt reports that she went to surgeon today as the rash on her body has gotten worse, spreading to her abdomen, chest and back and is very itchy. 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 ACM educated pt to try to keep skin cool and not to take hot showers, as this may irritate it. Pt reports her hip is really not painful, just a burning sensation from hip to knee; states surgeon told her this was normal; pt denies need to take pain med. Patient denies any s/s of infection. Patient instructed on s/s of infection to report including but not limited to fever, chills, foul smelling drainage, increased warmth, redness or hardness around incision. Pt states she was released from home PT and will start outpt PT next week. Pt reports she has been driving and surgeon today said that this is fine. Pt no longer using a cane to walk, but still carries it with her, in case she needs it. Pt asked if it would be okay for her to ride in car with friend to [place]; ACM encouraged pt to discuss this with PT and with surgeon. Pt states that surgeon has her off work for 12 weeks, but she would like to return sooner. ACM encouraged pt to discuss this with surgeon at visit on 3/21 and to set a goal with PT. 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 Pt denies any further questions, needs or concerns; is agreeable to [facility] follow up outreach. CTN provided contact information for future needs. Plan for follow-up call in 7-10 days based on severity of symptoms and risk factors. Plan for next call: symptom management-pain, rash  self management-walking, PT visits  follow up appointment-any new appts  medication management-any questions or changes with meds      Care Transitions Subsequent and Final Call    Schedule Follow Up Appointment with PCP: Completed  Subsequent and Final Calls  Do you have any ongoing symptoms?: Yes  Onset of Patient-reported symptoms: In the past 7 days  Patient-reported symptoms: Rash  Have your medications changed?: Yes  Patient Reports: Taking Zytec 2x day and a cream for the rash  Do you have any questions related to your medications?: No  Do you currently have any active services?: Yes  Are you currently active with any services?: Home Health  Do you have any needs or concerns that I can assist you with?: No  Care Transitions Interventions     Other Services:  (Comment: 2/16-sent pt handouts on fiber/high iron foods)   Other Interventions:         Goals      Conditions and Symptoms      I will schedule office visits, as directed by my provider. I will keep my appointment or reschedule if I have to cancel. I will notify my provider of any barriers to my plan of care. I will notify my provider of any symptoms that indicate a worsening of my condition.     Barriers: lack of education  Plan for overcoming my barriers: work with ACM  Confidence: 8/10  Anticipated Goal Completion Date: 3/16/22    2/16/22 - Pt has f/u appt with surgeon on 2/21/22 2/23/22 - Pt had f/u appt 2/21; next appt 3/21  3/2/22- Pt had appt with surgeon on 3/2 and appt with Dermatology on 3/2            Follow Up  Future Appointments   Date Time Provider Nataliya Caal   3/7/2022 10:45 AM GARRY Patel   3/21/2022  9:30 AM Lucian Jiémnez  [address] Dee MSN, RN   Ambulatory Care Manager  Associate Care Management  Cell [phone]  [email or web address]

## 2022-03-02 NOTE — PROGRESS NOTES
[address]  [address] [facility] [place] [address]  Dept: [phone]  Dept Fax: [phone]        Postoperative follow-up note    Subjective:   Ahmed Hamman is a 49y.o. year old female who presents to our office today for postoperative followup regarding her   1. Arthritis of right hip    2. S/P total right hip arthroplasty        Chief Complaint   Patient presents with    Post-Op Check     right BROOKLYN 2/14/2022        Date of Surgery: 2/14/2022    Ahmed Hamman  is a 49y.o. year old female who presents to our office today for postoperative follow up after having undergone a right total hip arthroplasty with the anterior approach completed on 2/14/2022 by Dr. Rubi Mcmillan DO. The patient denies fevers, chills, nausea, vomiting, diarrhea. The patient has started physical therapy. Patient notes that her hip is doing very well and she is only having very minimal discomfort. She has transition to a cane. Patient is here today for continued rash that was previously surrounding her surgical incision but is now on her abdomen and back. Patient notes that it is very itchy and she has tried topical 1% hydrocortisone cream and oral Benadryl without improvement in her symptoms. Patient notes that she has not been seen for this by a dermatologist in the past.      Review of Systems   Constitutional: Negative for activity change and fever. HENT: Negative for sneezing. Respiratory: Negative for cough and shortness of breath. Cardiovascular: Negative for chest pain. Gastrointestinal: Negative for vomiting. Musculoskeletal: Negative for arthralgias, joint swelling and myalgias. Skin: Positive for rash (chest, abdomen and back). Negative for color change. Neurological: Negative for weakness and numbness. Psychiatric/Behavioral: Negative for sleep disturbance. Objective :   General: Ahmed Hamman is a 49 y.o. female who is alert and oriented and sitting comfortably in our office. Ortho Exam  MS: Patient ambulates with a cane and without a limp noted to the right lower extremity. Evaluation of the anterior aspect of the right hip reveals a well-healed surgical incision with superficial skin glue and mild eschar noted. The patient does have a diffuse raised papular type rash on the abdomen chest and mid back. No evidence of pustules appreciated. No rash noted over the bilateral lower extremity or bilateral upper extremity. No rash surrounding the surgical incision. Neuro: alert. oriented  Eyes: Extra-ocular muscles intact  Mouth: Oral mucosa moist. No perioral lesions  Pulm: Respirations unlabored and regular. Skin: warm, well perfused  Psych:   Patient has good fund of knowledge and displays understanging of exam, diagnosis, and plan. Radiology: No new imaging obtained today in office. Assessment:      1. Arthritis of right hip    2. S/P total right hip arthroplasty         Plan:      Patient is currently 2 weeks postoperative after undergoing a right total hip arthroplasty completed by Dr. Kermit Doan DO on 2/14/2022. Patient is doing very well in regards to the right hip but is here today for evaluation and treatment of the continued rash on the chest, abdomen and low back. The patient has failed conservative treatment being topical 1% hydrocortisone cream and oral Benadryl therefore she was given a referral to dermatology for further evaluation and treatment. We are able to get the patient into the dermatology office today at 1:30 PM.    In regards to the right hip the patient has a follow-up appointment with Dr. LEONILA BALTAZAR on 3/21/2022 for her 6-week postoperative appointment. She was instructed to call our office with any questions or concerns prior to her next appointment. She voiced her understanding.        Follow up: Return in about 19 days (around 3/21/2022) for post-operative follow up.    No orders of the defined types were placed in this encounter. No orders of the defined types were placed in this encounter. This note is created with the assistance of a speech recognition program.  While intending to generate a document that actually reflects the content of the visit, the document can still have some errors including those of syntax and sound a like substitutions which may escape proof reading. In such instances, actual meaning can be extrapolated by contextual diversion.      Electronically signed by Ran Mirza PA-C on 3/2/2022 at 4:31 PM

## 2022-03-02 NOTE — PROGRESS NOTES
Dermatology Patient Note  [address]ela  [address]  [facility]  Dept: [phone]  Dept Fax: [phone]      VISITDATE: 3/2/2022   REFERRING PROVIDER: No ref. provider found      Ana Huff is a 49 y.o. female  who presents today in the office for:    New Patient (rash- back, abd, chest. Started after surgery on 2/14/22. Tried hydrocortisone cream & Benadryl with no help. Itches)      HISTORY OF PRESENT ILLNESS:  Pruritic dermatitis on trunk x 2 weeks. This began shortly after right total hip replacement. Pt. States similar process when she had COVID, approximately 2 months ago. Failed OTC hydrocortisone and Benadryl. MEDICAL PROBLEMS:  Patient Active Problem List    Diagnosis Date Noted    H/O total hip arthroplasty, right 02/14/2022    Prediabetes 01/31/2022    Hip pain 09/16/2021    Arthritis of right hip 09/16/2021    Lumbar degenerative disc disease 09/16/2021    Nut allergy 04/18/2021    Attention deficit hyperactivity disorder (ADHD), predominantly inattentive type 04/18/2021    Hashimoto's thyroiditis 04/18/2021    Family history of brain aneurysm 04/18/2021    Family history of systemic lupus erythematosus 04/18/2021    Chronic midline low back pain without sciatica 10/15/2017     Last Assessment & Plan:   Formatting of this note might be different from the original.  Admit observation to the CDU on fall precautions, MRI lumbar spine, pain medications, consult neurosurgery and physical therapy. Case management for insurance help.   10/15/17   Work up in progress and increase activity ,if patient is feeling better then possible discharge home today  MRI right disc extrusion with pressure on nerve sheath ,no foraminal stenosis         CURRENT MEDICATIONS:   Current Outpatient Medications   Medication Sig Dispense Refill    ibuprofen (ADVIL;MOTRIN) 800 MG tablet Take 800 mg by mouth every 6 hours as needed for Pain      triamcinolone (KENALOG) 0.1 % cream Apply to rash twice daily (not face, armpit or groin) 454 g 1    aspirin EC 81 MG EC tablet Take 1 tablet by mouth 2 times daily 84 tablet 0    vitamin D (ERGOCALCIFEROL) 1.25 MG (10467 UT) CAPS capsule Take 1 tab every Monday and every Friday 30 capsule 1    amphetamine-dextroamphetamine (ADDERALL) 20 MG tablet Take 1 tablet by mouth 2 times daily for 30 days.  60 tablet 0    ondansetron (ZOFRAN-ODT) 4 MG disintegrating tablet Take 1 tablet by mouth 3 times daily as needed for Nausea or Vomiting 30 tablet 0    levothyroxine (SYNTHROID) 175 MCG tablet Take 1 tablet by mouth every morning (before breakfast) 90 tablet 1    fluticasone (FLONASE) 50 MCG/ACT nasal spray 2 sprays by Each Nostril route daily 16 g 0    albuterol sulfate  (90 Base) MCG/ACT inhaler Inhale into the lungs every 4 hours as needed      docusate sodium (COLACE) 100 MG capsule Take 1 capsule by mouth 2 times daily as needed for Constipation (Patient not taking: Reported on 3/2/2022) 60 capsule 0    Ddvlvk-OfQlh-HmZhg-Meth-FA-DHA (PRENATE MINI) 18-0.6-0.4-350 MG CAPS Take 1 capsule by mouth daily (Patient not taking: Reported on 2/16/2022) 30 capsule 2    baclofen (LIORESAL) 10 MG tablet Take 1-2 tablets by mouth 2 times daily as needed (PRN for back pain, stiffness) (Patient not taking: Reported on 2/16/2022) 90 tablet 0    EPINEPHrine (EPIPEN 2-DINO) 0.3 MG/0.3ML SOAJ injection Inject 0.3 mLs into the muscle once for 1 dose Use as directed for allergic reaction, facial swelling 0.3 mL 1     Current Facility-Administered Medications   Medication Dose Route Frequency Provider Last Rate Last Admin    lidocaine-EPINEPHrine 1 %-1:650506 injection 20 mL  20 mL IntraDERmal Once Selvin Miranda PA-C           ALLERGIES:   Allergies   Allergen Reactions    Morphine Hives and Swelling     Morphine      Mineral [Macadamia Nut Oil] Swelling       SOCIAL HISTORY:  Social History Tobacco Use    Smoking status: Never Smoker    Smokeless tobacco: Never Used   Substance Use Topics    Alcohol use: Yes     Comment: \"A COUPLE ONCE A MONTH OR SO.\"       Pertinent ROS:  Review of Systems  Skin: Denies any new changing, growing or bleeding lesions or rashes except as described in the HPI   Constitutional: Denies fevers, chills, and malaise. PHYSICAL EXAM:   /78   Pulse 89   Temp 97.6 °F (36.4 °C)   Ht 5' 9\" (1.753 m)   Wt 217 lb 3.2 oz (98.5 kg)   SpO2 99%   BMI 32.07 kg/m²     The patient is generally well appearing, well nourished, alert and conversational. Affect is normal.    Cutaneous Exam:  Physical Exam  Skin:             Face and trunk    Facial covering was not removed during examination. Diagnoses/exam findings/medical history pertinent to this visit are listed below:    Assessment:   Diagnosis Orders   1. Neoplasm of uncertain behavior  triamcinolone (KENALOG) 0.1 % cream    lidocaine-EPINEPHrine 1 %-1:127303 injection 20 mL    Surgical Pathology    ME PUNCH BIOPSY SKIN SINGLE LESION        Plan:  1. Neoplasm of uncertain behavior (r/o Vladislav's Dz)  Punch Biopsy: The procedure and its risks were explained including but not limited to pain, bleeding, infection, permanent scar, permanent pigment alteration and need for an additional procedure. Consent to proceed with the procedure was obtained from the patient or the parent. After cleaning with alcohol the rash was anesthetized with 1% lidocaine with epinephrine and a 4 mm punch was performed.  Hemostasis was achieved with suture closure of the defect with 4-0 Prolene and Vaseline and a bandage were applied.  - Triamcinolone 0.1% BID prn  - OTC Cetirizine 10 mg BID prn      RTC prn    Future Appointments   Date Time Provider Nataliya Caal   3/7/2022 10:45 AM Vijay Posada PT [facility] [address] [place] [address]   3/21/2022  9:30 AM Juanpablo Jean DO [address] [address]         Patient Instructions   [place]'s Disease Take Zyrtec twice a day- Morning/Afternoon     Apply Kenalog cream         Electronically signed by Shar Woody PA-C on 3/2/22 at 1:48 PM EST

## 2022-03-04 DIAGNOSIS — F90.0 ATTENTION DEFICIT HYPERACTIVITY DISORDER (ADHD), PREDOMINANTLY INATTENTIVE TYPE: ICD-10-CM

## 2022-03-04 RX ORDER — DEXTROAMPHETAMINE SACCHARATE, AMPHETAMINE ASPARTATE, DEXTROAMPHETAMINE SULFATE AND AMPHETAMINE SULFATE 5; 5; 5; 5 MG/1; MG/1; MG/1; MG/1
20 TABLET ORAL 2 TIMES DAILY
Qty: 60 TABLET | Refills: 0 | Status: SHIPPED | OUTPATIENT
Start: 2022-03-04 | End: 2022-04-12 | Stop reason: SDUPTHER

## 2022-03-07 ENCOUNTER — HOSPITAL ENCOUNTER (OUTPATIENT)
Dept: PHYSICAL THERAPY | Age: 52
Setting detail: THERAPIES SERIES
Discharge: HOME | End: 2022-03-07
Payer: COMMERCIAL

## 2022-03-07 PROCEDURE — 97110 THERAPEUTIC EXERCISES: CPT

## 2022-03-07 PROCEDURE — 97161 PT EVAL LOW COMPLEX 20 MIN: CPT

## 2022-03-07 NOTE — CONSULTS
[x] [facility]) - [facility] & Therapy  [address]  [place] Glory: [phone]   F: [phone]       Date:  3/7/2022  Patient: Elton Hansen  : 1970  MRN: [id number]  Physician: Gaylia Hatchet, PA-C  Insurance: [employer] --  visits (Dalia Mess required after 30 visits)   Medical Diagnosis:   M16.11 (ICD-10-CM) - Arthritis of right hip   Z96.641 (ICD-10-CM) - S/P total right hip arthroplasty   Rehab Codes: R25 pain , M25.60 stiffness, R53.1 weakness  Onset Date: 22- DOS    Next 's appt: 3/21/22- Dr. Rene Cerda: avoid extreme hip hyper-extension & ER     Subjective:   CC: s/p R BROOKLYN (DOS 22)     Pt arrives ambulatory without AD she is currently 3 weeks post-op R BROOKLYN performed by Dr. Eleazar Tiwari. Since being at hospital she was completing home PT 3x/wk for total of 3 weeks. Pt notes she was completing standing, sitting, and supine exercises. Note she has progressed well and no using any AD but has the cane in case. Denies any falls. Still having a burning sensation in the R hip but pain is more in the R knee. Since being home she has developed a rash on her abdomen but this is being managed by dermatology. Has been doing well with her ADLs, has just noticed getting up from a kneeling position. PMHx: [] Unremarkable [] Diabetes [] HTN  [] Pacemaker   [] MI/Heart Problems [] Cancer [x] Arthritis [] Other:              [x] Refer to full medical chart  In EPIC     Comorbidities:   [x] Obesity [] Dialysis  [] Other:   [] Asthma/COPD [] Dementia [] Other:   [] Stroke [] Sleep apnea [] Other:   [] Vascular disease [] Rheumatic disease [] Other:     Preferred Language:   [x] English           [] Other:    Prior Imaging: no significant findings post -op      Previous Treatment:   - was icing after surgery but has lessened in the past couple days   - only taking motrin as needed.      Home Environment: 1SH, 2 steps to enter completing in a step over step fashion [x]           Modified Independent []            Not independent []  Comments:  Mild pain increase with walking too far, limited stance time R vs L     Functional Testing:   Squat -- limited squat depth   Demos hip Strength, ROM, SL stability with standing march motion for step over step navigation       Assessment:  Pt presents 3 weeks post-op R BROOK[place]N and is progressing well and healing as to be expected. She was previously active with work duties, working 12 hour shifts. She is limited in R hip strength, endurance of LE musculature, and has some muscle tightness given her post-op status. These deficits are affecting her from feeling ready and able to return to work. She is still very consistent with her home program which was reviewed today. Added stretching to her home program to address flexibility. Feel she has a good prognosis to progress with PT in a short POC. Feel this patient would benefit from skilled PT to allow her to increase strength, endurance, and ROM of the R hip to allow her to complete her work duties and ADLs with less dysfunction. Problems:    [x] ? Pain:  [x] ? ROM:  [x] ? Strength:  [x] ? Function:  [] Other:    STG/LTG: (to be met in 10 treatments)  1. Pt will self report no pain greater than 2/10 with navigation of a flight of stairs in order to better tolerate ADLs/work activities with minimal dysfunction  2. Pt will improve AROM of R hip flexion to >110 deg to be able to sit to low surfaces at home and the community. 3. Pt will demonstrate improved R LE strength to 4+/5 or greater in order to demonstrate improved stability/strength necessary for unrestricted ADLs/work activities. 4. Pt will self report ability to tolerate walking bouts of >15 minutes showing improvement to be able to tolerate consistent ambulation needed for work duties.    5. Pt will be able to hold SL stance for >10 seconds without signs of Trendeleberg demonstrating improved neuromuscular control needed for Biofeedback, first 15 minutes   90912  [] Biofeedback, additional 15 minutes   90913 [] Dry Needling, 3 or more muscles  20561     []  Medication allergies reviewed for use of    Dexamethasone Sodium Phosphate 4mg/ml     with iontophoresis treatments. Pt is not allergic. Frequency: 2 x/week for 10 visits    Todays Treatment:  Modalities:   Precautions:  Exercises:  Exercise Reps/ Time Weight/ Level Comments   Supine HS stretch*  2x30s      Supine modified fito* 2x30s   No overpressure    Standing lateral hip stretch*  2x30s      Patient education  5'  Discussed how to progress walking program. Educated on need to remain moving and active. Discussed limiting heavy lifting until strength is improved           Other:  (*) = added to HEP     Specific Instructions for next treatment: Complete stretching and strengthening to isolate weak musculature.  Add standing exercises as able     Treatment Charges: Mins Units   [x] Evaluation       [x]  Low       []  Moderate       []  High 34 1   []  Modalities     [x]  Ther Exercise 10 1   []  Manual Therapy     []  Ther Activities     []  Aquatics     []  Neuromuscular     []  Gait Training     []  Dry Needling           1-2 muscles     []  Dry Needling           3 or more muscles     [] Vasocompression     []  Other       TOTAL TREATMENT TIME: 44 minutes -- 10 minutes timed units   Time in: [email or web address]    Time Out: 2134    Electronically signed by: Mariaelena Mercer PT

## 2022-03-08 ENCOUNTER — TELEPHONE (OUTPATIENT)
Dept: DERMATOLOGY | Age: 52
End: 2022-03-08

## 2022-03-08 DIAGNOSIS — D48.9 NEOPLASM OF UNCERTAIN BEHAVIOR: ICD-10-CM

## 2022-03-08 NOTE — TELEPHONE ENCOUNTER
Future Appointments   Date Time Provider Nataliya Caal   3/9/2022  9:30 AM SCHEDULE, [place] [place] [facility] CONTINGENT PTA [facility]   3/17/2022  9:00 AM Eren Bassett, PT [facility]   3/21/2022  9:30 AM Sergio Richard [facility]   3/21/2022 11:45 AM Eren Bassett, PT [facility]   3/24/2022  9:30 AM Eren Bassett, PT [facility]   3/29/2022 10:45 AM Eren Bassett, PT [facility]   4/19/2022  9:45 AM Rita Hernández PA-C  derm [facility]     Pt given results and is not as itchy with the medication

## 2022-03-08 NOTE — PROGRESS NOTES
Future Appointments   Date Time Provider Nataliya Caal   3/9/2022  9:30 AM SCHEDULE, [place] [place] [facility] CONTINGENT PTA [facility]   3/17/2022  9:00 AM Lawrance Ing, PT [facility]   3/21/2022  9:30 AM Juanpablo Martinez [facility]   3/21/2022 11:45 AM Lawrance Ing, PT [facility]   3/24/2022  9:30 AM Lawrance Ing, PT [facility] [address] [place] [address]   3/29/2022 10:45 AM Lawrance Ing, PT [facility] [address] [place] [address]   4/19/2022  9:45 AM Shar Woody PA-C [facility]

## 2022-03-08 NOTE — RESULT ENCOUNTER NOTE
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 This process tends to be chronic, and intermittent, and we treat it symptomatically with mid potency topical steroids and antihistamines (which wwew prescribed at last visit). Please confirm that pt. Is getting some relief from the pruritus with the use of these medications. F/U in clinic in 4 weeks.

## 2022-03-09 ENCOUNTER — HOSPITAL ENCOUNTER (OUTPATIENT)
Dept: PHYSICAL THERAPY | Age: 52
Setting detail: THERAPIES SERIES
Discharge: HOME | End: 2022-03-09
Payer: COMMERCIAL

## 2022-03-09 ENCOUNTER — CARE COORDINATION (OUTPATIENT)
Dept: OTHER | Facility: CLINIC | Age: 52
End: 2022-03-09

## 2022-03-09 PROCEDURE — 97110 THERAPEUTIC EXERCISES: CPT

## 2022-03-09 NOTE — FLOWSHEET NOTE
[x]? [address]  [place]: [phone]   F: [phone]       Physical Therapy Daily Treatment Note    Date:  3/9/2022  Patient Name:  Ahmed Hamman    :  1970  MRN: [id number]  Physician: Lizzy Rosa PA-C                     Insurance: [facility] [address] [id number] --  visits (Tally  required after 30 visits)   Medical Diagnosis:   M16.11 (ICD-10-CM) - Arthritis of right hip   Z96.641 (ICD-10-CM) - S/P total right hip arthroplasty   Rehab Codes: R25 pain , M25.60 stiffness, R53.1 weakness  Onset Date: 22- DOS                Next Dr.'s appt: 3/21/22- Dr. Sweetie Child   Visit# / total visits: 2/10     Cancels/No Shows:     Subjective:  Pt arrives noting stiffness in R hip with minimal pain present. Has noticed an increase in ROM with HEP stretches. States her last home therapy session was Friday and she was completing resisted 3 way hip, marches, and side stepping. Mentions today was the first day she was able to put on her sock without using a reacher.    Pain:  [x] Yes  [] No Location: R hip Pain Rating: (0-10 scale) 2-3/10  Pain altered Tx:  [x] No  [] Yes  Action:  Comments:    Objective:  Modalities:   PRECAUTIONS: avoid extreme hip hyper-extension & ER   Exercises:  Exercise Reps/ Time Weight/ Level Comments   Supine HS stretch*  2x30s        Supine modified fito* 2x30s    No overpressure    Standing lateral hip stretch*  2x30s        Heel slides  20x     SLR 20x     Bridges  20x     Hooklying hip add  20x3\" ball    Side lying hip abd 20x     Side lying clamshells  20x     Side lying reverse clamshells  20x           Standing       Heel/toe raise  20x     4 way hip bilat 10x2 ea     Alt marches  20x ea     Hs curls  20x ea     Step ups 10x 6\"    Step over/back krystian  10x ea  Lateral; bilat   Gait training  1x  In hallway down & back   Side stepping  1x ea  In hallway leading with R down and with L on the way back         Patient education     Discussed how to progress walking program. Educated on need to remain moving and active. Discussed limiting heavy lifting until strength is improved               Other:  (*) = added to HEP      Specific Instructions for next treatment: Complete stretching and strengthening to isolate weak musculature. Add standing exercises as able        Treatment Charges: Mins Units   []  Modalities     [x]  Ther Exercise 40 3   []  Manual Therapy     []  Ther Activities     []  Aquatics     []  Vasocompression     []  Other     Total Treatment time 40 3       Assessment: [x] Progressing toward goals. Initiated session with stretches to increase muscle flexibility. Pt demonstrates M/L trunk sway with standing hip abduction requiring cues to maintain an upright posture and to isolate motion to hips. Instructed to complete exercises within a pain free range along with a slow and controlled motion to increase muscle recruitment. Bilateral side stepping completed in hallway with good tolerance. Ended session with gait training requiring cues for proper mechanics and encouraged to focus on this at home and in the community. No complaints of increased pain throughout session. Fatigue present post treatment. Will monitor sx's and progress as tolerated. [] No change. [] Other:  [x] Patient would continue to benefit from skilled physical therapy services in order to: to allow her to increase strength, endurance, and ROM of the R hip to allow her to complete her work duties and ADLs with less dysfunction. STG/LTG  Problems:    [x]? ? Pain:  [x]? ? ROM:  [x]? ? Strength:  [x]? ? Function:  []? Other:     STG/LTG: (to be met in 10 treatments)  1. Pt will self report no pain greater than 2/10 with navigation of a flight of stairs in order to better tolerate ADLs/work activities with minimal dysfunction  2. Pt will improve AROM of R hip flexion to >110 deg to be able to sit to low surfaces at home and the community.   3. Pt will demonstrate improved R LE strength to 4+/5 or greater in order to demonstrate improved stability/strength necessary for unrestricted ADLs/work activities. 4. Pt will self report ability to tolerate walking bouts of >15 minutes showing improvement to be able to tolerate consistent ambulation needed for work duties. 5. Pt will be able to hold SL stance for >10 seconds without signs of Trendeleberg demonstrating improved neuromuscular control needed for stepping over obstacles in the community. 6. Pt will decrease functional limitation per HOOS Jr  to <8% in order to demonstrate improved functional tolerances at PLOF with minimal restriction/dysfunction  7. Pt will demonstrate independence with a long term HEP for continued progress/maintenance after completion of PT     Pt. Education:  [x] Yes  [] No  [x] Reviewed Prior HEP/Ed  Method of Education: [x] Verbal  [x] Demo  [] Written  Comprehension of Education:  [x] Verbalizes understanding. [] Demonstrates understanding. [] Needs review. [] Demonstrates/verbalizes HEP/Ed previously given. Plan: [x] Continue current frequency toward long and short term goals.     [] Specific Instructions for subsequent treatments:       Time In: 9:35            Time Out: 10:15    Electronically signed by:  Peggy Rosenthal PTA

## 2022-03-14 ENCOUNTER — TELEPHONE (OUTPATIENT)
Dept: INTERNAL MEDICINE CLINIC | Age: 52
End: 2022-03-14

## 2022-03-14 DIAGNOSIS — Z12.31 ENCOUNTER FOR SCREENING MAMMOGRAM FOR MALIGNANT NEOPLASM OF BREAST: Primary | ICD-10-CM

## 2022-03-14 NOTE — TELEPHONE ENCOUNTER
Patient states she always ends up having to get a 3D mammogram.  Can you please order it as a 3D so she doesn't have to go thru it twice?

## 2022-03-15 DIAGNOSIS — Z96.641 S/P TOTAL RIGHT HIP ARTHROPLASTY: Primary | ICD-10-CM

## 2022-03-15 RX ORDER — HYDROCODONE BITARTRATE AND ACETAMINOPHEN 5; 325 MG/1; MG/1
1 TABLET ORAL 2 TIMES DAILY PRN
Qty: 14 TABLET | Refills: 0 | Status: SHIPPED | OUTPATIENT
Start: 2022-03-15 | End: 2022-03-22

## 2022-03-16 ENCOUNTER — CARE COORDINATION (OUTPATIENT)
Dept: OTHER | Facility: CLINIC | Age: 52
End: 2022-03-16

## 2022-03-16 NOTE — CARE COORDINATION
[facility] Transitions Follow Up Call    3/16/2022    Patient: Claudene Easterly  Patient : 1970   MRN: [id number]  Reason for Admission: Arthritis of right hip; s/p right total hip arthroplasty  Discharge Date: 2/15/22 RARS: Readmission Risk Score: 9.7 ( )      Spoke with: Patient    Care Transitions Follow Up Call    Needs to be reviewed by the provider   Additional needs identified to be addressed with provider: No                 Method of communication with provider : none      Care Transition Nurse (CTN) contacted the patient by telephone to follow up after admission on 22. Verified name and  with patient as identifiers. Addressed changes since last contact: none  Discussed follow-up appointments. If no appointment was previously scheduled, appointment scheduling offered: Yes and f/u appts scheduled. Is follow up appointment scheduled within 7 days of discharge? Yes. Advance Care Planning:   Does patient have an Advance Directive: not on file. CTN reviewed discharge instructions, medical action plan and red flags with patient and discussed any barriers to care and/or understanding of plan of care after discharge. Discussed appropriate site of care based on symptoms and resources available to patient including: PCP, Specialist, Benefits related nurse triage line, When to call 911 and [address]. The patient agrees to contact the PCP office for questions related to their healthcare.      Patients top risk factors for readmission: medical condition-pre-diabetes; s/p total right hip replacement  Interventions to address risk factors: Scheduled appointment with Jaye Recinos has f/u appts with Ortho on 3/21 and Derm in April, Education of patient/family/caregiver/guardian to support self-management-ACM educated pt on red flags and appropriate sites of care, including PCP, specialist, nurse access line, when to go to urgent care/ED and when to call 911 for life threatening emergencies and pt verbalized understanding and Assessment and support for treatment adherence and medication management-Medication reconciliation was performed with patient, who verbalizes understanding of administration of home medications      Non-[facility] follow up appointment(s): none known    Pt states she is doing pretty good; reports that she continues to have postop hip pain, 3/10, getting relief with Norco and ibuprofen. Pt states she contacted Ortho provider yesterday re: pain and need for refill for norco and that they stated it is to be expected to be experiencing postop pain at this point and did give a refill for norco. Pt states she takes norco before and after PT and is taking ibuprofen once during day. Pt states she is not having issues with bowel movements. Pt reports that incision site looks great. Patient denies any s/s of infection. Patient instructed on s/s of infection to report including but not limited to fever, chills, foul smelling drainage, increased warmth, redness or hardness around incision. Pt reports that she attends PT 2x per week and no longer needs a cane to ambulate. Medication reconciliation was performed with patient, who verbalizes understanding of administration of home medications. Advised obtaining a 90-day supply of all daily and as-needed medications. Pt denies any further questions, needs or concerns; no further outreach indicated. ACM will sign off care team at this time. CTN provided contact information for future needs. No further follow-up call indicated based on severity of symptoms and risk factors. No further outreach scheduled with this ACM, ACM will sign off care team at this time. Episode of Care resolved. Patient has this ACM's contact information if any questions, concerns or needs arise.     Care Transitions Subsequent and Final Call    Subsequent and Final Calls  Do you have any ongoing symptoms?: No  Have your medications changed?: No  Do you have any questions related to your medications?: No  Do you currently have any active services?: Yes  Are you currently active with any services?: Home Health  Do you have any needs or concerns that I can assist you with?: No  Identified Barriers: None  Care Transitions Interventions  No Identified Needs     Other Services:  (Comment: 2/16-sent pt handouts on fiber/high iron foods)   Other Interventions:         Goals      Conditions and Symptoms      I will schedule office visits, as directed by my provider. I will keep my appointment or reschedule if I have to cancel. I will notify my provider of any barriers to my plan of care. I will notify my provider of any symptoms that indicate a worsening of my condition. Barriers: lack of education  Plan for overcoming my barriers: work with ACM  Confidence: 8/10  Anticipated Goal Completion Date: 3/16/22    2/16/22 - Pt has f/u appt with surgeon on 2/21/22 2/23/22 - Pt had f/u appt 2/21; next appt 3/21  3/2/22- Pt had appt with surgeon on 3/2 and appt with Dermatology on 3/2  3/16/22- Pt has been attending all postop appts; next Ortho appt 3/21. Goal Met          Follow Up  Future Appointments   Date Time Provider Department Center   3/17/2022  9:00 AM Lawrance Ing, PT [facility]   3/21/2022  [facility]   3/21/2022 11:45 AM Lawrance Ing, PT [facility]   3/24/2022  9:30 AM Lawrance Ing, PT [facility]   3/29/2022 10:45 AM Lawrance Ing, PT [facility]   4/19/2022  9:45 AM LONNIE Rodgers [address] Dee MSN, RN   Ambulatory Care Manager  Associate Care Management  Cell [phone]  [email or web address]

## 2022-03-17 ENCOUNTER — HOSPITAL ENCOUNTER (OUTPATIENT)
Dept: PHYSICAL THERAPY | Age: 52
Setting detail: THERAPIES SERIES
Discharge: HOME | End: 2022-03-17
Payer: COMMERCIAL

## 2022-03-17 NOTE — FLOWSHEET NOTE
[] [address]  [place]: [phone]   F: [phone]     Physical Therapy Cancel/No Show note    Date: 3/17/2022  Patient: Ahmed Hamman  : 1970  MRN: [id number]    Visit Count: 2/10  Cancels/No Shows to date:     For today's appointment patient:    [x]  Cancelled    [] Rescheduled appointment    [] No-show     Reason given by patient:    [x]  Patient ill    []  Conflicting appointment    [] No transportation      [] Conflict with work    [] No reason given    [] Weather related    [] [id number]    [] Other:      Comments:        [x] Next appointment was confirmed    Electronically signed by: Tasha Cruz PT

## 2022-03-21 ENCOUNTER — HOSPITAL ENCOUNTER (OUTPATIENT)
Dept: PHYSICAL THERAPY | Age: 52
Setting detail: THERAPIES SERIES
Discharge: HOME | End: 2022-03-21
Payer: COMMERCIAL

## 2022-03-21 ENCOUNTER — OFFICE VISIT (OUTPATIENT)
Dept: ORTHOPEDIC SURGERY | Age: 52
End: 2022-03-21

## 2022-03-21 VITALS — BODY MASS INDEX: 31.55 KG/M2 | HEIGHT: 69 IN | WEIGHT: 213 LBS

## 2022-03-21 DIAGNOSIS — M16.11 ARTHRITIS OF RIGHT HIP: ICD-10-CM

## 2022-03-21 DIAGNOSIS — Z96.641 S/P TOTAL RIGHT HIP ARTHROPLASTY: Primary | ICD-10-CM

## 2022-03-21 PROCEDURE — 99024 POSTOP FOLLOW-UP VISIT: CPT | Performed by: ORTHOPAEDIC SURGERY

## 2022-03-21 PROCEDURE — 97110 THERAPEUTIC EXERCISES: CPT

## 2022-03-21 ASSESSMENT — ENCOUNTER SYMPTOMS
EYE DISCHARGE: 0
SHORTNESS OF BREATH: 0
ABDOMINAL PAIN: 0
ROS SKIN COMMENTS: NEGATIVE FOR RASH

## 2022-03-21 NOTE — PROGRESS NOTES
[address]  [address] [facility] [address]  Dept: [phone]  Dept Fax: [phone]        Postoperative follow-up note    Subjective:   Lucian Marcelo is a 49y.o. year old female who presents to our office today for postoperative followup regarding her   1. S/P total right hip arthroplasty    2. Arthritis of right hip    . Chief Complaint   Patient presents with    Follow-up     RT [place] 02/14/2022      Mrs. Danuta Fairbanks presents to the office today for recheck of her right hip. She underwent total hip arthroplasty on 2/14/2022. She is 49years of age. She is approximately 5 to 6 weeks out and very happy with the result. She is returning to work on April 8 and feels like she will be able to go back to work at that point. Review of Systems   Constitutional: Positive for activity change. Negative for fever. HENT: Negative for dental problem. Eyes: Negative for discharge. Respiratory: Negative for shortness of breath. Cardiovascular: Negative for chest pain. Gastrointestinal: Negative for abdominal pain. Genitourinary: Negative. Musculoskeletal: Positive for arthralgias. Skin:        Negative for rash   Neurological: Positive for weakness. Psychiatric/Behavioral: Negative for confusion. I have reviewed the CC, HPI, ROS, PMH, FHX, Social History, and if not present in this note, I have reviewed in the patient's chart. I agree with the documentation provided by other staff and have reviewed their documentation prior to providing my signature indicating agreement. Objective :   General: Lucian Marcelo is a 49 y.o. female who is alert and oriented and sitting comfortably in our office. Ortho Exam  MS: Right hip incision is healing well without signs of infection. Patient ambulates with a very mild abductor weakness type of gait. She has no antalgia.   Motor, sensory, vascular examination of the right lower extremity is grossly intact without focal deficits. Neuro: alert. oriented  Eyes: Extra-ocular muscles intact  Mouth: Oral mucosa moist. No perioral lesions  Pulm: Respirations unlabored and regular. Skin: warm, well perfused  Psych:   Patient has good fund of knowledge and displays understanging of exam, diagnosis, and plan. Radiology: XR HIP RIGHT (2-3 VIEWS)    Result Date: 3/2/2022  History:   Status post Left  total hip arthroplasty Findings:    Low AP pelvis, AP Left  hip, cross table lateral xrays Left hip done in the office today shows total hip arthroplasty in good position without signs complication. Leg lengths are approximate. Components are in good position without signs of loosening. No evidence of fracture, subluxation, dislocation, radiopaque foreign body, radiopaque foreign tumor is noted. Impression: Left total hip arthroplasty in good position without complications noted. Assessment:      1. S/P total right hip arthroplasty    2. Arthritis of right hip         Plan:      Patient is doing well. She is happy with result. She is going to continue her home exercise program.  I plan to see her back in 1 year for clinical radiographic evaluation or sooner as necessary. Follow up: No follow-ups on file. No orders of the defined types were placed in this encounter. No orders of the defined types were placed in this encounter.       This note is created with the assistance of a speech recognition program.  While intending to generate a document that actually reflects the content of the visit, the document can still have some errors including those of syntax and sound a like substitutions which may escape proof reading.  In such instances, actual meaning can be extrapolated by contextual diversion      Electronically signed by Steve Pimentel on 3/21/2022 at 10:32 AM

## 2022-03-21 NOTE — FLOWSHEET NOTE
[x]? [facility]) - [facility] & Therapy  [address]  [place] [facility]: [phone]   F: [phone]       Physical Therapy Daily Treatment Note    Date:  3/21/2022  Patient Name:  Bautista Trotter    :  1970  MRN: [id number]  Physician: Eber Dickinson PA-C                     Insurance: [facility] --  visits (Karena Couch required after 30 visits)   Medical Diagnosis:   M16.11 (ICD-10-CM) - Arthritis of right hip   Z96.641 (ICD-10-CM) - S/P total right hip arthroplasty   Rehab Codes: R25 pain , M25.60 stiffness, R53.1 weakness  Onset Date: 22- DOS                Next Dr.'s appt: 3/21/22- Dr. Magalie Mueller   Visit# / total visits: 3/10     Cancels/No Shows:     Subjective:  Pt arrives noting stiffness in R hip with minimal pain present. Patient states she saw her MD and reports she is cleared to go back to work. Patient says she has indefinite restrictions such as no jumping/running excessively to avoid break down of new hip joint. Also states for another 3 months she is to avoid extreme hip hyper extension. Patient reports she is unsure if she should go back to work right away because she does not want to go to soon and regress/hurt her hip.    Pain:  [x] Yes  [] No Location: R hip Pain Rating: (0-10 scale) 2-3/10  Pain altered Tx:  [x] No  [] Yes  Action:  Comments:    Objective:  Modalities:   PRECAUTIONS: avoid extreme hip hyper-extension for another 3 months , avoid running/jumping- indefinitely PER patient at last MD visit 3/21/22   Exercises:  Exercise Reps/ Time Weight/ Level Comments Completed today    Supine HS stretch*  2x30s      x   Supine modified fito* 2x30s    No overpressure  x   Standing lateral hip stretch*  2x30s         Heel slides  20x   x   SLR 20x   x   Bridges  20x      Hooklying hip add  20x3\" ball     Side lying hip abd 20x      Side lying clamshells  20x      Side lying reverse clamshells  20x             Standing        Heel/toe raise  20x      4 way hip bilat 10x2 ea red  x   Alt marches  20x ea      Hs curls  20x ea      Step ups 10x 8\" Fwd/lat increase step height 3/21/22  x   Step over/back krystian  10x ea  Lateral; bilat    Gait training  1x  In hallway down & back    Side stepping  10 ft x6 reps  Red  Resistance added 3/21 x   Tandem stance  30\"x4   Added 3/21  x   Push/pull cart  25 ft x 5 reps   50# Added 3/21   To facilitate work activities  x   Standing mini squat  15x 2\" hold   Added 3/21  x   Side lunge  10x   RLE only   Added 3/21  x          Treadmill gait training  5'   Incline 2%, 1.0mph   Cues for core engagement, increase step length, increase heel strike, increase stance time on RLE  x   Other:  (*) = added to HEP      Specific Instructions for next treatment: Complete stretching and strengthening to isolate weak musculature. Add standing exercises as able        Treatment Charges: Mins Units   []  Modalities     [x]  Ther Exercise 44 3   []  Manual Therapy     []  Ther Activities     []  Aquatics     []  Vasocompression     []  Other     Total Treatment time 44 3       Assessment: [x] Progressing toward goals. 3/21: Focused treatment on LE strengthening, and increase standing activity tolerance to be ready for work activities/shifts. Prior to progressions therapist educated patient on importance of feedback during treatment to avoid exacerbating symptoms in both R knee and hip with good understanding. Patient continues to demonstrates M/L trunk sway with standing hip abduction and with gait requiring cues for postural awareness and core engagement throughout. Patient was able to carryover cues for better gait mechanics. Was able to add push/pull with weighted cart to facilitate work related activities while remaining compliant with restrictions cues to avoid large step lengths while walking backwards with cart to avoid hyper hip extension. Fatigue noted at end of treatment especially after treadmill gait training. [] No change.      [] Other:  [x] Patient would continue to benefit from skilled physical therapy services in order to: to allow her to increase strength, endurance, and ROM of the R hip to allow her to complete her work duties and ADLs with less dysfunction. STG/LTG  Problems:    [x]? ? Pain:  [x]? ? ROM:  [x]? ? Strength:  [x]? ? Function:  []? Other:     STG/LTG: (to be met in 10 treatments)  1. Pt will self report no pain greater than 2/10 with navigation of a flight of stairs in order to better tolerate ADLs/work activities with minimal dysfunction  2. Pt will improve AROM of R hip flexion to >110 deg to be able to sit to low surfaces at home and the community. 3. Pt will demonstrate improved R LE strength to 4+/5 or greater in order to demonstrate improved stability/strength necessary for unrestricted ADLs/work activities. 4. Pt will self report ability to tolerate walking bouts of >15 minutes showing improvement to be able to tolerate consistent ambulation needed for work duties. 5. Pt will be able to hold SL stance for >10 seconds without signs of [place] demonstrating improved neuromuscular control needed for stepping over obstacles in the community. 6. Pt will decrease functional limitation per HOOS Jr  to <8% in order to demonstrate improved functional tolerances at PLOF with minimal restriction/dysfunction  7. Pt will demonstrate independence with a long term HEP for continued progress/maintenance after completion of PT     Pt. Education:  [x] Yes  [] No  [x] Reviewed Prior HEP/Ed  Method of Education: [x] Verbal  [x] Demo  [] Written  Comprehension of Education:  [x] Verbalizes understanding. [] Demonstrates understanding. [] Needs review. [] Demonstrates/verbalizes HEP/Ed previously given. Plan: [x] Continue current frequency toward long and short term goals.     [] Specific Instructions for subsequent treatments:       Time In: 11:36            Time Out: 12:20    Electronically signed by:  Stefanie Prince PTA

## 2022-03-24 ENCOUNTER — HOSPITAL ENCOUNTER (OUTPATIENT)
Dept: PHYSICAL THERAPY | Age: 52
Setting detail: THERAPIES SERIES
Discharge: HOME | End: 2022-03-24
Payer: COMMERCIAL

## 2022-03-24 NOTE — FLOWSHEET NOTE
[] [address]  [place]: [phone]   F: [phone]     Physical Therapy Cancel/No Show note    Date: 3/24/2022  Patient: Zita Ruff  : 1970  MRN: [id number]    Visit Count: 3/10  Cancels/No Shows to date:     For today's appointment patient:    [x]  Cancelled    [] Rescheduled appointment    [] No-show     Reason given by patient:    []  Patient ill    []  Conflicting appointment    [] No transportation      [] Conflict with work    [] No reason given    [x] Weather related    [] COVID-19    [] Other:      Comments:        [x] Next appointment was confirmed    Electronically signed by: Braulio Ramirez PT

## 2022-03-29 ENCOUNTER — HOSPITAL ENCOUNTER (OUTPATIENT)
Dept: PHYSICAL THERAPY | Age: 52
Setting detail: THERAPIES SERIES
Discharge: HOME | End: 2022-03-29
Payer: COMMERCIAL

## 2022-03-29 PROCEDURE — 97110 THERAPEUTIC EXERCISES: CPT

## 2022-03-29 NOTE — FLOWSHEET NOTE
[x]? [address]  [place]: [phone]   F: [phone]       Physical Therapy Daily Treatment Note    Date:  3/29/2022  Patient Name:  Lynn Borrego    :  1970  MRN: [id number]  Physician: Dayami Gayle PA-C                     Insurance: [facility] --  visits (Tatianna Steelellie required after 30 visits)   Medical Diagnosis:   M16.11 (ICD-10-CM) - Arthritis of right hip   Z96.641 (ICD-10-CM) - S/P total right hip arthroplasty   Rehab Codes: R25 pain , M25.60 stiffness, R53.1 weakness  Onset Date: 22- DOS                Next Dr.'s appt: 3/21/22- Dr. Lio Gallo   Visit# / total visits: 4/10     Cancels/No Shows: 2/0    Subjective:  Pt notes her hip pain is minimal but has been having R knee pain since last PT session. Pt notes she is walking more and had a fall where she tripped on her cat. She notes she is still feeling pain and tightness in the inguinal region.  Pt notes she still is planning to go back to work May 7th       Pain:  [x] Yes  [] No   --  R hip  10  --  R knee 10    Pain altered Tx:  [] No  [x] Yes  Action:held from treadmill      Objective:  Modalities:   PRECAUTIONS: avoid extreme hip hyper-extension for another 3 months , avoid running/jumping- indefinitely PER patient at last MD visit 3/21/22   Exercises:  Exercise Reps/ Time Weight/ Level Comments Completed today           nustep 5' Level 3  LEs only  x          Standing        HS stretch  2x30s ea   Foot on step     Hip flexor stretch  [id number]   Staggered stance with reaching arm overhead  x   Heel/toe raise  20x   x   4 way hip bilat x20 ea green  x   Alt marches  20x ea      Hs curls  20x ea Green   x   Step ups 10x2 8\" Fwd/lat   x   Step over/back krystian  10x ea  Lateral; bilat    Gait training  1x  In hallway down & back    Side stepping  3x15 ft ea  Red   x   Tandem stance  30\"x2ea    x   Monster walks :fwd & bkwd  6x15 ft  Red   x   Push/pull cart  25 ft x 5 reps   50# Added 3/21   To facilitate work activities     Standing mini squat  15x 3\" hold   Added 3/21  x   Side lunge  10x2  RLE only   Added 3/21  x   SLS on foam 3x20s   x   Treadmill gait training  5'   Incline 2%, 1.0mph   Cues for core engagement, increase step length, increase heel strike, increase stance time on RLE     Other:  (*) = added to HEP      Specific Instructions for next treatment: standing exercises as able; try to return to treadmill as pain allows     Assessment: [x] Progressing toward goals. Began session on nustep vs treadmill due to knee pain that increased after last session. Completed to focus treatment on increasing strength and standing tolerance  to be ready for work activities/shifts. Increased resistance to build strength. Added in monster walks for additional strengthening. Pt has no increase in pain with session and feels her R knee loosened up. Will continue to progress based on her symptoms in future sessions. [] No change. [] Other:  [x] Patient would continue to benefit from skilled physical therapy services in order to: to allow her to increase strength, endurance, and ROM of the R hip to allow her to complete her work duties and ADLs with less dysfunction. STG/LTG: (to be met in 10 treatments)  1. Pt will self report no pain greater than 2/10 with navigation of a flight of stairs in order to better tolerate ADLs/work activities with minimal dysfunction  2. Pt will improve AROM of R hip flexion to >110 deg to be able to sit to low surfaces at home and the community. 3. Pt will demonstrate improved R LE strength to 4+/5 or greater in order to demonstrate improved stability/strength necessary for unrestricted ADLs/work activities. 4. Pt will self report ability to tolerate walking bouts of >15 minutes showing improvement to be able to tolerate consistent ambulation needed for work duties.    5. Pt will be able to hold SL stance for >10 seconds without signs of Jose L Ramsey demonstrating improved neuromuscular control needed for stepping over obstacles in the community. 6. Pt will decrease functional limitation per HOOS Jr  to <8% in order to demonstrate improved functional tolerances at PLOF with minimal restriction/dysfunction  7. Pt will demonstrate independence with a long term HEP for continued progress/maintenance after completion of PT     Pt. Education:  [x] Yes  [] No  [x] Reviewed Prior HEP/Ed  Method of Education: [x] Verbal  [x] Demo  [] Written  Comprehension of Education:  [x] Verbalizes understanding. [] Demonstrates understanding. [] Needs review. [] Demonstrates/verbalizes HEP/Ed previously given. Plan: [x] Continue current frequency toward long and short term goals.     [] Specific Instructions for subsequent treatments:     Treatment Charges: Mins Units   []  Modalities     [x]  Ther Exercise 42 3   []  Manual Therapy     []  Ther Activities     []  Aquatics     []  Vasocompression     []  Other     Total Treatment time 42 3         Time In: 8438          Time Out: 3202    Electronically signed by:  Ike Muñoz, PT

## 2022-03-31 ENCOUNTER — HOSPITAL ENCOUNTER (OUTPATIENT)
Dept: WOMENS IMAGING | Age: 52
Discharge: HOME | End: 2022-04-02
Payer: COMMERCIAL

## 2022-03-31 DIAGNOSIS — Z12.31 ENCOUNTER FOR SCREENING MAMMOGRAM FOR MALIGNANT NEOPLASM OF BREAST: ICD-10-CM

## 2022-03-31 PROCEDURE — 77063 BREAST TOMOSYNTHESIS BI: CPT

## 2022-04-04 ENCOUNTER — HOSPITAL ENCOUNTER (OUTPATIENT)
Dept: PHYSICAL THERAPY | Age: 52
Setting detail: THERAPIES SERIES
Discharge: HOME | End: 2022-04-04

## 2022-04-04 NOTE — FLOWSHEET NOTE
[x] [facility]) - [facility] & Therapy  [address]  [place]: [phone]   F: [phone]     Physical Therapy Cancel/No Show note    Date: 2022  Patient: Elida Goss  : 1970  MRN: [id number]    Visit Count: 4/10   Cancels/No Shows to date:     For today's appointment patient:    []  Cancelled    [] Rescheduled appointment    [x] No-show     Reason given by patient:    []  Patient ill    []  Conflicting appointment    [] No transportation      [] Conflict with work    [] No reason given    [] Weather related    [] COVID-19    [x] Other:      Comments:  Left voicemail informing pt of missed appt and reminded of Wednesday's appt time. Pt needs scheduled for next week.        [x] Next appointment was previously confirmed    Electronically signed by: Moose Vargas PTA

## 2022-04-06 ENCOUNTER — HOSPITAL ENCOUNTER (OUTPATIENT)
Dept: PHYSICAL THERAPY | Age: 52
Setting detail: THERAPIES SERIES
Discharge: HOME | End: 2022-04-06

## 2022-04-06 NOTE — FLOWSHEET NOTE
[x] [facility]) - [facility] & Therapy  [address]  [place]: [phone]   F: [phone]     Physical Therapy Cancel/No Show note    Date: 2022  Patient: Juliana Mcdowell  : 1970  MRN: [id number]    Visit Count: 4/10   Cancels/No Shows to date: 3/1    For today's appointment patient:    [x]  Cancelled    [] Rescheduled appointment    [] No-show     Reason given by patient:    [x]  Patient ill    []  Conflicting appointment    [] No transportation      [] Conflict with work    [] No reason given    [] Weather related    [] COVID-19    [x] Other:      Comments:  Pt has no follow up appt.  Notes she will call back to schedule when she is feeling better        [] Next appointment was previously confirmed    Electronically signed by: Te Christine PT

## 2022-04-11 RX ORDER — LORATADINE 10 MG/1
1300 TABLET ORAL EVERY 8 HOURS PRN
COMMUNITY
Start: 2022-02-02

## 2022-04-11 RX ORDER — HYDROXYCHLOROQUINE SULFATE 200 MG/1
200 TABLET, FILM COATED ORAL 2 TIMES DAILY
COMMUNITY
End: 2022-06-21 | Stop reason: ALTCHOICE

## 2022-04-12 ENCOUNTER — OFFICE VISIT (OUTPATIENT)
Dept: INTERNAL MEDICINE CLINIC | Age: 52
End: 2022-04-12
Payer: COMMERCIAL

## 2022-04-12 VITALS
OXYGEN SATURATION: 98 % | WEIGHT: 217 LBS | HEART RATE: 101 BPM | DIASTOLIC BLOOD PRESSURE: 86 MMHG | BODY MASS INDEX: 32.14 KG/M2 | HEIGHT: 69 IN | SYSTOLIC BLOOD PRESSURE: 138 MMHG

## 2022-04-12 DIAGNOSIS — Z76.89 ENCOUNTER TO ESTABLISH CARE: Primary | ICD-10-CM

## 2022-04-12 DIAGNOSIS — E06.3 HASHIMOTO'S THYROIDITIS: ICD-10-CM

## 2022-04-12 DIAGNOSIS — R11.0 NAUSEA: ICD-10-CM

## 2022-04-12 DIAGNOSIS — F90.0 ATTENTION DEFICIT HYPERACTIVITY DISORDER (ADHD), PREDOMINANTLY INATTENTIVE TYPE: ICD-10-CM

## 2022-04-12 DIAGNOSIS — M16.11 ARTHRITIS OF RIGHT HIP: ICD-10-CM

## 2022-04-12 DIAGNOSIS — L65.9 HAIR LOSS: ICD-10-CM

## 2022-04-12 DIAGNOSIS — M32.9 LUPUS (HCC): ICD-10-CM

## 2022-04-12 DIAGNOSIS — E66.9 OBESITY (BMI 30.0-34.9): ICD-10-CM

## 2022-04-12 DIAGNOSIS — R73.03 PREDIABETES: ICD-10-CM

## 2022-04-12 PROCEDURE — 99214 OFFICE O/P EST MOD 30 MIN: CPT | Performed by: NURSE PRACTITIONER

## 2022-04-12 RX ORDER — ONDANSETRON 4 MG/1
4 TABLET, ORALLY DISINTEGRATING ORAL 3 TIMES DAILY PRN
Qty: 30 TABLET | Refills: 2 | Status: SHIPPED | OUTPATIENT
Start: 2022-04-12 | End: 2022-11-01 | Stop reason: SDUPTHER

## 2022-04-12 RX ORDER — DEXTROAMPHETAMINE SACCHARATE, AMPHETAMINE ASPARTATE, DEXTROAMPHETAMINE SULFATE AND AMPHETAMINE SULFATE 5; 5; 5; 5 MG/1; MG/1; MG/1; MG/1
20 TABLET ORAL 2 TIMES DAILY
Qty: 60 TABLET | Refills: 0 | Status: SHIPPED | OUTPATIENT
Start: 2022-04-12 | End: 2022-05-18 | Stop reason: SDUPTHER

## 2022-04-12 ASSESSMENT — ENCOUNTER SYMPTOMS
WHEEZING: 0
ABDOMINAL PAIN: 0
EYE DISCHARGE: 0
TROUBLE SWALLOWING: 0
NAUSEA: 0
SORE THROAT: 0
DIARRHEA: 0
CONSTIPATION: 0
SHORTNESS OF BREATH: 0
COUGH: 0

## 2022-04-12 NOTE — PROGRESS NOTES
[address] [address]  Dept: [phone]  Dept Fax: [phone]    Office Progress/Follow Up Note  Date of patient's visit: 4/12/2022   Patient's Name:  Claudia Barrientos  YOB: 1970            Patient Care Team:  TAMARA Hoyos CNP as PCP - General (Nurse Practitioner)  TAMARA Hoyos CNP as PCP - [facility]  Sagar Etienne RN as Ambulatory Care Manager    REASON FOR VISIT: Arthritis (osteoarthritis, patient states that since the surgery she is doing well. 8 weeks ago )        HISTORY OF PRESENT ILLNESS:      History was obtained from the patient. Claudia Pittr is a 49 y.o. is here to establish care with new provider, and follow up of  Arthritis (osteoarthritis, patient states that since the surgery she is doing well. 8 weeks ago )    HPI  Just finished PT after right hip replacement, doing well. Obesity with BMI 32  Diet- used hcg and 500 calorie per day diet before (about 3 years ago), kept weight off. Avoiding carbs and sweets. Admits to drinking 2L pop per day- pepsi. Patient Active Problem List   Diagnosis    Nut allergy    Attention deficit hyperactivity disorder (ADHD), predominantly inattentive type    Hashimoto's thyroiditis    Family history of brain aneurysm    Family history of systemic lupus erythematosus    Chronic midline low back pain without sciatica    Hip pain    Arthritis of right hip    Lumbar degenerative disc disease    Prediabetes    H/O total hip arthroplasty, right    Lupus (HCC)       Allergies   Allergen Reactions    Morphine Hives and Swelling     Morphine      Somers [Macadamia Nut Oil] Swelling         MEDICATIONS:     Current Outpatient Medications   Medication Sig Dispense Refill    amphetamine-dextroamphetamine (ADDERALL) 20 MG tablet Take 1 tablet by mouth 2 times daily for 30 days.  60 tablet 0    ondansetron (ZOFRAN-ODT) 4 MG disintegrating tablet Take 1 tablet by mouth 3 times daily as needed for Nausea 30 tablet 2    Prenatal MV-Min-Fe Fum-FA-DHA (PRENATAL MULTI +DHA) 27-0.8-200 MG CAPS Take 1 tablet by mouth daily 90 capsule 3    Semaglutide-Weight Management (WEGOVY) 0.25 MG/0.5ML SOAJ SC injection Inject 0.25 mg into the skin every 7 days 2 mL 0    RA ARTHRITIS PAIN RELIEF 650 MG extended release tablet take 1 tablet by mouth every 6 hours AS NEEDED FOR PAIN DO NOT EXCEED 5 days      hydroxychloroquine (PLAQUENIL) 200 MG tablet Take 200 mg by mouth 2 times daily      ibuprofen (ADVIL;MOTRIN) 800 MG tablet Take 800 mg by mouth every 6 hours as needed for Pain      triamcinolone (KENALOG) 0.1 % cream Apply to rash twice daily (not face, armpit or groin) 454 g 1    cetirizine (ZYRTEC) 10 MG tablet Take 10 mg by mouth 2 times daily      vitamin D (ERGOCALCIFEROL) 1.25 MG (09746 UT) CAPS capsule Take 1 tab every Monday and every Friday 30 capsule 1    levothyroxine (SYNTHROID) 175 MCG tablet Take 1 tablet by mouth every morning (before breakfast) 90 tablet 1    fluticasone (FLONASE) 50 MCG/ACT nasal spray 2 sprays by Each Nostril route daily 16 g 0    albuterol sulfate  (90 Base) MCG/ACT inhaler Inhale into the lungs every 4 hours as needed      aspirin EC 81 MG EC tablet Take 1 tablet by mouth 2 times daily 84 tablet 0    EPINEPHrine (EPIPEN 2-DINO) 0.3 MG/0.3ML SOAJ injection Inject 0.3 mLs into the muscle once for 1 dose Use as directed for allergic reaction, facial swelling 0.3 mL 1     No current facility-administered medications for this visit. SOCIAL HISTORY    Reviewed and updated. Sarah Rivera  reports that she has never smoked. She has never used smokeless tobacco.    FAMILY HISTORY:    Reviewed and updated. family history includes Breast Cancer (age of onset: 54) in her maternal grandmother; Breast Cancer (age of onset: 72) in her maternal aunt; Breast Cancer (age of onset: 77) in her mother; Heart Disease in her sister;  Other (age of onset: 59) in her father; Rheum Arthritis in her sister. REVIEW OF SYSTEMS:      Review of Systems   Constitutional: Negative for chills, fatigue and fever. HENT: Negative for congestion, ear pain, sore throat and trouble swallowing. Eyes: Negative for discharge and visual disturbance. Respiratory: Negative for cough, shortness of breath and wheezing. Cardiovascular: Negative for chest pain, palpitations and leg swelling. Gastrointestinal: Negative for abdominal pain, constipation, diarrhea and nausea. Genitourinary: Negative for dysuria, frequency and hematuria. Musculoskeletal: Positive for arthralgias. Negative for gait problem. Neurological: Negative for dizziness and headaches. Psychiatric/Behavioral: Positive for decreased concentration. Negative for sleep disturbance. The patient is not nervous/anxious. PHYSICAL EXAM:      Vitals:    04/12/22 1408   BP: 138/86   Pulse: 101   SpO2: 98%   Weight: 217 lb (98.4 kg)   Height: 5' 9\" (1.753 m)     BP Readings from Last 3 Encounters:   04/12/22 138/86   03/02/22 115/78   02/15/22 (!) 94/52        Physical Exam  Constitutional:       General: She is not in acute distress. Appearance: She is well-developed. She is obese. HENT:      Head: Normocephalic and atraumatic. Right Ear: Tympanic membrane and external ear normal.      Left Ear: Tympanic membrane and external ear normal.      Nose: Nose normal.      Mouth/Throat:      Mouth: Mucous membranes are moist.      Pharynx: Oropharynx is clear. Eyes:      General:         Right eye: No discharge. Left eye: No discharge. Conjunctiva/sclera: Conjunctivae normal.      Pupils: Pupils are equal, round, and reactive to light. Neck:      Thyroid: No thyromegaly. Cardiovascular:      Rate and Rhythm: Normal rate and regular rhythm. Pulses: Normal pulses. Heart sounds: Normal heart sounds. No murmur heard.       Pulmonary:      Effort: Pulmonary effort is normal.      Breath sounds: Normal breath sounds. No wheezing. Abdominal:      General: Bowel sounds are normal. There is no distension. Palpations: Abdomen is soft. Tenderness: There is no abdominal tenderness. Musculoskeletal:      Cervical back: Normal range of motion and neck supple. No deformity or tenderness. Thoracic back: No deformity or tenderness. Lumbar back: No deformity or tenderness. Normal range of motion. Right hip: No deformity. Normal range of motion. Left hip: No deformity. Normal range of motion. Right lower leg: No edema. Left lower leg: No edema. Lymphadenopathy:      Cervical: No cervical adenopathy. Skin:     General: Skin is warm and dry. Findings: No rash. Neurological:      Mental Status: She is alert and oriented to person, place, and time.       Gait: Gait normal.   Psychiatric:         Mood and Affect: Mood normal.         Behavior: Behavior normal.          LABORATORY FINDINGS:    CBC:   Lab Results   Component Value Date    WBC 12.4 02/15/2022    HGB 8.9 02/15/2022     02/15/2022     BMP:   Lab Results   Component Value Date     02/15/2022    K 4.1 02/15/2022     02/15/2022    CO2 25 02/15/2022    BUN 12 02/15/2022    CREATININE 0.74 02/15/2022    GLUCOSE 110 02/15/2022     HEMOGLOBIN A1C:   Lab Results   Component Value Date    LABA1C 5.7 01/25/2022     FASTING LIPID PANEL:   Lab Results   Component Value Date    CHOL 251 (H) 08/03/2021    HDL 55 08/03/2021    LDLCHOLESTEROL 169 (H) 08/03/2021    TRIG 137 08/03/2021       ASSESSMENT AND PLAN:      Visit Diagnoses and Associated Orders     Encounter to establish care    -  Primary         Hashimoto's thyroiditis        Last TSH 6.9, continue Synthroid and repeat level (previously ordered)         Attention deficit hyperactivity disorder (ADHD), predominantly inattentive type        Stable, continue Adderall    amphetamine-dextroamphetamine (ADDERALL) 20 MG tablet [9082]           Prediabetes        Last A1c 5.7, patient is motivated to lose weight. Advised on healthy diet, avoiding drastic calorie restrictive diets, exercise         Nausea        Advised to continue drinking fluids, use zofran prn    ondansetron (ZOFRAN-ODT) 4 MG disintegrating tablet [24132]           Hair loss        Taking prenatal vitamin, advised to consider biotin hair skin & nails    Prenatal MV-Min-Fe Fum-FA-DHA (PRENATAL MULTI +DHA) 27-0.8-200 MG CAPS [834749]           Obesity (BMI 30.0-34. 9)        Advised on healthy diet, regular exercise. Will trial Wegovy if covered under insurance    Semaglutide-Weight Management (WEGOVY) 0.25 MG/0.5ML SOAJ SC injection [[id number]]           Arthritis of right hip        S/P hip replacement, follows with ortho    RA ARTHRITIS PAIN RELIEF 650 MG extended release tablet [[id number]]           Lupus ([address]        Management per rheumatology    hydroxychloroquine (PLAQUENIL) 200 MG tablet [19202]                  FOLLOW UP AND INSTRUCTIONS:     Return in about 3 months (around 7/12/2022) for routine follow up. Patient to call one month after starting Rodolph Fraise for dose titration. · Kelsey received counseling on the following healthy behaviors: nutrition, exercise and medication adherence    · Discussed use, benefit, and side effects of prescribed medications. Barriers to medication compliance addressed. All patient questions answered. Pt voiced understanding. TAMARA Pennington CNP    4/22/2022, 7:16 PM    Please note that this chart was generated using voice recognition Dragon dictation software. Although every effort was made to ensure the accuracy of this automatedtranscription, some errors in transcription may have occurred.

## 2022-04-22 PROBLEM — M32.9 LUPUS (HCC): Status: ACTIVE | Noted: 2022-04-22

## 2022-04-29 ENCOUNTER — HOSPITAL ENCOUNTER (OUTPATIENT)
Age: 52
Setting detail: SPECIMEN
Discharge: HOME | End: 2022-04-29

## 2022-04-29 LAB
ABSOLUTE EOS #: 0.53 K/UL (ref 0–0.44)
ABSOLUTE IMMATURE GRANULOCYTE: 0.05 K/UL (ref 0–0.3)
ABSOLUTE LYMPH #: 2.23 K/UL (ref 1.1–3.7)
ABSOLUTE MONO #: 0.52 K/UL (ref 0.1–1.2)
ALBUMIN SERPL-MCNC: 4.7 G/DL (ref 3.5–5.2)
ANION GAP SERPL CALCULATED.3IONS-SCNC: 11 MMOL/L (ref 9–17)
BASOPHILS # BLD: 1 % (ref 0–2)
BASOPHILS ABSOLUTE: 0.1 K/UL (ref 0–0.2)
BILIRUBIN URINE: NEGATIVE
BUN BLDV-MCNC: 11 MG/DL (ref 6–20)
CALCIUM SERPL-MCNC: 9.7 MG/DL (ref 8.6–10.4)
CHLORIDE BLD-SCNC: 101 MMOL/L (ref 98–107)
CO2: 26 MMOL/L (ref 20–31)
COLOR: YELLOW
COMMENT UA: NORMAL
CREAT SERPL-MCNC: 0.73 MG/DL (ref 0.5–0.9)
CREAT SERPL-MCNC: 0.73 MG/DL (ref 0.5–0.9)
CREATININE CLEARANCE: 58.4 ML/MIN/BSA (ref 71–151)
CREATININE URINE: 62.2 MG/DL (ref 28–217)
EOSINOPHILS RELATIVE PERCENT: 5 % (ref 1–4)
GFR AFRICAN AMERICAN: >60 ML/MIN
GFR NON-AFRICAN AMERICAN: >60 ML/MIN
GFR SERPL CREATININE-BSD FRML MDRD: NORMAL ML/MIN/{1.73_M2}
GLUCOSE BLD-MCNC: 79 MG/DL (ref 70–99)
GLUCOSE URINE: NEGATIVE
HCT VFR BLD CALC: 42.8 % (ref 36.3–47.1)
HEMOGLOBIN: 13.2 G/DL (ref 11.9–15.1)
IMMATURE GRANULOCYTES: 0 %
KETONES, URINE: NEGATIVE
LENGTH OF COLLECTION: 24 H
LEUKOCYTE ESTERASE, URINE: NEGATIVE
LYMPHOCYTES # BLD: 20 % (ref 24–43)
MCH RBC QN AUTO: 30.1 PG (ref 25.2–33.5)
MCHC RBC AUTO-ENTMCNC: 30.8 G/DL (ref 28.4–34.8)
MCV RBC AUTO: 97.7 FL (ref 82.6–102.9)
MONOCYTES # BLD: 5 % (ref 3–12)
NITRITE, URINE: NEGATIVE
NRBC AUTOMATED: 0 PER 100 WBC
PATIENT HEIGHT: 175 CM
PATIENT WEIGHT: 98 KG
PDW BLD-RTO: 13 % (ref 11.8–14.4)
PH UA: 6.5 (ref 5–8)
PLATELET # BLD: 358 K/UL (ref 138–453)
PMV BLD AUTO: 9.9 FL (ref 8.1–13.5)
POTASSIUM SERPL-SCNC: 4.3 MMOL/L (ref 3.7–5.3)
PROTEIN UA: NEGATIVE
RBC # BLD: 4.38 M/UL (ref 3.95–5.11)
SEG NEUTROPHILS: 69 % (ref 36–65)
SEGMENTED NEUTROPHILS ABSOLUTE COUNT: 7.99 K/UL (ref 1.5–8.1)
SODIUM BLD-SCNC: 138 MMOL/L (ref 135–144)
SPECIFIC GRAVITY UA: 1.02 (ref 1–1.03)
TURBIDITY: CLEAR
URINE HGB: NEGATIVE
UROBILINOGEN, URINE: NORMAL
VOLUME: 1215 ML
WBC # BLD: 11.4 K/UL (ref 3.5–11.3)

## 2022-04-30 LAB
CREATININE URINE: 61.5 MG/DL
CREATININE, 24H UR: 747 MG/24 H (ref 740–1570)
HOURS COLLECTED: 24 H
HOURS COLLECTED: 24 H
PROTEIN 24 HOUR URINE: 85 MG/24 H
URINE TOTAL PROTEIN: 7 MG/DL
VOLUME: 1215 ML
VOLUME: 1215 ML

## 2022-05-02 ENCOUNTER — TELEPHONE (OUTPATIENT)
Dept: ORTHOPEDIC SURGERY | Age: 52
End: 2022-05-02

## 2022-05-12 PROBLEM — E66.811 OBESITY (BMI 30.0-34.9): Status: ACTIVE | Noted: 2022-05-12

## 2022-05-12 PROBLEM — E66.9 OBESITY (BMI 30.0-34.9): Status: ACTIVE | Noted: 2022-05-12

## 2022-06-14 ENCOUNTER — CLINICAL DOCUMENTATION (OUTPATIENT)
Dept: PHYSICAL THERAPY | Age: 52
End: 2022-06-14

## 2022-06-14 NOTE — PROGRESS NOTES
[] [facility] ([facility]) @ [facility]  [address] [address] [address]  Phone [phone]  Fax [phone]    Physical Therapy Discharge Note    Date: 2022      Patient: Geovanna Sinclair  : 1970  MRN: [id number]    Physician: Jessica Sarkar PA-C                     Insurance: [facility] --  visits (auth required after 30 visits)   Medical Diagnosis:   M16.11 (ICD-10-CM) - Arthritis of right hip   Z96.641 (ICD-10-CM) - S/P total right hip arthroplasty   Rehab Codes: R25 pain , M25.60 stiffness, R53.1 weakness  Onset Date: 22- DOS                  Total visits attended: 4/10  Cancels/No shows: 3/1  Date of initial visit: 3/7/22                    [x] Other: pt called and cancelled her appt on  due to being sick. She was to return call to reschedule. No contact has been made. Will close this episode of care at this time. Please see 3/29/22 for last known status         It Is My Understanding That The:  [x] Patient returned to work. -- as noted per chart review         Treatment Included:     [x] Therapeutic Exercise   72893  [] Iontophoresis: 4 mg/mL Dexamethasone Sodium Phosphate  mAmin  72917   [x] Therapeutic Activity  10285 [] Vasopneumatic cold with compression  21065    [x] Gait Training   11702 [] Ultrasound   27801   [x] Neuromuscular Re-education  51158 [] Electrical Stimulation Unattended  20833   [x] Manual Therapy  81548 [] Electrical Stimulation Attended  74510   [x] Instruction in HEP  [] Lumbar/Cervical Traction  55333   [] Aquatic Therapy   10063 [] Cold/hotpack    [] Massage   26237      [] Dry Needling, 1 or 2 muscles  72219   [] Biofeedback, first 15 minutes   16885  [] Biofeedback, additional 15 minutes   39826 [] Dry Needling, 3 or more muscles  75307                If you have any questions or concerns regarding this patient's care, please contact us.    Thank you for your referral.      Electronically signed by: Amara Cox PT

## 2022-06-20 RX ORDER — PNV NO.52/IRON/FA/OMEGA-3/DHA 29-1-200MG
COMBINATION PACKAGE (EA) ORAL
COMMUNITY
Start: 2022-04-12 | End: 2022-07-14

## 2022-06-21 ENCOUNTER — OFFICE VISIT (OUTPATIENT)
Dept: INTERNAL MEDICINE CLINIC | Age: 52
End: 2022-06-21
Payer: COMMERCIAL

## 2022-06-21 VITALS
DIASTOLIC BLOOD PRESSURE: 76 MMHG | HEIGHT: 69 IN | OXYGEN SATURATION: 100 % | BODY MASS INDEX: 31.99 KG/M2 | WEIGHT: 216 LBS | HEART RATE: 93 BPM | SYSTOLIC BLOOD PRESSURE: 124 MMHG

## 2022-06-21 DIAGNOSIS — F90.0 ATTENTION DEFICIT HYPERACTIVITY DISORDER (ADHD), PREDOMINANTLY INATTENTIVE TYPE: ICD-10-CM

## 2022-06-21 DIAGNOSIS — E03.8 HYPOTHYROIDISM DUE TO HASHIMOTO'S THYROIDITIS: ICD-10-CM

## 2022-06-21 DIAGNOSIS — E55.9 VITAMIN D DEFICIENCY: ICD-10-CM

## 2022-06-21 DIAGNOSIS — G89.29 CHRONIC RIGHT HIP PAIN: ICD-10-CM

## 2022-06-21 DIAGNOSIS — L65.9 HAIR LOSS: ICD-10-CM

## 2022-06-21 DIAGNOSIS — E66.9 OBESITY (BMI 30.0-34.9): Primary | ICD-10-CM

## 2022-06-21 DIAGNOSIS — M25.551 CHRONIC RIGHT HIP PAIN: ICD-10-CM

## 2022-06-21 DIAGNOSIS — E06.3 HYPOTHYROIDISM DUE TO HASHIMOTO'S THYROIDITIS: ICD-10-CM

## 2022-06-21 DIAGNOSIS — F41.9 ANXIETY: ICD-10-CM

## 2022-06-21 PROCEDURE — 99214 OFFICE O/P EST MOD 30 MIN: CPT | Performed by: NURSE PRACTITIONER

## 2022-06-21 RX ORDER — BUSPIRONE HYDROCHLORIDE 5 MG/1
5 TABLET ORAL 2 TIMES DAILY
Qty: 60 TABLET | Refills: 2 | Status: SHIPPED | OUTPATIENT
Start: 2022-06-21 | End: 2022-09-13 | Stop reason: SDUPTHER

## 2022-06-21 RX ORDER — DEXTROAMPHETAMINE SACCHARATE, AMPHETAMINE ASPARTATE, DEXTROAMPHETAMINE SULFATE AND AMPHETAMINE SULFATE 5; 5; 5; 5 MG/1; MG/1; MG/1; MG/1
20 TABLET ORAL 2 TIMES DAILY
Qty: 60 TABLET | Refills: 0 | Status: SHIPPED | OUTPATIENT
Start: 2022-06-21 | End: 2022-09-13 | Stop reason: SDUPTHER

## 2022-06-21 ASSESSMENT — PATIENT HEALTH QUESTIONNAIRE - PHQ9
1. LITTLE INTEREST OR PLEASURE IN DOING THINGS: 0
SUM OF ALL RESPONSES TO PHQ QUESTIONS 1-9: 1
SUM OF ALL RESPONSES TO PHQ QUESTIONS 1-9: 1
2. FEELING DOWN, DEPRESSED OR HOPELESS: 1
SUM OF ALL RESPONSES TO PHQ9 QUESTIONS 1 & 2: 1
SUM OF ALL RESPONSES TO PHQ QUESTIONS 1-9: 1
SUM OF ALL RESPONSES TO PHQ QUESTIONS 1-9: 1

## 2022-06-21 NOTE — PROGRESS NOTES
Visit Information    Have you changed or started any medications since your last visit including any over-the-counter medicines, vitamins, or herbal medicines? no   Are you having any side effects from any of your medications? -  no  Have you stopped taking any of your medications? Is so, why? -  no    Have you seen any other physician or provider since your last visit? No  Have you had any other diagnostic tests since your last visit? Yes - Records Obtained  Have you been seen in the emergency room and/or had an admission to a hospital since we last saw you? No  Have you had your routine dental cleaning in the past 6 months? yes -     Have you activated your [employer] account? If not, what are your barriers?  Yes     Patient Care Team:  TAMARA Elizalde CNP as PCP - General (Internal Medicine)  TAMARA Elizalde CNP as PCP - [facility]    Medical History Review  Past Medical, Family, and Social History reviewed and does contribute to the patient presenting condition    Health Maintenance   Topic Date Due    Pneumococcal 0-64 years Vaccine (1 - PCV) Never done    DTaP/Tdap/Td vaccine (1 - Tdap) Never done    Colorectal Cancer Screen  Never done    Shingles vaccine (1 of 2) Never done    COVID-19 Vaccine (3 - Booster for Pfizer series) 04/02/2022    Flu vaccine (1) 09/01/2022    A1C test (Diabetic or Prediabetic)  01/25/2023    Breast cancer screen  03/31/2023    Depression Screen  06/21/2023    Lipids  08/03/2026    Hepatitis C screen  Completed    HIV screen  Completed    Hepatitis A vaccine  Aged Out    Hepatitis B vaccine  Aged Out    Hib vaccine  Aged Out    Meningococcal (ACWY) vaccine  Aged Out         [address] [facility] [address]  [place] [address]  Dept: [phone]  Dept Fax: [phone]    Office Progress/Follow Up Note  Date of patient's visit: 6/21/2022   Patient's Name:  Sanjuana Martinez  YOB: 1970            Patient Care Team:  TAMARA Cordoba CNP as PCP - General (Internal Medicine)  TAMARA Cordoba CNP as PCP - [facility] Empan[facility] Provider    REASON FOR VISIT: Alopecia, Fatigue, Hip Pain (having more pain had surgery in Feb), ADHD, and Anxiety        HISTORY OF PRESENT ILLNESS:      History was obtained from the patient. Esteban Holman is a 49 y.o. is here for multiple medical concerns including follow up of  Alopecia, Fatigue, Hip Pain (having more pain had surgery in Feb), ADHD, and Anxiety    HPI  Hip pain - reports worsening pain in right hip. She has been sitting down more at work. She follows with ortho, s/p right hip arthroplasty in Feb.   Anxiety - most days, multiple stressors, is asking for buspar. ADHD- has been on current dose of Adderall for 10+ years. Needs refill. Reports fatigue and hair loss, would like TSH level checked. Has hashimoto's, last TSH in Nov was elevated, repeat level was ordered but she has not completed yet. Patient Active Problem List   Diagnosis    Nut allergy    Attention deficit hyperactivity disorder (ADHD), predominantly inattentive type    Hashimoto's thyroiditis    Family history of brain aneurysm    Family history of systemic lupus erythematosus    Chronic midline low back pain without sciatica    Hip pain    Arthritis of right hip    Lumbar degenerative disc disease    Prediabetes    H/O total hip arthroplasty, right    Lupus ([facility] [address]    Obesity (BMI 30.0-34. 9)       Allergies   Allergen Reactions    Morphine Hives and Swelling     Morphine      Gordon [Macadamia Nut Oil] Swelling         MEDICATIONS:     Current Outpatient Medications   Medication Sig Dispense Refill    amphetamine-dextroamphetamine (ADDERALL) 20 MG tablet Take 1 tablet by mouth 2 times daily for 30 days.  60 tablet 0    busPIRone (BUSPAR) 5 MG tablet Take 1 tablet by mouth 2 times daily 60 tablet 2    Gjhxgk-DdQnb-InEkz-FA-CA-Omega (COMPLETE  DHA) 29-1-200 & 200 MG MISC take 1 capsule and take 1 tablet by mouth once daily      ondansetron (ZOFRAN-ODT) 4 MG disintegrating tablet Take 1 tablet by mouth 3 times daily as needed for Nausea 30 tablet 2    Prenatal MV-Min-Fe Fum-FA-DHA (PRENATAL MULTI +DHA) 27-0.8-200 MG CAPS Take 1 tablet by mouth daily 90 capsule 3    RA ARTHRITIS PAIN RELIEF 650 MG extended release tablet take 1 tablet by mouth every 6 hours AS NEEDED FOR PAIN DO NOT EXCEED 5 days      ibuprofen (ADVIL;MOTRIN) 800 MG tablet Take 800 mg by mouth every 6 hours as needed for Pain      triamcinolone (KENALOG) 0.1 % cream Apply to rash twice daily (not face, armpit or groin) 454 g 1    cetirizine (ZYRTEC) 10 MG tablet Take 10 mg by mouth 2 times daily      aspirin EC 81 MG EC tablet Take 1 tablet by mouth 2 times daily 84 tablet 0    vitamin D (ERGOCALCIFEROL) 1.25 MG (15700 UT) CAPS capsule Take 1 tab every Monday and every Friday 30 capsule 1    levothyroxine (SYNTHROID) 175 MCG tablet Take 1 tablet by mouth every morning (before breakfast) 90 tablet 1    fluticasone (FLONASE) 50 MCG/ACT nasal spray 2 sprays by Each Nostril route daily 16 g 0    EPINEPHrine (EPIPEN 2-DINO) 0.3 MG/0.3ML SOAJ injection Inject 0.3 mLs into the muscle once for 1 dose Use as directed for allergic reaction, facial swelling 0.3 mL 1    albuterol sulfate  (90 Base) MCG/ACT inhaler Inhale into the lungs every 4 hours as needed       No current facility-administered medications for this visit. SOCIAL HISTORY    Reviewed and updated. Lj  reports that she has never smoked. She has never used smokeless tobacco.    FAMILY HISTORY:    Reviewed and updated. family history includes Breast Cancer (age of onset: 54) in her maternal grandmother; Breast Cancer (age of onset: 72) in her maternal aunt; Breast Cancer (age of onset: 77) in her mother; Heart Disease in her sister; Other (age of onset: 59) in her father; Rheum Arthritis in her sister.     REVIEW OF SYSTEMS:      Review of Systems Constitutional: Positive for fatigue. Negative for chills and fever. HENT: Negative for congestion, ear pain and trouble swallowing. Eyes: Negative for discharge and visual disturbance. Respiratory: Negative for cough, shortness of breath and wheezing. Cardiovascular: Negative for chest pain, palpitations and leg swelling. Gastrointestinal: Negative for abdominal pain, constipation and diarrhea. Genitourinary: Negative for difficulty urinating. Musculoskeletal: Positive for arthralgias and back pain. Negative for gait problem. Neurological: Negative for dizziness and headaches. Psychiatric/Behavioral: Positive for decreased concentration. Negative for sleep disturbance. The patient is nervous/anxious. PHYSICAL EXAM:      Vitals:    06/21/22 0742   BP: 124/76   Pulse: 93   SpO2: 100%   Weight: 216 lb (98 kg)   Height: 5' 9\" (1.753 m)     BP Readings from Last 3 Encounters:   06/21/22 124/76   04/12/22 138/86   03/02/22 115/78        Physical Exam  Constitutional:       Appearance: She is well-developed. She is obese. HENT:      Head: Normocephalic and atraumatic. Right Ear: External ear normal.      Left Ear: External ear normal.      Nose: Nose normal.   Eyes:      General:         Right eye: No discharge. Left eye: No discharge. Conjunctiva/sclera: Conjunctivae normal.      Pupils: Pupils are equal, round, and reactive to light. Neck:      Thyroid: No thyromegaly. Cardiovascular:      Rate and Rhythm: Normal rate and regular rhythm. Pulses: Normal pulses. Heart sounds: Normal heart sounds. No murmur heard. Pulmonary:      Effort: Pulmonary effort is normal.      Breath sounds: Normal breath sounds. No wheezing. Abdominal:      General: Bowel sounds are normal.      Palpations: Abdomen is soft. Tenderness: There is no abdominal tenderness. Musculoskeletal:      Cervical back: Normal range of motion and neck supple.  No deformity or tenderness. Thoracic back: No deformity or tenderness. Lumbar back: No deformity or tenderness. Normal range of motion. Right hip: No deformity. Normal range of motion. Left hip: No deformity. Normal range of motion. Right lower leg: No edema. Left lower leg: No edema. Lymphadenopathy:      Cervical: No cervical adenopathy. Skin:     General: Skin is warm and dry. Neurological:      Mental Status: She is alert and oriented to person, place, and time. Gait: Gait normal.   Psychiatric:         Mood and Affect: Mood is anxious. Mood is not depressed. Speech: Speech normal.         Behavior: Behavior normal.          LABORATORY FINDINGS:    CBC:   Lab Results   Component Value Date/Time    WBC 11.4 04/29/2022 12:26 PM    HGB 13.2 04/29/2022 12:26 PM     04/29/2022 12:26 PM     BMP:   Lab Results   Component Value Date/Time     04/29/2022 12:26 PM    K 4.3 04/29/2022 12:26 PM     04/29/2022 12:26 PM    CO2 26 04/29/2022 12:26 PM    BUN 11 04/29/2022 12:26 PM    CREATININE 0.73 04/29/2022 12:26 PM    CREATININE 0.73 04/29/2022 12:26 PM    GLUCOSE 79 04/29/2022 12:26 PM     HEMOGLOBIN A1C:   Lab Results   Component Value Date/Time    LABA1C 5.7 01/25/2022 10:02 AM     FASTING LIPID PANEL:   Lab Results   Component Value Date    CHOL 251 (H) 08/03/2021    HDL 55 08/03/2021    LDLCHOLESTEROL 169 (H) 08/03/2021    TRIG 137 08/03/2021       ASSESSMENT AND PLAN:      Visit Diagnoses and Associated Orders     Obesity (BMI 30.0-34.9)    -  Primary    Advised at length on benefits of regular exercise, healthy diet. Will have her schedule with Dr Vandana Mann for weight loss options         Hypothyroidism due to Hashimoto's thyroiditis        Last TSH elevated, will repeat TSH, adjust Synthroid dose if indicated. Attention deficit hyperactivity disorder (ADHD), predominantly inattentive type        Controlled on Adderall, continue current dose. amphetamine-dextroamphetamine (ADDERALL) 20 MG tablet [0599]           Anxiety        New, will check TSH. Advised on benefits of exercise, offered counseling    TSH [22064 Custom]   - Future Order    busPIRone (BUSPAR) 5 MG tablet [6547]           Hair loss        Check TSH level, Vit D level. Advised on healthy diet, vitamins. Clkaom-NgLmv-FcXkr-FA-CA-Omega (COMPLETE  DHA) 29-1-200 & 200 MG MISC [[id number]]           Vitamin D deficiency        Vitamin D 25 Hydroxy [24824 Custom]   - Future Order         Chronic right hip pain        Advised to avoid sitting for long periods, stretches. Follow up with ortho if pain persists or worsens                FOLLOW UP AND INSTRUCTIONS:     Return in about 2 months (around 2022) for weight loss. With Dr Lobito Nguyen. · Kelsey received counseling on the following healthy behaviors: nutrition, exercise and medication adherence    · Discussed use, benefit, and side effects of prescribed medications. Barriers to medication compliance addressed. All patient questions answered. Pt voiced understanding. TAMARA Sunshine - CALEB    2022, 11:26 AM    Please note that this chart was generated using voice recognition Dragon dictation software. Although every effort was made to ensure the accuracy of this automatedtranscription, some errors in transcription may have occurred.

## 2022-06-24 ENCOUNTER — HOSPITAL ENCOUNTER (OUTPATIENT)
Age: 52
Setting detail: SPECIMEN
Discharge: HOME | End: 2022-06-24

## 2022-06-24 DIAGNOSIS — F41.9 ANXIETY: ICD-10-CM

## 2022-06-24 DIAGNOSIS — E55.9 VITAMIN D DEFICIENCY: ICD-10-CM

## 2022-06-24 LAB
TSH SERPL DL<=0.05 MIU/L-ACNC: 0.72 UIU/ML (ref 0.3–5)
VITAMIN D 25-HYDROXY: 21.8 NG/ML

## 2022-07-02 ASSESSMENT — ENCOUNTER SYMPTOMS
EYE DISCHARGE: 0
TROUBLE SWALLOWING: 0
COUGH: 0
WHEEZING: 0
DIARRHEA: 0
BACK PAIN: 1
ABDOMINAL PAIN: 0
CONSTIPATION: 0
SHORTNESS OF BREATH: 0

## 2022-07-14 ENCOUNTER — APPOINTMENT (OUTPATIENT)
Dept: CT IMAGING | Age: 52
End: 2022-07-14
Payer: COMMERCIAL

## 2022-07-14 ENCOUNTER — HOSPITAL ENCOUNTER (EMERGENCY)
Age: 52
Discharge: HOME | End: 2022-07-14
Attending: EMERGENCY MEDICINE
Payer: COMMERCIAL

## 2022-07-14 VITALS
WEIGHT: 215 LBS | OXYGEN SATURATION: 98 % | BODY MASS INDEX: 30.78 KG/M2 | RESPIRATION RATE: 16 BRPM | DIASTOLIC BLOOD PRESSURE: 69 MMHG | TEMPERATURE: 98.1 F | SYSTOLIC BLOOD PRESSURE: 121 MMHG | HEIGHT: 70 IN | HEART RATE: 94 BPM

## 2022-07-14 DIAGNOSIS — R10.11 RIGHT UPPER QUADRANT ABDOMINAL PAIN: Primary | ICD-10-CM

## 2022-07-14 LAB
ABSOLUTE EOS #: 0.32 K/UL (ref 0–0.4)
ABSOLUTE LYMPH #: 2.68 K/UL (ref 1–4.8)
ABSOLUTE MONO #: 0.43 K/UL (ref 0.1–1.3)
ALBUMIN SERPL-MCNC: 4.5 G/DL (ref 3.5–5.2)
ALP BLD-CCNC: 82 U/L (ref 35–104)
ALT SERPL-CCNC: 16 U/L (ref 5–33)
ANION GAP SERPL CALCULATED.3IONS-SCNC: 11 MMOL/L (ref 9–17)
AST SERPL-CCNC: 15 U/L
BASOPHILS # BLD: 0 % (ref 0–2)
BASOPHILS ABSOLUTE: 0 K/UL (ref 0–0.2)
BILIRUB SERPL-MCNC: 0.31 MG/DL (ref 0.3–1.2)
BUN BLDV-MCNC: 17 MG/DL (ref 6–20)
CALCIUM SERPL-MCNC: 9.4 MG/DL (ref 8.6–10.4)
CHLORIDE BLD-SCNC: 100 MMOL/L (ref 98–107)
CO2: 27 MMOL/L (ref 20–31)
CREAT SERPL-MCNC: 0.77 MG/DL (ref 0.5–0.9)
EOSINOPHILS RELATIVE PERCENT: 3 % (ref 0–4)
GFR AFRICAN AMERICAN: >60 ML/MIN
GFR NON-AFRICAN AMERICAN: >60 ML/MIN
GFR SERPL CREATININE-BSD FRML MDRD: NORMAL ML/MIN/{1.73_M2}
GLUCOSE BLD-MCNC: 85 MG/DL (ref 70–99)
HCT VFR BLD CALC: 38.8 % (ref 36–46)
HEMOGLOBIN: 13 G/DL (ref 12–16)
INR BLD: 0.9
LIPASE: 49 U/L (ref 13–60)
LYMPHOCYTES # BLD: 25 % (ref 24–44)
MAGNESIUM: 2 MG/DL (ref 1.6–2.6)
MCH RBC QN AUTO: 29.5 PG (ref 26–34)
MCHC RBC AUTO-ENTMCNC: 33.6 G/DL (ref 31–37)
MCV RBC AUTO: 88 FL (ref 80–100)
MONOCYTES # BLD: 4 % (ref 1–7)
MORPHOLOGY: NORMAL
PDW BLD-RTO: 13.8 % (ref 11.5–14.9)
PLATELET # BLD: 313 K/UL (ref 150–450)
PMV BLD AUTO: 7.4 FL (ref 6–12)
POTASSIUM SERPL-SCNC: 3.9 MMOL/L (ref 3.7–5.3)
PROTHROMBIN TIME: 12.4 SEC (ref 11.8–14.6)
RBC # BLD: 4.41 M/UL (ref 4–5.2)
SEG NEUTROPHILS: 68 % (ref 36–66)
SEGMENTED NEUTROPHILS ABSOLUTE COUNT: 7.27 K/UL (ref 1.3–9.1)
SODIUM BLD-SCNC: 138 MMOL/L (ref 135–144)
TOTAL PROTEIN: 7.7 G/DL (ref 6.4–8.3)
TROPONIN, HIGH SENSITIVITY: <6 NG/L (ref 0–14)
WBC # BLD: 10.7 K/UL (ref 3.5–11)

## 2022-07-14 PROCEDURE — 85025 COMPLETE CBC W/AUTO DIFF WBC: CPT

## 2022-07-14 PROCEDURE — 83690 ASSAY OF LIPASE: CPT

## 2022-07-14 PROCEDURE — 80053 COMPREHEN METABOLIC PANEL: CPT

## 2022-07-14 PROCEDURE — 6360000004 HC RX CONTRAST MEDICATION: Performed by: EMERGENCY MEDICINE

## 2022-07-14 PROCEDURE — 36415 COLL VENOUS BLD VENIPUNCTURE: CPT

## 2022-07-14 PROCEDURE — 2500000003 HC RX 250 WO HCPCS: Performed by: EMERGENCY MEDICINE

## 2022-07-14 PROCEDURE — 99285 EMERGENCY DEPT VISIT HI MDM: CPT

## 2022-07-14 PROCEDURE — 96375 TX/PRO/DX INJ NEW DRUG ADDON: CPT

## 2022-07-14 PROCEDURE — 96374 THER/PROPH/DIAG INJ IV PUSH: CPT

## 2022-07-14 PROCEDURE — 74177 CT ABD & PELVIS W/CONTRAST: CPT

## 2022-07-14 PROCEDURE — 84484 ASSAY OF TROPONIN QUANT: CPT

## 2022-07-14 PROCEDURE — 83735 ASSAY OF MAGNESIUM: CPT

## 2022-07-14 PROCEDURE — 93005 ELECTROCARDIOGRAM TRACING: CPT | Performed by: EMERGENCY MEDICINE

## 2022-07-14 PROCEDURE — 2580000003 HC RX 258: Performed by: EMERGENCY MEDICINE

## 2022-07-14 PROCEDURE — 85610 PROTHROMBIN TIME: CPT

## 2022-07-14 PROCEDURE — A4216 STERILE WATER/SALINE, 10 ML: HCPCS | Performed by: EMERGENCY MEDICINE

## 2022-07-14 PROCEDURE — 6360000002 HC RX W HCPCS: Performed by: EMERGENCY MEDICINE

## 2022-07-14 RX ORDER — 0.9 % SODIUM CHLORIDE 0.9 %
80 INTRAVENOUS SOLUTION INTRAVENOUS ONCE
Status: COMPLETED | OUTPATIENT
Start: 2022-07-14 | End: 2022-07-14

## 2022-07-14 RX ORDER — ONDANSETRON 2 MG/ML
4 INJECTION INTRAMUSCULAR; INTRAVENOUS ONCE
Status: COMPLETED | OUTPATIENT
Start: 2022-07-14 | End: 2022-07-14

## 2022-07-14 RX ORDER — SODIUM CHLORIDE 0.9 % (FLUSH) 0.9 %
10 SYRINGE (ML) INJECTION PRN
Status: DISCONTINUED | OUTPATIENT
Start: 2022-07-14 | End: 2022-07-14 | Stop reason: HOSPADM

## 2022-07-14 RX ORDER — METOCLOPRAMIDE HYDROCHLORIDE 5 MG/ML
10 INJECTION INTRAMUSCULAR; INTRAVENOUS ONCE
Status: COMPLETED | OUTPATIENT
Start: 2022-07-14 | End: 2022-07-14

## 2022-07-14 RX ORDER — FENTANYL CITRATE 50 UG/ML
25 INJECTION, SOLUTION INTRAMUSCULAR; INTRAVENOUS ONCE
Status: COMPLETED | OUTPATIENT
Start: 2022-07-14 | End: 2022-07-14

## 2022-07-14 RX ORDER — ONDANSETRON 4 MG/1
4 TABLET, ORALLY DISINTEGRATING ORAL EVERY 8 HOURS PRN
Qty: 20 TABLET | Refills: 0 | Status: SHIPPED | OUTPATIENT
Start: 2022-07-14 | End: 2022-08-17

## 2022-07-14 RX ORDER — ERGOCALCIFEROL (VITAMIN D2) 1250 MCG
50000 CAPSULE ORAL
COMMUNITY

## 2022-07-14 RX ORDER — 0.9 % SODIUM CHLORIDE 0.9 %
1000 INTRAVENOUS SOLUTION INTRAVENOUS ONCE
Status: COMPLETED | OUTPATIENT
Start: 2022-07-14 | End: 2022-07-14

## 2022-07-14 RX ADMIN — IOPAMIDOL 75 ML: 755 INJECTION, SOLUTION INTRAVENOUS at 16:28

## 2022-07-14 RX ADMIN — SODIUM CHLORIDE, PRESERVATIVE FREE 10 ML: 5 INJECTION INTRAVENOUS at 16:28

## 2022-07-14 RX ADMIN — SODIUM CHLORIDE 1000 ML: 9 INJECTION, SOLUTION INTRAVENOUS at 15:55

## 2022-07-14 RX ADMIN — METOCLOPRAMIDE HYDROCHLORIDE 10 MG: 5 INJECTION INTRAMUSCULAR; INTRAVENOUS at 18:00

## 2022-07-14 RX ADMIN — FENTANYL CITRATE 25 MCG: 50 INJECTION, SOLUTION INTRAMUSCULAR; INTRAVENOUS at 15:56

## 2022-07-14 RX ADMIN — SODIUM CHLORIDE 80 ML: 9 INJECTION, SOLUTION INTRAVENOUS at 16:29

## 2022-07-14 RX ADMIN — ONDANSETRON 4 MG: 2 INJECTION INTRAMUSCULAR; INTRAVENOUS at 15:56

## 2022-07-14 RX ADMIN — FAMOTIDINE 20 MG: 10 INJECTION INTRAVENOUS at 18:01

## 2022-07-14 ASSESSMENT — ENCOUNTER SYMPTOMS
SHORTNESS OF BREATH: 0
NAUSEA: 1
COLOR CHANGE: 0
VOMITING: 1
BACK PAIN: 0
ABDOMINAL PAIN: 1
EYE PAIN: 0

## 2022-07-14 ASSESSMENT — PAIN SCALES - GENERAL: PAINLEVEL_OUTOF10: 4

## 2022-07-14 ASSESSMENT — PAIN - FUNCTIONAL ASSESSMENT: PAIN_FUNCTIONAL_ASSESSMENT: NONE - DENIES PAIN

## 2022-07-14 NOTE — ED PROVIDER NOTES
EMERGENCY DEPARTMENT ENCOUNTER    Pt Name: Lida Aquino  MRN: [id number]  [place] 1970  Date of evaluation: 7/14/22  CHIEF COMPLAINT       Chief Complaint   Patient presents with    Abdominal Pain    Nausea    Fever     HISTORY OF PRESENT ILLNESS   56-year-old female presents for complaints of right upper quadrant abdominal pain. Patient reports she been having intermittent episodes of pain for the last week or so, states that this got worse in the last couple days. Patient reports pain is sharp and aching, worse after she eats, admits associated nausea with vomiting, reports that she had a fever earlier today she measured it was in the 100s and she took Tylenol which did help her fever. Patient reports she is having pain. Denies any changes in bowel movements, denies any difficulty urinating or pain with urination, denies any associated chest pain or shortness of breath. Patient reports a history of thyroid issues however denies any other medical issues, denies any past surgical history. The history is provided by the patient. REVIEW OF SYSTEMS     Review of Systems   Constitutional: Negative for fever. HENT: Negative for congestion and ear pain. Eyes: Negative for pain. Respiratory: Negative for shortness of breath. Cardiovascular: Negative for chest pain, palpitations and leg swelling. Gastrointestinal: Positive for abdominal pain, nausea and vomiting. Genitourinary: Negative for dysuria and flank pain. Musculoskeletal: Negative for back pain. Skin: Negative for color change. Neurological: Negative for numbness and headaches. Psychiatric/Behavioral: Negative for confusion. All other systems reviewed and are negative.     PASTMEDICAL HISTORY     Past Medical History:   Diagnosis Date    Asthma     Attention deficit hyperactivity disorder (ADHD), predominantly inattentive type     early 80's    COVID-19 01/05/2022    cough, body aches, fatigue, headache, vomiting, temp 99. X 6 DAYS   TESTED AT [address]  COVID-19 10/2020    symptoms X 19 days    Hashimoto's thyroiditis     Hip arthritis     Hypothyroidism     Lumbar disc disease     [facility]upus ([facility] [address]     Under care of team 01/25/2022    PCP - Jyoti Forrest CNP - LAST VISIT -  1/11/22    Under care of team 01/25/2022    RHEUMATOLOGY - DR. Korin Wells - Mary Muss VISIT 12/2021    Under care of team 01/25/2022    ORTHO - DR. BROWN - LAST VISIT 1/2022     Past Problem List  Patient Active Problem List   Diagnosis Code    Nut allergy Z91.018    Attention deficit hyperactivity disorder (ADHD), predominantly inattentive type F90.0    Hashimoto's thyroiditis E06.3    Family history of brain aneurysm Z82.49    Family history of systemic lupus erythematosus Z82.69    Chronic midline low back pain without sciatica M54.50, G89.29    Hip pain M25.559    Arthritis of right hip M16.11    Lumbar degenerative disc disease M51.36    Prediabetes R73.03    H/O total hip arthroplasty, right Z96.641    Lupus ([facility] [address] M32.9    Obesity (BMI 30.0-34. 9) E66.9     SURGICAL HISTORY       Past Surgical History:   Procedure Laterality Date    BLADDER SUSPENSION      at 29 yo during hysterectomy    HYSTERECTOMY (CERVIX STATUS UNKNOWN)      HYSTERECTOMY, VAGINAL      at 29 yo for h/o prolapsed uterus    TOTAL HIP ARTHROPLASTY Right 2/14/2022    RIGHT HIP TOTAL ARTHROPLASTY ANTERIOR APPROACH - MEDACTA performed by Skylar Torres DO at [facility]       Discharge Medication List as of 7/14/2022  6:37 PM      CONTINUE these medications which have NOT CHANGED    Details   ergocalciferol (ERGOCALCIFEROL) 1.25 MG (06914 UT) capsule Take 50,000 Units by mouth Twice a Week Indications: Tues, SunHistorical Med      amphetamine-dextroamphetamine (ADDERALL) 20 MG tablet Take 1 tablet by mouth 2 times daily for 30 days. , Disp-60 tablet, R-0Normal      busPIRone (BUSPAR) 5 MG tablet Take 1 tablet by mouth 2 times daily, Disp-60 tablet, R-2Normal      !! ondansetron (ZOFRAN-ODT) 4 MG disintegrating tablet Take 1 tablet by mouth 3 times daily as needed for Nausea, Disp-30 tablet, R-2Normal      RA ARTHRITIS PAIN RELIEF 650 MG extended release tablet Take 1,300 mg by mouth every 8 hours as needed for Pain or Fever , DAWHistorical Med      ibuprofen (ADVIL;MOTRIN) 800 MG tablet Take 800 mg by mouth every 6 hours as needed for PainHistorical Med      cetirizine (ZYRTEC) 10 MG tablet Take 10 mg by mouth 2 times dailyHistorical Med      levothyroxine (SYNTHROID) 175 MCG tablet Take 1 tablet by mouth every morning (before breakfast), Disp-90 tablet, R-1Normal      fluticasone (FLONASE) 50 MCG/ACT nasal spray 2 sprays by Each Nostril route daily, Disp-16 g, R-0NO PRINT      EPINEPHrine (EPIPEN 2-DINO) 0.3 MG/0.3ML SOAJ injection Inject 0.3 mLs into the muscle once for 1 dose Use as directed for allergic reaction, facial swelling, Disp-0.3 mL, R-1Normal      albuterol sulfate  (90 Base) MCG/ACT inhaler Inhale into the lungs every 4 hours as neededHistorical Med       !! - Potential duplicate medications found. Please discuss with provider. ALLERGIES     is allergic to morphine and walnut [macadamia nut oil]. FAMILY HISTORY     She indicated that the status of her mother is unknown. She indicated that her father is . She indicated that the status of her sister is unknown. She indicated that the status of her maternal grandmother is unknown. She indicated that the status of her maternal aunt is unknown.      SOCIAL HISTORY       Social History     Tobacco Use    Smoking status: Never Smoker    Smokeless tobacco: Never Used   Vaping Use    Vaping Use: Never used   Substance Use Topics    Alcohol use: Yes     Comment: \"A COUPLE ONCE A MONTH OR SO.\"    Drug use: Never     PHYSICAL EXAM     INITIAL VITALS: /69   Pulse 94   Temp 98.1 °F (36.7 °C) (Oral)   Resp 16   Ht 5' 10\" (1.778 m)   Wt 215 lb (97.5 kg)   SpO2 98%   BMI 30.85 kg/m²    Physical Exam  Vitals and nursing note reviewed. Constitutional:       General: She is not in acute distress. Appearance: Normal appearance. She is not toxic-appearing. HENT:      Head: Normocephalic and atraumatic. Nose: Nose normal.      Mouth/Throat:      Mouth: Mucous membranes are moist.      Pharynx: Oropharynx is clear. Eyes:      Extraocular Movements: Extraocular movements intact. Conjunctiva/sclera: Conjunctivae normal.      Pupils: Pupils are equal, round, and reactive to light. Cardiovascular:      Rate and Rhythm: Normal rate and regular rhythm. Pulses: Normal pulses. Heart sounds: Normal heart sounds. Pulmonary:      Effort: Pulmonary effort is normal.      Breath sounds: Normal breath sounds. Abdominal:      General: Bowel sounds are normal. There is no distension. Palpations: Abdomen is soft. Tenderness: There is abdominal tenderness in the right upper quadrant and epigastric area. Musculoskeletal:         General: Normal range of motion. Cervical back: Normal range of motion. No spinous process tenderness or muscular tenderness. Skin:     General: Skin is warm and dry. Capillary Refill: Capillary refill takes less than 2 seconds. Neurological:      General: No focal deficit present. Mental Status: She is alert and oriented to person, place, and time. Cranial Nerves: Cranial nerves are intact. Sensory: Sensation is intact. Motor: Motor function is intact. Psychiatric:         Mood and Affect: Mood normal.         Thought Content: Thought content does not include homicidal or suicidal ideation. MEDICAL DECISION MAKIN-year-old female presents for abdominal pain.   On initial exam patient in no acute distress, vitals are stable, abdomen is soft with tenderness in the epigastric and right upper quadrants, differential includes gastritis, peptic ulcer, cholecystitis, or pancreatitis, will check labs and imaging    Labs reviewed and unremarkable    CT abdomen pelvis is showing gallbladder wall thickening however no significant surrounding signs of inflammation    Unable to obtain ultrasound at this time is after hours    Patient was reevaluated and reports that she is feeling better    Did discuss with general surgery Dr. Stepan Wilson, who does not feel patient needs to be admitted at this time and would like to see patient in the office    Results were discussed with patient, discussed findings of the CT, discussed admission versus outpatient treatment, patient would prefer outpatient treatment at this time will provide order for ultrasound and provide information for general surgery follow-up, discussed with patient strict return precautions, patient voiced understanding is comfortable plan and discharge home    Patient/Guardian was informed of their diagnosis and told to follow up with PCP & general surgery in 1-3 days. Patient demonstrates understanding and agreement with the plan. They were given the opportunity to ask questions and those questions were answered to the best of our ability with the available information. Patient/Guardian told to return to the ED for any new, worsening, changing or persistent symptoms. This dictation was prepared using [employer] voice recognition software. CRITICAL CARE:       PROCEDURES:    Procedures    DIAGNOSTIC RESULTS   EKG:All EKG's are interpreted by the Emergency Department Physician who either signs or Co-signs this chart in the absence of a cardiologist.    This rhythm rate of 88, normal axis, normal intervals, no ST segment elevation or depression, no significant T wave changes    RADIOLOGY:All plain film, CT, MRI, and formal ultrasound images (except ED bedside ultrasound) are read by the radiologist, see reports below, unless otherwisenoted in MDM or here.   CT ABDOMEN PELVIS W IV CONTRAST Additional Contrast? None   Final Result   1. Gallbladder wall thickening is nonspecific, but could be seen with   cholecystitis. Clinical correlation is recommended. Additionally,   ultrasound may be useful for further evaluation. 2. Otherwise, no acute intra-abdominal abnormality. US ABDOMEN LIMITED    (Results Pending)     LABS: All lab results were reviewed by myself, and all abnormals are listed below. Labs Reviewed   CBC WITH AUTO DIFFERENTIAL - Abnormal; Notable for the following components:       Result Value    Seg Neutrophils 68 (*)     All other components within normal limits   COMPREHENSIVE METABOLIC PANEL   LIPASE   TROPONIN   PROTIME-INR   MAGNESIUM   URINALYSIS WITH REFLEX TO CULTURE       EMERGENCY DEPARTMENTCOURSE:         Vitals:    Vitals:    07/14/22 1532 07/14/22 1556   BP: 121/69    Pulse: 94    Resp: 16 16   Temp: 98.1 °F (36.7 °C)    TempSrc: Oral    SpO2: 98%    Weight: 215 lb (97.5 kg)    Height: 5' 10\" (1.778 m)        The patient was given the following medications while in the emergency department:  Orders Placed This Encounter   Medications    0.9 % sodium chloride bolus    fentaNYL (SUBLIMAZE) injection 25 mcg    ondansetron (ZOFRAN) injection 4 mg    iopamidol (ISOVUE-370) 76 % injection 75 mL    0.9 % sodium chloride bolus    sodium chloride flush 0.9 % injection 10 mL    metoclopramide (REGLAN) injection 10 mg    famotidine (PEPCID) 20 mg in sodium chloride (PF) 10 mL injection    ondansetron (ZOFRAN ODT) 4 MG disintegrating tablet     Sig: Take 1 tablet by mouth every 8 hours as needed for Nausea or Vomiting     Dispense:  20 tablet     Refill:  0     CONSULTS:  IP CONSULT TO GENERAL SURGERY    FINAL IMPRESSION      1.  Right upper quadrant abdominal pain          DISPOSITION/PLAN   DISPOSITION        PATIENT REFERRED TO:  Alicia Dawson, APRN - CNP  [address]  [phone]    Schedule an appointment as soon as possible for a visit       [facility] ED  [address]  [address] [facility]  [phone]    As needed, If symptoms worsen    Yao Sylvester MD  [facility] [address] [address] [phone]    Schedule an appointment as soon as possible for a visit       DISCHARGE MEDICATIONS:  Discharge Medication List as of 7/14/2022  6:37 PM      START taking these medications    Details   !! ondansetron (ZOFRAN ODT) 4 MG disintegrating tablet Take 1 tablet by mouth every 8 hours as needed for Nausea or Vomiting, Disp-20 tablet, R-0Print       !! - Potential duplicate medications found. Please discuss with provider. The care is provided during an unprecedented national emergency due to the novel coronavirus, COVID 19.   DO Ronnie Wilhelm DO  07/14/22 2025

## 2022-07-14 NOTE — PROGRESS NOTES
Medication History completed:    New medications: none    Medications discontinued: aspirin, prenatal vitamin, triamcinolone cream    Changes to dosing:   Ergocalciferol changed to 50,000 units twice weekly on Tuesday and Sunday    Stated allergies: As listed    Other pertinent information: Medications confirmed with patient. Medication history completed by Pavel MckayD Candidate [id number] under my direct supervision.      Thank you,  Татьяна Ahn PharmD, BCPS  [phone]

## 2022-07-15 ENCOUNTER — HOSPITAL ENCOUNTER (OUTPATIENT)
Dept: ULTRASOUND IMAGING | Age: 52
Discharge: HOME | End: 2022-07-17
Payer: COMMERCIAL

## 2022-07-15 DIAGNOSIS — R10.11 RIGHT UPPER QUADRANT ABDOMINAL PAIN: ICD-10-CM

## 2022-07-15 LAB
EKG ATRIAL RATE: 88 BPM
EKG P AXIS: 51 DEGREES
EKG P-R INTERVAL: 148 MS
EKG Q-T INTERVAL: 376 MS
EKG QRS DURATION: 92 MS
EKG QTC CALCULATION (BAZETT): 454 MS
EKG R AXIS: -2 DEGREES
EKG T AXIS: 24 DEGREES
EKG VENTRICULAR RATE: 88 BPM

## 2022-07-15 PROCEDURE — 76705 ECHO EXAM OF ABDOMEN: CPT

## 2022-07-15 PROCEDURE — 93010 ELECTROCARDIOGRAM REPORT: CPT | Performed by: INTERNAL MEDICINE

## 2022-08-16 NOTE — H&P
HISTORY and [facility] [address]       NAME:  Shawanda Steve  MRN: [id number]   YOB: 1970   Date: 8/16/2022   Age: 49 y.o. Gender: female       COMPLAINT AND PRESENT HISTORY:   Shawanda Steve  is a 49 y.o. female presenting today for  preadmission testing prior to a   CHOLECYSTECTOMY LAPAROSCOPIC ROBOTIC XI as r/t Cholecystitis Calculus of gallbladder without cholecystitis without obstruction, Chronic cholecystitis with calculus. Pt states she has had symptoms of abdominal pain, fever, nausea and vomiting for about 4 days in July of this year. She states her symptoms have nearly resolved, states she does have discomfort in her mid abdomen in the postprandial state, she reports diarrhea if she eats any fatty foods. Denies any bloody or tarry stools. Pt has a PMHX significant for lupus, hypothyroidism    EKG 7/14/22  Narrative & Impression    Normal sinus rhythm  Normal ECG  No previous ECGs available        Pt does not wear dentures. Pt denies any hx of MRSA infection  Pt not currently taking any blood thinners or anticoagulants  Pt denies any personal or FHx of complications with anesthesia. Pt denies any acute symptoms of illness at this time including no SOB, CP, fever, URI or UTI symptoms. RECENT IMAGING    No results found. PAST MEDICAL HISTORY     Past Medical History:   Diagnosis Date    Asthma     Attention deficit hyperactivity disorder (ADHD), predominantly inattentive type     early 90's    COVID-19 01/05/2022    cough, body aches, fatigue, headache, vomiting, temp 99. X 6 DAYS   TESTED AT [address] [address]     COVID-19 10/2020    symptoms X 19 days    Hashimoto's thyroiditis     Hip arthritis     Hypothyroidism     Lumbar disc disease     Lupus ([facility] [address]     Under care of team 01/25/2022    PCP - Dwight Saldivar CNP - LAST VISIT -  1/11/22    Under care of team 01/25/2022    RHEUMATOLOGY - DR. Chantale Bunn - [place] VISIT 12/2021    Under care of team 01/25/2022 ORTHO - DR. BROWN - LAST VISIT 1/2022       SURGICAL HISTORY       Past Surgical History:   Procedure Laterality Date    BLADDER SUSPENSION      at 31 yo during hysterectomy    HYSTERECTOMY (CERVIX STATUS UNKNOWN)      HYSTERECTOMY, VAGINAL      at 31 yo for h/o prolapsed uterus    TOTAL HIP ARTHROPLASTY Right 2/14/2022    RIGHT HIP TOTAL ARTHROPLASTY ANTERIOR APPROACH - [facility] performed by Bryce Manzo DO at [facility] HISTORY       Family History   Problem Relation Age of Onset    Breast Cancer Mother 77    Other Father 59        brain aneurysm    Rheum Arthritis Sister     Heart Disease Sister         from [facility]    Breast Cancer Maternal Grandmother 54    Breast Cancer Maternal Aunt 72       SOCIAL HISTORY       Social History     Socioeconomic History    Marital status: Single   Tobacco Use    Smoking status: Never    Smokeless tobacco: Never   Vaping Use    Vaping Use: Never used   Substance and Sexual Activity    Alcohol use: Yes     Comment: \"A COUPLE ONCE A MONTH OR SO.\"    Drug use: Never           REVIEW OF SYSTEMS      Allergies   Allergen Reactions    Morphine Hives and Swelling     Morphine      [place] [Macadamia Nut Oil] Swelling       Current Outpatient Medications on File Prior to Encounter   Medication Sig Dispense Refill    ergocalciferol (ERGOCALCIFEROL) 1.25 MG (00519 UT) capsule Take 50,000 Units by mouth Twice a Week Indications: Ana Virk      ondansetron (ZOFRAN ODT) 4 MG disintegrating tablet Take 1 tablet by mouth every 8 hours as needed for Nausea or Vomiting 20 tablet 0    amphetamine-dextroamphetamine (ADDERALL) 20 MG tablet Take 1 tablet by mouth 2 times daily for 30 days.  60 tablet 0    busPIRone (BUSPAR) 5 MG tablet Take 1 tablet by mouth 2 times daily 60 tablet 2    ondansetron (ZOFRAN-ODT) 4 MG disintegrating tablet Take 1 tablet by mouth 3 times daily as needed for Nausea 30 tablet 2    RA ARTHRITIS PAIN RELIEF 650 MG is well-developed and normal weight. She is not ill-appearing or toxic-appearing. HENT:      Head: Normocephalic and atraumatic. Mouth/Throat:      Mouth: Mucous membranes are dry. Pharynx: Oropharynx is clear. No oropharyngeal exudate or posterior oropharyngeal erythema. Eyes:      Extraocular Movements: Extraocular movements intact. Conjunctiva/sclera: Conjunctivae normal.      Pupils: Pupils are equal, round, and reactive to light. Cardiovascular:      Rate and Rhythm: Normal rate and regular rhythm. Pulses: Normal pulses. Heart sounds: Normal heart sounds. No murmur heard. No friction rub. No gallop. Pulmonary:      Effort: Pulmonary effort is normal.      Breath sounds: Normal breath sounds. No wheezing. Abdominal:      General: Bowel sounds are normal. There is no distension. Palpations: Abdomen is soft. Tenderness: There is no abdominal tenderness. There is no guarding or rebound. Comments: Hyperactive bs. Musculoskeletal:         General: No swelling. Normal range of motion. Cervical back: Normal range of motion and neck supple. No rigidity or tenderness. Right lower leg: No edema. Left lower leg: No edema. Skin:     General: Skin is warm and dry. Findings: No erythema. Neurological:      General: No focal deficit present. Mental Status: She is alert and oriented to person, place, and time. Mental status is at baseline. Sensory: No sensory deficit. Psychiatric:         Mood and Affect: Mood normal.         Behavior: Behavior normal.         Thought Content:  Thought content normal.         Judgment: Judgment normal.                                                                                       PROVISIONAL DIAGNOSES / SURGERY:      CHOLECYSTECTOMY LAPAROSCOPIC ROBOTIC XI     Cholecystitis   Calculus of gallbladder without cholecystitis without obstruction   Chronic cholecystitis with calculus    Patient Active Problem List    Diagnosis Date Noted    Obesity (BMI 30.0-34.9) 05/12/2022    Lupus ([address] 04/22/2022    H/O total hip arthroplasty, right 02/14/2022    Prediabetes 01/31/2022    Hip pain 09/16/2021    Arthritis of right hip 09/16/2021    Lumbar degenerative disc disease 09/16/2021    Nut allergy 04/18/2021    Attention deficit hyperactivity disorder (ADHD), predominantly inattentive type 04/18/2021    Hashimoto's thyroiditis 04/18/2021    Family history of brain aneurysm 04/18/2021    Family history of systemic lupus erythematosus 04/18/2021    Chronic midline low back pain without sciatica 10/15/2017         Total time spent on encounter- PAT provider minutes: 21-30 minutes  Medical clearance requested, pt aware and has appt scheduled.        TAMARA Toure - CNP on 8/16/2022 at 9:13 AM

## 2022-08-17 ENCOUNTER — HOSPITAL ENCOUNTER (OUTPATIENT)
Dept: PREADMISSION TESTING | Age: 52
Discharge: HOME | End: 2022-08-21
Attending: SURGERY | Admitting: SURGERY
Payer: COMMERCIAL

## 2022-08-17 VITALS
RESPIRATION RATE: 16 BRPM | HEART RATE: 82 BPM | HEIGHT: 69 IN | DIASTOLIC BLOOD PRESSURE: 67 MMHG | SYSTOLIC BLOOD PRESSURE: 118 MMHG | WEIGHT: 218 LBS | TEMPERATURE: 97.8 F | BODY MASS INDEX: 32.29 KG/M2 | OXYGEN SATURATION: 98 %

## 2022-08-17 DIAGNOSIS — Z01.818 PREOP EXAMINATION: ICD-10-CM

## 2022-08-17 LAB
ABSOLUTE EOS #: 0.3 K/UL (ref 0–0.4)
ABSOLUTE LYMPH #: 2 K/UL (ref 1–4.8)
ABSOLUTE MONO #: 0.4 K/UL (ref 0.1–1.3)
ALBUMIN SERPL-MCNC: 4.1 G/DL (ref 3.5–5.2)
ALP BLD-CCNC: 77 U/L (ref 35–104)
ALT SERPL-CCNC: 14 U/L (ref 5–33)
AMYLASE: 56 U/L (ref 28–100)
ANION GAP SERPL CALCULATED.3IONS-SCNC: 11 MMOL/L (ref 9–17)
AST SERPL-CCNC: 14 U/L
BASOPHILS # BLD: 1 % (ref 0–2)
BASOPHILS ABSOLUTE: 0.1 K/UL (ref 0–0.2)
BILIRUB SERPL-MCNC: 0.48 MG/DL (ref 0.3–1.2)
BILIRUBIN DIRECT: 0.1 MG/DL
BILIRUBIN, INDIRECT: 0.38 MG/DL (ref 0–1)
BUN BLDV-MCNC: 7 MG/DL (ref 6–20)
CALCIUM SERPL-MCNC: 9.2 MG/DL (ref 8.6–10.4)
CHLORIDE BLD-SCNC: 105 MMOL/L (ref 98–107)
CO2: 26 MMOL/L (ref 20–31)
CREAT SERPL-MCNC: 0.64 MG/DL (ref 0.5–0.9)
EOSINOPHILS RELATIVE PERCENT: 4 % (ref 0–4)
GFR AFRICAN AMERICAN: >60 ML/MIN
GFR NON-AFRICAN AMERICAN: >60 ML/MIN
GFR SERPL CREATININE-BSD FRML MDRD: NORMAL ML/MIN/{1.73_M2}
GLUCOSE BLD-MCNC: 91 MG/DL (ref 70–99)
HCT VFR BLD CALC: 36.4 % (ref 36–46)
HEMOGLOBIN: 11.9 G/DL (ref 12–16)
LIPASE: 22 U/L (ref 13–60)
LYMPHOCYTES # BLD: 24 % (ref 24–44)
MCH RBC QN AUTO: 29 PG (ref 26–34)
MCHC RBC AUTO-ENTMCNC: 32.8 G/DL (ref 31–37)
MCV RBC AUTO: 88.6 FL (ref 80–100)
MONOCYTES # BLD: 5 % (ref 1–7)
PDW BLD-RTO: 13.9 % (ref 11.5–14.9)
PLATELET # BLD: 340 K/UL (ref 150–450)
PMV BLD AUTO: 8.2 FL (ref 6–12)
POTASSIUM SERPL-SCNC: 3.7 MMOL/L (ref 3.7–5.3)
RBC # BLD: 4.1 M/UL (ref 4–5.2)
SEG NEUTROPHILS: 66 % (ref 36–66)
SEGMENTED NEUTROPHILS ABSOLUTE COUNT: 5.6 K/UL (ref 1.3–9.1)
SODIUM BLD-SCNC: 142 MMOL/L (ref 135–144)
TOTAL PROTEIN: 6.7 G/DL (ref 6.4–8.3)
WBC # BLD: 8.4 K/UL (ref 3.5–11)

## 2022-08-17 PROCEDURE — 82150 ASSAY OF AMYLASE: CPT

## 2022-08-17 PROCEDURE — 85025 COMPLETE CBC W/AUTO DIFF WBC: CPT

## 2022-08-17 PROCEDURE — 80076 HEPATIC FUNCTION PANEL: CPT

## 2022-08-17 PROCEDURE — 83690 ASSAY OF LIPASE: CPT

## 2022-08-17 PROCEDURE — 80048 BASIC METABOLIC PNL TOTAL CA: CPT

## 2022-08-17 PROCEDURE — 36415 COLL VENOUS BLD VENIPUNCTURE: CPT

## 2022-08-17 PROCEDURE — APPSS45 APP SPLIT SHARED TIME 31-45 MINUTES: Performed by: NURSE PRACTITIONER

## 2022-08-17 ASSESSMENT — ENCOUNTER SYMPTOMS
SINUS PAIN: 0
COUGH: 0
TROUBLE SWALLOWING: 0
SORE THROAT: 0
SINUS PRESSURE: 0
SHORTNESS OF BREATH: 0
BACK PAIN: 0
WHEEZING: 0
RHINORRHEA: 0
APNEA: 0
NAUSEA: 0
CHEST TIGHTNESS: 0

## 2022-08-19 ENCOUNTER — TELEPHONE (OUTPATIENT)
Dept: MAMMOGRAPHY | Age: 52
End: 2022-08-19

## 2022-08-19 NOTE — TELEPHONE ENCOUNTER
Medical surgical clearance request received 08/19/22    Surgeon: Dr Loida Youssef    Procedure: Laparoscopic robotic cholecystectomy     Date of Procedure: 08/29/2022    Last appt: 6/21/22    Next appt: Visit date not found    PATs received:    [] yes   [x] no

## 2022-08-22 ENCOUNTER — TELEPHONE (OUTPATIENT)
Dept: INTERNAL MEDICINE CLINIC | Age: 52
End: 2022-08-22

## 2022-08-22 NOTE — TELEPHONE ENCOUNTER
Medical surgical clearance request received 22    Surgeon: Dr. Rosalva Apgar    Procedure: Laparoscopic Robotic Cholecystectomy    Date of Procedure: 2022    Last appt: 2022    Next appt: 2022    PATs received:   No

## 2022-08-24 ENCOUNTER — OFFICE VISIT (OUTPATIENT)
Dept: INTERNAL MEDICINE CLINIC | Age: 52
End: 2022-08-24
Payer: COMMERCIAL

## 2022-08-24 VITALS
DIASTOLIC BLOOD PRESSURE: 70 MMHG | WEIGHT: 222 LBS | BODY MASS INDEX: 32.78 KG/M2 | SYSTOLIC BLOOD PRESSURE: 120 MMHG | HEART RATE: 92 BPM | OXYGEN SATURATION: 99 %

## 2022-08-24 DIAGNOSIS — K80.00 CALCULUS OF GALLBLADDER WITH ACUTE CHOLECYSTITIS WITHOUT OBSTRUCTION: Primary | ICD-10-CM

## 2022-08-24 PROCEDURE — 99213 OFFICE O/P EST LOW 20 MIN: CPT | Performed by: INTERNAL MEDICINE

## 2022-08-24 ASSESSMENT — ENCOUNTER SYMPTOMS: ABDOMINAL PAIN: 1

## 2022-08-24 NOTE — PROGRESS NOTES
[address]  [place] [address]  Dept: [phone]  Dept Fax: [phone]    Shelda Spurling is a 49 y.o. female who presents to the urgent care today for her medicalconditions/complaints as noted below. Shelda Spurling is c/o of Pre-op Exam (8/29/22)      HPI:     Abdominal Pain  This is a new problem. The current episode started 1 to 4 weeks ago. The onset quality is undetermined. The problem occurs constantly. The pain is located in the RUQ. The quality of the pain is colicky. The abdominal pain does not radiate. The pain is aggravated by eating. The pain is relieved by Nothing. She has tried nothing (scheduled for lap choly on Monday) for the symptoms. Her past medical history is significant for gallstones. Has allergy induced asthma. Past Medical History:   Diagnosis Date    Asthma     Attention deficit hyperactivity disorder (ADHD), predominantly inattentive type     early 90's    COVID-19 01/05/2022    cough, body aches, fatigue, headache, vomiting, temp 99. X 6 DAYS   TESTED AT [address] COVID-19 10/2020    symptoms X 19 days    Hashimoto's thyroiditis     Hip arthritis     Hyperlipidemia     Hypothyroidism     Lumbar disc disease     Lupus ([facility] [address]     Under care of team 01/25/2022    PCP - Onofre Castellanos CNP - LAST VISIT -  1/11/22    Under care of team 01/25/2022    RHEUMATOLOGY - DR. Lynn Alejandre - LASY VISIT 12/2021    Under care of team 01/25/2022    ORTHO - DR. BROWN - LAST VISIT 1/2022        Current Outpatient Medications   Medication Sig Dispense Refill    ergocalciferol (ERGOCALCIFEROL) 1.25 MG (90843 UT) capsule Take 50,000 Units by mouth Twice a Week Indications: Tues, Sun      busPIRone (BUSPAR) 5 MG tablet Take 1 tablet by mouth 2 times daily 60 tablet 2    ondansetron (ZOFRAN-ODT) 4 MG disintegrating tablet Take 1 tablet by mouth 3 times daily as needed for Nausea 30 tablet 2    RA ARTHRITIS PAIN RELIEF 650 MG extended release tablet Take 1,300 mg by mouth every 8 hours as needed for Pain or Fever       ibuprofen (ADVIL;MOTRIN) 800 MG tablet Take 800 mg by mouth every 6 hours as needed for Pain      cetirizine (ZYRTEC) 10 MG tablet Take 10 mg by mouth 2 times daily      levothyroxine (SYNTHROID) 175 MCG tablet Take 1 tablet by mouth every morning (before breakfast) 90 tablet 1    fluticasone (FLONASE) 50 MCG/ACT nasal spray 2 sprays by Each Nostril route daily 16 g 0    albuterol sulfate  (90 Base) MCG/ACT inhaler Inhale into the lungs every 4 hours as needed      amphetamine-dextroamphetamine (ADDERALL) 20 MG tablet Take 1 tablet by mouth 2 times daily for 30 days. 60 tablet 0    EPINEPHrine (EPIPEN 2-DINO) 0.3 MG/0.3ML SOAJ injection Inject 0.3 mLs into the muscle once for 1 dose Use as directed for allergic reaction, facial swelling 0.3 mL 1     No current facility-administered medications for this visit. Allergies   Allergen Reactions    Morphine Hives and Swelling     Morphine      Parmele [Macadamia Nut Oil] Swelling       Health Maintenance   Topic Date Due    Pneumococcal 0-64 years Vaccine (1 - PCV) Never done    DTaP/Tdap/Td vaccine (1 - Tdap) Never done    Colorectal Cancer Screen  Never done    Shingles vaccine (1 of 2) Never done    COVID-19 Vaccine (3 - Booster for Pfizer series) 04/02/2022    Flu vaccine (1) 09/01/2022    A1C test (Diabetic or Prediabetic)  01/25/2023    Breast cancer screen  03/31/2023    Depression Screen  06/21/2023    Lipids  08/03/2026    Hepatitis C screen  Completed    HIV screen  Completed    Hepatitis A vaccine  Aged Out    Hepatitis B vaccine  Aged Out    Hib vaccine  Aged Out    Meningococcal (ACWY) vaccine  Aged Out       Subjective:      Review of Systems   Gastrointestinal:  Positive for abdominal pain. All other systems reviewed and are negative. Objective:     Physical Exam  Vitals reviewed. Constitutional:       Appearance: Normal appearance. She is well-developed. HENT:      Head: Normocephalic and atraumatic. Eyes:      Conjunctiva/sclera: Conjunctivae normal.      Pupils: Pupils are equal, round, and reactive to light. Neck:      Thyroid: No thyromegaly. Vascular: No JVD. Cardiovascular:      Rate and Rhythm: Normal rate and regular rhythm. Heart sounds: S1 normal and S2 normal. No murmur heard. Pulmonary:      Effort: Pulmonary effort is normal. No respiratory distress. Breath sounds: Normal breath sounds. Abdominal:      General: Bowel sounds are normal.      Palpations: Abdomen is soft. There is no mass. Tenderness: There is abdominal tenderness in the right upper quadrant. Musculoskeletal:         General: No tenderness. Normal range of motion. Cervical back: Neck supple. Right lower leg: No edema. Left lower leg: No edema. Lymphadenopathy:      Cervical: No cervical adenopathy. Skin:     General: Skin is warm and dry. Neurological:      Mental Status: She is alert and oriented to person, place, and time. Cranial Nerves: No cranial nerve deficit. Deep Tendon Reflexes: Reflexes are normal and symmetric. Psychiatric:         Behavior: Behavior normal.     /70 (Site: Right Upper Arm, Position: Sitting)   Pulse 92   Wt 222 lb (100.7 kg)   SpO2 99%   BMI 32.78 kg/m²       Assessment:       Diagnosis Orders   1. Calculus of gallbladder with acute cholecystitis without obstruction            Plan:      No follow-ups on file. No orders of the defined types were placed in this encounter. No orders of the defined types were placed in this encounter. Patient given educational materials - see patient instructions. Discussed use, benefit, and side effects of prescribed medications. All patientquestions answered. Pt voiced understanding.     Electronically signed by Arthur Canales MD on 8/24/2022at 5:18 PM

## 2022-08-25 ENCOUNTER — TELEPHONE (OUTPATIENT)
Dept: INTERNAL MEDICINE CLINIC | Age: 52
End: 2022-08-25

## 2022-08-25 NOTE — TELEPHONE ENCOUNTER
Patient called in stating that she is experiencing very serve migraines since stopping the omega that Dr. Jorge Luis Torres had suggested yesterday at the appointment, she would like to know if anything would be able to be done for this

## 2022-08-25 NOTE — TELEPHONE ENCOUNTER
If she still has the omega vitamins she could try taking one half a day or one every other day to taper off. She could also try tylenol, advil or aleve for the HA.

## 2022-09-13 ENCOUNTER — OFFICE VISIT (OUTPATIENT)
Dept: INTERNAL MEDICINE CLINIC | Age: 52
End: 2022-09-13
Payer: COMMERCIAL

## 2022-09-13 VITALS
SYSTOLIC BLOOD PRESSURE: 118 MMHG | OXYGEN SATURATION: 98 % | BODY MASS INDEX: 33.21 KG/M2 | WEIGHT: 224.2 LBS | HEIGHT: 69 IN | HEART RATE: 83 BPM | DIASTOLIC BLOOD PRESSURE: 74 MMHG

## 2022-09-13 DIAGNOSIS — Z12.11 COLON CANCER SCREENING: ICD-10-CM

## 2022-09-13 DIAGNOSIS — F90.0 ATTENTION DEFICIT HYPERACTIVITY DISORDER (ADHD), PREDOMINANTLY INATTENTIVE TYPE: Primary | ICD-10-CM

## 2022-09-13 DIAGNOSIS — F41.9 ANXIETY: ICD-10-CM

## 2022-09-13 DIAGNOSIS — J45.20 MILD INTERMITTENT ASTHMA WITHOUT COMPLICATION: ICD-10-CM

## 2022-09-13 DIAGNOSIS — G43.109 MIGRAINE WITH AURA AND WITHOUT STATUS MIGRAINOSUS, NOT INTRACTABLE: ICD-10-CM

## 2022-09-13 PROCEDURE — 99214 OFFICE O/P EST MOD 30 MIN: CPT | Performed by: NURSE PRACTITIONER

## 2022-09-13 RX ORDER — BUSPIRONE HYDROCHLORIDE 5 MG/1
5 TABLET ORAL 2 TIMES DAILY
Qty: 60 TABLET | Refills: 2 | Status: SHIPPED | OUTPATIENT
Start: 2022-09-13 | End: 2022-12-12

## 2022-09-13 RX ORDER — ALBUTEROL SULFATE 90 UG/1
2 AEROSOL, METERED RESPIRATORY (INHALATION) EVERY 4 HOURS PRN
Qty: 18 G | Refills: 2 | Status: SHIPPED | OUTPATIENT
Start: 2022-09-13

## 2022-09-13 RX ORDER — DEXTROAMPHETAMINE SACCHARATE, AMPHETAMINE ASPARTATE, DEXTROAMPHETAMINE SULFATE AND AMPHETAMINE SULFATE 5; 5; 5; 5 MG/1; MG/1; MG/1; MG/1
20 TABLET ORAL 2 TIMES DAILY
Qty: 60 TABLET | Refills: 0 | Status: SHIPPED | OUTPATIENT
Start: 2022-09-13 | End: 2022-11-01 | Stop reason: SDUPTHER

## 2022-09-13 SDOH — ECONOMIC STABILITY: FOOD INSECURITY: WITHIN THE PAST 12 MONTHS, THE FOOD YOU BOUGHT JUST DIDN'T LAST AND YOU DIDN'T HAVE MONEY TO GET MORE.: NEVER TRUE

## 2022-09-13 SDOH — ECONOMIC STABILITY: FOOD INSECURITY: WITHIN THE PAST 12 MONTHS, YOU WORRIED THAT YOUR FOOD WOULD RUN OUT BEFORE YOU GOT MONEY TO BUY MORE.: NEVER TRUE

## 2022-09-13 ASSESSMENT — SOCIAL DETERMINANTS OF HEALTH (SDOH): HOW HARD IS IT FOR YOU TO PAY FOR THE VERY BASICS LIKE FOOD, HOUSING, MEDICAL CARE, AND HEATING?: NOT HARD AT ALL

## 2022-09-13 NOTE — PROGRESS NOTES
Visit Information    Have you changed or started any medications since your last visit including any over-the-counter medicines, vitamins, or herbal medicines? no   Are you having any side effects from any of your medications? -  no  Have you stopped taking any of your medications? Is so, why? -  no    Have you seen any other physician or provider since your last visit? No  Have you had any other diagnostic tests since your last visit? No  Have you been seen in the emergency room and/or had an admission to a hospital since we last saw you? No  Have you had your routine dental cleaning in the past 6 months? yno -     Have you activated your [employer] account? If not, what are your barriers?  Yes     Patient Care Team:  TAMARA Baeza CNP as PCP - General (Internal Medicine)  TAMARA Baeza CNP as PCP - [facility] Provider    Medical History Review  Past Medical, Family, and Social History reviewed and does contribute to the patient presenting condition    Health Maintenance   Topic Date Due    Pneumococcal 0-64 years Vaccine (1 - PCV) Never done    DTaP/Tdap/Td vaccine (1 - Tdap) Never done    Cervical cancer screen  Never done    Colorectal Cancer Screen  Never done    Shingles vaccine (1 of 2) Never done    COVID-19 Vaccine (3 - Booster for Pfizer series) 04/02/2022    Flu vaccine (1) 08/01/2022    A1C test (Diabetic or Prediabetic)  01/25/2023    Breast cancer screen  03/31/2023    Depression Screen  06/21/2023    Lipids  09/14/2027    Hepatitis C screen  Completed    HIV screen  Completed    Hepatitis A vaccine  Aged Out    Hepatitis B vaccine  Aged Out    Hib vaccine  Aged Out    Meningococcal (ACWY) vaccine  Aged Out         [address] [address]  Dept: [phone]  Dept Fax: [phone]    Office Progress/Follow Up Note  Date of patient's visit: 9/13/2022   Patient's Name:  Denece Child  YOB: 1970            Patient Care Team:  TAMARA Fish CNP as PCP - General (Internal Medicine)  TAMARA Fish CNP as PCP - REHABILITATION [facility] Empaneled Provider    REASON FOR VISIT: ADHD, Anxiety, Cholelithiasis, and Migraine        HISTORY OF PRESENT ILLNESS:      History was obtained from the patient. Vivian Chow is a 47 y.o. 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 \"   With concurrent anxiety, doing ok with Buspar. Was on wellbutrin previously and had suicidal thinking. Girlfriend is working as travel nurse in [employer], so she did not have ride from planned madhav[place] so it was cancelled. Recent recurrence of headaches, severe at times. Has history of migraines with aura since teenager, had MRI in 2019. Also has multiple family members who have migraines. Patient Active Problem List   Diagnosis    Nut allergy    Attention deficit hyperactivity disorder (ADHD), predominantly inattentive type    Hashimoto's thyroiditis    Family history of brain aneurysm    Family history of systemic lupus erythematosus    Chronic midline low back pain without sciatica    Hip pain    Arthritis of right hip    Lumbar degenerative disc disease    Prediabetes    H/O total hip arthroplasty, right    Lupus (HCC)    Obesity (BMI 30.0-34. 9)       Allergies   Allergen Reactions    Morphine Hives and Swelling     Morphine      Vader [Macadamia Nut Oil] Swelling         MEDICATIONS:     Current Outpatient Medications   Medication Sig Dispense Refill    albuterol sulfate HFA (PROVENTIL;VENTOLIN;PROAIR) 108 (90 Base) MCG/ACT inhaler Inhale 2 puffs into the lungs every 4 hours as needed for Wheezing 18 g 2    amphetamine-dextroamphetamine (ADDERALL) 20 MG tablet Take 1 tablet by mouth 2 times daily for 30 days.  60 tablet 0 busPIRone (BUSPAR) 5 MG tablet Take 1 tablet by mouth 2 times daily 60 tablet 2    ergocalciferol (ERGOCALCIFEROL) 1.25 MG (44202 UT) capsule Take 50,000 Units by mouth Twice a Week Indications: [place], Sun      ondansetron (ZOFRAN-ODT) 4 MG disintegrating tablet Take 1 tablet by mouth 3 times daily as needed for Nausea 30 tablet 2    RA ARTHRITIS PAIN RELIEF 650 MG extended release tablet Take 1,300 mg by mouth every 8 hours as needed for Pain or Fever       ibuprofen (ADVIL;MOTRIN) 800 MG tablet Take 800 mg by mouth every 6 hours as needed for Pain      cetirizine (ZYRTEC) 10 MG tablet Take 10 mg by mouth 2 times daily      levothyroxine (SYNTHROID) 175 MCG tablet Take 1 tablet by mouth every morning (before breakfast) 90 tablet 1    fluticasone (FLONASE) 50 MCG/ACT nasal spray 2 sprays by Each Nostril route daily 16 g 0    Ubrogepant (UBRELVY) 50 MG TABS Take 50 mg by mouth as needed (take at onset of headache.) 30 tablet 1    EPINEPHrine (EPIPEN 2-DINO) 0.3 MG/0.3ML SOAJ injection Inject 0.3 mLs into the muscle once for 1 dose Use as directed for allergic reaction, facial swelling 0.3 mL 1     No current facility-administered medications for this visit. SOCIAL HISTORY    Reviewed and updated. Michael Green  reports that she has never smoked. She has never used smokeless tobacco.    FAMILY HISTORY:    Reviewed and updated. family history includes Breast Cancer (age of onset: 54) in her maternal grandmother; Breast Cancer (age of onset: 72) in her maternal aunt; Breast Cancer (age of onset: 77) in her mother; Heart Disease in her sister; Other (age of onset: 59) in her father; Rheum Arthritis in her sister. REVIEW OF SYSTEMS:      Review of Systems   Constitutional:  Positive for fatigue. Negative for chills and fever. HENT:  Negative for congestion, ear pain and trouble swallowing. Eyes:  Negative for discharge and visual disturbance. Respiratory:  Negative for cough, shortness of breath and wheezing. Cardiovascular:  Negative for chest pain, palpitations and leg swelling. Gastrointestinal:  Negative for abdominal pain, constipation and diarrhea. Genitourinary:  Negative for difficulty urinating. Musculoskeletal:  Positive for arthralgias and back pain. Negative for gait problem. Neurological:  Positive for headaches. Negative for dizziness. Psychiatric/Behavioral:  Positive for decreased concentration. Negative for sleep disturbance. The patient is nervous/anxious. PHYSICAL EXAM:      Vitals:    09/13/22 0825   BP: 118/74   Pulse: 83   SpO2: 98%   Weight: 224 lb 3.2 oz (101.7 kg)   Height: 5' 9\" (1.753 m)     BP Readings from Last 3 Encounters:   09/16/22 107/74   09/13/22 118/74   08/24/22 120/70        Physical Exam  Constitutional:       Appearance: She is well-developed. She is obese. HENT:      Head: Normocephalic and atraumatic. Right Ear: External ear normal.      Left Ear: External ear normal.      Nose: Nose normal.   Eyes:      General:         Right eye: No discharge. Left eye: No discharge. Conjunctiva/sclera: Conjunctivae normal.      Pupils: Pupils are equal, round, and reactive to light. Neck:      Thyroid: No thyromegaly. Cardiovascular:      Rate and Rhythm: Normal rate and regular rhythm. Pulses: Normal pulses. Heart sounds: Normal heart sounds. No murmur heard. Pulmonary:      Effort: Pulmonary effort is normal.      Breath sounds: Normal breath sounds. No wheezing. Abdominal:      General: Bowel sounds are normal.      Palpations: Abdomen is soft. Tenderness: There is no abdominal tenderness. Musculoskeletal:      Cervical back: Normal range of motion and neck supple. No deformity or tenderness. Thoracic back: No deformity or tenderness. Lumbar back: No deformity or tenderness. Normal range of motion. Right hip: No deformity. Normal range of motion. Left hip: No deformity. Normal range of motion.       Right lower leg: No edema. Left lower leg: No edema. Lymphadenopathy:      Cervical: No cervical adenopathy. Skin:     General: Skin is warm and dry. Neurological:      Mental Status: She is alert and oriented to person, place, and time. Gait: Gait normal.   Psychiatric:         Mood and Affect: Mood is anxious. Mood is not depressed. Speech: Speech normal.         Behavior: Behavior normal.        LABORATORY FINDINGS:    CBC:   Lab Results   Component Value Date/Time    WBC 8.4 08/17/2022 07:30 AM    HGB 11.9 08/17/2022 07:30 AM     08/17/2022 07:30 AM     BMP:   Lab Results   Component Value Date/Time     08/17/2022 07:30 AM    K 3.7 08/17/2022 07:30 AM     08/17/2022 07:30 AM    CO2 26 08/17/2022 07:30 AM    BUN 7 08/17/2022 07:30 AM    CREATININE 0.64 08/17/2022 07:30 AM    GLUCOSE 111 09/14/2022 07:47 AM     HEMOGLOBIN A1C:   Lab Results   Component Value Date/Time    LABA1C 5.7 01/25/2022 10:02 AM       ASSESSMENT AND PLAN:      Visit Diagnoses and Associated Orders       Attention deficit hyperactivity disorder (ADHD), predominantly inattentive type    -  Primary    Controlled on Adderall, continue current dose.  Will refer to kaur for further management    amphetamine-dextroamphetamine (ADDERALL) 20 MG tablet [2168]      External Referral To Psychiatry [GVU222 Custom]           Colon cancer screening        FIT-DNA ([facility]) [52332 CPT(R)]           Mild intermittent asthma without complication        Controlled on rescue inhaler prn, continue same     albuterol sulfate HFA (PROVENTIL;VENTOLIN;PROAIR) 108 (90 Base) MCG/ACT inhaler [20696]           Migraine with aura and without status migrainosus, not intractable        Recurrent, will refer to neuro for further eval. Continue motrin prn    Raphael Cyr MD, Neurology, [place] [[id number] Custom]           Anxiety        Suboptimal control on Buspar, will refer to psych     busPIRone (BUSPAR) 5 MG tablet [8970] FOLLOW UP AND INSTRUCTIONS:     Return in about 3 months (around 12/13/2022) for chronic conditions, or sooner if needed. List of psych providers given. Kelsey received counseling on the following healthy behaviors: nutrition, exercise, and medication adherence    Discussed use, benefit, and side effects of prescribed medications. Barriers to medication compliance addressed. All patient questions answered. Pt voiced understanding. TAMARA Fierro CNP    10/1/2022, 6:11 PM    Please note that this chart was generated using voice recognition Dragon dictation software. Although every effort was made to ensure the accuracy of this automatedtranscription, some errors in transcription may have occurred. Adequate

## 2022-09-14 LAB
CHOLESTEROL/HDL RATIO: 6.1
CHOLESTEROL: 323 MG/DL
GLUCOSE BLD-MCNC: 111 MG/DL (ref 70–99)
HDLC SERPL-MCNC: 53 MG/DL
LDL CHOLESTEROL: 226 MG/DL (ref 0–130)
PATIENT FASTING?: YES
TRIGL SERPL-MCNC: 220 MG/DL

## 2022-09-16 ENCOUNTER — OFFICE VISIT (OUTPATIENT)
Dept: NEUROLOGY | Age: 52
End: 2022-09-16
Payer: COMMERCIAL

## 2022-09-16 VITALS
DIASTOLIC BLOOD PRESSURE: 74 MMHG | HEART RATE: 91 BPM | HEIGHT: 69 IN | WEIGHT: 221 LBS | SYSTOLIC BLOOD PRESSURE: 107 MMHG | BODY MASS INDEX: 32.73 KG/M2

## 2022-09-16 DIAGNOSIS — G43.711 CHRONIC MIGRAINE WITHOUT AURA, WITH INTRACTABLE MIGRAINE, SO STATED, WITH STATUS MIGRAINOSUS: Primary | ICD-10-CM

## 2022-09-16 PROBLEM — Z01.818 PREOP EXAMINATION: Status: RESOLVED | Noted: 2022-08-17 | Resolved: 2022-09-16

## 2022-09-16 PROCEDURE — 99204 OFFICE O/P NEW MOD 45 MIN: CPT | Performed by: PSYCHIATRY & NEUROLOGY

## 2022-09-16 PROCEDURE — 64615 CHEMODENERV MUSC MIGRAINE: CPT | Performed by: PSYCHIATRY & NEUROLOGY

## 2022-09-16 RX ORDER — UBROGEPANT 50 MG/1
50 TABLET ORAL PRN
Qty: 30 TABLET | Refills: 1 | Status: SHIPPED | OUTPATIENT
Start: 2022-09-16 | End: 2022-10-16

## 2022-09-16 NOTE — PROGRESS NOTES
[facility], [address] [facility]  Ph: [phone] or [phone]  FAX: [phone]        Reason for consult: Headache disorder    I had the pleasure of seeing your patient in neurology consultation for her symptoms. As you would recall Valdo Arredondo is a 49 y.o. yo female. The patient gives a previous history of migraine headaches. The headaches are throbbing in nature, associate with nausea, vomiting, photophobia, phonophobia. Headaches are more frequent last 2 years. He reports having 4-5 severe headaches and restless days she has mild to moderate headaches. She denies having any aura nor any weakness numbness tingling in the extremities. No change in nature of the headaches in last many years. Previously, the patient has taken Zomig and Imitrex as abortive treatment without relief. She has taken Inderal and amitriptyline as a preventive medication without improvement. Past Medical History:   Diagnosis Date    Asthma     Attention deficit hyperactivity disorder (ADHD), predominantly inattentive type     early 90's    COVID-19 01/05/2022    cough, body aches, fatigue, headache, vomiting, temp 99. X 6 DAYS   TESTED AT [address] COVID-19 10/2020    symptoms X 19 days    Hashimoto's thyroiditis     Hip arthritis     Hyperlipidemia     Hypothyroidism     Lumbar disc disease     Lupus ([facility] [address]     Migraine with aura and without status migrainosus, not intractable     Under care of team 01/25/2022    PCP - Sherry Mark CNP - LAST VISIT -  1/11/22    Under care of team 01/25/2022    RHEUMATOLOGY - DR. Jennifer Adam - PORTERY VISIT 12/2021    Under care of team 01/25/2022    ORTHO - DR. BROWN - LAST VISIT 1/2022     Past Surgical History:   Procedure Laterality Date    BLADDER SUSPENSION      at 29 yo during hysterectomy    HYSTERECTOMY (CERVIX STATUS UNKNOWN)      HYSTERECTOMY, VAGINAL      at 29 yo for h/o prolapsed uterus    TOTAL HIP ARTHROPLASTY Right 02/14/2022    RIGHT HIP TOTAL ARTHROPLASTY ANTERIOR APPROACH - MEDACTA performed by Sharyn Jason DO at [facility] Left      Social History     Socioeconomic History    Marital status: Single     Spouse name: Not on file    Number of children: Not on file    Years of education: Not on file    Highest education level: Not on file   Occupational History    Not on file   Tobacco Use    Smoking status: Never    Smokeless tobacco: Never   Vaping Use    Vaping Use: Never used   Substance and Sexual Activity    Alcohol use: Yes     Comment: rare    Drug use: Never    Sexual activity: Not on file   Other Topics Concern    Not on file   Social History Narrative    Not on file     Social Determinants of Health     Financial Resource Strain: Low Risk     Difficulty of Paying Living Expenses: Not hard at all   Food Insecurity: No Food Insecurity    Worried About Running Out of Food in the Last Year: Never true    Ran Out of Food in the Last Year: Never true   Transportation Needs: Not on file   Physical Activity: Not on file   Stress: Not on file   Social Connections: Not on file   Intimate Partner Violence: Not on file   Housing Stability: Not on file     Family History   Problem Relation Age of Onset    Breast Cancer Mother 77    Other Father 59        brain aneurysm    Rheum Arthritis Sister     Heart Disease Sister         from covid    Breast Cancer Maternal Grandmother 54    Breast Cancer Maternal Aunt 65      Allergies   Allergen Reactions    Morphine Hives and Swelling     Morphine      [place] [Macadamia Nut Oil] Swelling      /74 (Site: Left Upper Arm, Position: Sitting, Cuff Size: Medium Adult)   Pulse 91   Ht 5' 9\" (1.753 m)   Wt 221 lb (100.2 kg)   BMI 32.64 kg/m²      ROS:  Constitutional Negative for fever and chills   HEENT Negative for ear discharge, ear pain, nosebleed   Eyes Negative for photophobia, pain and discharge   Respiratory Negative for hemoptysis and sputum   Cardiovascular Negative for orthopnea, claudication and PND   Gastrointestinal Negative for abdominal pain, diarrhea, blood in stool   Musculoskeletal Negative for joint pain, negative for myalgia   Skin Negative for rash or itching   Endo/heme/allergies Negative for polydipsia, environmental allergy   Psychiatric/behavioral Negative for suicidal ideation. Patient is not anxious   General examination:    Head: Normocephalic, atraumatic  Eyes: Extraocular movements intact  Lungs: Respirations unlabored, chest wall no deformity  ENT: Normal external ear canals, no sinus tenderness  Heart: Regular rate rhythm  Abdomen: No masses, tenderness  Extremities: No cyanosis or edema, 2+ pulses  Skin: Intact, normal skin color    Neurological examination:    Mental status   Alert and oriented; intact memory with no confusion, speech or language problems; no hallucinations or delusions     Cranial nerves   II - visual fields intact to confrontation                                                III, IV, VI - extra-ocular muscles full: no pupillary defect; no VALERY, no nystagmus, no ptosis                                                                      V - normal facial sensation                                                               VII - normal facial symmetry                                                             VIII - intact hearing                                                                             IX, X - symmetrical palate                                                                  XI - symmetrical shoulder shrug                                                       XII - midline tongue without atrophy or fasciculation     Motor function  Normal muscle bulk and tone; normal power 5/5, including fine motor movements     Sensory function Intact to touch, pin, vibration, proprioception     Cerebellar Intact fine motor movement.  No involuntary movements or tremors     Reflex function Intact 2+ DTR and symmetric. Negative Babinski     Gait                  Normal         Lab Results   Component Value Date    LDLCHOLESTEROL 226 (H) 09/14/2022     No components found for: CHLPL  Lab Results   Component Value Date    TRIG 220 (H) 09/14/2022    TRIG 137 08/03/2021    TRIG 120 07/28/2021     Lab Results   Component Value Date    HDL 53 09/14/2022    HDL 55 08/03/2021    HDL 51 07/28/2021     No results found for: LDLCALC  No results found for: LABVLDL  Lab Results   Component Value Date    LABA1C 5.7 01/25/2022     Lab Results   Component Value Date     01/25/2022     No results found for: [id number]   Neurological work up:  CT head  CTA head and neck  MRI brain   2 D echo     Assessment and Recommendations:   Chronic migraine headaches without aura with status migrainosus    The patient has failed at least 2 prophylactic medications, she  has headaches lasting more than 4 hours a day and has more than 15 headache days in a month. In my opinion, the patient would be a good candidate for Botox injections for her migraines. The patient meets the diagnosis for chronic migraine without aura, intractable, with status migrainosus (G43.711)    As abortive treatment, I have given her a prescription for Ubrelvy 50 mg to be can as needed. I will get MRI scan of the brain neurology structural brain abnormality. Jeffery Han MD  Neurology    This note is created with the assistance of a speech-recognition program. While intending to generate a document that actually reflects the content of the visit, the document can still have some errors including those of syntax and sound a- like substitutions which may escape proofreading. In such instances, actual meaning can be extrapolated by contextual derivation.

## 2022-09-19 NOTE — PROGRESS NOTES
[facility]  [facility], [address], [address] [place] [address]  Ph: [phone] or [phone]  FAX: [phone]    Jimbo Benitez M.D.  Shelia Arciniega M.D. Diamond Neff M.D. Montse Huynh M.D. Indication for treatment: Chronic migraine without aura, intractable, with status migrainosus (G 43.711)  Consent form was signed. Please see the associated scanned paper. Potential risks and benefits for the procedure were explained to the patient. Procedure: 30-gauge half inch needle was used and 200 U vial Botox was prepared with 4ml 0.9% NS.155 units of Botox were used and 45 units of Botox were discarded.    Botox was injected at 31 different sites as follows:   Order  Muscle  Units injected    A  Corrugator1  10 units at 2 div sites    B  Procerus  5 Units in 1 site    C  Frontalis¹  20 Units div. 4 sites    D  Temporalis¹  40 Units div 8 site    E  Occipitalis¹  30 Units div. 6 sites    F  Cervical paraspinal muscles¹  20 Units div 4 sites    G  Trapezius¹  30 Units div 6 sites    Total Dose  155 Units div 31 sites    2 Dose distributed bilaterally  Blood loss: Less than 1 cc  Complications: none

## 2022-09-30 DIAGNOSIS — M25.559 HIP PAIN: Primary | ICD-10-CM

## 2022-10-01 ASSESSMENT — ENCOUNTER SYMPTOMS
WHEEZING: 0
EYE DISCHARGE: 0
CONSTIPATION: 0
ABDOMINAL PAIN: 0
TROUBLE SWALLOWING: 0
COUGH: 0
BACK PAIN: 1
SHORTNESS OF BREATH: 0
DIARRHEA: 0

## 2022-10-04 ENCOUNTER — TELEPHONE (OUTPATIENT)
Dept: NEUROLOGY | Age: 52
End: 2022-10-04

## 2022-10-10 ENCOUNTER — OFFICE VISIT (OUTPATIENT)
Dept: NEUROLOGY | Age: 52
End: 2022-10-10
Payer: COMMERCIAL

## 2022-10-10 VITALS
DIASTOLIC BLOOD PRESSURE: 74 MMHG | BODY MASS INDEX: 33.53 KG/M2 | HEIGHT: 69 IN | SYSTOLIC BLOOD PRESSURE: 119 MMHG | HEART RATE: 107 BPM | WEIGHT: 226.4 LBS

## 2022-10-10 DIAGNOSIS — G43.711 CHRONIC MIGRAINE WITHOUT AURA, WITH INTRACTABLE MIGRAINE, SO STATED, WITH STATUS MIGRAINOSUS: Primary | ICD-10-CM

## 2022-10-10 PROCEDURE — 99213 OFFICE O/P EST LOW 20 MIN: CPT | Performed by: PSYCHIATRY & NEUROLOGY

## 2022-10-10 NOTE — PROGRESS NOTES
[address]  [facility], [address] [place] [address], [address] [facility]  Ph: [phone] or [phone]  FAX: [phone]        Reason for consult: Headache disorder    I had the pleasure of seeing your patient in neurology consultation for her symptoms. As you would recall Kenny Newman is a 47 y.o. yo female. The patient gives a previous history of migraine headaches. The headaches are throbbing in nature, associate with nausea, vomiting, photophobia, phonophobia. Headaches are more frequent last 2 years. He reports having 4-5 severe headaches and restless days she has mild to moderate headaches. She denies having any aura nor any weakness numbness tingling in the extremities. No change in nature of the headaches in last many years. Previously, the patient has taken Zomig and Imitrex as abortive treatment without relief. She has taken Inderal and amitriptyline as a preventive medication without improvement. Today, the patient reports to the office following a round of Botox injections on 9/16/2022. The patient reports to high efficacy with the Botox injections, and she endorses only migraine since the visit. Patient was unaware that Virginia Gregty was approved by her insurance, but she plans to initiate this medication for abortive treatment. She is agreeable to continuing Botox injections. Past Medical History:   Diagnosis Date    Asthma     Attention deficit hyperactivity disorder (ADHD), predominantly inattentive type     early 90's    COVID-19 01/05/2022    cough, body aches, fatigue, headache, vomiting, temp 99. X 6 DAYS   [facility]     COVID-19 10/2020    symptoms X 19 days    Hashimoto's thyroiditis     Hip arthritis     Hyperlipidemia     Hypothyroidism     Lumbar disc disease     Lupus ([facility] [address]     Migraine with aura and without status migrainosus, not intractable     Under care of team 01/25/2022    PCP - Lyndsay Harrell CNP - LAST VISIT -  1/11/22 Under care of team 01/25/2022    RHEUMATOLOGY - DR. Soham Arellano - LASY VISIT 12/2021    Under care of team 01/25/2022    ORTHO - DR. BROWN - LAST VISIT 1/2022     Past Surgical History:   Procedure Laterality Date    BLADDER SUSPENSION      at 29 yo during hysterectomy    HYSTERECTOMY (CERVIX STATUS UNKNOWN)      HYSTERECTOMY, VAGINAL      at 29 yo for h/o prolapsed uterus    TOTAL HIP ARTHROPLASTY Right 02/14/2022    RIGHT HIP TOTAL ARTHROPLASTY ANTERIOR APPROACH - MEDACTA performed by Bibiana Yao DO at [address] [place] [address] Left      Social History     Socioeconomic History    Marital status: Single     Spouse name: Not on file    Number of children: Not on file    Years of education: Not on file    Highest education level: Not on file   Occupational History    Not on file   Tobacco Use    Smoking status: Never    Smokeless tobacco: Never   Vaping Use    Vaping Use: Never used   Substance and Sexual Activity    Alcohol use: Yes     Comment: rare    Drug use: Never    Sexual activity: Not on file   Other Topics Concern    Not on file   Social History Narrative    Not on file     Social Determinants of Health     Financial Resource Strain: Low Risk     Difficulty of Paying Living Expenses: Not hard at all   Food Insecurity: No Food Insecurity    Worried About Running Out of Food in the Last Year: Never true    Ran Out of Food in the Last Year: Never true   Transportation Needs: Not on file   Physical Activity: Not on file   Stress: Not on file   Social Connections: Not on file   Intimate Partner Violence: Not on file   Housing Stability: Not on file     Family History   Problem Relation Age of Onset    Breast Cancer Mother 77    Other Father 59        brain aneurysm    Rheum Arthritis Sister     Heart Disease Sister         from covid    Breast Cancer Maternal Grandmother 54    Breast Cancer Maternal Aunt 72      Allergies   Allergen Reactions    Morphine Hives and Swelling Morphine      [place] [Macadamia Nut Oil] Swelling      /74 (Site: Left Upper Arm, Position: Sitting, Cuff Size: Medium Adult)   Pulse (!) 107   Ht 5' 9\" (1.753 m)   Wt 226 lb 6.4 oz (102.7 kg)   BMI 33.43 kg/m²    HEENT [] Hearing Loss  [] Visual Disturbance  [] Tinnitus  [] Eye pain   Respiratory [] Shortness of Breath  [] Cough  [] Snoring   Cardiovascular [] Chest Pain  [] Palpitations  [] Lightheaded   GI [] Constipation  [] Diarrhea  [] Swallowing change  [] Nausea/vomiting    [] Urinary Frequency  [] Urinary Urgency   Musculoskeletal [] Neck pain  [] Back pain  [] Muscle pain  [] Restless legs   Dermatologic [] Skin changes   Neurologic [] Memory loss/confusion  [] Seizures  [] Trouble walking or imbalance  [] Dizziness  [] Sleep disturbance  [] Weakness  [] Numbness  [] Tremors  [] Speech Difficulty  [x] Headaches  [x] Light Sensitivity  [x] Sound Sensitivity   Endocrinology []Excessive thirst  []Excessive hunger   Psychiatric [] Anxiety/Depression  [] Hallucination   Allergy/immunology []Hives/environmental allergies   Hematologic/lymph [] Abnormal bleeding  [] Abnormal bruising     General examination:    Head: Normocephalic, atraumatic  Eyes: Extraocular movements intact  Lungs: Respirations unlabored, chest wall no deformity  ENT: Normal external ear canals, no sinus tenderness  Heart: Regular rate rhythm  Abdomen: No masses, tenderness  Extremities: No cyanosis or edema, 2+ pulses  Skin: Intact, normal skin color    Neurological examination:    Mental status   Alert and oriented; intact memory with no confusion, speech or language problems; no hallucinations or delusions     Cranial nerves   II - visual fields intact to confrontation                                                III, IV, VI - extra-ocular muscles full: no pupillary defect; no VALERY, no nystagmus, no ptosis                                                                      V - normal facial sensation VII - normal facial symmetry                                                             VIII - intact hearing                                                                             IX, X - symmetrical palate                                                                  XI - symmetrical shoulder shrug                                                       XII - midline tongue without atrophy or fasciculation     Motor function  Normal muscle bulk and tone; normal power 5/5, including fine motor movements     Sensory function Intact to touch, pin, vibration, proprioception     Cerebellar Intact fine motor movement. No involuntary movements or tremors     Reflex function Intact 2+ DTR and symmetric. Negative Babinski     Gait                  Normal         Lab Results   Component Value Date    LDLCHOLESTEROL 226 (H) 09/14/2022     No components found for: CHLPL  Lab Results   Component Value Date    TRIG 220 (H) 09/14/2022    TRIG 137 08/03/2021    TRIG 120 07/28/2021     Lab Results   Component Value Date    HDL 53 09/14/2022    HDL 55 08/03/2021    HDL 51 07/28/2021     No results found for: LDLCALC  No results found for: LABVLDL  Lab Results   Component Value Date    LABA1C 5.7 01/25/2022     Lab Results   Component Value Date     01/25/2022     No results found for: [id number]   Neurological work up:  CT head  CTA head and neck  MRI brain   2 D echo     Assessment and Recommendations:   Chronic migraine headaches without aura with status migrainosus    The patient reports following a round of Botox injections on 9/16/2022. She endorses significant relief and has experienced one breakthrough headache within the last month. I recommend she continued Botox injections every three months for headache prophylaxis in addition to Ubrelvy 50 mg as needed for abortive treatment. All medication side effects were discussed and questions answered.     Patient to follow up with next Botox procedure or sooner if symptoms worsen. This note is created with the assistance of a speech-recognition program. While intending to generate a document that actually reflects the content of the visit, the document can still have some errors including those of syntax and sound a- like substitutions which may escape proofreading. In such instances, actual meaning can be extrapolated by contextual derivation. Scribe Attestation:   By signing my name below, Beatrice Pelletier, attest that this documentation has been prepared under the direction and in the presence of Danielle Villa MD.    Electronically Signed: Brody Jacobo 10/10/22. 11:18 AM    I, Concha Doan MD, personally performed the services described in this documentation. All medical record entries made by the scribe were at my direction and in my presence. I have reviewed the chart and discharge instructions (if applicable) and agree that the record reflects my personal performance and is accurate and complete.     Electronically Signed: Concha Doan 10/10/2022 1:00 PM    Diplomate, American Board of Psychiatry and Neurology  Diplomate, American Board of Clinical Neurophysiology  Diplomate, American Board of Epilepsy

## 2022-10-17 ENCOUNTER — HOSPITAL ENCOUNTER (OUTPATIENT)
Dept: MRI IMAGING | Age: 52
Discharge: HOME | End: 2022-10-19
Payer: COMMERCIAL

## 2022-10-17 DIAGNOSIS — G43.711 CHRONIC MIGRAINE WITHOUT AURA, WITH INTRACTABLE MIGRAINE, SO STATED, WITH STATUS MIGRAINOSUS: ICD-10-CM

## 2022-10-17 LAB
BUN BLDV-MCNC: 10 MG/DL (ref 6–20)
CREAT SERPL-MCNC: 0.74 MG/DL (ref 0.5–0.9)
GFR SERPL CREATININE-BSD FRML MDRD: >60 ML/MIN/1.73M2

## 2022-10-17 PROCEDURE — 36415 COLL VENOUS BLD VENIPUNCTURE: CPT

## 2022-10-17 PROCEDURE — 70553 MRI BRAIN STEM W/O & W/DYE: CPT

## 2022-10-17 PROCEDURE — 82565 ASSAY OF CREATININE: CPT

## 2022-10-17 PROCEDURE — 6360000004 HC RX CONTRAST MEDICATION: Performed by: PSYCHIATRY & NEUROLOGY

## 2022-10-17 PROCEDURE — 84520 ASSAY OF UREA NITROGEN: CPT

## 2022-10-17 PROCEDURE — A9579 GAD-BASE MR CONTRAST NOS,1ML: HCPCS | Performed by: PSYCHIATRY & NEUROLOGY

## 2022-10-17 PROCEDURE — 2580000003 HC RX 258: Performed by: PSYCHIATRY & NEUROLOGY

## 2022-10-17 RX ORDER — SODIUM CHLORIDE 0.9 % (FLUSH) 0.9 %
10 SYRINGE (ML) INJECTION ONCE
Status: COMPLETED | OUTPATIENT
Start: 2022-10-17 | End: 2022-10-17

## 2022-10-17 RX ADMIN — GADOTERIDOL 20 ML: 279.3 INJECTION, SOLUTION INTRAVENOUS at 14:11

## 2022-10-17 RX ADMIN — SODIUM CHLORIDE, PRESERVATIVE FREE 10 ML: 5 INJECTION INTRAVENOUS at 14:11

## 2022-11-01 DIAGNOSIS — F90.0 ATTENTION DEFICIT HYPERACTIVITY DISORDER (ADHD), PREDOMINANTLY INATTENTIVE TYPE: ICD-10-CM

## 2022-11-01 DIAGNOSIS — R11.0 NAUSEA: ICD-10-CM

## 2022-11-01 RX ORDER — ONDANSETRON 4 MG/1
4 TABLET, ORALLY DISINTEGRATING ORAL 3 TIMES DAILY PRN
Qty: 30 TABLET | Refills: 2 | Status: SHIPPED | OUTPATIENT
Start: 2022-11-01

## 2022-11-01 RX ORDER — DEXTROAMPHETAMINE SACCHARATE, AMPHETAMINE ASPARTATE, DEXTROAMPHETAMINE SULFATE AND AMPHETAMINE SULFATE 5; 5; 5; 5 MG/1; MG/1; MG/1; MG/1
20 TABLET ORAL 2 TIMES DAILY
Qty: 60 TABLET | Refills: 0 | Status: SHIPPED | OUTPATIENT
Start: 2022-11-01 | End: 2022-12-01

## 2022-11-07 ENCOUNTER — OFFICE VISIT (OUTPATIENT)
Dept: ORTHOPEDIC SURGERY | Age: 52
End: 2022-11-07
Payer: COMMERCIAL

## 2022-11-07 VITALS — WEIGHT: 220.8 LBS | BODY MASS INDEX: 32.7 KG/M2 | HEIGHT: 69 IN

## 2022-11-07 DIAGNOSIS — M25.559 HIP PAIN: Primary | ICD-10-CM

## 2022-11-07 PROCEDURE — 99213 OFFICE O/P EST LOW 20 MIN: CPT | Performed by: ORTHOPAEDIC SURGERY

## 2022-11-07 RX ORDER — MELOXICAM 15 MG/1
15 TABLET ORAL DAILY
Qty: 30 TABLET | Refills: 3 | Status: SHIPPED | OUTPATIENT
Start: 2022-11-07

## 2022-11-09 ASSESSMENT — ENCOUNTER SYMPTOMS
EYE DISCHARGE: 0
ROS SKIN COMMENTS: NEGATIVE FOR RASH
SHORTNESS OF BREATH: 0
ABDOMINAL PAIN: 0

## 2022-11-09 NOTE — PROGRESS NOTES
[address] [facility]TS MEDICINE  [facility]  [address] [id number]  Dept: [phone]  Dept Fax: [phone]        Ambulatory Follow Up      Subjective:   Trang Sood is a 47y.o. year old female who presents to our office today for routine followup regarding her   1. Hip pain    . Chief Complaint   Patient presents with    Hip Pain     Left x3 months       HPI  Leopoldo American is a 72-year-old female who presents the office today for recheck. She underwent right total hip arthroplasty on 2/14/2022 and very happy with the results. She has had left hip and groin pain over the last 3 months. She has tried modifying her activities and continues to have pain and would like treatment and evaluation for that. Review of Systems   Constitutional:  Positive for activity change. Negative for fever. HENT:  Negative for dental problem. Eyes:  Negative for discharge. Respiratory:  Negative for shortness of breath. Cardiovascular:  Negative for chest pain. Gastrointestinal:  Negative for abdominal pain. Genitourinary: Negative. Musculoskeletal:  Positive for arthralgias. Skin:         Negative for rash   Neurological:  Positive for weakness. Psychiatric/Behavioral:  Negative for confusion. I have reviewed the CC, HPI, ROS, PMH, FHX, Social History, and if not present in this note, I have reviewed in the patient's chart. I agree with the documentation provided by other staff and have reviewed their documentation prior to providing my signature indicating agreement. Objective :   Ht 5' 9\" (1.753 m)   Wt 220 lb 12.8 oz (100.2 kg)   BMI 32.61 kg/m²  Body mass index is 32.61 kg/m². General: Trang Sood is a 47 y.o. female who is alert and oriented and sitting comfortably in our office.   Ortho Exam  MS:  Patient ambulates with mild shortening weightbearing phase and antalgic gait to the Left lower extremity. There is no erythema, warmth, skin lesions, signs of infection. Positive hip logroll and Stinchfield test is noted. Patient has full range of motion of the Left knee and ankle. Motor, sensory, and vascular examination to the Left lower extremity is intact without focal deficits. Patient has full range of motion of the lumbosacral spine. Neuro: alert and oriented to person and place. Eyes: Extra-ocular muscles intact  Mouth: Oral mucosa moist. No perioral lesions  Pulm: Respirations unlabored and regular. Symmetric chest excursion without outward deformity is noted. Skin: warm, well perfused  Psych:   Patient has good fund of knowledge and displays understanging of exam, diagnosis, and plan. Radiology:         XR HIP 1 VW W PELVIS LEFT    Result Date: 11/9/2022  History: Left hip pain Findings: Low AP pelvis and frog-leg lateral x-ray of the left hip done in the office today shows mild joint space narrowing and mild subchondral cystic changes at the femoral head and acetabulum. No further evidence of fracture, subluxation, dislocation, radiopaque foreign body, radiopaque tumors noted. Right total hip arthroplasty appears to be in good position without complications. Moderate degenerative changes lower visualized lumbar segments is noted. Impression: Left hip x-rays as described above. Assessment:      1. Hip pain         Plan:      The patient continues to have left hip pain. She would like to try Mobic as an anti-inflammatory continue her home exercise program.  I plan to see her back as needed. Follow up:No follow-ups on file. Orders Placed This Encounter   Medications    meloxicam (MOBIC) 15 MG tablet     Sig: Take 1 tablet by mouth daily     Dispense:  30 tablet     Refill:  3         No orders of the defined types were placed in this encounter.       This note is created with the assistance of a speech recognition program.  While intending to generate a document that actually reflects the content of the visit, the document can still have some errors including those of syntax and sound a like substitutions which may escape proof reading.   In such instances, actual meaning can be extrapolated by contextual diversion      Electronically signed by Syeda Casarez DO, FAOAO on 11/9/2022 at 11:54 AM

## 2022-11-21 ENCOUNTER — TELEPHONE (OUTPATIENT)
Dept: INTERNAL MEDICINE CLINIC | Age: 52
End: 2022-11-21

## 2022-11-21 NOTE — TELEPHONE ENCOUNTER
Medical surgical clearance request received 11/21/22    Surgeon: Dr. Cony Hopkins    Procedure: Jame Vance Robotic    Date of Procedure: 12/20/2022    Last appt: 09/13/2022    Next appt: 12/20/2022    PATs received:   NO    Surgery was rescheduled

## 2022-12-05 NOTE — H&P
HISTORY and [facility] [address] [id number]       NAME:  Davis Ramírez  MRN: [id number]   YOB: 1970   Date: 12/6/2022   Age: 47 y.o. Gender: female       COMPLAINT AND PRESENT HISTORY:     Davis Ramírez is 47 y.o.,   female, undergoing  for Robotic Laparoscopic Cholecystectomy ROBOTIC XI. Patient has hx of SYMPTOMATIC CHOLELITHIASIS & CHRONIC CHOLECYSTITIS W/CALCULOUS     Gall bladder ultrasound showed Gallstones. Pt complains of RUQ and Epigastric pains. Intermittenly    The pain is for about 4 days in July of this year. in character,  Rates pain at 5/10. The pain radiates to the Rt back but not to the shoulder. She states her symptoms have nearly resolved, states she does have discomfort in her mid abdomen in the postprandial state, she reports diarrhea if she eats any fatty foods. Symptoms started 5 months ago, In July. for about 4 days in July of this year. Pt has intolerance to greasy and spicy foods. \" I'm living on saltines\". Pt also complains of nausea with no vomiting. Patient states that the nausea has been more prominent with metallic taste in her mouth, she is on Zofran. She denies any current diarrhea or constipation. No change in the color of the stools. Any significant medical hx: Asthma, HLD, THYROID, LUPUS- stable,  hashimoto's thyroiditis    Associated medications: albuterol, synthroid    Pt denies any  chest pain or SOB. No fever or chills. Patient denies any personal hx of blood clots. Functional Capacity per patient:              1. Patient is able to walk 2 city blocks on level ground without SOB. 2. Patient is able to climb 2 flights of stairs without SOB. Any anticoagulants or blood thinners: mobic , motrin    Patient denies any personal or family problems with anesthesia. Imaging:     RIGHT UPPER QUADRANT ULTRASOUND    7/15/2022   Impression  Cholelithiasis  Exam otherwise unremarkable.     PAST MEDICAL HISTORY Past Medical History:   Diagnosis Date    Asthma     Attention deficit hyperactivity disorder (ADHD), predominantly inattentive type     early 90's    COVID-19 01/05/2022    cough, body aches, fatigue, headache, vomiting, temp 99. X 6 DAYS   TESTED AT [address] COVID-19 10/2020    symptoms X 19 days    COVID-19     x 3    Hashimoto's thyroiditis     Hip arthritis     Hyperlipidemia     Hypothyroidism     Lumbar disc disease     Lupus ([address]     Migraine with aura and without status migrainosus, not intractable     Under care of team 01/25/2022    PCP - Sherie Campos CNP - LAST VISIT -  1/11/22    Under care of team 01/25/2022    RHEUMATOLOGY - DR. Saravanan Fontaine - LASY VISIT 12/2021    Under care of team 01/25/2022    ORTHO - DR. BROWN - LAST VISIT 1/2022         SURGICAL HISTORY       Past Surgical History:   Procedure Laterality Date    BLADDER SUSPENSION      at 31 yo during hysterectomy    ENDOSCOPY, COLON, DIAGNOSTIC      HYSTERECTOMY (CERVIX STATUS UNKNOWN)      HYSTERECTOMY, VAGINAL      at 31 yo for h/o prolapsed uterus    TOTAL HIP ARTHROPLASTY Right 02/14/2022    RIGHT HIP TOTAL ARTHROPLASTY ANTERIOR APPROACH - [facility] performed by Rajinder Lowe DO at [address] [facility] Left        FAMILY HISTORY       Family History   Problem Relation Age of Onset    Breast Cancer Mother 77    Other Father 59        brain aneurysm    Rheum Arthritis Sister     Heart Disease Sister         from [facility]    Breast Cancer Maternal Grandmother 54    Breast Cancer Maternal Aunt 72       SOCIAL HISTORY       Social History     Socioeconomic History    Marital status: Single     Spouse name: None    Number of children: None    Years of education: None    Highest education level: None   Tobacco Use    Smoking status: Never    Smokeless tobacco: Never   Vaping Use    Vaping Use: Never used   Substance and Sexual Activity    Alcohol use: Yes     Comment: rare    Drug use: Never Social Determinants of Health     Financial Resource Strain: Low Risk     Difficulty of Paying Living Expenses: Not hard at all   Food Insecurity: No Food Insecurity    Worried About [facility] in the Last Year: Never true    Ran Out of Food in the Last Year: Never true           REVIEW OF SYSTEMS      Allergies   Allergen Reactions    Morphine Hives and Swelling     Morphine      [place] [Macadamia Nut Oil] Swelling       Current Outpatient Medications on File Prior to Encounter   Medication Sig Dispense Refill    meloxicam (MOBIC) 15 MG tablet Take 1 tablet by mouth daily 30 tablet 3    ondansetron (ZOFRAN-ODT) 4 MG disintegrating tablet Take 1 tablet by mouth 3 times daily as needed for Nausea 30 tablet 2    amphetamine-dextroamphetamine (ADDERALL) 20 MG tablet Take 1 tablet by mouth 2 times daily for 30 days.  60 tablet 0    albuterol sulfate HFA (PROVENTIL;VENTOLIN;PROAIR) 108 (90 Base) MCG/ACT inhaler Inhale 2 puffs into the lungs every 4 hours as needed for Wheezing 18 g 2    busPIRone (BUSPAR) 5 MG tablet Take 1 tablet by mouth 2 times daily 60 tablet 2    ergocalciferol (ERGOCALCIFEROL) 1.25 MG (72159 UT) capsule Take 50,000 Units by mouth Twice a Week Indications: Tues, Sun      RA ARTHRITIS PAIN RELIEF 650 MG extended release tablet Take 1,300 mg by mouth every 8 hours as needed for Pain or Fever       ibuprofen (ADVIL;MOTRIN) 800 MG tablet Take 800 mg by mouth every 6 hours as needed for Pain      cetirizine (ZYRTEC) 10 MG tablet Take 10 mg by mouth 2 times daily      levothyroxine (SYNTHROID) 175 MCG tablet Take 1 tablet by mouth every morning (before breakfast) 90 tablet 1    fluticasone (FLONASE) 50 MCG/ACT nasal spray 2 sprays by Each Nostril route daily 16 g 0    EPINEPHrine (EPIPEN 2-DINO) 0.3 MG/0.3ML SOAJ injection Inject 0.3 mLs into the muscle once for 1 dose Use as directed for allergic reaction, facial swelling 0.3 mL 1     No current facility-administered medications on file prior to encounter. General health:  Fairly good. No fever or chills. Skin:  No itching, redness or rash. HEENT:  No headache, epistaxis or sore throat. Neck:  No pain, stiffness or masses. Cardiovascular/Respiratory system:  No chest pain, palpitation or shortness of breath. Gastrointestinal tract: See HPI. Genitourinary:  No burning on micturition. No hesitancy, urgency, frequency or discoloration of urine. Locomotor:  No bone or joint pains. No swelling. Neuropsychiatric:  No referable complaints. GENERAL PHYSICAL EXAM:     Vitals: /84   Pulse 83   Temp 97.3 °F (36.3 °C)   Resp 18   Ht 5' 10\" (1.778 m)   Wt 215 lb (97.5 kg)   SpO2 99%   BMI 30.85 kg/m²  Body mass index is 30.85 kg/m². GENERAL APPEARANCE:   Denece Child is 47 y.o., female, moderately obese, nourished, conscious, alert. Does not appear to be distress or pain at this time. SKIN:  Warm, dry, no cyanosis or jaundice. HEAD:  Normocephalic, atraumatic, no swelling or tenderness. EYES:  Pupils equal, reactive to light. EARS:  No discharge, no marked hearing loss. NOSE:  No rhinorrhea, epistaxis or septal deformity. THROAT:  Not congested. No ulceration bleeding or discharge. NECK:  No stiffness, trachea central.                  CHEST:  Symmetrical and equal on expansion. HEART:  RRR S1 > S2. No audible murmurs or gallops. LUNGS:  Equal on expansion, normal breath sounds. No adventitious sounds. ABDOMEN:  Obese. Soft on palpation. No localized tenderness. No guarding or rigidity. LYMPHATICS:  No palpable cervical lymphadenopathy. LOCOMOTOR, BACK AND SPINE:  No tenderness or deformities.                  EXTREMITIES: Symmetrical, no pretibial edema. No calf tenderness. No discoloration or ulcerations. NEUROLOGIC:  The patient is conscious, alert, oriented,Cranial nerve II-XII intact, taste and smell were not examined. No apparent focal sensory or motor deficits. LAB REVIEW            Lab Results   Component Value Date    WBC 7.5 12/06/2022    HGB 12.8 12/06/2022    HCT 39.1 12/06/2022    MCV 88.3 12/06/2022     12/06/2022     Lab Results   Component Value Date/Time     12/06/2022 11:45 AM    K 3.8 12/06/2022 11:45 AM    CL 99 12/06/2022 11:45 AM    CO2 27 12/06/2022 11:45 AM    BUN 9 12/06/2022 11:45 AM    CREATININE 0.79 12/06/2022 11:45 AM    GLUCOSE 93 12/06/2022 11:45 AM    CALCIUM 9.2 12/06/2022 11:45 AM                                              EKG REVIEW          Last EKG was on 7/14/22 reads   Normal sinus rhythm  Normal ECG          PROVISIONAL DIAGNOSES / SURGERY:      CHOLECYSTECTOMY LAPAROSCOPIC ROBOTIC XI    SYMPTOMATIC CHOLELITHIASIS & CHRONIC CHOLECYSTITIS W/CALCULOUS      Patient Active Problem List    Diagnosis Date Noted    Obesity (BMI 30.0-34.9) 05/12/2022    Lupus ([address] 04/22/2022    H/O total hip arthroplasty, right 02/14/2022    Prediabetes 01/31/2022    Hip pain 09/16/2021    Arthritis of right hip 09/16/2021    Lumbar degenerative disc disease 09/16/2021    Nut allergy 04/18/2021    Attention deficit hyperactivity disorder (ADHD), predominantly inattentive type 04/18/2021    Hashimoto's thyroiditis 04/18/2021    Family history of brain aneurysm 04/18/2021    Family history of systemic lupus erythematosus 04/18/2021    Chronic midline low back pain without sciatica 10/15/2017       CLEARANCE:   Based on my personal evaluation of patient including review of patient's chart, no clearance required for scheduled surgery.     ALBERTO HARRISON, TAMARA - CNP on 12/6/2022 at 11:41 AM    Total time spent on encounter- PAT provider minutes: 31-40 minutes

## 2022-12-06 ENCOUNTER — HOSPITAL ENCOUNTER (OUTPATIENT)
Dept: PREADMISSION TESTING | Age: 52
Discharge: HOME | End: 2022-12-10
Attending: SURGERY | Admitting: SURGERY
Payer: COMMERCIAL

## 2022-12-06 VITALS
BODY MASS INDEX: 30.78 KG/M2 | TEMPERATURE: 97.3 F | WEIGHT: 215 LBS | RESPIRATION RATE: 18 BRPM | DIASTOLIC BLOOD PRESSURE: 84 MMHG | SYSTOLIC BLOOD PRESSURE: 126 MMHG | HEART RATE: 83 BPM | OXYGEN SATURATION: 99 % | HEIGHT: 70 IN

## 2022-12-06 LAB
ABSOLUTE EOS #: 0.6 K/UL (ref 0–0.4)
ABSOLUTE LYMPH #: 2 K/UL (ref 1–4.8)
ABSOLUTE MONO #: 0.4 K/UL (ref 0.1–1.3)
ALBUMIN SERPL-MCNC: 4.6 G/DL (ref 3.5–5.2)
ALP BLD-CCNC: 83 U/L (ref 35–104)
ALT SERPL-CCNC: 19 U/L (ref 5–33)
AMYLASE: 63 U/L (ref 28–100)
ANION GAP SERPL CALCULATED.3IONS-SCNC: 8 MMOL/L (ref 9–17)
AST SERPL-CCNC: 20 U/L
BASOPHILS # BLD: 1 % (ref 0–2)
BASOPHILS ABSOLUTE: 0.1 K/UL (ref 0–0.2)
BILIRUB SERPL-MCNC: 0.8 MG/DL (ref 0.3–1.2)
BILIRUBIN DIRECT: 0.1 MG/DL
BILIRUBIN, INDIRECT: 0.7 MG/DL (ref 0–1)
BUN BLDV-MCNC: 9 MG/DL (ref 6–20)
CALCIUM SERPL-MCNC: 9.2 MG/DL (ref 8.6–10.4)
CHLORIDE BLD-SCNC: 99 MMOL/L (ref 98–107)
CO2: 27 MMOL/L (ref 20–31)
CREAT SERPL-MCNC: 0.79 MG/DL (ref 0.5–0.9)
EOSINOPHILS RELATIVE PERCENT: 9 % (ref 0–4)
GFR SERPL CREATININE-BSD FRML MDRD: >60 ML/MIN/1.73M2
GLUCOSE BLD-MCNC: 93 MG/DL (ref 70–99)
HCT VFR BLD CALC: 39.1 % (ref 36–46)
HEMOGLOBIN: 12.8 G/DL (ref 12–16)
LIPASE: 24 U/L (ref 13–60)
LYMPHOCYTES # BLD: 27 % (ref 24–44)
MCH RBC QN AUTO: 28.8 PG (ref 26–34)
MCHC RBC AUTO-ENTMCNC: 32.7 G/DL (ref 31–37)
MCV RBC AUTO: 88.3 FL (ref 80–100)
MONOCYTES # BLD: 6 % (ref 1–7)
PDW BLD-RTO: 14.7 % (ref 11.5–14.9)
PLATELET # BLD: 303 K/UL (ref 150–450)
PMV BLD AUTO: 7.6 FL (ref 6–12)
POTASSIUM SERPL-SCNC: 3.8 MMOL/L (ref 3.7–5.3)
RBC # BLD: 4.43 M/UL (ref 4–5.2)
SEG NEUTROPHILS: 57 % (ref 36–66)
SEGMENTED NEUTROPHILS ABSOLUTE COUNT: 4.3 K/UL (ref 1.3–9.1)
SODIUM BLD-SCNC: 134 MMOL/L (ref 135–144)
TOTAL PROTEIN: 7.3 G/DL (ref 6.4–8.3)
TSH SERPL DL<=0.05 MIU/L-ACNC: 73.57 UIU/ML (ref 0.3–5)
WBC # BLD: 7.5 K/UL (ref 3.5–11)

## 2022-12-06 PROCEDURE — APPSS45 APP SPLIT SHARED TIME 31-45 MINUTES: Performed by: NURSE PRACTITIONER

## 2022-12-06 PROCEDURE — 83690 ASSAY OF LIPASE: CPT

## 2022-12-06 PROCEDURE — 80048 BASIC METABOLIC PNL TOTAL CA: CPT

## 2022-12-06 PROCEDURE — 84443 ASSAY THYROID STIM HORMONE: CPT

## 2022-12-06 PROCEDURE — 82150 ASSAY OF AMYLASE: CPT

## 2022-12-06 PROCEDURE — 85025 COMPLETE CBC W/AUTO DIFF WBC: CPT

## 2022-12-06 PROCEDURE — 80076 HEPATIC FUNCTION PANEL: CPT

## 2022-12-06 PROCEDURE — 36415 COLL VENOUS BLD VENIPUNCTURE: CPT

## 2022-12-06 NOTE — DISCHARGE INSTRUCTIONS
Pre-op Instructions For Out-Patient Surgery    Medication Instructions:  Please stop herbs and any supplements now (includes vitamins and minerals). Please contact your surgeon and prescribing physician for pre-op instructions for any blood thinners. Stop Meloxicam & Ibuprofen as directed    If you have inhalers/aerosol treatments at home, please use them the morning of your surgery and bring the inhalers with you to the hospital.    Please take the following medications the morning of your surgery with a sip of water:    Levothyroxine, Buspar, Inhaler    Surgery Instructions:  After midnight before surgery:  Do not eat or drink anything, including water, mints, gum, and hard candy. You may brush your teeth without swallowing. No smoking, chewing tobacco, or street drugs. Please shower or bathe before surgery. If you were given Surgical Scrub Chlorhexidine Gluconate Liquid (CHG), please shower the night before and the morning of your surgery following the detailed instructions you received during your pre-admission visit. Please do not wear any cologne, lotion, powder, deodorant, jewelry, piercings, perfume, makeup, nail polish, hair accessories, or hair spray on the day of surgery. Wear loose comfortable clothing. Leave your valuables at home. Bring a storage case for any glasses/contacts. An adult who is responsible for you MUST drive you home and should be with you for the first 24 hours after surgery. If having out-patient knee and foot surgeries, please arrange for planned crutches, walker, or wheelchair before arriving to the hospital.    The Day of Surgery:  Arrive at [facility] at the time directed by your surgeon and check in at the desk. If you have a living will or healthcare power of , please bring a copy. You will be taken to the pre-op holding area where you will be prepared for surgery.   A physical assessment will be performed by a nurse practitioner or house officer. Your IV will be started and you will meet your anesthesiologist.    When you go to surgery, your family will be directed to the surgical waiting room, where the doctor should speak with them after your surgery. After surgery, you will be taken to the recovery room then when you are awake and stable you will go to the short stay unit for preparation to be discharged. If you use a Bi-PAP or C-PAP machine, please bring it with you and leave it in the car in case it is needed in recovery room.

## 2023-01-10 ENCOUNTER — OFFICE VISIT (OUTPATIENT)
Dept: INTERNAL MEDICINE CLINIC | Age: 53
End: 2023-01-10

## 2023-01-10 ENCOUNTER — HOSPITAL ENCOUNTER (OUTPATIENT)
Facility: CLINIC | Age: 53
Discharge: HOME | End: 2023-01-12
Payer: COMMERCIAL

## 2023-01-10 ENCOUNTER — HOSPITAL ENCOUNTER (OUTPATIENT)
Dept: GENERAL RADIOLOGY | Facility: CLINIC | Age: 53
Discharge: HOME | End: 2023-01-12
Payer: COMMERCIAL

## 2023-01-10 VITALS — WEIGHT: 215 LBS | SYSTOLIC BLOOD PRESSURE: 122 MMHG | DIASTOLIC BLOOD PRESSURE: 80 MMHG | BODY MASS INDEX: 30.85 KG/M2

## 2023-01-10 DIAGNOSIS — R05.3 PERSISTENT DRY COUGH: ICD-10-CM

## 2023-01-10 DIAGNOSIS — E55.9 VITAMIN D DEFICIENCY: ICD-10-CM

## 2023-01-10 DIAGNOSIS — E06.3 HASHIMOTO'S THYROIDITIS: ICD-10-CM

## 2023-01-10 DIAGNOSIS — J45.30 MILD PERSISTENT ASTHMA, UNSPECIFIED WHETHER COMPLICATED: ICD-10-CM

## 2023-01-10 DIAGNOSIS — R06.2 WHEEZING: ICD-10-CM

## 2023-01-10 DIAGNOSIS — K80.20 CALCULUS OF GALLBLADDER WITHOUT CHOLECYSTITIS WITHOUT OBSTRUCTION: ICD-10-CM

## 2023-01-10 DIAGNOSIS — F90.0 ATTENTION DEFICIT HYPERACTIVITY DISORDER (ADHD), PREDOMINANTLY INATTENTIVE TYPE: Primary | ICD-10-CM

## 2023-01-10 PROCEDURE — 71046 X-RAY EXAM CHEST 2 VIEWS: CPT

## 2023-01-10 RX ORDER — LEVOTHYROXINE SODIUM 175 UG/1
175 TABLET ORAL
Qty: 30 TABLET | Refills: 1 | Status: SHIPPED | OUTPATIENT
Start: 2023-01-10

## 2023-01-10 RX ORDER — PREDNISONE 20 MG/1
40 TABLET ORAL DAILY
Qty: 10 TABLET | Refills: 0 | Status: SHIPPED | OUTPATIENT
Start: 2023-01-10 | End: 2023-01-15

## 2023-01-10 RX ORDER — IBUPROFEN 800 MG/1
800 TABLET ORAL 2 TIMES DAILY PRN
Qty: 60 TABLET | Refills: 0 | Status: SHIPPED | OUTPATIENT
Start: 2023-01-10

## 2023-01-10 RX ORDER — DEXTROAMPHETAMINE SACCHARATE, AMPHETAMINE ASPARTATE, DEXTROAMPHETAMINE SULFATE AND AMPHETAMINE SULFATE 5; 5; 5; 5 MG/1; MG/1; MG/1; MG/1
20 TABLET ORAL 2 TIMES DAILY
Qty: 60 TABLET | Refills: 0 | Status: SHIPPED | OUTPATIENT
Start: 2023-01-10 | End: 2023-02-09

## 2023-01-10 RX ORDER — ERGOCALCIFEROL 1.25 MG/1
50000 CAPSULE ORAL
Qty: 8 CAPSULE | Refills: 2 | Status: SHIPPED | OUTPATIENT
Start: 2023-01-12 | End: 2023-04-06

## 2023-01-10 ASSESSMENT — PATIENT HEALTH QUESTIONNAIRE - PHQ9
SUM OF ALL RESPONSES TO PHQ9 QUESTIONS 1 & 2: 0
2. FEELING DOWN, DEPRESSED OR HOPELESS: 0
SUM OF ALL RESPONSES TO PHQ QUESTIONS 1-9: 0
SUM OF ALL RESPONSES TO PHQ QUESTIONS 1-9: 0
1. LITTLE INTEREST OR PLEASURE IN DOING THINGS: 0
SUM OF ALL RESPONSES TO PHQ QUESTIONS 1-9: 0
SUM OF ALL RESPONSES TO PHQ QUESTIONS 1-9: 0

## 2023-01-10 ASSESSMENT — ENCOUNTER SYMPTOMS
DIARRHEA: 1
SINUS PRESSURE: 0
SHORTNESS OF BREATH: 1
WHEEZING: 0
COUGH: 1
ABDOMINAL PAIN: 0
CHEST TIGHTNESS: 1
SORE THROAT: 0

## 2023-01-10 NOTE — PROGRESS NOTES
Visit Information    Have you changed or started any medications since your last visit including any over-the-counter medicines, vitamins, or herbal medicines? no   Are you having any side effects from any of your medications? -  no  Have you stopped taking any of your medications? Is so, why? -  no    Have you seen any other physician or provider since your last visit? No  Have you had any other diagnostic tests since your last visit? No  Have you been seen in the emergency room and/or had an admission to a hospital since we last saw you? No  Have you had your routine dental cleaning in the past 6 months? no    Have you activated your [employer] account? If not, what are your barriers?  Yes     Patient Care Team:  TAMARA Dimas CNP as PCP - General (Internal Medicine)  [facility] Dim[facility]    Medical History Review  Past Medical, Family, and Social History reviewed and does contribute to the patient presenting condition    Health Maintenance   Topic Date Due    Pneumococcal 0-64 years Vaccine (1 - PCV) Never done    DTaP/Tdap/Td vaccine (1 - Tdap) Never done    Colorectal Cancer Screen  Never done    Shingles vaccine (1 of 2) Never done    COVID-19 Vaccine (3 - Booster for Pfizer series) 12/28/2021    Flu vaccine (1) 08/01/2022    A1C test (Diabetic or Prediabetic)  01/25/2023    Breast cancer screen  03/31/2023    Depression Screen  01/10/2024    Lipids  09/14/2027    Hepatitis C screen  Completed    HIV screen  Completed    Hepatitis A vaccine  Aged Out    Hib vaccine  Aged Out    Meningococcal (ACWY) vaccine  Aged Out         [address] [address]  Dept: [phone]  Dept Fax: [phone]    Office Progress/Follow Up Note  Date of patient's visit: 1/10/2023   Patient's Name:  Shawanda Steve  YOB: 1970            Patient Care Team:  TAMARA Dimas CNP as PCP - General (Internal Medicine)  TAMARA Blanco - CNP as PCP - [facility] Empaneled Provider    REASON FOR VISIT: ADHD (Follow up ), Hypothyroidism, and Cough        HISTORY OF PRESENT ILLNESS:      History was obtained from the patient. Len Rubi is a 47 y.o. is here for multiple medical concerns including  ADHD (Follow up ), Hypothyroidism, and Cough    HPI  ADHD - still has not established with psych. States she is doing ok on current dose of Adderall, is working as a nurse on the floor. Denies SE. She had Covid in late December, symptoms overall better but still having dry cough. Not better with otc cough med, does not want tessalon perles. Also with some wheezing, shortness of breath. History of asthma. Hypothyroid-last TSH in Dec for preop testing was very elevated at 73.57. She was advised to schedule in office appt to review results but did not do so and now has been out of medication. Needs referral to new surgeon to schedule cholecystectomy. Patient Active Problem List   Diagnosis    Nut allergy    Attention deficit hyperactivity disorder (ADHD), predominantly inattentive type    Hashimoto's thyroiditis    Family history of brain aneurysm    Family history of systemic lupus erythematosus    Chronic midline low back pain without sciatica    Hip pain    Arthritis of right hip    Lumbar degenerative disc disease    Prediabetes    H/O total hip arthroplasty, right    Lupus (HCC)    Obesity (BMI 30.0-34. 9)       Allergies   Allergen Reactions    Morphine Hives and Swelling     Morphine      [place] [Macadamia Nut Oil] Swelling         MEDICATIONS:     Current Outpatient Medications   Medication Sig Dispense Refill    ibuprofen (ADVIL;MOTRIN) 800 MG tablet Take 1 tablet by mouth 2 times daily as needed for Pain 60 tablet 0    levothyroxine (SYNTHROID) 175 MCG tablet Take 1 tablet by mouth every morning (before breakfast) 30 tablet 1    ergocalciferol (ERGOCALCIFEROL) 1.25 MG (14173 UT) capsule Take 1 capsule by mouth Twice a Week Indications: Tues, Sun 8 capsule 2    amphetamine-dextroamphetamine (ADDERALL) 20 MG tablet Take 1 tablet by mouth 2 times daily for 30 days. 60 tablet 0    ondansetron (ZOFRAN-ODT) 4 MG disintegrating tablet Take 1 tablet by mouth 3 times daily as needed for Nausea 30 tablet 2    albuterol sulfate HFA (PROVENTIL;VENTOLIN;PROAIR) 108 (90 Base) MCG/ACT inhaler Inhale 2 puffs into the lungs every 4 hours as needed for Wheezing 18 g 2    RA ARTHRITIS PAIN RELIEF 650 MG extended release tablet Take 1,300 mg by mouth every 8 hours as needed for Pain or Fever       cetirizine (ZYRTEC) 10 MG tablet Take 10 mg by mouth 2 times daily      fluticasone (FLONASE) 50 MCG/ACT nasal spray 2 sprays by Each Nostril route daily 16 g 0    EPINEPHrine (EPIPEN 2-DINO) 0.3 MG/0.3ML SOAJ injection Inject 0.3 mLs into the muscle once for 1 dose Use as directed for allergic reaction, facial swelling 0.3 mL 1     No current facility-administered medications for this visit. SOCIAL HISTORY    Reviewed and updated. Evens Smith  reports that she has never smoked. She has never used smokeless tobacco.    FAMILY HISTORY:    Reviewed and updated. family history includes Breast Cancer (age of onset: 54) in her maternal grandmother; Breast Cancer (age of onset: 72) in her maternal aunt; Breast Cancer (age of onset: 77) in her mother; Heart Disease in her sister; Other (age of onset: 59) in her father; Rheum Arthritis in her sister. REVIEW OF SYSTEMS:      Review of Systems   Constitutional:  Positive for fatigue. Negative for chills and fever. HENT:  Negative for congestion, ear pain, sinus pressure, sore throat and trouble swallowing. Eyes:  Negative for discharge and visual disturbance. Respiratory:  Positive for cough, chest tightness and shortness of breath. Negative for wheezing. Dry cough at night   Cardiovascular:  Negative for chest pain, palpitations and leg swelling. Gastrointestinal:  Positive for diarrhea. Negative for abdominal pain and constipation. Genitourinary:  Negative for difficulty urinating. Musculoskeletal:  Positive for arthralgias and back pain. Negative for gait problem. Neurological:  Positive for headaches. Negative for dizziness. Psychiatric/Behavioral:  Positive for decreased concentration. Negative for sleep disturbance. The patient is nervous/anxious. PHYSICAL EXAM:      Vitals:    01/10/23 1252   BP: 122/80   Site: Left Upper Arm   Weight: 215 lb (97.5 kg)     BP Readings from Last 3 Encounters:   01/10/23 122/80   12/06/22 126/84   10/10/22 119/74        Physical Exam  Constitutional:       Appearance: She is well-developed. She is obese. HENT:      Head: Normocephalic and atraumatic. Right Ear: External ear normal.      Left Ear: External ear normal.      Nose: Nose normal.   Eyes:      General:         Right eye: No discharge. Left eye: No discharge. Conjunctiva/sclera: Conjunctivae normal.      Pupils: Pupils are equal, round, and reactive to light. Neck:      Thyroid: No thyromegaly. Cardiovascular:      Rate and Rhythm: Normal rate and regular rhythm. Pulses: Normal pulses. Heart sounds: Normal heart sounds. No murmur heard. Pulmonary:      Effort: Pulmonary effort is normal.      Breath sounds: Wheezing (faint) present. Abdominal:      General: Bowel sounds are normal.      Palpations: Abdomen is soft. Tenderness: There is no abdominal tenderness. Musculoskeletal:      Cervical back: Normal range of motion and neck supple. No deformity or tenderness. Thoracic back: No deformity or tenderness. Lumbar back: No deformity or tenderness. Normal range of motion. Right hip: No deformity. Normal range of motion. Left hip: No deformity. Normal range of motion. Right lower leg: No edema. Left lower leg: No edema. Lymphadenopathy:      Cervical: No cervical adenopathy. Skin:     General: Skin is warm and dry. Neurological:      Mental Status: She is alert and oriented to person, place, and time. Gait: Gait normal.   Psychiatric:         Mood and Affect: Mood normal.         Speech: Speech normal.         Behavior: Behavior normal.        LABORATORY FINDINGS:    CBC:   Lab Results   Component Value Date/Time    WBC 7.5 12/06/2022 11:45 AM    HGB 12.8 12/06/2022 11:45 AM     12/06/2022 11:45 AM     BMP:   Lab Results   Component Value Date/Time     12/06/2022 11:45 AM    K 3.8 12/06/2022 11:45 AM    CL 99 12/06/2022 11:45 AM    CO2 27 12/06/2022 11:45 AM    BUN 9 12/06/2022 11:45 AM    CREATININE 0.79 12/06/2022 11:45 AM    GLUCOSE 93 12/06/2022 11:45 AM     HEMOGLOBIN A1C:   Lab Results   Component Value Date/Time    LABA1C 5.7 01/25/2022 10:02 AM       ASSESSMENT AND PLAN:      Visit Diagnoses and Associated Orders       Attention deficit hyperactivity disorder (ADHD), predominantly inattentive type    -  Primary    Controlled on Adderall, continue current dose. Will refer to [facility] for further management    amphetamine-dextroamphetamine (ADDERALL) 20 MG tablet [[id number]]           Hashimoto's thyroiditis        Last TSH elevated.  Patient advised to take Synthroid daily as ordered, repeat TSH in 4 weeks and will go from there    levothyroxine (SYNTHROID) 175 MCG tablet [87593]      TSH [[id number] Custom]   - Future Order         Calculus of gallbladder without cholecystitis without obstruction        Will refer to surgery for eval    Emaline BuddyBet, DO, General Surgery, [facility] [HYI01 Custom]           Persistent dry cough        S/P covid, Will check CXR    XR CHEST STANDARD (2 VW) [[id number] Custom]   - Future Order         Vitamin D deficiency        ergocalciferol (ERGOCALCIFEROL) 1.25 MG (04141 UT) capsule [[id number]]           Wheezing        XR CHEST STANDARD (2 VW) [[id number] Custom]   - Future Order    predniSONE (DELTASONE) 20 MG tablet [[id number]]           Mild persistent asthma, unspecified whether complicated             ORDERS WITHOUT AN ASSOCIATED DIAGNOSIS    ibuprofen (ADVIL;MOTRIN) 800 MG tablet [[id number]]               FOLLOW UP AND INSTRUCTIONS:     Return in 3 months (on 4/10/2023) for routine follow up. Kelsey received counseling on the following healthy behaviors: nutrition, exercise, and medication adherence    Discussed use, benefit, and side effects of prescribed medications. Barriers to medication compliance addressed. All patient questions answered. Pt voiced understanding. TAMARA Gary - CALEB    1/25/2023, 6:21 PM    Please note that this chart was generated using voice recognition Dragon dictation software. Although every effort was made to ensure the accuracy of this automatedtranscription, some errors in transcription may have occurred.

## 2023-01-11 ENCOUNTER — TELEPHONE (OUTPATIENT)
Dept: SURGERY | Age: 53
End: 2023-01-11

## 2023-01-11 NOTE — TELEPHONE ENCOUNTER
1/11/23- Called patient (1st attempt) to schedule new patient with Dr. Amy Enrique.  is full and unable to accept new messages at this time.

## 2023-01-25 ASSESSMENT — ENCOUNTER SYMPTOMS
CONSTIPATION: 0
EYE DISCHARGE: 0
BACK PAIN: 1
TROUBLE SWALLOWING: 0

## 2023-01-30 ENCOUNTER — OFFICE VISIT (OUTPATIENT)
Dept: SURGERY | Age: 53
End: 2023-01-30
Payer: COMMERCIAL

## 2023-01-30 ENCOUNTER — TELEPHONE (OUTPATIENT)
Dept: SURGERY | Age: 53
End: 2023-01-30

## 2023-01-30 VITALS
SYSTOLIC BLOOD PRESSURE: 113 MMHG | OXYGEN SATURATION: 100 % | BODY MASS INDEX: 30.78 KG/M2 | HEIGHT: 70 IN | RESPIRATION RATE: 16 BRPM | WEIGHT: 215 LBS | DIASTOLIC BLOOD PRESSURE: 73 MMHG | HEART RATE: 96 BPM

## 2023-01-30 DIAGNOSIS — K80.20 SYMPTOMATIC CHOLELITHIASIS: Primary | ICD-10-CM

## 2023-01-30 PROCEDURE — 99204 OFFICE O/P NEW MOD 45 MIN: CPT | Performed by: SURGERY

## 2023-01-30 NOTE — TELEPHONE ENCOUNTER
1/30/23- Spoke to patient, surgery scheduled at [facility]. Patient confirmed and information given to patient at clinic visit.        Surgery date/time: 2/17/23 at 12pm  Arrival time: 10am  PAT: 2/2/23 at [address] [place] [address] op: 2/28/23 at 9:10am

## 2023-01-30 NOTE — PROGRESS NOTES
[facility] General Surgery  History & Physical  David Crowell DO    Pt Name: Emanuel Ram  MRN: [id number]  YOB: 1970  Date of evaluation: 1/30/2023  Primary Care Physician: TAMARA Hoff CNP    Chief Complaint: symptomatic cholelithiasis      SUBJECTIVE:    History of Present Illness: This is a 47 y.o.  female who presents for evaluation for the above, she was previously worked up and scheduled for cholecystectomy but was forced to reschedule/cancel for unforseen reasons. Pt reports nausea and epigastric/RUQ discomfort, no specific food triggers. No prior abdominal surgery. Chart review performed to add information to the HPI: Yes    Past Medical History   has a past medical history of Asthma, Attention deficit hyperactivity disorder (ADHD), predominantly inattentive type, COVID-19, COVID-19, COVID-19, Hashimoto's thyroiditis, Hip arthritis, Hyperlipidemia, Hypothyroidism, Lumbar disc disease, Lupus ([facility] [address] Migraine with aura and without status migrainosus, not intractable, Under care of team, Under care of team, and Under care of team.    Past Surgical History   has a past surgical history that includes Hysterectomy, vaginal; bladder suspension; Hysterectomy; Total hip arthroplasty (Right, 02/14/2022); US Breast Fine Needle Aspiration (Left); and Endoscopy, colon, diagnostic. Family History  family history includes Breast Cancer (age of onset: 54) in her maternal grandmother; Breast Cancer (age of onset: 72) in her maternal aunt; Breast Cancer (age of onset: 77) in her mother; Heart Disease in her sister; Other (age of onset: 59) in her father; Rheum Arthritis in her sister. Social History  Tobacco use:  reports that she has never smoked. She has never used smokeless tobacco.  Alcohol use:  reports current alcohol use. Drug use:  reports no history of drug use.       Medications  Current Medications:   Current Outpatient Medications   Medication Sig Dispense Refill ibuprofen (ADVIL;MOTRIN) 800 MG tablet Take 1 tablet by mouth 2 times daily as needed for Pain 60 tablet 0    levothyroxine (SYNTHROID) 175 MCG tablet Take 1 tablet by mouth every morning (before breakfast) 30 tablet 1    ergocalciferol (ERGOCALCIFEROL) 1.25 MG (72376 UT) capsule Take 1 capsule by mouth Twice a Week Indications: Tues, Sun 8 capsule 2    amphetamine-dextroamphetamine (ADDERALL) 20 MG tablet Take 1 tablet by mouth 2 times daily for 30 days. 60 tablet 0    ondansetron (ZOFRAN-ODT) 4 MG disintegrating tablet Take 1 tablet by mouth 3 times daily as needed for Nausea 30 tablet 2    albuterol sulfate HFA (PROVENTIL;VENTOLIN;PROAIR) 108 (90 Base) MCG/ACT inhaler Inhale 2 puffs into the lungs every 4 hours as needed for Wheezing 18 g 2    RA ARTHRITIS PAIN RELIEF 650 MG extended release tablet Take 1,300 mg by mouth every 8 hours as needed for Pain or Fever       cetirizine (ZYRTEC) 10 MG tablet Take 10 mg by mouth 2 times daily      fluticasone (FLONASE) 50 MCG/ACT nasal spray 2 sprays by Each Nostril route daily 16 g 0    EPINEPHrine (EPIPEN 2-DINO) 0.3 MG/0.3ML SOAJ injection Inject 0.3 mLs into the muscle once for 1 dose Use as directed for allergic reaction, facial swelling 0.3 mL 1     No current facility-administered medications for this visit.     Home Medications:   Prior to Admission medications    Medication Sig Start Date End Date Taking? Authorizing Provider   ibuprofen (ADVIL;MOTRIN) 800 MG tablet Take 1 tablet by mouth 2 times daily as needed for Pain 1/10/23   TAMARA Catalan CNP   levothyroxine (SYNTHROID) 175 MCG tablet Take 1 tablet by mouth every morning (before breakfast) 1/10/23   TAMARA Catalan CNP   ergocalciferol (ERGOCALCIFEROL) 1.25 MG (29603 UT) capsule Take 1 capsule by mouth Twice a Week Indications: Tues, Sun 1/12/23 4/6/23  TAMARA Catalan CNP   amphetamine-dextroamphetamine (ADDERALL) 20 MG tablet Take 1 tablet by mouth 2 times daily for 30 days. 1/10/23  2/9/23  TAMARA Philippe CNP   ondansetron (ZOFRAN-ODT) 4 MG disintegrating tablet Take 1 tablet by mouth 3 times daily as needed for Nausea 11/1/22   TAMARA Philippe CNP   albuterol sulfate HFA (PROVENTIL;VENTOLIN;PROAIR) 108 (90 Base) MCG/ACT inhaler Inhale 2 puffs into the lungs every 4 hours as needed for Wheezing 9/13/22   TAMARA Grover CNP   RA ARTHRITIS PAIN RELIEF 650 MG extended release tablet Take 1,300 mg by mouth every 8 hours as needed for Pain or Fever  2/2/22   Historical Provider, MD   cetirizine (ZYRTEC) 10 MG tablet Take 10 mg by mouth 2 times daily    Historical Provider, MD   fluticasone (FLONASE) 50 MCG/ACT nasal spray 2 sprays by Each Nostril route daily 12/3/21   TAMARA Levi CNP   EPINEPHrine (EPIPEN 2-DINO) 0.3 MG/0.3ML SOAJ injection Inject 0.3 mLs into the muscle once for 1 dose Use as directed for allergic reaction, facial swelling 4/18/21 10/10/22  TAMARA lFores CNP       Allergies  Morphine and [place] [macadamia nut oil]      Review of Systems:  General: Denies any fever, chills. Eyes: Denies any changes in vision, diplopia or eye pain  Ears, Nose, Mouth: Denies changes in hearing/tinnitus or drainage from ears, no rhinorrhea or bloody nose, no difficulty chewing  Throat: no difficulty swallowing, no throat pain  Respiratory: Denies any shortness of breath or cough. Cardiac: Denies any chest pain, palpitations, claudication or edema. Gastrointestinal: RUQ/egpiastric discomfort, nausea. Genitourinary: Denies any frequency, urgency, hesitancy or incontinence. Musculoskeletal: Denies worsening muscle weakness or recent trauma  Skin: Denies rashes or lesions  Psychiatric: Denies any recent changes in mood or affect  Hematologic: Denies bruising or bleeding easily.     PHYSICAL EXAMINATION  Vitals:   Vitals:    01/30/23 1353   BP: 113/73   Pulse: 96   Resp: 16   SpO2: 100%       General Appearance:  awake, alert, no acute distress, well developed, well nourished   Skin:  Skin color, texture, turgor normal. No rashes or lesions. Head/face:  NCAT, face symmetrical  Eyes:  PERRL, no evidence of conjunctivitis or ptosis bilaterally  Ears:  External ears and canals grossly normal, no evidence of otorrhea. Nose/Sinuses:  Nares normal. Septum midline. Mucosa normal. No external drainage noted. Mouth/Neck:  Mucosa moist.  No external oral lesions. Trachea midline. No visible masses. Lungs:  Normal chest expansion, unlabored breathing without accessory muscle use. No audible rales, rhonchi, or wheezing. Cardiovascular: S1S2. No evidence of JVD. No evidence of pulsatile masses in abdomen  Abdomen:  Soft, non-tender, no organomegaly, no masses. Musculoskeletal: No evidence of bony/muscular deformities, trauma, atrophy of either left/right upper/lower extremity. No evidence of digital clubbing or cyanosis. Neurologic:  CN 2-12 grossly intact without obvious deficits. Grossly normal sensation in all extremities. Psychiatric: appropriate judgement and insight, appropriate recall of recent and remote memory, no evidence of depression/anxiety/agitation      RADIOLOGY:  The following images and/or reports were personally reviewed with the following significant findings pertinent to the Chief Complaint and/or HPI:     [address] [place] [address]       7/15/2022 8:26 am       COMPARISON:   None. HISTORY:   ORDERING SYSTEM PROVIDED HISTORY: Right upper quadrant abdominal pain   TECHNOLOGIST PROVIDED HISTORY:   Please send results to Dr. Prema Link   Pain, Gallbladder Wall Thickening on CT   Specify organ?->GALLBLADDER       FINDINGS:   LIVER:  The liver demonstrates normal echogenicity without evidence of   intrahepatic biliary ductal dilatation. BILIARY SYSTEM:  Gallstones in the gallbladder. No wall thickening   pericholecystic fluid. Negative sonographic Esquivel's sign.        Common bile duct is within normal limits measuring 3.9 mm.       RIGHT KIDNEY: The right kidney is grossly unremarkable without evidence of   hydronephrosis.       PANCREAS:  Visualized portions of the pancreas are unremarkable.       OTHER: No evidence of right upper quadrant ascites.           Impression   Cholelithiasis       Exam otherwise unremarkable.         DIAGNOSES:   Diagnosis Orders   1. Symptomatic cholelithiasis            PLAN:  Pt would like to proceed with cholecystectomy. Risks include bleeding, infection, scarring, bile leak, damage to surrounding tissues, chance of damage to the common bile duct, risk of subtotal cholecystectomy, conversion to open procedure, need for further surgery or procedures. Benefits, alternatives, complications and procedure details were explained to the patient, all questions were answered.        Medical Decision Making: moderate complexity    Electronically signed by Ritesh Cordon DO on 1/30/2023 at 2:57 PM

## 2023-01-30 NOTE — LETTER
[facility]  Surgical Specialties - General Surgery  [address]  [place], [facility] [address]  Phone: [phone]  Fax: [phone]      23    Patient: Tonny Strickland  MRN: [id number]  : 1970  Date of visit: 2023    Dear TAMARA Gibbs CNP:      Thank you for requesting consultation for [place] in regards to evaluation for symptomatic cholelithiasis. Below are the relevant portions of my assessment and plan of care. If you have questions, please do not hesitate to call me. I look forward to following [place] along with you. Sincerely,        Mariana Mcmahan DO      [facility] General Surgery  History & Physical  Mariana Mcmahan DO    Pt Name: Tonny Strickland  MRN: [id number]  YOB: 1970  Date of evaluation: 2023  Primary Care Physician: TAMARA Gibbs CNP    Chief Complaint: symptomatic cholelithiasis      SUBJECTIVE:    History of Present Illness: This is a 47 y.o.  female who presents for evaluation for the above, she was previously worked up and scheduled for cholecystectomy but was forced to reschedule/cancel for unforseen reasons. Pt reports nausea and epigastric/RUQ discomfort, no specific food triggers. No prior abdominal surgery. Chart review performed to add information to the HPI: Yes    Past Medical History   has a past medical history of Asthma, Attention deficit hyperactivity disorder (ADHD), predominantly inattentive type, COVID-19, COVID-19, COVID-19, Hashimoto's thyroiditis, Hip arthritis, Hyperlipidemia, Hypothyroidism, Lumbar disc disease, Lupus ([facility] [address] Migraine with aura and without status migrainosus, not intractable, Under care of team, Under care of team, and Under care of team.    Past Surgical History   has a past surgical history that includes Hysterectomy, vaginal; bladder suspension; Hysterectomy;  Total hip arthroplasty (Right, 2022); US Breast Fine Needle Aspiration (Left); and Endoscopy, colon, diagnostic. Family History  family history includes Breast Cancer (age of onset: 54) in her maternal grandmother; Breast Cancer (age of onset: 72) in her maternal aunt; Breast Cancer (age of onset: 77) in her mother; Heart Disease in her sister; Other (age of onset: 59) in her father; Rheum Arthritis in her sister. Social History  Tobacco use:  reports that she has never smoked. She has never used smokeless tobacco.  Alcohol use:  reports current alcohol use. Drug use:  reports no history of drug use. Medications  Current Medications:   Current Outpatient Medications   Medication Sig Dispense Refill    ibuprofen (ADVIL;MOTRIN) 800 MG tablet Take 1 tablet by mouth 2 times daily as needed for Pain 60 tablet 0    levothyroxine (SYNTHROID) 175 MCG tablet Take 1 tablet by mouth every morning (before breakfast) 30 tablet 1    ergocalciferol (ERGOCALCIFEROL) 1.25 MG (77763 UT) capsule Take 1 capsule by mouth Twice a Week Indications: Tues, Sun 8 capsule 2    amphetamine-dextroamphetamine (ADDERALL) 20 MG tablet Take 1 tablet by mouth 2 times daily for 30 days. 60 tablet 0    ondansetron (ZOFRAN-ODT) 4 MG disintegrating tablet Take 1 tablet by mouth 3 times daily as needed for Nausea 30 tablet 2    albuterol sulfate HFA (PROVENTIL;VENTOLIN;PROAIR) 108 (90 Base) MCG/ACT inhaler Inhale 2 puffs into the lungs every 4 hours as needed for Wheezing 18 g 2    RA ARTHRITIS PAIN RELIEF 650 MG extended release tablet Take 1,300 mg by mouth every 8 hours as needed for Pain or Fever       cetirizine (ZYRTEC) 10 MG tablet Take 10 mg by mouth 2 times daily      fluticasone (FLONASE) 50 MCG/ACT nasal spray 2 sprays by Each Nostril route daily 16 g 0    EPINEPHrine (EPIPEN 2-DINO) 0.3 MG/0.3ML SOAJ injection Inject 0.3 mLs into the muscle once for 1 dose Use as directed for allergic reaction, facial swelling 0.3 mL 1     No current facility-administered medications for this visit.      Home Medications:   Prior to Admission medications    Medication Sig Start Date End Date Taking? Authorizing Provider   ibuprofen (ADVIL;MOTRIN) 800 MG tablet Take 1 tablet by mouth 2 times daily as needed for Pain 1/10/23   TAMARA Hoff CNP   levothyroxine (SYNTHROID) 175 MCG tablet Take 1 tablet by mouth every morning (before breakfast) 1/10/23   TAMARA Hoff CNP   ergocalciferol (ERGOCALCIFEROL) 1.25 MG (76315 UT) capsule Take 1 capsule by mouth Twice a Week Indications: Darya Alar 1/12/23 4/6/23  TAMARA Graham CNP   amphetamine-dextroamphetamine (ADDERALL) 20 MG tablet Take 1 tablet by mouth 2 times daily for 30 days. 1/10/23 2/9/23  TAMARA Hoff CNP   ondansetron (ZOFRAN-ODT) 4 MG disintegrating tablet Take 1 tablet by mouth 3 times daily as needed for Nausea 11/1/22   TAMARA Hoff CNP   albuterol sulfate HFA (PROVENTIL;VENTOLIN;PROAIR) 108 (90 Base) MCG/ACT inhaler Inhale 2 puffs into the lungs every 4 hours as needed for Wheezing 9/13/22   TAMARA Graham CNP   RA ARTHRITIS PAIN RELIEF 650 MG extended release tablet Take 1,300 mg by mouth every 8 hours as needed for Pain or Fever  2/2/22   Historical Provider, MD   cetirizine (ZYRTEC) 10 MG tablet Take 10 mg by mouth 2 times daily    Historical Provider, MD   fluticasone (FLONASE) 50 MCG/ACT nasal spray 2 sprays by Each Nostril route daily 12/3/21   TAMARA Geronimo CNP   EPINEPHrine (EPIPEN 2-DINO) 0.3 MG/0.3ML SOAJ injection Inject 0.3 mLs into the muscle once for 1 dose Use as directed for allergic reaction, facial swelling 4/18/21 10/10/22  TAMARA Colon CNP       Allergies  Morphine and [place] [macadamia nut oil]      Review of Systems:  General: Denies any fever, chills.   Eyes: Denies any changes in vision, diplopia or eye pain  Ears, Nose, Mouth: Denies changes in hearing/tinnitus or drainage from ears, no rhinorrhea or bloody nose, no difficulty chewing  Throat: no difficulty swallowing, no throat pain  Respiratory: Denies any shortness of breath or cough. Cardiac: Denies any chest pain, palpitations, claudication or edema. Gastrointestinal: RUQ/egpiastric discomfort, nausea. Genitourinary: Denies any frequency, urgency, hesitancy or incontinence. Musculoskeletal: Denies worsening muscle weakness or recent trauma  Skin: Denies rashes or lesions  Psychiatric: Denies any recent changes in mood or affect  Hematologic: Denies bruising or bleeding easily. PHYSICAL EXAMINATION  Vitals:   Vitals:    01/30/23 1353   BP: 113/73   Pulse: 96   Resp: 16   SpO2: 100%       General Appearance:  awake, alert, no acute distress, well developed, well nourished   Skin:  Skin color, texture, turgor normal. No rashes or lesions. Head/face:  NCAT, face symmetrical  Eyes:  PERRL, no evidence of conjunctivitis or ptosis bilaterally  Ears:  External ears and canals grossly normal, no evidence of otorrhea. Nose/Sinuses:  Nares normal. Septum midline. Mucosa normal. No external drainage noted. Mouth/Neck:  Mucosa moist.  No external oral lesions. Trachea midline. No visible masses. Lungs:  Normal chest expansion, unlabored breathing without accessory muscle use. No audible rales, rhonchi, or wheezing. Cardiovascular: S1S2. No evidence of JVD. No evidence of pulsatile masses in abdomen  Abdomen:  Soft, non-tender, no organomegaly, no masses. Musculoskeletal: No evidence of bony/muscular deformities, trauma, atrophy of either left/right upper/lower extremity. No evidence of digital clubbing or cyanosis. Neurologic:  CN 2-12 grossly intact without obvious deficits. Grossly normal sensation in all extremities.   Psychiatric: appropriate judgement and insight, appropriate recall of recent and remote memory, no evidence of depression/anxiety/agitation      RADIOLOGY:  The following images and/or reports were personally reviewed with the following significant findings pertinent to the Chief Complaint and/or HPI:     RIGHT UPPER QUADRANT ULTRASOUND       7/15/2022 8:26 am       COMPARISON:   None. HISTORY:   ORDERING SYSTEM PROVIDED HISTORY: Right upper quadrant abdominal pain   TECHNOLOGIST PROVIDED HISTORY:   Please send results to Dr. Vannessa Brown   Pain, Gallbladder Wall Thickening on CT   Specify organ?->GALLBLADDER       FINDINGS:   LIVER:  The liver demonstrates normal echogenicity without evidence of   intrahepatic biliary ductal dilatation. BILIARY SYSTEM:  Gallstones in the gallbladder. No wall thickening   pericholecystic fluid. Negative sonographic Esquivel's sign. Common bile duct is within normal limits measuring 3.9 mm.       RIGHT KIDNEY: The right kidney is grossly unremarkable without evidence of   hydronephrosis. PANCREAS:  Visualized portions of the pancreas are unremarkable. OTHER: No evidence of right upper quadrant ascites. Impression   Cholelithiasis       Exam otherwise unremarkable. DIAGNOSES:   Diagnosis Orders   1. Symptomatic cholelithiasis            PLAN:  · Pt would like to proceed with cholecystectomy. Risks include bleeding, infection, scarring, bile leak, damage to surrounding tissues, chance of damage to the common bile duct, risk of subtotal cholecystectomy, conversion to open procedure, need for further surgery or procedures. Benefits, alternatives, complications and procedure details were explained to the patient, all questions were answered.   ·       Medical Decision Making: moderate complexity    Electronically signed by Carmella Cardenas DO on 1/30/2023 at 2:57 PM

## 2023-02-07 ENCOUNTER — OFFICE VISIT (OUTPATIENT)
Dept: INTERNAL MEDICINE CLINIC | Age: 53
End: 2023-02-07
Payer: COMMERCIAL

## 2023-02-07 VITALS
DIASTOLIC BLOOD PRESSURE: 68 MMHG | WEIGHT: 215 LBS | SYSTOLIC BLOOD PRESSURE: 116 MMHG | BODY MASS INDEX: 30.78 KG/M2 | HEIGHT: 70 IN

## 2023-02-07 DIAGNOSIS — E06.3 HASHIMOTO'S THYROIDITIS: ICD-10-CM

## 2023-02-07 DIAGNOSIS — F41.9 ANXIETY: Primary | ICD-10-CM

## 2023-02-07 DIAGNOSIS — G47.00 INSOMNIA, UNSPECIFIED TYPE: ICD-10-CM

## 2023-02-07 DIAGNOSIS — M32.9 LUPUS (HCC): ICD-10-CM

## 2023-02-07 DIAGNOSIS — E78.5 HYPERLIPIDEMIA, UNSPECIFIED HYPERLIPIDEMIA TYPE: ICD-10-CM

## 2023-02-07 PROCEDURE — 99214 OFFICE O/P EST MOD 30 MIN: CPT | Performed by: NURSE PRACTITIONER

## 2023-02-07 RX ORDER — BUSPIRONE HYDROCHLORIDE 5 MG/1
5 TABLET ORAL 3 TIMES DAILY
Qty: 90 TABLET | Refills: 0 | Status: SHIPPED | OUTPATIENT
Start: 2023-02-07 | End: 2023-03-09

## 2023-02-07 RX ORDER — BUSPIRONE HYDROCHLORIDE 5 MG/1
5 TABLET ORAL DAILY
COMMUNITY
End: 2023-02-07 | Stop reason: DRUGHIGH

## 2023-02-07 SDOH — ECONOMIC STABILITY: FOOD INSECURITY: WITHIN THE PAST 12 MONTHS, THE FOOD YOU BOUGHT JUST DIDN'T LAST AND YOU DIDN'T HAVE MONEY TO GET MORE.: NEVER TRUE

## 2023-02-07 SDOH — ECONOMIC STABILITY: INCOME INSECURITY: HOW HARD IS IT FOR YOU TO PAY FOR THE VERY BASICS LIKE FOOD, HOUSING, MEDICAL CARE, AND HEATING?: NOT HARD AT ALL

## 2023-02-07 SDOH — ECONOMIC STABILITY: FOOD INSECURITY: WITHIN THE PAST 12 MONTHS, YOU WORRIED THAT YOUR FOOD WOULD RUN OUT BEFORE YOU GOT MONEY TO BUY MORE.: NEVER TRUE

## 2023-02-07 SDOH — ECONOMIC STABILITY: HOUSING INSECURITY
IN THE LAST 12 MONTHS, WAS THERE A TIME WHEN YOU DID NOT HAVE A STEADY PLACE TO SLEEP OR SLEPT IN A SHELTER (INCLUDING NOW)?: NO

## 2023-02-07 NOTE — PROGRESS NOTES
Visit Information    Have you changed or started any medications since your last visit including any over-the-counter medicines, vitamins, or herbal medicines? no   Are you having any side effects from any of your medications? -  no  Have you stopped taking any of your medications? Is so, why? -  no    Have you seen any other physician or provider since your last visit? Yes - Records Obtained  Have you had any other diagnostic tests since your last visit? no  Have you been seen in the emergency room and/or had an admission to a hospital since we last saw you? No  Have you had your routine dental cleaning in the past 6 months? yes -     Have you activated your [email or web address] account? If not, what are your barriers?  Yes     Patient Care Team:  TAMARA Marino CNP as PCP - General (Internal Medicine)  TAMARA Marino CNP as PCP - Empaneled Provider    Medical History Review  Past Medical, Family, and Social History reviewed and does contribute to the patient presenting condition    Health Maintenance   Topic Date Due    Pneumococcal 0-64 years Vaccine (1 - PCV) Never done    DTaP/Tdap/Td vaccine (1 - Tdap) Never done    Colorectal Cancer Screen  Never done    Shingles vaccine (1 of 2) Never done    COVID-19 Vaccine (3 - Booster for [place] series) 12/28/2021    Flu vaccine (1) 08/01/2022    A1C test (Diabetic or Prediabetic)  01/25/2023    Breast cancer screen  03/31/2023    Depression Screen  01/10/2024    Lipids  02/08/2028    Hepatitis C screen  Completed    HIV screen  Completed    Hepatitis A vaccine  Aged Out    Hib vaccine  Aged Out    Meningococcal (ACWY) vaccine  Aged Out         [address] [address]  Dept: [phone]  Dept Fax: [phone]    Office Progress/Follow Up Note  Date of patient's visit: 2/7/2023   Patient's Name:  Neeta Velazquez  YOB: 1970            Patient Care Team:  TAMARA Marino CNP as PCP - General (Internal Medicine)  TAMARA Crawford - CNP as PCP - Empaneled Provider    REASON FOR VISIT: Insomnia (Having trouble falling asleep for a few months. No relief with tylenol PM and melatonin. ), Fatigue, Anxiety, Hyperlipidemia, and Hypothyroidism        HISTORY OF PRESENT ILLNESS:      History was obtained from the patient. Steven Arriaga is a 47 y.o. is here for multiple medical concerns including Insomnia (Having trouble falling asleep for a few months. No relief with tylenol PM and melatonin. ), Fatigue, Anxiety, Hyperlipidemia, and Hypothyroidism    Insomnia  This is a recurrent problem. The current episode started more than 1 month ago. The problem occurs daily. The problem has been gradually worsening. Associated symptoms include arthralgias, fatigue and headaches. Pertinent negatives include no abdominal pain, chest pain, chills, coughing or fever. Treatments tried: melatonin, Tylenol PM (which works but makes her groggy), has been on trazodone which she coudln't tolerate, was on [place] years ago which made her eat. The treatment provided no relief. Fatigue  This is a recurrent problem. The current episode started more than 1 month ago. The problem occurs daily. The problem has been gradually worsening. Associated symptoms include arthralgias, fatigue and headaches. Pertinent negatives include no abdominal pain, chest pain, chills, coughing or fever. She has tried lying down for the symptoms. The treatment provided no relief. Anxiety  Presents for follow-up (recently found that her mom's cancer is back, also that her family member's unborn baby has a heart defect) visit. Symptoms include insomnia and nervous/anxious behavior. Patient reports no chest pain, dizziness, palpitations or shortness of breath. Symptoms occur most days. The severity of symptoms is causing significant distress. The quality of sleep is poor. Compliance with medications: currently on Buspar 5 mg daily.    Hyperlipidemia  The problem is uncontrolled. Recent lipid tests were reviewed and are high. Exacerbating diseases include hypothyroidism and obesity. She has no history of diabetes. Pertinent negatives include no chest pain or shortness of breath. Current antihyperlipidemic treatment includes diet change and exercise. Risk factors for coronary artery disease include stress, dyslipidemia and obesity. Patient Active Problem List   Diagnosis    Nut allergy    Attention deficit hyperactivity disorder (ADHD), predominantly inattentive type    Hashimoto's thyroiditis    Family history of brain aneurysm    Family history of systemic lupus erythematosus    Chronic midline low back pain without sciatica    Hip pain    Arthritis of right hip    Lumbar degenerative disc disease    Prediabetes    H/O total hip arthroplasty, right    Lupus (HCC)    Obesity (BMI 30.0-34. 9)       Allergies   Allergen Reactions    Morphine Hives and Swelling     Morphine      [place] [Macadamia Nut Oil] Swelling         MEDICATIONS:     Current Outpatient Medications   Medication Sig Dispense Refill    busPIRone (BUSPAR) 5 MG tablet Take 1 tablet by mouth 3 times daily 90 tablet 0    ibuprofen (ADVIL;MOTRIN) 800 MG tablet Take 1 tablet by mouth 2 times daily as needed for Pain 60 tablet 0    levothyroxine (SYNTHROID) 175 MCG tablet Take 1 tablet by mouth every morning (before breakfast) 30 tablet 1    ergocalciferol (ERGOCALCIFEROL) 1.25 MG (62618 UT) capsule Take 1 capsule by mouth Twice a Week Indications: Tues, Sun 8 capsule 2    amphetamine-dextroamphetamine (ADDERALL) 20 MG tablet Take 1 tablet by mouth 2 times daily for 30 days.  60 tablet 0    ondansetron (ZOFRAN-ODT) 4 MG disintegrating tablet Take 1 tablet by mouth 3 times daily as needed for Nausea 30 tablet 2    albuterol sulfate HFA (PROVENTIL;VENTOLIN;PROAIR) 108 (90 Base) MCG/ACT inhaler Inhale 2 puffs into the lungs every 4 hours as needed for Wheezing 18 g 2    cetirizine (ZYRTEC) 10 MG tablet Take 10 mg by mouth 2 times daily      fluticasone (FLONASE) 50 MCG/ACT nasal spray 2 sprays by Each Nostril route daily 16 g 0    EPINEPHrine (EPIPEN 2-DINO) 0.3 MG/0.3ML SOAJ injection Inject 0.3 mLs into the muscle once for 1 dose Use as directed for allergic reaction, facial swelling 0.3 mL 1     No current facility-administered medications for this visit. SOCIAL HISTORY    Reviewed and updated. Mahendra Foreman  reports that she has never smoked. She has never used smokeless tobacco.    FAMILY HISTORY:    Reviewed and updated. family history includes Breast Cancer (age of onset: 54) in her maternal grandmother; Breast Cancer (age of onset: 72) in her maternal aunt; Breast Cancer (age of onset: 77) in her mother; Heart Disease in her sister; Other (age of onset: 59) in her father; Rheum Arthritis in her sister. REVIEW OF SYSTEMS:      Review of Systems   Constitutional:  Positive for fatigue. Negative for chills and fever. HENT:  Negative for trouble swallowing. Eyes:  Negative for visual disturbance. Respiratory:  Negative for cough, shortness of breath and wheezing. Cardiovascular:  Negative for chest pain, palpitations and leg swelling. Gastrointestinal:  Positive for diarrhea. Negative for abdominal pain. Genitourinary:  Negative for difficulty urinating. Musculoskeletal:  Positive for arthralgias. Negative for gait problem. Neurological:  Positive for headaches. Negative for dizziness. Psychiatric/Behavioral:  Positive for dysphoric mood and sleep disturbance. The patient is nervous/anxious and has insomnia. PHYSICAL EXAM:      Vitals:    02/07/23 1452   BP: 116/68   Site: Left Upper Arm   Weight: 215 lb (97.5 kg)   Height: 5' 10\" (1.778 m)     BP Readings from Last 3 Encounters:   02/07/23 116/68   01/30/23 113/73   01/10/23 122/80        Physical Exam  Constitutional:       Appearance: She is well-developed. She is obese. HENT:      Head: Normocephalic and atraumatic.       Right Ear: External ear normal.      Left Ear: External ear normal.      Nose: Nose normal.   Eyes:      General:         Right eye: No discharge. Left eye: No discharge. Conjunctiva/sclera: Conjunctivae normal.      Pupils: Pupils are equal, round, and reactive to light. Neck:      Thyroid: No thyromegaly. Cardiovascular:      Rate and Rhythm: Normal rate and regular rhythm. Pulses: Normal pulses. Heart sounds: Normal heart sounds. No murmur heard. Pulmonary:      Effort: Pulmonary effort is normal.      Breath sounds: Normal breath sounds. No wheezing. Abdominal:      General: Bowel sounds are normal.      Palpations: Abdomen is soft. Tenderness: There is no abdominal tenderness. Musculoskeletal:      Cervical back: Normal range of motion and neck supple. No deformity or tenderness. Thoracic back: No deformity or tenderness. Lumbar back: No deformity or tenderness. Normal range of motion. Right hip: No deformity. Normal range of motion. Left hip: No deformity. Normal range of motion. Right lower leg: No edema. Left lower leg: No edema. Lymphadenopathy:      Cervical: No cervical adenopathy. Skin:     General: Skin is warm and dry. Neurological:      Mental Status: She is alert and oriented to person, place, and time. Gait: Gait normal.   Psychiatric:         Mood and Affect: Mood is anxious and depressed.          Speech: Speech normal.         Behavior: Behavior normal.        LABORATORY FINDINGS:    CBC:   Lab Results   Component Value Date/Time    WBC 7.5 12/06/2022 11:45 AM    HGB 12.8 12/06/2022 11:45 AM     12/06/2022 11:45 AM     BMP:   Lab Results   Component Value Date/Time     12/06/2022 11:45 AM    K 3.8 12/06/2022 11:45 AM    CL 99 12/06/2022 11:45 AM    CO2 27 12/06/2022 11:45 AM    BUN 9 12/06/2022 11:45 AM    CREATININE 0.79 12/06/2022 11:45 AM    GLUCOSE 93 12/06/2022 11:45 AM     HEMOGLOBIN A1C:   Lab Results Component Value Date/Time    LABA1C 5.7 01/25/2022 10:02 AM         ASSESSMENT AND PLAN:      Visit Diagnoses and Associated Orders       Anxiety    -  Primary    Not controlled on Buspar, will increase to TID    busPIRone (BUSPAR) 5 MG tablet [9324]           Insomnia, unspecified type        Will address anxiety, advised on sleep hygiene, exercise. Patient to discuss with behavioral health provider         Hyperlipidemia, unspecified hyperlipidemia type         - will repeat panel, advised on healthy diet, exercise    Lipid Panel [03901 Custom]   - Future Order         Lupus ([facility] [address]        Stable, previously evaluated by rheumatology         Hashimoto's thyroiditis        Last TSH high, patient to complete repeat TSH, continue current dose of Synthroid, adjust if needed                  FOLLOW UP AND INSTRUCTIONS:     Return for routine follow up at next scheduled appointment. Kelsey received counseling on the following healthy behaviors: nutrition, exercise, and medication adherence    Discussed use, benefit, and side effects of prescribed medications. Barriers to medication compliance addressed. All patient questions answered. Pt voiced understanding. Baez Overall, APRN - CNP    2/8/2023, 9:51 PM    Please note that this chart was generated using voice recognition Dragon dictation software. Although every effort was made to ensure the accuracy of this automatedtranscription, some errors in transcription may have occurred.

## 2023-02-08 ENCOUNTER — HOSPITAL ENCOUNTER (OUTPATIENT)
Age: 53
Discharge: HOME | End: 2023-02-08
Payer: COMMERCIAL

## 2023-02-08 DIAGNOSIS — E06.3 HASHIMOTO'S THYROIDITIS: ICD-10-CM

## 2023-02-08 DIAGNOSIS — E78.5 HYPERLIPIDEMIA, UNSPECIFIED HYPERLIPIDEMIA TYPE: ICD-10-CM

## 2023-02-08 LAB
CHOLEST SERPL-MCNC: 255 MG/DL
CHOLESTEROL/HDL RATIO: 4.3
HDLC SERPL-MCNC: 59 MG/DL
LDLC SERPL CALC-MCNC: 175 MG/DL (ref 0–130)
TRIGL SERPL-MCNC: 107 MG/DL
TSH SERPL-ACNC: 0.35 UIU/ML (ref 0.3–5)

## 2023-02-08 PROCEDURE — 84443 ASSAY THYROID STIM HORMONE: CPT

## 2023-02-08 PROCEDURE — 36415 COLL VENOUS BLD VENIPUNCTURE: CPT

## 2023-02-08 PROCEDURE — 80061 LIPID PANEL: CPT

## 2023-02-08 ASSESSMENT — ENCOUNTER SYMPTOMS
COUGH: 0
DIARRHEA: 1
TROUBLE SWALLOWING: 0
WHEEZING: 0
SHORTNESS OF BREATH: 0
ABDOMINAL PAIN: 0

## 2023-02-13 ENCOUNTER — ANESTHESIA EVENT (OUTPATIENT)
Dept: OPERATING ROOM | Age: 53
End: 2023-02-13
Payer: COMMERCIAL

## 2023-02-17 ENCOUNTER — HOSPITAL ENCOUNTER (OUTPATIENT)
Age: 53
Setting detail: OUTPATIENT SURGERY
Discharge: HOME | End: 2023-02-17
Attending: SURGERY | Admitting: SURGERY
Payer: COMMERCIAL

## 2023-02-17 ENCOUNTER — ANESTHESIA (OUTPATIENT)
Dept: OPERATING ROOM | Age: 53
End: 2023-02-17
Payer: COMMERCIAL

## 2023-02-17 VITALS
WEIGHT: 218 LBS | TEMPERATURE: 98 F | RESPIRATION RATE: 22 BRPM | HEART RATE: 77 BPM | SYSTOLIC BLOOD PRESSURE: 140 MMHG | HEIGHT: 70 IN | DIASTOLIC BLOOD PRESSURE: 83 MMHG | BODY MASS INDEX: 31.21 KG/M2 | OXYGEN SATURATION: 97 %

## 2023-02-17 DIAGNOSIS — K80.20 SYMPTOMATIC CHOLELITHIASIS: Primary | ICD-10-CM

## 2023-02-17 PROCEDURE — 7100000000 HC PACU RECOVERY - FIRST 15 MIN: Performed by: SURGERY

## 2023-02-17 PROCEDURE — 2500000003 HC RX 250 WO HCPCS: Performed by: SURGERY

## 2023-02-17 PROCEDURE — 2580000003 HC RX 258: Performed by: ANESTHESIOLOGY

## 2023-02-17 PROCEDURE — 6360000002 HC RX W HCPCS

## 2023-02-17 PROCEDURE — C1889 IMPLANT/INSERT DEVICE, NOC: HCPCS | Performed by: SURGERY

## 2023-02-17 PROCEDURE — 3600000019 HC SURGERY ROBOT ADDTL 15MIN: Performed by: SURGERY

## 2023-02-17 PROCEDURE — 88304 TISSUE EXAM BY PATHOLOGIST: CPT

## 2023-02-17 PROCEDURE — 7100000010 HC PHASE II RECOVERY - FIRST 15 MIN: Performed by: SURGERY

## 2023-02-17 PROCEDURE — 3700000000 HC ANESTHESIA ATTENDED CARE: Performed by: SURGERY

## 2023-02-17 PROCEDURE — 6360000002 HC RX W HCPCS: Performed by: SURGERY

## 2023-02-17 PROCEDURE — 6370000000 HC RX 637 (ALT 250 FOR IP)

## 2023-02-17 PROCEDURE — 7100000011 HC PHASE II RECOVERY - ADDTL 15 MIN: Performed by: SURGERY

## 2023-02-17 PROCEDURE — 2500000003 HC RX 250 WO HCPCS

## 2023-02-17 PROCEDURE — 3600000009 HC SURGERY ROBOT BASE: Performed by: SURGERY

## 2023-02-17 PROCEDURE — 7100000001 HC PACU RECOVERY - ADDTL 15 MIN: Performed by: SURGERY

## 2023-02-17 PROCEDURE — 6360000002 HC RX W HCPCS: Performed by: ANESTHESIOLOGY

## 2023-02-17 PROCEDURE — 3700000001 HC ADD 15 MINUTES (ANESTHESIA): Performed by: SURGERY

## 2023-02-17 PROCEDURE — 2580000003 HC RX 258: Performed by: SURGERY

## 2023-02-17 PROCEDURE — 47562 LAPAROSCOPIC CHOLECYSTECTOMY: CPT | Performed by: SURGERY

## 2023-02-17 PROCEDURE — 2709999900 HC NON-CHARGEABLE SUPPLY: Performed by: SURGERY

## 2023-02-17 PROCEDURE — S2900 ROBOTIC SURGICAL SYSTEM: HCPCS | Performed by: SURGERY

## 2023-02-17 DEVICE — CLIP INT L POLYMER LOK LIG HEM O LOK: Type: IMPLANTABLE DEVICE | Status: FUNCTIONAL

## 2023-02-17 RX ORDER — FENTANYL CITRATE 50 UG/ML
INJECTION, SOLUTION INTRAMUSCULAR; INTRAVENOUS PRN
Status: DISCONTINUED | OUTPATIENT
Start: 2023-02-17 | End: 2023-02-17 | Stop reason: SDUPTHER

## 2023-02-17 RX ORDER — ONDANSETRON 2 MG/ML
INJECTION INTRAMUSCULAR; INTRAVENOUS PRN
Status: DISCONTINUED | OUTPATIENT
Start: 2023-02-17 | End: 2023-02-17 | Stop reason: SDUPTHER

## 2023-02-17 RX ORDER — ONDANSETRON 2 MG/ML
INJECTION INTRAMUSCULAR; INTRAVENOUS
Status: COMPLETED
Start: 2023-02-17 | End: 2023-02-17

## 2023-02-17 RX ORDER — INDOCYANINE GREEN AND WATER 25 MG
KIT INJECTION
Status: COMPLETED
Start: 2023-02-17 | End: 2023-02-17

## 2023-02-17 RX ORDER — TRAMADOL HYDROCHLORIDE 50 MG/1
TABLET ORAL
Status: COMPLETED
Start: 2023-02-17 | End: 2023-02-17

## 2023-02-17 RX ORDER — TRAMADOL HYDROCHLORIDE 50 MG/1
50 TABLET ORAL EVERY 6 HOURS PRN
Qty: 10 TABLET | Refills: 0 | Status: SHIPPED | OUTPATIENT
Start: 2023-02-17 | End: 2023-02-24

## 2023-02-17 RX ORDER — ROCURONIUM BROMIDE 10 MG/ML
INJECTION, SOLUTION INTRAVENOUS PRN
Status: DISCONTINUED | OUTPATIENT
Start: 2023-02-17 | End: 2023-02-17 | Stop reason: SDUPTHER

## 2023-02-17 RX ORDER — ONDANSETRON 2 MG/ML
4 INJECTION INTRAMUSCULAR; INTRAVENOUS
Status: COMPLETED | OUTPATIENT
Start: 2023-02-17 | End: 2023-02-17

## 2023-02-17 RX ORDER — FENTANYL CITRATE 50 UG/ML
25 INJECTION, SOLUTION INTRAMUSCULAR; INTRAVENOUS EVERY 5 MIN PRN
Status: DISCONTINUED | OUTPATIENT
Start: 2023-02-17 | End: 2023-02-17 | Stop reason: HOSPADM

## 2023-02-17 RX ORDER — DIPHENHYDRAMINE HYDROCHLORIDE 50 MG/ML
INJECTION INTRAMUSCULAR; INTRAVENOUS PRN
Status: DISCONTINUED | OUTPATIENT
Start: 2023-02-17 | End: 2023-02-17 | Stop reason: SDUPTHER

## 2023-02-17 RX ORDER — KETOROLAC TROMETHAMINE 30 MG/ML
INJECTION, SOLUTION INTRAMUSCULAR; INTRAVENOUS PRN
Status: DISCONTINUED | OUTPATIENT
Start: 2023-02-17 | End: 2023-02-17 | Stop reason: SDUPTHER

## 2023-02-17 RX ORDER — FENTANYL CITRATE 50 UG/ML
50 INJECTION, SOLUTION INTRAMUSCULAR; INTRAVENOUS EVERY 5 MIN PRN
Status: COMPLETED | OUTPATIENT
Start: 2023-02-17 | End: 2023-02-17

## 2023-02-17 RX ORDER — ONDANSETRON 4 MG/1
4 TABLET, FILM COATED ORAL EVERY 8 HOURS PRN
Qty: 10 TABLET | Refills: 0 | Status: SHIPPED | OUTPATIENT
Start: 2023-02-17 | End: 2023-02-22

## 2023-02-17 RX ORDER — MIDAZOLAM HYDROCHLORIDE 1 MG/ML
INJECTION INTRAMUSCULAR; INTRAVENOUS PRN
Status: DISCONTINUED | OUTPATIENT
Start: 2023-02-17 | End: 2023-02-17 | Stop reason: SDUPTHER

## 2023-02-17 RX ORDER — LIDOCAINE HYDROCHLORIDE 10 MG/ML
INJECTION, SOLUTION INFILTRATION; PERINEURAL PRN
Status: DISCONTINUED | OUTPATIENT
Start: 2023-02-17 | End: 2023-02-17 | Stop reason: SDUPTHER

## 2023-02-17 RX ORDER — FENTANYL CITRATE 50 UG/ML
INJECTION, SOLUTION INTRAMUSCULAR; INTRAVENOUS
Status: COMPLETED
Start: 2023-02-17 | End: 2023-02-17

## 2023-02-17 RX ORDER — DOCUSATE SODIUM 100 MG/1
100 CAPSULE, LIQUID FILLED ORAL 2 TIMES DAILY
Qty: 20 CAPSULE | Refills: 0 | Status: SHIPPED | OUTPATIENT
Start: 2023-02-17

## 2023-02-17 RX ORDER — SODIUM CHLORIDE 0.9 % (FLUSH) 0.9 %
5-40 SYRINGE (ML) INJECTION EVERY 12 HOURS SCHEDULED
Status: DISCONTINUED | OUTPATIENT
Start: 2023-02-17 | End: 2023-02-17 | Stop reason: HOSPADM

## 2023-02-17 RX ORDER — CEFAZOLIN 2 G/1
INJECTION, POWDER, FOR SOLUTION INTRAMUSCULAR; INTRAVENOUS
Status: DISCONTINUED
Start: 2023-02-17 | End: 2023-02-17 | Stop reason: HOSPADM

## 2023-02-17 RX ORDER — SODIUM CHLORIDE 9 MG/ML
INJECTION, SOLUTION INTRAVENOUS PRN
Status: DISCONTINUED | OUTPATIENT
Start: 2023-02-17 | End: 2023-02-17 | Stop reason: HOSPADM

## 2023-02-17 RX ORDER — INDOCYANINE GREEN AND WATER 25 MG
5 KIT INJECTION ONCE
Status: COMPLETED | OUTPATIENT
Start: 2023-02-17 | End: 2023-02-17

## 2023-02-17 RX ORDER — PROPOFOL 10 MG/ML
INJECTION, EMULSION INTRAVENOUS PRN
Status: DISCONTINUED | OUTPATIENT
Start: 2023-02-17 | End: 2023-02-17 | Stop reason: SDUPTHER

## 2023-02-17 RX ORDER — DEXAMETHASONE SODIUM PHOSPHATE 10 MG/ML
INJECTION, SOLUTION INTRAMUSCULAR; INTRAVENOUS PRN
Status: DISCONTINUED | OUTPATIENT
Start: 2023-02-17 | End: 2023-02-17 | Stop reason: SDUPTHER

## 2023-02-17 RX ORDER — SODIUM CHLORIDE 0.9 % (FLUSH) 0.9 %
5-40 SYRINGE (ML) INJECTION PRN
Status: DISCONTINUED | OUTPATIENT
Start: 2023-02-17 | End: 2023-02-17 | Stop reason: HOSPADM

## 2023-02-17 RX ORDER — TRAMADOL HYDROCHLORIDE 50 MG/1
50 TABLET ORAL ONCE
Status: COMPLETED | OUTPATIENT
Start: 2023-02-17 | End: 2023-02-17

## 2023-02-17 RX ORDER — SODIUM CHLORIDE, SODIUM LACTATE, POTASSIUM CHLORIDE, CALCIUM CHLORIDE 600; 310; 30; 20 MG/100ML; MG/100ML; MG/100ML; MG/100ML
INJECTION, SOLUTION INTRAVENOUS CONTINUOUS
Status: DISCONTINUED | OUTPATIENT
Start: 2023-02-17 | End: 2023-02-17 | Stop reason: HOSPADM

## 2023-02-17 RX ADMIN — CEFAZOLIN 2000 MG: 2 INJECTION, POWDER, FOR SOLUTION INTRAMUSCULAR; INTRAVENOUS at 11:28

## 2023-02-17 RX ADMIN — ONDANSETRON 4 MG: 2 INJECTION INTRAMUSCULAR; INTRAVENOUS at 12:51

## 2023-02-17 RX ADMIN — FENTANYL CITRATE 50 MCG: 50 INJECTION, SOLUTION INTRAMUSCULAR; INTRAVENOUS at 12:12

## 2023-02-17 RX ADMIN — FENTANYL CITRATE 100 MCG: 50 INJECTION, SOLUTION INTRAMUSCULAR; INTRAVENOUS at 11:18

## 2023-02-17 RX ADMIN — SODIUM CHLORIDE, POTASSIUM CHLORIDE, SODIUM LACTATE AND CALCIUM CHLORIDE: 600; 310; 30; 20 INJECTION, SOLUTION INTRAVENOUS at 11:13

## 2023-02-17 RX ADMIN — ONDANSETRON 4 MG: 2 INJECTION INTRAMUSCULAR; INTRAVENOUS at 11:50

## 2023-02-17 RX ADMIN — MIDAZOLAM 2 MG: 1 INJECTION INTRAMUSCULAR; INTRAVENOUS at 11:13

## 2023-02-17 RX ADMIN — SUGAMMADEX 200 MG: 100 INJECTION, SOLUTION INTRAVENOUS at 11:57

## 2023-02-17 RX ADMIN — TRAMADOL HYDROCHLORIDE 50 MG: 50 TABLET ORAL at 13:10

## 2023-02-17 RX ADMIN — LIDOCAINE HYDROCHLORIDE 40 MG: 10 INJECTION, SOLUTION INFILTRATION; PERINEURAL at 11:18

## 2023-02-17 RX ADMIN — ROCURONIUM BROMIDE 40 MG: 10 INJECTION, SOLUTION INTRAVENOUS at 11:18

## 2023-02-17 RX ADMIN — DIPHENHYDRAMINE HYDROCHLORIDE 12.5 MG: 50 INJECTION, SOLUTION INTRAMUSCULAR; INTRAVENOUS at 11:31

## 2023-02-17 RX ADMIN — INDOCYANINE GREEN AND WATER 5 MG: KIT at 11:18

## 2023-02-17 RX ADMIN — KETOROLAC TROMETHAMINE 30 MG: 30 INJECTION, SOLUTION INTRAMUSCULAR; INTRAVENOUS at 11:50

## 2023-02-17 RX ADMIN — FENTANYL CITRATE 50 MCG: 50 INJECTION, SOLUTION INTRAMUSCULAR; INTRAVENOUS at 12:17

## 2023-02-17 RX ADMIN — DEXAMETHASONE SODIUM PHOSPHATE 5 MG: 10 INJECTION, SOLUTION INTRAMUSCULAR; INTRAVENOUS at 11:31

## 2023-02-17 RX ADMIN — TRAMADOL HYDROCHLORIDE 50 MG: 50 TABLET, COATED ORAL at 13:10

## 2023-02-17 RX ADMIN — PROPOFOL 150 MG: 10 INJECTION, EMULSION INTRAVENOUS at 11:18

## 2023-02-17 ASSESSMENT — PAIN SCALES - GENERAL
PAINLEVEL_OUTOF10: 8
PAINLEVEL_OUTOF10: 5
PAINLEVEL_OUTOF10: 5
PAINLEVEL_OUTOF10: 7
PAINLEVEL_OUTOF10: 5

## 2023-02-17 ASSESSMENT — PAIN DESCRIPTION - DESCRIPTORS
DESCRIPTORS: ACHING
DESCRIPTORS: SORE
DESCRIPTORS: ACHING;SORE
DESCRIPTORS: SORE
DESCRIPTORS: SORE

## 2023-02-17 ASSESSMENT — PAIN - FUNCTIONAL ASSESSMENT: PAIN_FUNCTIONAL_ASSESSMENT: 0-10

## 2023-02-17 ASSESSMENT — PAIN DESCRIPTION - LOCATION
LOCATION: ABDOMEN

## 2023-02-17 ASSESSMENT — ENCOUNTER SYMPTOMS: SHORTNESS OF BREATH: 0

## 2023-02-17 NOTE — H&P
[address]      Patient's Name/ Date of Birth/ Gender: Kristan Kahn / 1970 (47 y.o.) / female     Attending physician: Emily Bhatia DO    CC: symptomatic cholelithiasis    History of present Illness: Kristan Kahn is a 47 y.o. female, presents for cholecystectomy. Past Medical History:  has a past medical history of Asthma, Attention deficit hyperactivity disorder (ADHD), predominantly inattentive type, COVID-19, COVID-19, COVID-19, Hashimoto's thyroiditis, Hip arthritis, Hyperlipidemia, Hypothyroidism, Lumbar disc disease, Lupus ([facility] [address] Migraine with aura and without status migrainosus, not intractable, Under care of team, Under care of team, and Under care of team.    Past Surgical History:   Past Surgical History:   Procedure Laterality Date    BLADDER SUSPENSION      at 29 yo during hysterectomy    HYSTERECTOMY (CERVIX STATUS UNKNOWN)      HYSTERECTOMY, VAGINAL      at 29 yo for h/o prolapsed uterus    TOTAL HIP ARTHROPLASTY Right 02/14/2022    RIGHT HIP TOTAL ARTHROPLASTY ANTERIOR APPROACH - [facility] performed by Brenda Garcia DO at [address] [place] [address]        Social History:  reports that she has never smoked. She has never used smokeless tobacco. She reports current alcohol use. She reports that she does not use drugs. Family History: family history includes Breast Cancer (age of onset: 54) in her maternal grandmother; Breast Cancer (age of onset: 72) in her maternal aunt; Breast Cancer (age of onset: 77) in her mother; Heart Disease in her sister; Other (age of onset: 59) in her father; Rheum Arthritis in her sister. Review of Systems:   General: Denies fever, chills, night sweats, weight loss, malaise, fatigue  HEENT: Denies sore throat, sinus problems, allergic rhinosinusitis  Card: Denies chest pain, palpitations, orthopnea/PND.  Denies h/o murmurs  Pulm: Denies cough, shortness of breath, MERCADO  GI: Denies history of constipation, diarrhea, hematochezia or melena  : Denies polyuria, dysuria, hematuria  Endo: Denies diabetes, thyroid problems. Heme: Denies anemia, h/o bleeding or clotting problems. Neuro: Denies h/o CVA, TIA  Skin: Denies rashes, ulcers  Musculoskeletal: Denies muscle, joint, back pain. Allergies: Morphine and [place] [macadamia nut oil]    Current Meds:  Current Facility-Administered Medications:     lactated ringers IV soln infusion, , IntraVENous, Continuous, Freedom Hernandes MD    sodium chloride flush 0.9 % injection 5-40 mL, 5-40 mL, IntraVENous, 2 times per day, Freedom Hernandes MD    sodium chloride flush 0.9 % injection 5-40 mL, 5-40 mL, IntraVENous, PRN, Freedom Hernandes MD    0.9 % sodium chloride infusion, , IntraVENous, PRN, Freedom Hernandes MD    ceFAZolin (ANCEF) 2,000 mg in sodium chloride 0.9 % 50 mL IVPB (mini-bag), 2,000 mg, IntraVENous, On Call to OR, Alexa Duvall DO    indocyanine green (IC-GREEN) syringe 5 mg, 5 mg, IntraVENous, Once, Alexa Duvall DO    indocyanine green (IC-GREEN) 25 MG syringe, , , ,     ceFAZolin (ANCEF) 2 g injection, , , ,     Vital Signs:  Vitals:    02/17/23 1021   BP: 123/73   Pulse: 80   Resp: 19   Temp: 97 °F (36.1 °C)   SpO2: 100%       Physical Exam:  Vital signs and Nurse's note reviewed  Gen:  A&Ox3, NAD  HEENT: NCAT, PERRLA, EOMI, no scleral icterus, oral mucosa moist  Neck: Trachea midline without obvious masses or lesions  Chest: Symmetric rise with inhalation, no evidence of trauma  CVS: S1S2  Resp: Good bilateral air entry, no audible wheeze or rhonchi  Abd: soft, non-tender, non-distended, no hepatosplenomegaly or palpable masses  Ext: No clubbing, cyanosis, edema, peripheral pulses 2+ Rad/Fem/DP/PT  CNS: Moves all extremities, no gross focal motor deficits  Skin: No erythema or ulcerations       Assessment:    Esteban Holman is a 47 y.o. female with     Symptomatic cholelithiasis    Plan:     To OR for cholecystectomy, all questions answered, written informed consent obtained      Monica Nguyen DO  2/17/2023

## 2023-02-17 NOTE — ANESTHESIA PRE PROCEDURE
Department of Anesthesiology  Preprocedure Note       Name:  Valentina Pereyra   Age:  47 y.o.  :  1970                                          MRN:  [id number]         Date:  2023      Surgeon: Chad Mcintosh):  Cristino Osborne DO    Procedure: Procedure(s):  CHOLECYSTECTOMY LAPAROSCOPIC ROBOTIC WITH FIREFLY    Medications prior to admission:   Prior to Admission medications    Medication Sig Start Date End Date Taking? Authorizing Provider   traMADol (ULTRAM) 50 MG tablet Take 1 tablet by mouth every 6 hours as needed for Pain for up to 7 days. Intended supply: 7 days. Take lowest dose possible to manage pain Max Daily Amount: 200 mg 23 Yes Cristino Osborne DO   ondansetron [facility]) 4 MG tablet Take 1 tablet by mouth every 8 hours as needed for Nausea or Vomiting 23 Yes Cristino Osborne DO   docusate sodium (COLACE) 100 MG capsule Take 1 capsule by mouth 2 times daily 23  Yes Cristino Osboren DO   busPIRone (BUSPAR) 5 MG tablet Take 1 tablet by mouth 3 times daily 2/7/23 3/9/23  TAMARA Gay CNP   ibuprofen (ADVIL;MOTRIN) 800 MG tablet Take 1 tablet by mouth 2 times daily as needed for Pain 1/10/23   TAMARA Gay CNP   levothyroxine (SYNTHROID) 175 MCG tablet Take 1 tablet by mouth every morning (before breakfast) 1/10/23   TAMARA Gay CNP   ergocalciferol (ERGOCALCIFEROL) 1.25 MG (21913 UT) capsule Take 1 capsule by mouth Twice a Week Indications: Cheryl Zarate 23  TAMARA Yoder CNP   amphetamine-dextroamphetamine (ADDERALL) 20 MG tablet Take 1 tablet by mouth 2 times daily for 30 days.  1/10/23 2/9/23  TAMARA Gay CNP   ondansetron (ZOFRAN-ODT) 4 MG disintegrating tablet Take 1 tablet by mouth 3 times daily as needed for Nausea 22   TAMARA Gay CNP   albuterol sulfate HFA (PROVENTIL;VENTOLIN;PROAIR) 108 (90 Base) MCG/ACT inhaler Inhale 2 puffs into the lungs every 4 hours as needed for Wheezing 22   TAMARA Gay - CNP   cetirizine (ZYRTEC) 10 MG tablet Take 10 mg by mouth 2 times daily    Historical Provider, MD   fluticasone (FLONASE) 50 MCG/ACT nasal spray 2 sprays by Each Nostril route daily 12/3/21   Goldie Severance, APRN - CNP   EPINEPHrine (EPIPEN 2-DINO) 0.3 MG/0.3ML SOAJ injection Inject 0.3 mLs into the muscle once for 1 dose Use as directed for allergic reaction, facial swelling 4/18/21 10/10/22  TAMARA Ventura CNP       Current medications:    Current Facility-Administered Medications   Medication Dose Route Frequency Provider Last Rate Last Admin    lactated ringers IV soln infusion   IntraVENous Continuous Donovan Cobos MD        sodium chloride flush 0.9 % injection 5-40 mL  5-40 mL IntraVENous 2 times per day Donovan Cobos MD        sodium chloride flush 0.9 % injection 5-40 mL  5-40 mL IntraVENous PRN Donovan Cobos MD        0.9 % sodium chloride infusion   IntraVENous PRN Donovan Cobos MD        ceFAZolin (ANCEF) 2,000 mg in sodium chloride 0.9 % 50 mL IVPB (mini-bag)  2,000 mg IntraVENous On Call to OR Zenobia Dhaliwal, DO        indocyanine green (IC-GREEN) syringe 5 mg  5 mg IntraVENous Once Zenobia Dhaliwal, DO        indocyanine green (IC-GREEN) 25 MG syringe             ceFAZolin (ANCEF) 2 g injection                Allergies:     Allergies   Allergen Reactions    Morphine Hives and Swelling     Morphine      [place] [Macadamia Nut Oil] Swelling       Problem List:    Patient Active Problem List   Diagnosis Code    Nut allergy Z91.018    Attention deficit hyperactivity disorder (ADHD), predominantly inattentive type F90.0    Hashimoto's thyroiditis E06.3    Family history of brain aneurysm Z82.49    Family history of systemic lupus erythematosus Z82.69    Chronic midline low back pain without sciatica M54.50, G89.29    Hip pain M25.559    Arthritis of right hip M16.11    Lumbar degenerative disc disease M51.36    Prediabetes R73.03    H/O total hip arthroplasty, right Z96.641    Lupus (HCC) M32.9    Obesity (BMI 30.0-34. 9) E66.9    Symptomatic cholelithiasis K80.20       Past Medical History:        Diagnosis Date    Asthma     Attention deficit hyperactivity disorder (ADHD), predominantly inattentive type     early 80's    COVID-19 01/05/2022    cough, body aches, fatigue, headache, vomiting, temp 99. X 6 DAYS   TESTED AT [address]  COVID-19 10/2020    symptoms X 19 days    COVID-19     x 3    Hashimoto's thyroiditis     Hip arthritis     Hyperlipidemia     Hypothyroidism     Lumbar disc disease     Lupus ([address]     Migraine with aura and without status migrainosus, not intractable     Under care of team 01/25/2022    PCP - Garland Westfall CNP - LAST VISIT -  1/11/22    Under care of team 01/25/2022    RHEUMATOLOGY - DR. Nicki Mccormick - [facility] VISIT 12/2021    Under care of team 01/25/2022    ORTHO - DR. BROWN - LAST VISIT 1/2022       Past Surgical History:        Procedure Laterality Date    BLADDER SUSPENSION      at [address] yo during hysterectomy    HYSTERECTOMY (CERVIX STATUS UNKNOWN)      HYSTERECTOMY, VAGINAL      at [address] yo for h/o prolapsed uterus    TOTAL HIP ARTHROPLASTY Right 02/14/2022    RIGHT HIP TOTAL ARTHROPLASTY ANTERIOR APPROACH - [facility] performed by Cristiano Choi DO at [address]        Social History:    Social History     Tobacco Use    Smoking status: Never    Smokeless tobacco: Never   Substance Use Topics    Alcohol use: Yes     Comment: rare                                Counseling given: Not Answered      Vital Signs (Current):   Vitals:    02/17/23 1016 02/17/23 1021   BP:  123/73   Pulse:  80   Resp:  19   Temp:  97 °F (36.1 °C)   SpO2:  100%   Weight: 218 lb (98.9 kg)    Height: 5' 10\" (1.778 m)                                               BP Readings from Last 3 Encounters:   02/17/23 123/73   02/07/23 116/68   01/30/23 113/73       NPO Status: Time of last liquid consumption: 2000                        Time of last solid consumption: 2000                        Date of last liquid consumption: 02/16/23                        Date of last solid food consumption: 02/16/23    BMI:   Wt Readings from Last 3 Encounters:   02/17/23 218 lb (98.9 kg)   02/07/23 215 lb (97.5 kg)   02/02/23 215 lb (97.5 kg)     Body mass index is 31.28 kg/m². CBC:   Lab Results   Component Value Date/Time    WBC 7.5 12/06/2022 11:45 AM    RBC 4.43 12/06/2022 11:45 AM    HGB 12.8 12/06/2022 11:45 AM    HCT 39.1 12/06/2022 11:45 AM    MCV 88.3 12/06/2022 11:45 AM    RDW 14.7 12/06/2022 11:45 AM     12/06/2022 11:45 AM       CMP:   Lab Results   Component Value Date/Time     12/06/2022 11:45 AM    K 3.8 12/06/2022 11:45 AM    CL 99 12/06/2022 11:45 AM    CO2 27 12/06/2022 11:45 AM    BUN 9 12/06/2022 11:45 AM    CREATININE 0.79 12/06/2022 11:45 AM    GFRAA >60 08/17/2022 07:30 AM    LABGLOM >60 12/06/2022 11:45 AM    GLUCOSE 93 12/06/2022 11:45 AM    PROT 7.3 12/06/2022 11:45 AM    CALCIUM 9.2 12/06/2022 11:45 AM    BILITOT 0.8 12/06/2022 11:45 AM    ALKPHOS 83 12/06/2022 11:45 AM    AST 20 12/06/2022 11:45 AM    ALT 19 12/06/2022 11:45 AM       POC Tests: No results for input(s): POCGLU, POCNA, POCK, POCCL, POCBUN, POCHEMO, POCHCT in the last 72 hours.     Coags:   Lab Results   Component Value Date/Time    PROTIME 12.4 07/14/2022 03:47 PM    INR 0.9 07/14/2022 03:47 PM       HCG (If Applicable): No results found for: PREGTESTUR, PREGSERUM, HCG, HCGQUANT     ABGs: No results found for: PHART, PO2ART, FFC3DRW, UUV5CUC, BEART, Z0GYLWDQ     Type & Screen (If Applicable):  No results found for: LABABO, LABRH    Drug/Infectious Status (If Applicable):  Lab Results   Component Value Date/Time    HEPCAB NONREACTIVE 11/29/2021 09:15 AM       COVID-19 Screening (If Applicable): No results found for: COVID19        Anesthesia Evaluation    Airway: Mallampati: II  TM distance: >3 FB   Neck ROM: full  Mouth opening: > = 3 FB   Dental:    (+) other      Pulmonary:normal exam    (+) asthma:     (-) shortness of breath                           Cardiovascular:Negative CV ROS          ECG reviewed                        Neuro/Psych:   (+) neuromuscular disease:, headaches: migraine headaches, psychiatric history:            GI/Hepatic/Renal:             Endo/Other:    (+) hypothyroidism: arthritis:., .                 Abdominal:   (+) obese,           Vascular: Other Findings:             Anesthesia Plan      general     ASA 3       Induction: intravenous. MIPS: Postoperative opioids intended. Anesthetic plan and risks discussed with patient, father and mother. Plan discussed with CRNA.                     Vannesa Doll MD   2/17/2023

## 2023-02-17 NOTE — ANESTHESIA POSTPROCEDURE EVALUATION
POST- ANESTHESIA EVALUATION       Pt Name: Evelia Nguyen  MRN: [id number]  YOB: 1970  Date of evaluation: 2/17/2023  Time:  1:54 PM      BP (!) 140/83   Pulse 77   Temp 98 °F (36.7 °C) (Temporal)   Resp 22   Ht 5' 10\" (1.778 m)   Wt 218 lb (98.9 kg)   SpO2 97%   BMI 31.28 kg/m²      Consciousness Level  Awake  Cardiopulmonary Status  Stable  Pain Adequately Treated YES  Nausea / Vomiting  NO  Adequate Hydration  YES  Anesthesia Related Complications NONE      Electronically signed by Milagros Akers MD on 2/17/2023 at 1:54 PM       Department of Anesthesiology  Postprocedure Note    Patient: Evelia Nguyen  MRN: [id number]  YOB: 1970  Date of evaluation: 2/17/2023      Procedure Summary     Date: 02/17/23 Room / Location: [address]) [facility]    Anesthesia Start: 7520 Anesthesia Stop: 4537    Procedure: CHOLECYSTECTOMY LAPAROSCOPIC ROBOTIC WITH FIREFLY Diagnosis:       Symptomatic cholelithiasis      (Symptomatic cholelithiasis [K80.20])    Surgeons: Keven Spaulding DO Responsible Provider: Sunni Tai MD    Anesthesia Type: general ASA Status: 3          Anesthesia Type: No value filed.     Genny Phase I: Genny Score: 9    Genny Phase II: Genny Score: 10      Anesthesia Post Evaluation

## 2023-02-17 NOTE — OP NOTE
Operative Note      Patient: Jorge Luis Moreno  YOB: 1970  MRN: [id number]    Date of Procedure: 2/17/2023    Pre-Op Diagnosis: Symptomatic cholelithiasis [K80.20]    Post-Op Diagnosis: Same       Procedure(s):  CHOLECYSTECTOMY LAPAROSCOPIC ROBOTIC WITH FIREFLY    Surgeon(s):  Mika Spear DO    Assistant:   * No surgical staff found *    Anesthesia: General    Estimated Blood Loss (mL): Minimal    Complications: None    Specimens:   ID Type Source Tests Collected by Time Destination   A : Gallbladder with Contents Tissue Gallbladder SURGICAL PATHOLOGY Mika Spear DO 2/17/2023 1151        Implants:  Implant Name Type Inv. Item Serial No.  Lot No. LRB No. Used Action   CLIP INT L POLYMER CLAIRE LIG HEM O CLAIRE - [id number]  CLIP INT L POLYMER CLAIRE LIG HEM O CLAIRE  [employer] [id number] N/A 1 Implanted         Drains: * No LDAs found *    Findings: as above wound class 2      Detailed Description of Procedure:         HISTORY: The patient is a 47y.o. year old female with history of above preop diagnosis. The risk, benefits, expected outcome, and alternatives to the procedure were explained to the patient's understanding and written informed consent was obtained. DESCRIPTION OF PROCEDURE:  Patient was brought to the operating suite and placed on the operating table in supine position. Timeout was performed verifying correct patient, position, equipment and procedure to be performed. EPC cuffs were applied and preoperative antibiotics were infused. General anesthesia administered and the patient was endotracheally intubated. The patient's abdomen was prepped and draped in the usual sterile fashion. Scalpel was used to make a transverse incision 1cm at Moseley's point, 8mm Optiview trocar was used to gain entry into the abdomen under direct visualization, no injury to the underlying structures were seen.  1cm transverse incisions were then made below  the umbilicus and two additional incisions in the RLQ at the midclavicular and anterior axillary lines. 8mm robot ports were placed under direct visualization at these sites. Pneumoperitoneum established with 15mmHg CO2. The patient was then positioned in reverse Trendelenburg with rotation the patient's left, then the robot was docked in the usual fashion. The gallbladder appeared distended. Atraumatic graspers were used to grasp and elevate the fundus and infundibulum. Adhesions between the gallbladder and omentum were dissected away using blunt dissection as well as with electrocautery. The peritoneum between the gallbladder and liver were taken down with electrocautery on the medial and lateral aspects. The cystic duct and artery were skeletonized until the critical view was achieved. The cystic duct was clipped proximally and distally with Hemalok clips. The cystic artery was clipped proximally with Hemalok clips. The duct and artery were then transected with cautery . The gallbladder was dissected from the gallbladder fossa with electrocautery, then placed into an laparoscopic specimen bag which was removed from the abdomen through the LUQ incision. On inspection of the area of dissection, hemostasis was excellent, no leakage of bile was noted, clips were intact. ICG had been administered preoperatively, firefly was used at multiple points to identify both cystic duct as well as common bile duct to carry out a safe dissection, dissection was performed away from the CBD at all times. The working ports were removed under direct visualization, the camera and camera port were then removed and the abdomen dessuflated. The port sites were locally anesthetized with a total of 40cc of 1% lidocaine and 0.25% marcaine without epinephrine. The port sites were then closed with subcuticular sutures of 4-0 monocryl then covered with skin glue.      The patient tolerated the procedure well without complications, all sponge needle and instrument counts were correct at the end of the case. The patient was successfully extubated and taken to PACU in stable condition.       Electronically signed by Monica Nguyen DO on 2/17/2023 at 12:06 PM

## 2023-02-17 NOTE — DISCHARGE INSTRUCTIONS
Patient Discharge Instructions  Discharge Date:  2/17/2023    HYGEINE: [id number] Cyndi  to shower 02/18/23, no soaking in a tub/pool until seen at your follow up appointment. Clean incision daily with gentle soap and water, do not use alcohol/peroxide or any harsh cleansers. DRIVING: No driving while taking narcotic medications or while in pain    ACTIVITY: No lifting greater than 20lbs for 6 weeks or any strenuous activity. DIET: No fatty foods for one week then resume normal diet after. MEDICATIONS: Take all medications as prescribed. Take Colace until you have your first bowel movement, continue to take if you are using narcotics for pain control    SPECIAL INSTRUCTIONS:     Follow up with Dr. Kin Berry in 10-14 days, call the clinic for appointment. Call sooner if fever above 100.4 degrees Farenheit, increase in swelling or redness, thick purulent discharge or pain not controlled with medications. Activity  You have had anesthesia today  Do not drive, operate heavy equipment, consume alcoholic beverages, or make any important decisions  for 24 hours   If you are taking pain medication: Do not drive or consume alcohol. Take your time changing positions today. You may feel light headed or dizzy if you move too quickly. Continue your home medications as ordered by your physician. Diet   You can eat your normal diet when you feel well. You should start off with bland foods like chicken soup, toast, or yogurt. Then advance as tolerated. Drink plenty of fluids (unless your doctor tells you not to). Your urine should be very lightly colored without a strong odor.

## 2023-02-21 LAB — SURGICAL PATHOLOGY REPORT: NORMAL

## 2023-02-27 ENCOUNTER — OFFICE VISIT (OUTPATIENT)
Dept: SURGERY | Age: 53
End: 2023-02-27

## 2023-02-27 VITALS
BODY MASS INDEX: 31.21 KG/M2 | HEART RATE: 84 BPM | WEIGHT: 218 LBS | DIASTOLIC BLOOD PRESSURE: 73 MMHG | RESPIRATION RATE: 16 BRPM | OXYGEN SATURATION: 100 % | SYSTOLIC BLOOD PRESSURE: 115 MMHG | HEIGHT: 70 IN

## 2023-02-27 DIAGNOSIS — Z90.49 STATUS POST LAPAROSCOPIC CHOLECYSTECTOMY: Primary | ICD-10-CM

## 2023-02-27 PROCEDURE — 99024 POSTOP FOLLOW-UP VISIT: CPT | Performed by: SURGERY

## 2023-02-27 NOTE — PROGRESS NOTES
[facility] General Surgery   Clinic Evaluation  Ritesh Cordon DO    PATIENT NAME: Kelsey Owen     CLINIC VISIT DATE: 2/27/2023    SUBJECTIVE:  Kelsey Owen is a 52 y.o. female who presents to the clinic today for follow up to robot madhavi performed 2/17/23. No new issues since surgery, pt is tolerating regular diet and having normal BM. Pathology as follows:    -- Diagnosis --   GALLBLADDER, CHOLECYSTECTOMY:   - CHRONIC CHOLECYSTITIS.   - PATCHY CHOLESTEROLOSIS.   - CHOLELITHIASIS.       OBJECTIVE:    /73   Pulse 84   Resp 16   Ht 5' 10\" (1.778 m)   Wt 218 lb (98.9 kg)   SpO2 100%   BMI 31.28 kg/m²     General Appearance:  awake, alert, no acute distress, well developed, well nourished   Skin:  Skin color, texture, turgor normal. No rashes or lesions.  Lungs:  Normal chest expansion, unlabored breathing without accessory muscle use.   No audible rales, rhonchi, or wheezing.  Cardiovascular: S1S2. No evidence of JVD. No evidence of pulsatile masses in abdomen  Abdomen:  Soft, non-tender, no organomegaly, no masses. Incisions C/D/I without evidence of bleeding/infection  Musculoskeletal: No evidence of bony/muscular deformities, trauma, atrophy of either left/right upper/lower extremity. No evidence of digital clubbing or cyanosis.      ASSESSMENT:  1. Status post laparoscopic cholecystectomy          PLAN:  Lifting restriction to less than 20lbs for the next 4 weeks, unrestricted after this.  F/U prn    Electronically signed by Ritesh Cordon DO on 2/27/2023 at 1:04 PM

## 2023-02-27 NOTE — LETTER
[facility]  Surgical Specialties - General Surgery  [address]  [place], [address]  Phone: [phone]  Fax: [phone]      23    Patient: Shauna Short  MRN: [id number]  : 1970  Date of visit: 2023    Dear TAMARA Leong - CNP:        I saw Shauna Short in follow up to her cholecystectomy, she is doing well. Below are the relevant portions of my assessment and plan of care. If you have questions, please do not hesitate to call me. I look forward to following Shauna Short along with you. Sincerely,        Casi Joshua DO      [address] [facility] Surgery   Clinic Evaluation  Casi Joshua DO    PATIENT NAME: [address] [place] [address] VISIT DATE: 2023    SUBJECTIVE:  Shauna Short is a 47 y.o. female who presents to the clinic today for follow up to robot madhavi performed 23. No new issues since surgery, pt is tolerating regular diet and having normal BM. Pathology as follows:    -- Diagnosis --   GALLBLADDER, CHOLECYSTECTOMY:   - CHRONIC CHOLECYSTITIS. - PATCHY CHOLESTEROLOSIS.   - CHOLELITHIASIS. OBJECTIVE:    /73   Pulse 84   Resp 16   Ht 5' 10\" (1.778 m)   Wt 218 lb (98.9 kg)   SpO2 100%   BMI 31.28 kg/m²     General Appearance:  awake, alert, no acute distress, well developed, well nourished   Skin:  Skin color, texture, turgor normal. No rashes or lesions. Lungs:  Normal chest expansion, unlabored breathing without accessory muscle use. No audible rales, rhonchi, or wheezing. Cardiovascular: S1S2. No evidence of JVD. No evidence of pulsatile masses in abdomen  Abdomen:  Soft, non-tender, no organomegaly, no masses. Incisions C/D/I without evidence of bleeding/infection  Musculoskeletal: No evidence of bony/muscular deformities, trauma, atrophy of either left/right upper/lower extremity. No evidence of digital clubbing or cyanosis.       ASSESSMENT:  1. Status post laparoscopic cholecystectomy PLAN:  1. Lifting restriction to less than 20lbs for the next 4 weeks, unrestricted after this.   2. F/U prn    Electronically signed by Cm Calero DO on 2/27/2023 at 1:04 PM

## 2023-03-16 DIAGNOSIS — E06.3 HASHIMOTO'S THYROIDITIS: ICD-10-CM

## 2023-03-16 RX ORDER — LEVOTHYROXINE SODIUM 175 UG/1
TABLET ORAL
Qty: 30 TABLET | Refills: 5 | Status: SHIPPED | OUTPATIENT
Start: 2023-03-16

## 2023-04-19 DIAGNOSIS — E06.3 HASHIMOTO'S THYROIDITIS: ICD-10-CM

## 2023-04-20 RX ORDER — LEVOTHYROXINE SODIUM 175 UG/1
175 TABLET ORAL
Qty: 30 TABLET | Refills: 5 | OUTPATIENT
Start: 2023-04-20

## 2023-04-24 ENCOUNTER — TELEPHONE (OUTPATIENT)
Dept: NEUROLOGY | Age: 53
End: 2023-04-24

## 2023-05-02 DIAGNOSIS — E06.3 HASHIMOTO'S THYROIDITIS: ICD-10-CM

## 2023-05-02 RX ORDER — LEVOTHYROXINE SODIUM 175 UG/1
175 TABLET ORAL
Qty: 30 TABLET | Refills: 5 | OUTPATIENT
Start: 2023-05-02

## 2023-05-02 RX ORDER — IBUPROFEN 800 MG/1
800 TABLET ORAL 2 TIMES DAILY PRN
Qty: 60 TABLET | Refills: 0 | Status: SHIPPED | OUTPATIENT
Start: 2023-05-02

## 2023-05-11 ENCOUNTER — OFFICE VISIT (OUTPATIENT)
Dept: INTERNAL MEDICINE CLINIC | Age: 53
End: 2023-05-11
Payer: COMMERCIAL

## 2023-05-11 VITALS
WEIGHT: 217 LBS | DIASTOLIC BLOOD PRESSURE: 88 MMHG | HEIGHT: 69 IN | BODY MASS INDEX: 32.14 KG/M2 | HEART RATE: 102 BPM | OXYGEN SATURATION: 98 % | SYSTOLIC BLOOD PRESSURE: 124 MMHG

## 2023-05-11 DIAGNOSIS — F41.9 ANXIETY AND DEPRESSION: ICD-10-CM

## 2023-05-11 DIAGNOSIS — R73.03 PREDIABETES: ICD-10-CM

## 2023-05-11 DIAGNOSIS — E55.9 VITAMIN D DEFICIENCY: ICD-10-CM

## 2023-05-11 DIAGNOSIS — R53.83 OTHER FATIGUE: ICD-10-CM

## 2023-05-11 DIAGNOSIS — F32.A ANXIETY AND DEPRESSION: ICD-10-CM

## 2023-05-11 DIAGNOSIS — Z78.0 POSTMENOPAUSAL: ICD-10-CM

## 2023-05-11 DIAGNOSIS — G43.909 MIGRAINE WITHOUT STATUS MIGRAINOSUS, NOT INTRACTABLE, UNSPECIFIED MIGRAINE TYPE: ICD-10-CM

## 2023-05-11 DIAGNOSIS — F90.0 ATTENTION DEFICIT HYPERACTIVITY DISORDER (ADHD), PREDOMINANTLY INATTENTIVE TYPE: Primary | ICD-10-CM

## 2023-05-11 DIAGNOSIS — Z12.31 ENCOUNTER FOR SCREENING MAMMOGRAM FOR BREAST CANCER: ICD-10-CM

## 2023-05-11 PROCEDURE — 99214 OFFICE O/P EST MOD 30 MIN: CPT | Performed by: NURSE PRACTITIONER

## 2023-05-11 RX ORDER — SERTRALINE HYDROCHLORIDE 25 MG/1
25 TABLET, FILM COATED ORAL DAILY
Qty: 90 TABLET | Refills: 1 | Status: SHIPPED | OUTPATIENT
Start: 2023-05-11

## 2023-05-11 RX ORDER — DEXTROAMPHETAMINE SACCHARATE, AMPHETAMINE ASPARTATE, DEXTROAMPHETAMINE SULFATE AND AMPHETAMINE SULFATE 5; 5; 5; 5 MG/1; MG/1; MG/1; MG/1
20 TABLET ORAL 2 TIMES DAILY
Qty: 60 TABLET | Refills: 0 | Status: SHIPPED | OUTPATIENT
Start: 2023-05-11 | End: 2023-06-10

## 2023-05-11 RX ORDER — BUSPIRONE HYDROCHLORIDE 5 MG/1
5 TABLET ORAL 2 TIMES DAILY
Qty: 180 TABLET | Refills: 1 | Status: SHIPPED | OUTPATIENT
Start: 2023-05-11 | End: 2023-08-09

## 2023-05-16 ENCOUNTER — HOSPITAL ENCOUNTER (EMERGENCY)
Age: 53
Discharge: LEFT BEFORE DISCHARGE | End: 2023-05-16

## 2023-05-16 VITALS
DIASTOLIC BLOOD PRESSURE: 82 MMHG | BODY MASS INDEX: 32.29 KG/M2 | RESPIRATION RATE: 18 BRPM | SYSTOLIC BLOOD PRESSURE: 136 MMHG | OXYGEN SATURATION: 100 % | WEIGHT: 218 LBS | HEART RATE: 96 BPM | TEMPERATURE: 97.6 F | HEIGHT: 69 IN

## 2023-05-16 ASSESSMENT — PAIN DESCRIPTION - DESCRIPTORS: DESCRIPTORS: SHOOTING;SHARP;PRESSURE

## 2023-05-16 ASSESSMENT — PAIN DESCRIPTION - ORIENTATION: ORIENTATION: RIGHT

## 2023-05-16 ASSESSMENT — PAIN SCALES - GENERAL: PAINLEVEL_OUTOF10: 6

## 2023-05-16 ASSESSMENT — PAIN - FUNCTIONAL ASSESSMENT: PAIN_FUNCTIONAL_ASSESSMENT: 0-10

## 2023-05-16 ASSESSMENT — PAIN DESCRIPTION - LOCATION: LOCATION: HIP;FLANK

## 2023-05-17 ENCOUNTER — HOSPITAL ENCOUNTER (EMERGENCY)
Facility: CLINIC | Age: 53
Discharge: HOME | End: 2023-05-17
Attending: EMERGENCY MEDICINE
Payer: COMMERCIAL

## 2023-05-17 ENCOUNTER — APPOINTMENT (OUTPATIENT)
Dept: CT IMAGING | Facility: CLINIC | Age: 53
End: 2023-05-17
Payer: COMMERCIAL

## 2023-05-17 VITALS
TEMPERATURE: 98.3 F | OXYGEN SATURATION: 97 % | SYSTOLIC BLOOD PRESSURE: 130 MMHG | WEIGHT: 218 LBS | BODY MASS INDEX: 32.29 KG/M2 | DIASTOLIC BLOOD PRESSURE: 87 MMHG | RESPIRATION RATE: 16 BRPM | HEIGHT: 69 IN | HEART RATE: 95 BPM

## 2023-05-17 DIAGNOSIS — M54.41 ACUTE RIGHT-SIDED LOW BACK PAIN WITH RIGHT-SIDED SCIATICA: Primary | ICD-10-CM

## 2023-05-17 LAB
BACTERIA URNS QL MICRO: ABNORMAL
BILIRUB UR QL STRIP: NEGATIVE
CHARACTER UR: ABNORMAL
CLARITY UR: ABNORMAL
COLOR UR: YELLOW
EPI CELLS #/AREA URNS HPF: ABNORMAL /HPF (ref 0–5)
GLUCOSE UR STRIP.AUTO-MCNC: NEGATIVE MG/DL
HGB UR QL STRIP.AUTO: NEGATIVE
KETONES UR STRIP.AUTO-MCNC: ABNORMAL MG/DL
LEUKOCYTE ESTERASE UR QL STRIP: NEGATIVE
MUCOUS THREADS URNS QL MICRO: ABNORMAL
NITRITE UR QL STRIP: NEGATIVE
PROT UR STRIP-MCNC: ABNORMAL MG/DL
PROT UR STRIP.AUTO-MCNC: 7.5 MG/DL (ref 5–8)
RBC #/AREA URNS HPF: ABNORMAL /HPF (ref 0–2)
SP GR UR STRIP.AUTO: 1.02 (ref 1–1.03)
UROBILINOGEN UR STRIP-ACNC: NORMAL
WBC #/AREA URNS HPF: ABNORMAL /HPF (ref 0–5)

## 2023-05-17 PROCEDURE — 74176 CT ABD & PELVIS W/O CONTRAST: CPT

## 2023-05-17 PROCEDURE — 6360000002 HC RX W HCPCS: Performed by: EMERGENCY MEDICINE

## 2023-05-17 PROCEDURE — 99284 EMERGENCY DEPT VISIT MOD MDM: CPT

## 2023-05-17 PROCEDURE — 81001 URINALYSIS AUTO W/SCOPE: CPT

## 2023-05-17 PROCEDURE — 96372 THER/PROPH/DIAG INJ SC/IM: CPT

## 2023-05-17 RX ORDER — PREDNISONE 10 MG/1
10 TABLET ORAL SEE ADMIN INSTRUCTIONS
Qty: 17 TABLET | Refills: 0 | Status: SHIPPED | OUTPATIENT
Start: 2023-05-17 | End: 2023-05-23

## 2023-05-17 RX ORDER — KETOROLAC TROMETHAMINE 30 MG/ML
30 INJECTION, SOLUTION INTRAMUSCULAR; INTRAVENOUS ONCE
Status: COMPLETED | OUTPATIENT
Start: 2023-05-17 | End: 2023-05-17

## 2023-05-17 RX ORDER — HYDROCODONE BITARTRATE AND ACETAMINOPHEN 5; 325 MG/1; MG/1
1 TABLET ORAL EVERY 6 HOURS PRN
Qty: 10 TABLET | Refills: 0 | Status: SHIPPED | OUTPATIENT
Start: 2023-05-17 | End: 2023-05-20

## 2023-05-17 RX ADMIN — KETOROLAC TROMETHAMINE 30 MG: 30 INJECTION, SOLUTION INTRAMUSCULAR; INTRAVENOUS at 13:30

## 2023-05-17 ASSESSMENT — PAIN SCALES - GENERAL
PAINLEVEL_OUTOF10: 5
PAINLEVEL_OUTOF10: 5

## 2023-05-17 ASSESSMENT — PAIN DESCRIPTION - ORIENTATION: ORIENTATION: RIGHT

## 2023-05-17 ASSESSMENT — PAIN DESCRIPTION - LOCATION: LOCATION: HIP;FLANK;BACK

## 2023-05-17 ASSESSMENT — ENCOUNTER SYMPTOMS: ABDOMINAL PAIN: 0

## 2023-05-17 ASSESSMENT — PAIN - FUNCTIONAL ASSESSMENT: PAIN_FUNCTIONAL_ASSESSMENT: 0-10

## 2023-05-17 NOTE — ED PROVIDER NOTES
[facility]  [facility]  Phone: [facility]  EMERGENCY DEPARTMENT ENCOUNTER      Pt Name: Emanuel Ram  MRN: [id number]  [place] 1970  Date of evaluation: 5/17/2023  Provider: Daniella Villa DO    CHIEF COMPLAINT       Chief Complaint   Patient presents with    Hip Pain    Flank Pain     Right; pt reports symptoms have been constant x1 week; pt reports she is concerned she may have a kidney stone, but denies any hx of them. Denies any urinary complaints. HISTORY OF PRESENT ILLNESS   (Location/Symptom, Timing/Onset,Context/Setting, Quality, Duration, Modifying Factors, Severity)  Note limiting factors. Emanuel Ram is a 47 y.o. female who presents to the emergency department for the evaluation of right low back/flank/hip pain. Patient reports this is been going on for about 2 weeks. She has no injury. She has had some nausea. No vomiting. No dysuria or hematuria. She has a history of a total hip replacement about a year ago on the right side. No injury since then. No new numbness or weakness in her right lower extremity, but reports history of sciatica with distal right lower extremity paresthesia. She denies fever at home. Nursing Notes were reviewed. REVIEW OF SYSTEMS    (2-9systems for level 4, 10 or more for level 5)     Review of Systems   Constitutional:  Negative for fever. Gastrointestinal:  Negative for abdominal pain. Musculoskeletal:         Rt back/flank pain   Neurological:  Negative for seizures and weakness. Except asnoted above the remainder of the review of systems was reviewed and negative.        PAST MEDICAL HISTORY     Past Medical History:   Diagnosis Date    Allergic rhinitis     Asthma     Attention deficit hyperactivity disorder (ADHD), predominantly inattentive type     early 90's    Chronic back pain     Bulging l5    COVID-19 01/05/2022    cough, body aches, fatigue, headache,

## 2023-05-18 ENCOUNTER — CARE COORDINATION (OUTPATIENT)
Dept: OTHER | Facility: CLINIC | Age: 53
End: 2023-05-18

## 2023-05-18 NOTE — CARE COORDINATION
Ambulatory Care Coordination Note    ACM attempted to reach patient for introduction to Associate Care Management related to ED visit 5/17/23. HIPAA compliant message left requesting a return phone call at patient convenience. Plan for follow-up call in 1-2 days      Future Appointments   Date Time Provider Nataliya Cala   7/6/2023  8:50 AM MD PJ IyerURG ORT [facility]   8/15/2023  7:45 AM Cherelle Boswell APRN - CNP [facility] LENNIE Us RN  Associate Care Manager  Phone: [phone]  Email: [email or web address]

## 2023-05-25 ENCOUNTER — CARE COORDINATION (OUTPATIENT)
Dept: OTHER | Facility: CLINIC | Age: 53
End: 2023-05-25

## 2023-05-29 ASSESSMENT — ENCOUNTER SYMPTOMS
ABDOMINAL PAIN: 0
DIARRHEA: 0
EYE DISCHARGE: 0
NAUSEA: 0
WHEEZING: 0
TROUBLE SWALLOWING: 0
COUGH: 0
SORE THROAT: 0
BACK PAIN: 1
CONSTIPATION: 0
SHORTNESS OF BREATH: 0

## 2023-05-30 ENCOUNTER — HOSPITAL ENCOUNTER (OUTPATIENT)
Age: 53
Discharge: HOME | End: 2023-05-30
Payer: COMMERCIAL

## 2023-05-30 DIAGNOSIS — E55.9 VITAMIN D DEFICIENCY: ICD-10-CM

## 2023-05-30 DIAGNOSIS — R53.83 OTHER FATIGUE: ICD-10-CM

## 2023-05-30 DIAGNOSIS — R73.03 PREDIABETES: ICD-10-CM

## 2023-05-30 LAB
25(OH)D3 SERPL-MCNC: 27.2 NG/ML
ALBUMIN SERPL-MCNC: 4.6 G/DL (ref 3.5–5.2)
ALBUMIN/GLOB SERPL: 1.5 {RATIO} (ref 1–2.5)
ALP SERPL-CCNC: 80 U/L (ref 35–104)
ALT SERPL-CCNC: 28 U/L (ref 5–33)
ANION GAP SERPL CALCULATED.3IONS-SCNC: 12 MMOL/L (ref 9–17)
AST SERPL-CCNC: 28 U/L
BASOPHILS # BLD: 0.13 K/UL (ref 0–0.2)
BASOPHILS NFR BLD: 1 % (ref 0–2)
BILIRUB SERPL-MCNC: 0.5 MG/DL (ref 0.3–1.2)
BUN SERPL-MCNC: 18 MG/DL (ref 6–20)
CALCIUM SERPL-MCNC: 9.6 MG/DL (ref 8.6–10.4)
CHLORIDE SERPL-SCNC: 100 MMOL/L (ref 98–107)
CO2 SERPL-SCNC: 24 MMOL/L (ref 20–31)
CREAT SERPL-MCNC: 0.78 MG/DL (ref 0.5–0.9)
EOSINOPHIL # BLD: 0.43 K/UL (ref 0–0.44)
EOSINOPHILS RELATIVE PERCENT: 3 % (ref 1–4)
ERYTHROCYTE [DISTWIDTH] IN BLOOD BY AUTOMATED COUNT: 13.8 % (ref 11.8–14.4)
GFR SERPL CREATININE-BSD FRML MDRD: >60 ML/MIN/1.73M2
GLUCOSE SERPL-MCNC: 100 MG/DL (ref 70–99)
HCT VFR BLD AUTO: 37.4 % (ref 36.3–47.1)
HGB BLD-MCNC: 12 G/DL (ref 11.9–15.1)
IMM GRANULOCYTES # BLD AUTO: 0.11 K/UL (ref 0–0.3)
IMM GRANULOCYTES NFR BLD: 1 %
LYMPHOCYTES # BLD: 17 % (ref 24–43)
LYMPHOCYTES NFR BLD: 2.61 K/UL (ref 1.1–3.7)
MCH RBC QN AUTO: 29.8 PG (ref 25.2–33.5)
MCHC RBC AUTO-ENTMCNC: 32.1 G/DL (ref 28.4–34.8)
MCV RBC AUTO: 92.8 FL (ref 82.6–102.9)
MONOCYTES NFR BLD: 1.04 K/UL (ref 0.1–1.2)
MONOCYTES NFR BLD: 7 % (ref 3–12)
NEUTROPHILS NFR BLD: 71 % (ref 36–65)
NEUTS SEG NFR BLD: 11.25 K/UL (ref 1.5–8.1)
NRBC AUTOMATED: 0 PER 100 WBC
PLATELET # BLD AUTO: 290 K/UL (ref 138–453)
PMV BLD AUTO: 9.1 FL (ref 8.1–13.5)
POTASSIUM SERPL-SCNC: 4.1 MMOL/L (ref 3.7–5.3)
PROT SERPL-MCNC: 7.7 G/DL (ref 6.4–8.3)
RBC # BLD AUTO: 4.03 M/UL (ref 3.95–5.11)
SODIUM SERPL-SCNC: 136 MMOL/L (ref 135–144)
WBC OTHER # BLD: 15.6 K/UL (ref 3.5–11.3)

## 2023-05-30 PROCEDURE — 85025 COMPLETE CBC W/AUTO DIFF WBC: CPT

## 2023-05-30 PROCEDURE — 80053 COMPREHEN METABOLIC PANEL: CPT

## 2023-05-30 PROCEDURE — 82306 VITAMIN D 25 HYDROXY: CPT

## 2023-05-30 PROCEDURE — 83036 HEMOGLOBIN GLYCOSYLATED A1C: CPT

## 2023-05-30 PROCEDURE — 36415 COLL VENOUS BLD VENIPUNCTURE: CPT

## 2023-05-31 LAB
EST. AVERAGE GLUCOSE BLD GHB EST-MCNC: 114 MG/DL
HBA1C MFR BLD: 5.6 % (ref 4–6)

## 2023-06-07 ENCOUNTER — CARE COORDINATION (OUTPATIENT)
Dept: OTHER | Facility: CLINIC | Age: 53
End: 2023-06-07

## 2023-06-07 NOTE — CARE COORDINATION
Ambulatory Care Coordination Note    ACM attempted to reach patient for care management follow up call regarding back pain and plan of care after recent PCP follow up. HIPAA compliant message left requesting a return phone call at patient convenience. Plan for follow-up call in 5-7 days    Future Appointments   Date Time Provider Nataliya Caal   6/29/2023  8:30 AM [place], DO Cort[place] ORT SP [facility]   7/6/2023  8:50 AM MD DESTINY Jimenez [facility]   8/15/2023  7:45 AM [place], APRN - CNP [place] Dagoberto Murphy, MSN RN  Associate Care Manager  Phone: [phone]  Email: [email or web address]

## 2023-06-27 DIAGNOSIS — Z96.641 S/P TOTAL RIGHT HIP ARTHROPLASTY: Primary | ICD-10-CM

## 2023-06-28 ENCOUNTER — CARE COORDINATION (OUTPATIENT)
Dept: OTHER | Facility: CLINIC | Age: 53
End: 2023-06-28

## 2023-06-29 ENCOUNTER — OFFICE VISIT (OUTPATIENT)
Dept: ORTHOPEDIC SURGERY | Age: 53
End: 2023-06-29

## 2023-06-29 VITALS — RESPIRATION RATE: 14 BRPM | HEIGHT: 69 IN | WEIGHT: 212.2 LBS | BODY MASS INDEX: 31.43 KG/M2

## 2023-06-29 DIAGNOSIS — M51.36 LUMBAR DEGENERATIVE DISC DISEASE: Primary | ICD-10-CM

## 2023-06-29 DIAGNOSIS — Z96.641 S/P TOTAL RIGHT HIP ARTHROPLASTY: ICD-10-CM

## 2023-06-29 RX ORDER — METHYLPREDNISOLONE 4 MG/1
TABLET ORAL
Qty: 1 KIT | Refills: 0 | Status: SHIPPED | OUTPATIENT
Start: 2023-06-29

## 2023-06-29 RX ORDER — METHYLPREDNISOLONE 4 MG/1
TABLET ORAL
Qty: 1 KIT | Refills: 0 | Status: CANCELLED | OUTPATIENT
Start: 2023-06-29

## 2023-07-02 ASSESSMENT — ENCOUNTER SYMPTOMS
ABDOMINAL PAIN: 0
EYE DISCHARGE: 0
SHORTNESS OF BREATH: 0
ROS SKIN COMMENTS: NEGATIVE FOR RASH

## 2023-07-14 DIAGNOSIS — F90.0 ATTENTION DEFICIT HYPERACTIVITY DISORDER (ADHD), PREDOMINANTLY INATTENTIVE TYPE: ICD-10-CM

## 2023-07-14 RX ORDER — DEXTROAMPHETAMINE SACCHARATE, AMPHETAMINE ASPARTATE, DEXTROAMPHETAMINE SULFATE AND AMPHETAMINE SULFATE 5; 5; 5; 5 MG/1; MG/1; MG/1; MG/1
20 TABLET ORAL 2 TIMES DAILY
Qty: 60 TABLET | Refills: 0 | Status: SHIPPED | OUTPATIENT
Start: 2023-07-14 | End: 2023-08-13

## 2023-07-14 RX ORDER — IBUPROFEN 800 MG/1
800 TABLET ORAL 2 TIMES DAILY PRN
Qty: 60 TABLET | Refills: 0 | Status: SHIPPED | OUTPATIENT
Start: 2023-07-14

## 2023-08-15 ENCOUNTER — OFFICE VISIT (OUTPATIENT)
Dept: INTERNAL MEDICINE CLINIC | Age: 53
End: 2023-08-15
Payer: COMMERCIAL

## 2023-08-15 VITALS
DIASTOLIC BLOOD PRESSURE: 80 MMHG | SYSTOLIC BLOOD PRESSURE: 124 MMHG | HEART RATE: 90 BPM | WEIGHT: 220 LBS | BODY MASS INDEX: 31.5 KG/M2 | HEIGHT: 70 IN | OXYGEN SATURATION: 99 %

## 2023-08-15 DIAGNOSIS — G89.29 CHRONIC BILATERAL LOW BACK PAIN WITH RIGHT-SIDED SCIATICA: ICD-10-CM

## 2023-08-15 DIAGNOSIS — F90.0 ATTENTION DEFICIT HYPERACTIVITY DISORDER (ADHD), PREDOMINANTLY INATTENTIVE TYPE: Primary | ICD-10-CM

## 2023-08-15 DIAGNOSIS — F41.9 ANXIETY: ICD-10-CM

## 2023-08-15 DIAGNOSIS — E03.9 HYPOTHYROIDISM, UNSPECIFIED TYPE: ICD-10-CM

## 2023-08-15 DIAGNOSIS — M54.41 CHRONIC BILATERAL LOW BACK PAIN WITH RIGHT-SIDED SCIATICA: ICD-10-CM

## 2023-08-15 DIAGNOSIS — E78.5 HYPERLIPIDEMIA, UNSPECIFIED HYPERLIPIDEMIA TYPE: ICD-10-CM

## 2023-08-15 PROCEDURE — 99214 OFFICE O/P EST MOD 30 MIN: CPT | Performed by: NURSE PRACTITIONER

## 2023-08-15 RX ORDER — DEXTROAMPHETAMINE SACCHARATE, AMPHETAMINE ASPARTATE, DEXTROAMPHETAMINE SULFATE AND AMPHETAMINE SULFATE 5; 5; 5; 5 MG/1; MG/1; MG/1; MG/1
20 TABLET ORAL 2 TIMES DAILY
Qty: 60 TABLET | Refills: 0 | Status: SHIPPED | OUTPATIENT
Start: 2023-08-15 | End: 2023-09-14

## 2023-08-15 RX ORDER — TIZANIDINE 2 MG/1
2 TABLET ORAL 3 TIMES DAILY PRN
Qty: 60 TABLET | Refills: 0 | Status: SHIPPED | OUTPATIENT
Start: 2023-08-15

## 2023-08-15 ASSESSMENT — ENCOUNTER SYMPTOMS
DIARRHEA: 0
NAUSEA: 0
COUGH: 0
BACK PAIN: 1
SHORTNESS OF BREATH: 0
CONSTIPATION: 0
ABDOMINAL PAIN: 0
TROUBLE SWALLOWING: 0
WHEEZING: 0

## 2023-08-15 NOTE — PROGRESS NOTES
tenderness. Lumbar back: No deformity or tenderness. Normal range of motion. Right hip: No deformity. Normal range of motion. Left hip: No deformity. Normal range of motion. Right lower leg: No edema. Left lower leg: No edema. Lymphadenopathy:      Cervical: No cervical adenopathy. Skin:     General: Skin is warm and dry. Neurological:      Mental Status: She is alert and oriented to person, place, and time. Gait: Gait normal.   Psychiatric:         Mood and Affect: Mood is anxious and depressed. Speech: Speech normal.         Behavior: Behavior normal.        LABORATORY FINDINGS:    CBC:   Lab Results   Component Value Date/Time    WBC 15.6 05/30/2023 07:45 AM    HGB 12.0 05/30/2023 07:45 AM     05/30/2023 07:45 AM     BMP:   Lab Results   Component Value Date/Time     05/30/2023 07:45 AM    K 4.1 05/30/2023 07:45 AM     05/30/2023 07:45 AM    CO2 24 05/30/2023 07:45 AM    BUN 18 05/30/2023 07:45 AM    CREATININE 0.78 05/30/2023 07:45 AM    GLUCOSE 100 05/30/2023 07:45 AM     HEMOGLOBIN A1C:   Lab Results   Component Value Date/Time    LABA1C 5.6 05/30/2023 07:45 AM     FASTING LIPID PANEL:   Lab Results   Component Value Date    CHOL 255 (H) 02/08/2023    HDL 59 02/08/2023    LDLCHOLESTEROL 175 (H) 02/08/2023    TRIG 107 02/08/2023       ASSESSMENT AND PLAN:      Visit Diagnoses and Associated Orders       Attention deficit hyperactivity disorder (ADHD), predominantly inattentive type    -  Primary    Controlled on Adderall, continue current dose.  Advised to schedule with behavioral health, previously referred    amphetamine-dextroamphetamine (ADDERALL) 20 MG tablet [2940]           Chronic bilateral low back pain with right-sided sciatica        Following with ortho, plans to get MRI, will continue zanaflex prn    tiZANidine (ZANAFLEX) 2 MG tablet [19530]           Hypothyroidism, unspecified type        Last TSH within limits, compliant with

## 2023-08-31 ENCOUNTER — OFFICE VISIT (OUTPATIENT)
Dept: ORTHOPEDIC SURGERY | Age: 53
End: 2023-08-31

## 2023-08-31 ENCOUNTER — HOSPITAL ENCOUNTER (OUTPATIENT)
Age: 53
Setting detail: SPECIMEN
Discharge: HOME | End: 2023-08-31

## 2023-08-31 VITALS — WEIGHT: 218 LBS | RESPIRATION RATE: 14 BRPM | BODY MASS INDEX: 31.21 KG/M2 | HEIGHT: 70 IN

## 2023-08-31 DIAGNOSIS — Z96.641 S/P TOTAL RIGHT HIP ARTHROPLASTY: ICD-10-CM

## 2023-08-31 DIAGNOSIS — E03.9 HYPOTHYROIDISM, UNSPECIFIED TYPE: ICD-10-CM

## 2023-08-31 DIAGNOSIS — M16.12 OSTEOARTHRITIS OF LEFT HIP, UNSPECIFIED OSTEOARTHRITIS TYPE: Primary | ICD-10-CM

## 2023-08-31 DIAGNOSIS — E78.5 HYPERLIPIDEMIA, UNSPECIFIED HYPERLIPIDEMIA TYPE: ICD-10-CM

## 2023-08-31 LAB
ALBUMIN SERPL-MCNC: 4 G/DL (ref 3.5–5.2)
ALBUMIN/GLOB SERPL: 1.5 {RATIO} (ref 1–2.5)
ALP SERPL-CCNC: 85 U/L (ref 35–104)
ALT SERPL-CCNC: 31 U/L (ref 5–33)
ANION GAP SERPL CALCULATED.3IONS-SCNC: 10 MMOL/L (ref 9–17)
AST SERPL-CCNC: 21 U/L
BASOPHILS # BLD: 0.08 K/UL (ref 0–0.2)
BASOPHILS NFR BLD: 1 % (ref 0–2)
BILIRUB SERPL-MCNC: 0.3 MG/DL (ref 0.3–1.2)
BUN SERPL-MCNC: 12 MG/DL (ref 6–20)
CALCIUM SERPL-MCNC: 8.8 MG/DL (ref 8.6–10.4)
CHLORIDE SERPL-SCNC: 106 MMOL/L (ref 98–107)
CHOLEST SERPL-MCNC: 271 MG/DL
CHOLESTEROL/HDL RATIO: 5.8
CO2 SERPL-SCNC: 28 MMOL/L (ref 20–31)
CREAT SERPL-MCNC: 0.7 MG/DL (ref 0.5–0.9)
CRP SERPL HS-MCNC: 5.6 MG/L (ref 0–5)
EOSINOPHIL # BLD: 0.61 K/UL (ref 0–0.44)
EOSINOPHILS RELATIVE PERCENT: 7 % (ref 1–4)
ERYTHROCYTE [DISTWIDTH] IN BLOOD BY AUTOMATED COUNT: 12.8 % (ref 11.8–14.4)
ERYTHROCYTE [SEDIMENTATION RATE] IN BLOOD BY PHOTOMETRIC METHOD: 3 MM/HR (ref 0–30)
GFR SERPL CREATININE-BSD FRML MDRD: >60 ML/MIN/1.73M2
GLUCOSE SERPL-MCNC: 69 MG/DL (ref 70–99)
HCT VFR BLD AUTO: 38.4 % (ref 36.3–47.1)
HDLC SERPL-MCNC: 47 MG/DL
HGB BLD-MCNC: 12 G/DL (ref 11.9–15.1)
IMM GRANULOCYTES # BLD AUTO: <0.03 K/UL (ref 0–0.3)
IMM GRANULOCYTES NFR BLD: 0 %
LDLC SERPL CALC-MCNC: 174 MG/DL (ref 0–130)
LYMPHOCYTES NFR BLD: 2.91 K/UL (ref 1.1–3.7)
LYMPHOCYTES RELATIVE PERCENT: 35 % (ref 24–43)
MCH RBC QN AUTO: 29.6 PG (ref 25.2–33.5)
MCHC RBC AUTO-ENTMCNC: 31.3 G/DL (ref 28.4–34.8)
MCV RBC AUTO: 94.6 FL (ref 82.6–102.9)
MONOCYTES NFR BLD: 0.49 K/UL (ref 0.1–1.2)
MONOCYTES NFR BLD: 6 % (ref 3–12)
NEUTROPHILS NFR BLD: 51 % (ref 36–65)
NEUTS SEG NFR BLD: 4.17 K/UL (ref 1.5–8.1)
NRBC BLD-RTO: 0 PER 100 WBC
PLATELET # BLD AUTO: 286 K/UL (ref 138–453)
PMV BLD AUTO: 10.3 FL (ref 8.1–13.5)
POTASSIUM SERPL-SCNC: 3.8 MMOL/L (ref 3.7–5.3)
PROT SERPL-MCNC: 6.6 G/DL (ref 6.4–8.3)
RBC # BLD AUTO: 4.06 M/UL (ref 3.95–5.11)
SODIUM SERPL-SCNC: 144 MMOL/L (ref 135–144)
TRIGL SERPL-MCNC: 251 MG/DL
TSH SERPL DL<=0.05 MIU/L-ACNC: 1.59 UIU/ML (ref 0.3–5)
WBC OTHER # BLD: 8.3 K/UL (ref 3.5–11.3)

## 2023-08-31 RX ORDER — PREDNISONE 10 MG/1
10 TABLET ORAL SEE ADMIN INSTRUCTIONS
Qty: 17 TABLET | Refills: 0 | Status: SHIPPED | OUTPATIENT
Start: 2023-08-31 | End: 2023-09-06

## 2023-09-03 ASSESSMENT — ENCOUNTER SYMPTOMS
SHORTNESS OF BREATH: 0
ABDOMINAL PAIN: 0
EYE DISCHARGE: 0
ROS SKIN COMMENTS: NEGATIVE FOR RASH

## 2023-09-05 DIAGNOSIS — E78.2 MIXED HYPERLIPIDEMIA: Primary | ICD-10-CM

## 2023-09-05 RX ORDER — ATORVASTATIN CALCIUM 20 MG/1
20 TABLET, FILM COATED ORAL DAILY
Qty: 90 TABLET | Refills: 0 | Status: SHIPPED | OUTPATIENT
Start: 2023-09-05

## 2023-09-06 DIAGNOSIS — M16.12 OSTEOARTHRITIS OF LEFT HIP, UNSPECIFIED OSTEOARTHRITIS TYPE: Primary | ICD-10-CM

## 2023-09-13 ENCOUNTER — HOSPITAL ENCOUNTER (OUTPATIENT)
Dept: GENERAL RADIOLOGY | Age: 53
Discharge: HOME | End: 2023-09-15
Attending: ORTHOPAEDIC SURGERY
Payer: COMMERCIAL

## 2023-09-13 DIAGNOSIS — M16.12 ARTHRITIS OF LEFT HIP: ICD-10-CM

## 2023-09-13 DIAGNOSIS — M16.12 OSTEOARTHRITIS OF LEFT HIP, UNSPECIFIED OSTEOARTHRITIS TYPE: ICD-10-CM

## 2023-09-13 PROCEDURE — 6360000002 HC RX W HCPCS

## 2023-09-13 PROCEDURE — 2500000003 HC RX 250 WO HCPCS: Performed by: ORTHOPAEDIC SURGERY

## 2023-09-13 PROCEDURE — 2500000003 HC RX 250 WO HCPCS

## 2023-09-13 PROCEDURE — 6360000004 HC RX CONTRAST MEDICATION: Performed by: ORTHOPAEDIC SURGERY

## 2023-09-13 PROCEDURE — 20610 DRAIN/INJ JOINT/BURSA W/O US: CPT

## 2023-09-13 PROCEDURE — 6360000002 HC RX W HCPCS: Performed by: ORTHOPAEDIC SURGERY

## 2023-09-13 PROCEDURE — 77002 NEEDLE LOCALIZATION BY XRAY: CPT

## 2023-09-13 RX ORDER — METHYLPREDNISOLONE ACETATE 80 MG/ML
80 INJECTION, SUSPENSION INTRA-ARTICULAR; INTRALESIONAL; INTRAMUSCULAR; SOFT TISSUE ONCE
Status: COMPLETED | OUTPATIENT
Start: 2023-09-13 | End: 2023-09-13

## 2023-09-13 RX ORDER — BUPIVACAINE HYDROCHLORIDE 2.5 MG/ML
30 INJECTION, SOLUTION INFILTRATION; PERINEURAL ONCE
Status: COMPLETED | OUTPATIENT
Start: 2023-09-13 | End: 2023-09-13

## 2023-09-13 RX ORDER — LIDOCAINE HYDROCHLORIDE 10 MG/ML
5 INJECTION, SOLUTION INFILTRATION; PERINEURAL ONCE
Status: COMPLETED | OUTPATIENT
Start: 2023-09-13 | End: 2023-09-13

## 2023-09-13 RX ADMIN — LIDOCAINE HYDROCHLORIDE 10 ML: 10 INJECTION, SOLUTION INFILTRATION; PERINEURAL at 08:53

## 2023-09-13 RX ADMIN — BUPIVACAINE HYDROCHLORIDE 10 ML: 2.5 INJECTION, SOLUTION EPIDURAL; INFILTRATION; INTRACAUDAL; PERINEURAL at 08:56

## 2023-09-13 RX ADMIN — METHYLPREDNISOLONE ACETATE 80 MG: 80 INJECTION, SUSPENSION INTRA-ARTICULAR; INTRALESIONAL; INTRAMUSCULAR; SOFT TISSUE at 08:59

## 2023-09-13 RX ADMIN — IOPAMIDOL 5 ML: 408 INJECTION, SOLUTION INTRATHECAL at 08:55

## 2023-09-13 ASSESSMENT — PAIN SCALES - GENERAL: PAINLEVEL_OUTOF10: 8

## 2023-09-13 NOTE — OP NOTE
Operative Note      Patient: Jeremiah Yoo  YOB: 1970  MRN: [id number]    Date of Procedure: 9/13/2023    Osteoarthritis of the left hip. Post-Op Diagnosis: Same       Intra-articular injection of left hip. Kim Eaton D.O. Assistant:    Tim Mendes M.D. Anesthesia: * No anesthesia type entered *    Estimated Blood Loss (mL): None    Complications: None    Specimens:   * No specimens in log *    Implants:  * No implants in log *      Drains: * No LDAs found *    Findings: Osteoarthritis of left hip. Detailed Description of Procedure:   INTRA-ARTICULAR HIP INJECTION  Operative Report     Jeremiah Yoo  YOB: 1970  [id number]    Pre-operative Diagnosis: Osteoarthritis of left hip    Post-operative Diagnosis: Same    Procedure: Left hip arthrogram with corticosteroid injection under fluoroscopy    Anesthesia: Local with 1% lidocaine     Surgeons/Assistants: Justin    Estimated Blood Loss: 0 cc    Complications: None    Specimen: Was Not Obtained    Indication for operation:  Patient is a 47 y.o. female who presented to the clinic with Left hip pain. Patient's pain is not controlled with oral anti-inflammatories. Hip xrays show mild joint space narrowing with mild subchondral cystic changes at the femoral head and acetabulum of the left hip. At this time, the risks, benefits and alternatives to a diagnostic and therapeutic corticosteroid injection with local anesthesia was discussed with the patient. All questions were answered and patient wishes to proceed. Operative summary:  Patient was met in the preoperative holding area and the correct extremity was identified and marked. Informed consent was obtained. The risks included but not limited to infection, bleeding, nerve damage, skin pigment changes, and systemic manifestations were discussed in detail. The patient was escorted to the fluoroscopy suite.  The patient was positioned then prepped and draped in

## 2023-10-02 DIAGNOSIS — F90.0 ATTENTION DEFICIT HYPERACTIVITY DISORDER (ADHD), PREDOMINANTLY INATTENTIVE TYPE: ICD-10-CM

## 2023-10-02 DIAGNOSIS — E55.9 VITAMIN D DEFICIENCY: ICD-10-CM

## 2023-10-02 ASSESSMENT — SOCIAL DETERMINANTS OF HEALTH (SDOH)
WITHIN THE LAST YEAR, HAVE TO BEEN RAPED OR FORCED TO HAVE ANY KIND OF SEXUAL ACTIVITY BY YOUR PARTNER OR EX-PARTNER?: NO
WITHIN THE LAST YEAR, HAVE YOU BEEN KICKED, HIT, SLAPPED, OR OTHERWISE PHYSICALLY HURT BY YOUR PARTNER OR EX-PARTNER?: NO
WITHIN THE LAST YEAR, HAVE YOU BEEN HUMILIATED OR EMOTIONALLY ABUSED IN OTHER WAYS BY YOUR PARTNER OR EX-PARTNER?: YES
WITHIN THE LAST YEAR, HAVE YOU BEEN AFRAID OF YOUR PARTNER OR EX-PARTNER?: NO

## 2023-10-03 RX ORDER — IBUPROFEN 800 MG/1
800 TABLET ORAL 2 TIMES DAILY PRN
Qty: 60 TABLET | Refills: 0 | Status: SHIPPED | OUTPATIENT
Start: 2023-10-03

## 2023-10-03 RX ORDER — ERGOCALCIFEROL 1.25 MG/1
50000 CAPSULE ORAL
Qty: 8 CAPSULE | Refills: 2 | Status: SHIPPED | OUTPATIENT
Start: 2023-10-05 | End: 2023-12-28

## 2023-10-03 RX ORDER — DEXTROAMPHETAMINE SACCHARATE, AMPHETAMINE ASPARTATE, DEXTROAMPHETAMINE SULFATE AND AMPHETAMINE SULFATE 5; 5; 5; 5 MG/1; MG/1; MG/1; MG/1
20 TABLET ORAL 2 TIMES DAILY
Qty: 60 TABLET | Refills: 0 | Status: SHIPPED | OUTPATIENT
Start: 2023-10-03 | End: 2023-11-02

## 2023-10-05 ENCOUNTER — OFFICE VISIT (OUTPATIENT)
Dept: OBGYN CLINIC | Age: 53
End: 2023-10-05
Payer: COMMERCIAL

## 2023-10-05 VITALS
BODY MASS INDEX: 31.18 KG/M2 | HEIGHT: 70 IN | SYSTOLIC BLOOD PRESSURE: 132 MMHG | WEIGHT: 217.8 LBS | DIASTOLIC BLOOD PRESSURE: 84 MMHG

## 2023-10-05 DIAGNOSIS — Z90.710 HX OF HYSTERECTOMY: ICD-10-CM

## 2023-10-05 DIAGNOSIS — E78.5 HYPERLIPIDEMIA, UNSPECIFIED HYPERLIPIDEMIA TYPE: ICD-10-CM

## 2023-10-05 DIAGNOSIS — Z91.89 AT RISK FOR OSTEOPENIA: ICD-10-CM

## 2023-10-05 DIAGNOSIS — N95.1 VASOMOTOR SYMPTOMS DUE TO MENOPAUSE: Primary | ICD-10-CM

## 2023-10-05 DIAGNOSIS — Z80.3 FAMILY HISTORY OF BREAST CANCER: ICD-10-CM

## 2023-10-05 PROCEDURE — 99204 OFFICE O/P NEW MOD 45 MIN: CPT | Performed by: ADVANCED PRACTICE MIDWIFE

## 2023-10-05 ASSESSMENT — ENCOUNTER SYMPTOMS
VOMITING: 0
DIARRHEA: 0
NAUSEA: 0
ABDOMINAL PAIN: 0
SHORTNESS OF BREATH: 0

## 2023-10-05 NOTE — PROGRESS NOTES
placebo, but less beneficial thatn SSRIs and SNRIs and gabapentin in reducing VMS  Oxybutynin is an antimuscarinic anticholinergic threapy that is used for the treatment of overactive bladder and urinary urge incontinence. Studies show improvement for moderate to severe VMS. Adverse events of oxybutynin are usually dose-dependent and most commonly include a dry mouth and urinary difficulties. long term use of anticholinergics may be associated with cognitive decline, particularly in older persons  Neurokinin B antagonist: new nonhormone therapies, only one of which (fezolinetant) is FDA approved, are improtant because their development was founded on the burgeoning understanding of VMS physiology. Early evidence suggests benefit for qulaity of life and VMS-related distress, nocturnal awakenings, and sleep quality. Reviewed with Beverly Marion  that based on her risk factors I would not recommend hormone replacement therapy. Beverly Marion  is understanding and agreeable to try non hormonal treatment options. Discussed non-hormonal treatment, Moon Massy, for hot flashes with Beverly Marion. Reviewed the following with Beverly Marion:   Indications: Moon Massy is a neurokinin 3 (NK3) receptor antagonist indicated for the treatment of moderate to severe vasomotor symptoms due to menopause. Dosage/Administration: One 45 mg tablet orally once daily with or without food. Contraindications: Known cirrhosis,Severe renal impairment or end-stage renal disease, and Concomitant use with CY inhibitors  Beverly Marion denies a history of the above. Warnings/Precautions: Hepatic transaminase elevation: Elevations in serum transaminase concentrations greater than three times the upper limit of normal (ULN) occurred in the clinical trials. Perform bloodwork prior to initiation of VEOZAH to evaluate for hepatic function and injury.  Cannot start therapy if serum transaminase concentration is equal to or exceeds two times the

## 2023-11-09 ENCOUNTER — PATIENT MESSAGE (OUTPATIENT)
Dept: ORTHOPEDIC SURGERY | Age: 53
End: 2023-11-09

## 2023-11-14 ENCOUNTER — TELEPHONE (OUTPATIENT)
Dept: ORTHOPEDIC SURGERY | Age: 53
End: 2023-11-14

## 2023-11-28 ENCOUNTER — HOSPITAL ENCOUNTER (OUTPATIENT)
Dept: MRI IMAGING | Age: 53
Discharge: HOME | End: 2023-11-30
Attending: ORTHOPAEDIC SURGERY
Payer: COMMERCIAL

## 2023-11-28 DIAGNOSIS — M51.36 LUMBAR DEGENERATIVE DISC DISEASE: ICD-10-CM

## 2023-11-28 PROCEDURE — 72148 MRI LUMBAR SPINE W/O DYE: CPT

## 2023-12-13 ENCOUNTER — HOSPITAL ENCOUNTER (EMERGENCY)
Facility: CLINIC | Age: 53
Discharge: HOME | End: 2023-12-13
Attending: EMERGENCY MEDICINE
Payer: COMMERCIAL

## 2023-12-13 VITALS
DIASTOLIC BLOOD PRESSURE: 66 MMHG | HEIGHT: 69 IN | HEART RATE: 93 BPM | RESPIRATION RATE: 17 BRPM | WEIGHT: 218 LBS | OXYGEN SATURATION: 100 % | SYSTOLIC BLOOD PRESSURE: 126 MMHG | BODY MASS INDEX: 32.29 KG/M2 | TEMPERATURE: 97.6 F

## 2023-12-13 DIAGNOSIS — M25.552 LEFT HIP PAIN: Primary | ICD-10-CM

## 2023-12-13 PROCEDURE — 99283 EMERGENCY DEPT VISIT LOW MDM: CPT

## 2023-12-13 PROCEDURE — 6370000000 HC RX 637 (ALT 250 FOR IP): Performed by: EMERGENCY MEDICINE

## 2023-12-13 RX ORDER — PREDNISONE 20 MG/1
40 TABLET ORAL ONCE
Status: COMPLETED | OUTPATIENT
Start: 2023-12-13 | End: 2023-12-13

## 2023-12-13 RX ORDER — HYDROCODONE BITARTRATE AND ACETAMINOPHEN 5; 325 MG/1; MG/1
2 TABLET ORAL ONCE
Status: COMPLETED | OUTPATIENT
Start: 2023-12-13 | End: 2023-12-13

## 2023-12-13 RX ORDER — PREDNISONE 20 MG/1
20 TABLET ORAL 2 TIMES DAILY
Qty: 20 TABLET | Refills: 0 | Status: SHIPPED | OUTPATIENT
Start: 2023-12-13 | End: 2023-12-23

## 2023-12-13 RX ORDER — HYDROCODONE BITARTRATE AND ACETAMINOPHEN 5; 325 MG/1; MG/1
1 TABLET ORAL EVERY 6 HOURS PRN
Qty: 12 TABLET | Refills: 0 | Status: SHIPPED | OUTPATIENT
Start: 2023-12-13 | End: 2023-12-16

## 2023-12-13 RX ADMIN — PREDNISONE 40 MG: 20 TABLET ORAL at 21:00

## 2023-12-13 RX ADMIN — HYDROCODONE BITARTRATE AND ACETAMINOPHEN 2 TABLET: 5; 325 TABLET ORAL at 21:00

## 2023-12-13 ASSESSMENT — PAIN DESCRIPTION - PAIN TYPE
TYPE: CHRONIC PAIN
TYPE: CHRONIC PAIN

## 2023-12-13 ASSESSMENT — PAIN DESCRIPTION - ORIENTATION
ORIENTATION: LEFT

## 2023-12-13 ASSESSMENT — PAIN SCALES - GENERAL
PAINLEVEL_OUTOF10: 8
PAINLEVEL_OUTOF10: 3
PAINLEVEL_OUTOF10: 8

## 2023-12-13 ASSESSMENT — PAIN DESCRIPTION - LOCATION
LOCATION: HIP

## 2023-12-13 ASSESSMENT — PAIN DESCRIPTION - ONSET: ONSET: AWAKENED FROM SLEEP

## 2023-12-13 ASSESSMENT — PAIN DESCRIPTION - DIRECTION: RADIATING_TOWARDS: LEFT FOOT

## 2023-12-13 ASSESSMENT — PAIN DESCRIPTION - DESCRIPTORS
DESCRIPTORS: ACHING
DESCRIPTORS: ACHING;SHOOTING
DESCRIPTORS: ACHING

## 2023-12-13 ASSESSMENT — PAIN - FUNCTIONAL ASSESSMENT
PAIN_FUNCTIONAL_ASSESSMENT: 0-10
PAIN_FUNCTIONAL_ASSESSMENT: PREVENTS OR INTERFERES SOME ACTIVE ACTIVITIES AND ADLS
PAIN_FUNCTIONAL_ASSESSMENT: 0-10

## 2023-12-13 ASSESSMENT — PAIN DESCRIPTION - FREQUENCY: FREQUENCY: CONTINUOUS

## 2023-12-14 NOTE — ED TRIAGE NOTES
Mode of arrival (squad #, walk in, police, etc) : private car  Pt ambulated to room      Chief complaint(s): hip pain        Arrival Note (brief scenario, treatment PTA, etc). : pt states has an appt for hip replacement but was mopping at work and felt a \"pop\"        C= \"Have you ever felt that you should Cut down on your drinking? \"  No  A= \"Have people Annoyed you by criticizing your drinking? \"  No  G= \"Have you ever felt bad or Guilty about your drinking? \"  No  E= \"Have you ever had a drink as an Eye-opener first thing in the morning to steady your nerves or to help a hangover? \"  No      Deferred []      Reason for deferring: N/A    *If yes to two or more: probable alcohol abuse. *

## 2023-12-14 NOTE — ED NOTES
Pt discharged in stable condition with prescriptions and dc instructions. Pt ambulates to door with steady gait and without assistance. Pt picked up by son in private vehicle for transport home.       David Camarillo RN  12/13/23 2118

## 2023-12-15 DIAGNOSIS — R11.0 NAUSEA: ICD-10-CM

## 2023-12-15 DIAGNOSIS — F90.0 ATTENTION DEFICIT HYPERACTIVITY DISORDER (ADHD), PREDOMINANTLY INATTENTIVE TYPE: ICD-10-CM

## 2023-12-15 DIAGNOSIS — M54.41 CHRONIC BILATERAL LOW BACK PAIN WITH RIGHT-SIDED SCIATICA: ICD-10-CM

## 2023-12-15 DIAGNOSIS — G89.29 CHRONIC BILATERAL LOW BACK PAIN WITH RIGHT-SIDED SCIATICA: ICD-10-CM

## 2023-12-18 NOTE — TELEPHONE ENCOUNTER
Medication Requested: Adderall    Last visit: 8/15/23  Next visit: Visit date not found   Last refill: 10/3/23    Med contract on file:  [] yes   [x] no    Last urine drug screen: no results available  Consistent with medication(s):    [] yes   [] no    Last OARRS ran: unknown    Quantity of medication remaining: unknown (pt did not call)

## 2023-12-19 RX ORDER — IBUPROFEN 800 MG/1
800 TABLET ORAL 2 TIMES DAILY PRN
Qty: 60 TABLET | Refills: 0 | Status: SHIPPED | OUTPATIENT
Start: 2023-12-19

## 2023-12-19 RX ORDER — ONDANSETRON 4 MG/1
4 TABLET, ORALLY DISINTEGRATING ORAL 3 TIMES DAILY PRN
Qty: 30 TABLET | Refills: 2 | Status: SHIPPED | OUTPATIENT
Start: 2023-12-19

## 2023-12-19 RX ORDER — TIZANIDINE 2 MG/1
2 TABLET ORAL 3 TIMES DAILY PRN
Qty: 60 TABLET | Refills: 0 | Status: SHIPPED | OUTPATIENT
Start: 2023-12-19

## 2023-12-19 NOTE — TELEPHONE ENCOUNTER
LVM with patient to get her scheduled for an appt with Shelbi prior to medication being refilled due to patient not being seen since August.

## 2023-12-21 ENCOUNTER — OFFICE VISIT (OUTPATIENT)
Dept: ORTHOPEDIC SURGERY | Age: 53
End: 2023-12-21
Payer: COMMERCIAL

## 2023-12-21 VITALS — HEIGHT: 69 IN | WEIGHT: 225 LBS | BODY MASS INDEX: 33.33 KG/M2 | RESPIRATION RATE: 14 BRPM

## 2023-12-21 DIAGNOSIS — M51.36 LUMBAR DEGENERATIVE DISC DISEASE: ICD-10-CM

## 2023-12-21 DIAGNOSIS — M16.12 OSTEOARTHRITIS OF LEFT HIP, UNSPECIFIED OSTEOARTHRITIS TYPE: Primary | ICD-10-CM

## 2023-12-21 PROCEDURE — 99214 OFFICE O/P EST MOD 30 MIN: CPT | Performed by: ORTHOPAEDIC SURGERY

## 2023-12-23 DIAGNOSIS — E06.3 HASHIMOTO'S THYROIDITIS: ICD-10-CM

## 2023-12-25 NOTE — PROGRESS NOTES
[address] [facility]  [address] [address] [address]  Dept: [phone]  Dept Fax: [phone]        Ambulatory Follow Up      Subjective:   Mary Alva is a 47y.o. year old female who presents to our office today for routine followup regarding her   1. Osteoarthritis of left hip, unspecified osteoarthritis type    2. Lumbar degenerative disc disease    . Chief Complaint   Patient presents with    Follow-up     Left hip       HPI  Naila Yarbrough is a 61-year-old female who presents to the office today for recheck. She has been seeing pain management for her lumbosacral spine but continues to note significant left hip pain. Her lumbosacral spine causes right-sided radicular symptoms that she has an MRI positive for right-sided pathology for the lumbosacral spine. She notes that the left hip injection she had previously gave her nearly 100% improvement for 6 weeks. She was recently in the emergency department for increasing left hip pain. Review of Systems   Constitutional:  Positive for activity change. Negative for fever. HENT:  Negative for dental problem. Eyes:  Negative for discharge. Respiratory:  Negative for shortness of breath. Cardiovascular:  Negative for chest pain. Gastrointestinal:  Negative for abdominal pain. Genitourinary: Negative. Musculoskeletal:  Positive for arthralgias. Skin:         Negative for rash   Neurological:  Positive for weakness. Psychiatric/Behavioral:  Negative for confusion. I have reviewed the CC, HPI, ROS, PMH, FHX, Social History, and if not present in this note, I have reviewed in the patient's chart. I agree with the documentation provided by other staff and have reviewed their documentation prior to providing my signature indicating agreement.     Objective :   Resp 14   Ht 1.753 m (5' 9.02\")   Wt 102.1 kg (225 lb)

## 2023-12-26 ENCOUNTER — TELEPHONE (OUTPATIENT)
Dept: ORTHOPEDIC SURGERY | Age: 53
End: 2023-12-26

## 2023-12-26 RX ORDER — LEVOTHYROXINE SODIUM 175 UG/1
TABLET ORAL
Qty: 90 TABLET | Refills: 0 | Status: SHIPPED | OUTPATIENT
Start: 2023-12-26

## 2023-12-26 NOTE — TELEPHONE ENCOUNTER
Patient is scheduled for L [place] 1/22/23 and was wanting to take her FMLA starting 12/20/23 she went to the ER and is not able to stand for 12 hours. Please advise?

## 2023-12-27 ENCOUNTER — TELEPHONE (OUTPATIENT)
Dept: INTERNAL MEDICINE CLINIC | Age: 53
End: 2023-12-27

## 2023-12-27 ENCOUNTER — TELEPHONE (OUTPATIENT)
Dept: ORTHOPEDIC SURGERY | Age: 53
End: 2023-12-27

## 2023-12-27 DIAGNOSIS — Z01.818 PRE-OP TESTING: Primary | ICD-10-CM

## 2023-12-27 NOTE — TELEPHONE ENCOUNTER
Medical surgical clearance request received 12/27/23    Surgeon: [facility]    Procedure: Left Total Hip Replacement DOS    Date of Procedure: 1/22/24    Last appt: 8/15/23    Next appt: Visit date not found    PATs received:  No

## 2024-01-04 ENCOUNTER — HOSPITAL ENCOUNTER (OUTPATIENT)
Dept: GENERAL RADIOLOGY | Age: 54
Discharge: HOME | End: 2024-01-06
Payer: COMMERCIAL

## 2024-01-04 ENCOUNTER — HOSPITAL ENCOUNTER (OUTPATIENT)
Dept: PREADMISSION TESTING | Age: 54
Discharge: HOME | End: 2024-01-04
Payer: COMMERCIAL

## 2024-01-04 VITALS
BODY MASS INDEX: 32.97 KG/M2 | SYSTOLIC BLOOD PRESSURE: 121 MMHG | WEIGHT: 222.6 LBS | RESPIRATION RATE: 14 BRPM | HEART RATE: 89 BPM | HEIGHT: 69 IN | TEMPERATURE: 97.7 F | DIASTOLIC BLOOD PRESSURE: 89 MMHG | OXYGEN SATURATION: 98 %

## 2024-01-04 DIAGNOSIS — Z01.818 PRE-OP TESTING: ICD-10-CM

## 2024-01-04 LAB
ABO + RH BLD: NORMAL
ALBUMIN SERPL-MCNC: 4.6 G/DL (ref 3.5–5.2)
ALP SERPL-CCNC: 96 U/L (ref 35–104)
ALT SERPL-CCNC: 25 U/L (ref 5–33)
ANION GAP SERPL CALCULATED.3IONS-SCNC: 10 MMOL/L (ref 9–17)
ARM BAND NUMBER: NORMAL
AST SERPL-CCNC: 25 U/L
BILIRUB SERPL-MCNC: 0.5 MG/DL (ref 0.3–1.2)
BILIRUB UR QL STRIP: NEGATIVE
BLOOD BANK SAMPLE EXPIRATION: NORMAL
BLOOD GROUP ANTIBODIES SERPL: NEGATIVE
BUN SERPL-MCNC: 8 MG/DL (ref 6–20)
BUN/CREAT SERPL: 8 (ref 9–20)
CALCIUM SERPL-MCNC: 9.4 MG/DL (ref 8.6–10.4)
CHLORIDE SERPL-SCNC: 98 MMOL/L (ref 98–107)
CLARITY UR: ABNORMAL
CO2 SERPL-SCNC: 28 MMOL/L (ref 20–31)
COLOR UR: YELLOW
CREAT SERPL-MCNC: 1 MG/DL (ref 0.5–0.9)
EKG ATRIAL RATE: 70 BPM
EKG P AXIS: 45 DEGREES
EKG P-R INTERVAL: 170 MS
EKG Q-T INTERVAL: 430 MS
EKG QRS DURATION: 86 MS
EKG QTC CALCULATION (BAZETT): 464 MS
EKG R AXIS: -24 DEGREES
EKG T AXIS: 28 DEGREES
EKG VENTRICULAR RATE: 70 BPM
EPI CELLS #/AREA URNS HPF: NORMAL /HPF (ref 0–5)
ERYTHROCYTE [DISTWIDTH] IN BLOOD BY AUTOMATED COUNT: 13.8 % (ref 11.8–14.4)
EST. AVERAGE GLUCOSE BLD GHB EST-MCNC: 108 MG/DL
GFR SERPL CREATININE-BSD FRML MDRD: >60 ML/MIN/1.73M2
GLUCOSE SERPL-MCNC: 105 MG/DL (ref 70–99)
GLUCOSE UR STRIP-MCNC: NEGATIVE MG/DL
HBA1C MFR BLD: 5.4 % (ref 4–6)
HCT VFR BLD AUTO: 45.5 % (ref 36.3–47.1)
HGB BLD-MCNC: 14.6 G/DL (ref 11.9–15.1)
HGB UR QL STRIP.AUTO: NEGATIVE
KETONES UR STRIP-MCNC: NEGATIVE MG/DL
LEUKOCYTE ESTERASE UR QL STRIP: NEGATIVE
MCH RBC QN AUTO: 30.4 PG (ref 25.2–33.5)
MCHC RBC AUTO-ENTMCNC: 32.1 G/DL (ref 28.4–34.8)
MCV RBC AUTO: 94.6 FL (ref 82.6–102.9)
NITRITE UR QL STRIP: NEGATIVE
NRBC BLD-RTO: 0 PER 100 WBC
PH UR STRIP: 6 [PH] (ref 5–8)
PLATELET # BLD AUTO: 250 K/UL (ref 138–453)
PMV BLD AUTO: 9.5 FL (ref 8.1–13.5)
POTASSIUM SERPL-SCNC: 4 MMOL/L (ref 3.7–5.3)
PROT SERPL-MCNC: 8 G/DL (ref 6.4–8.3)
PROT UR STRIP-MCNC: NEGATIVE MG/DL
RBC # BLD AUTO: 4.81 M/UL (ref 3.95–5.11)
RBC #/AREA URNS HPF: NORMAL /HPF (ref 0–2)
SODIUM SERPL-SCNC: 136 MMOL/L (ref 135–144)
SP GR UR STRIP: 1.01 (ref 1–1.03)
UROBILINOGEN UR STRIP-ACNC: NORMAL EU/DL (ref 0–1)
WBC #/AREA URNS HPF: NORMAL /HPF (ref 0–5)
WBC OTHER # BLD: 5.6 K/UL (ref 3.5–11.3)

## 2024-01-04 PROCEDURE — 86900 BLOOD TYPING SEROLOGIC ABO: CPT

## 2024-01-04 PROCEDURE — 81001 URINALYSIS AUTO W/SCOPE: CPT

## 2024-01-04 PROCEDURE — 87641 MR-STAPH DNA AMP PROBE: CPT

## 2024-01-04 PROCEDURE — 86901 BLOOD TYPING SEROLOGIC RH(D): CPT

## 2024-01-04 PROCEDURE — 36415 COLL VENOUS BLD VENIPUNCTURE: CPT

## 2024-01-04 PROCEDURE — 86850 RBC ANTIBODY SCREEN: CPT

## 2024-01-04 PROCEDURE — 85027 COMPLETE CBC AUTOMATED: CPT

## 2024-01-04 PROCEDURE — 93005 ELECTROCARDIOGRAM TRACING: CPT

## 2024-01-04 PROCEDURE — 80053 COMPREHEN METABOLIC PANEL: CPT

## 2024-01-04 PROCEDURE — 83036 HEMOGLOBIN GLYCOSYLATED A1C: CPT

## 2024-01-04 PROCEDURE — 71046 X-RAY EXAM CHEST 2 VIEWS: CPT

## 2024-01-04 RX ORDER — M-VIT,TX,IRON,MINS/CALC/FOLIC 27MG-0.4MG
1 TABLET ORAL DAILY
COMMUNITY

## 2024-01-04 RX ORDER — GABAPENTIN 300 MG/1
300 CAPSULE ORAL ONCE
OUTPATIENT
Start: 2024-01-22

## 2024-01-04 RX ORDER — ACETAMINOPHEN 500 MG
1000 TABLET ORAL ONCE
OUTPATIENT
Start: 2024-01-22

## 2024-01-04 RX ORDER — DEXTROAMPHETAMINE SACCHARATE, AMPHETAMINE ASPARTATE, DEXTROAMPHETAMINE SULFATE AND AMPHETAMINE SULFATE 5; 5; 5; 5 MG/1; MG/1; MG/1; MG/1
20 TABLET ORAL 2 TIMES DAILY
Qty: 60 TABLET | Refills: 0 | OUTPATIENT
Start: 2024-01-04 | End: 2024-02-03

## 2024-01-04 RX ORDER — CELECOXIB 200 MG/1
200 CAPSULE ORAL ONCE
OUTPATIENT
Start: 2024-01-22

## 2024-01-04 ASSESSMENT — PROMIS GLOBAL HEALTH SCALE
IN THE PAST 7 DAYS, HOW WOULD YOU RATE YOUR FATIGUE ON AVERAGE [ON A SCALE FROM 1 (NONE) TO 5 (VERY SEVERE)]?: 2
IN GENERAL, PLEASE RATE HOW WELL YOU CARRY OUT YOUR USUAL SOCIAL ACTIVITIES (INCLUDES ACTIVITIES AT HOME, AT WORK, AND IN YOUR COMMUNITY, AND RESPONSIBILITIES AS A PARENT, CHILD, SPOUSE, EMPLOYEE, FRIEND, ETC) [ON A SCALE OF 1 (POOR) TO 5 (EXCELLENT)]?: 2
IN GENERAL, WOULD YOU SAY YOUR HEALTH IS...[ON A SCALE OF 1 (POOR) TO 5 (EXCELLENT)]: 3
SUM OF RESPONSES TO QUESTIONS 3, 6, 7, & 8: 12
IN GENERAL, HOW WOULD YOU RATE YOUR MENTAL HEALTH, INCLUDING YOUR MOOD AND YOUR ABILITY TO THINK [ON A SCALE OF 1 (POOR) TO 5 (EXCELLENT)]?: 3
IN THE PAST 7 DAYS, HOW WOULD YOU RATE YOUR PAIN ON AVERAGE [ON A SCALE FROM 0 (NO PAIN) TO 10 (WORST IMAGINABLE PAIN)]?: 5
IN GENERAL, HOW WOULD YOU RATE YOUR SATISFACTION WITH YOUR SOCIAL ACTIVITIES AND RELATIONSHIPS [ON A SCALE OF 1 (POOR) TO 5 (EXCELLENT)]?: 2
TO WHAT EXTENT ARE YOU ABLE TO CARRY OUT YOUR EVERYDAY PHYSICAL ACTIVITIES SUCH AS WALKING, CLIMBING STAIRS, CARRYING GROCERIES, OR MOVING A CHAIR [ON A SCALE OF 1 (NOT AT ALL) TO 5 (COMPLETELY)]?: 2
WHO IS THE PERSON COMPLETING THE PROMIS V1.1 SURVEY?: 0
SUM OF RESPONSES TO QUESTIONS 2, 4, 5, & 10: 11
IN GENERAL, HOW WOULD YOU RATE YOUR PHYSICAL HEALTH [ON A SCALE OF 1 (POOR) TO 5 (EXCELLENT)]?: 3
IN THE PAST 7 DAYS, HOW OFTEN HAVE YOU BEEN BOTHERED BY EMOTIONAL PROBLEMS, SUCH AS FEELING ANXIOUS, DEPRESSED, OR IRRITABLE [ON A SCALE FROM 1 (NEVER) TO 5 (ALWAYS)]?: 3
IN GENERAL, WOULD YOU SAY YOUR QUALITY OF LIFE IS...[ON A SCALE OF 1 (POOR) TO 5 (EXCELLENT)]: 3
HOW IS THE PROMIS V1.1 BEING ADMINISTERED?: 0

## 2024-01-04 ASSESSMENT — PAIN DESCRIPTION - LOCATION: LOCATION: HIP

## 2024-01-04 ASSESSMENT — PAIN DESCRIPTION - ORIENTATION: ORIENTATION: LEFT

## 2024-01-04 ASSESSMENT — HOOS JR
GOING UP OR DOWN STAIRS: 3
RISING FROM SITTING: 3
HOOS JR RAW SCORE: 15
WALKING ON UNEVEN SURFACE: 2
HOOS JR TOTAL INTERVAL SCORE: 43.335
HOOS JR RAW SCORE: 15
BENDING TO THE FLOOR TO PICK UP OBJECT: 3
SITTING: 2
LYING IN BED (TURNING OVER, MAINTAINING HIP POSITION): 2

## 2024-01-04 ASSESSMENT — PAIN SCALES - GENERAL: PAINLEVEL_OUTOF10: 5

## 2024-01-04 NOTE — PROGRESS NOTES
PAT Progress Note    Pt Name: Kelsey Owen  MRN: [id number]  YOB: 1970  Date of evaluation: 1/4/2024      [x] Called to PAT. I spoke to the patient, Kelsey Owen, a 53 y.o. female, who is scheduled for an upcoming LEFT HIP TOTAL ARTHROPLASTY ANTERIOR APPROACH by Juanpablo Llanos DO for arthritis of left hip on 1/22/2024 at 1215.     Functional Capacity per pt:  1) Pt is able to walk 2 city blocks on level ground without SOB.  2) Pt is able to climb 2 flights of stairs without SOB.  3) Pt is sometimes able to walk up a hill for 1-2 city blocks without SOB.  Patient denies chest pain, palpitations, shortness of breath. She has upcoming appointment with PCP for surgical clearance.    Vital signs: /89   Pulse 89   Temp 97.7 °F (36.5 °C) (Temporal)   Resp 14   Ht 1.753 m (5' 9\")   Wt 101 kg (222 lb 9.6 oz)   SpO2 98%   BMI 32.87 kg/m²     Physical Exam:     General Appearance:  Alert, well appearing, and in no acute distress.   Mental status:  Oriented to person, place, and time.  Lungs:  Bilateral equal air entry, clear to auscultation, no wheezing, rales or rhonchi, and normal effort.  Cardiovascular:  Normal rate, regular rhythm, no murmur, gallop, or rub.    Past Medical History:     Past Medical History:   Diagnosis Date    Allergic rhinitis     Asthma     Attention deficit hyperactivity disorder (ADHD), predominantly inattentive type     early 90's    Autoimmune disorder (HCC) 3/2013    Chronic back pain     Bulging l5    COVID-19 01/05/2022    cough, body aches, fatigue, headache, vomiting, temp 99. X 6 DAYS   TESTED AT [facility].    COVID-19 10/2020    symptoms X 19 days    COVID-19     x 3    Fibromyalgia 11/2021    Windsor's disease     causes intermittant rashes    Hashimoto's thyroiditis     Hip arthritis     Hyperlipidemia     Hypothyroidism     Lumbar disc disease     Lupus (HCC)     Menopausal symptoms 6/2023    Night hot flashes    Migraine 8/85    Migraine with  Take 1 capsule by mouth Twice a Week Indications: Tues, Sun 10/5/23 12/28/23  Shelbi Kelly APRN - CNP   amphetamine-dextroamphetamine (ADDERALL) 20 MG tablet Take 1 tablet by mouth 2 times daily for 30 days. 10/3/23 11/2/23  Shelbi Kelly APRN - CNP   atorvastatin (LIPITOR) 20 MG tablet Take 1 tablet by mouth daily 9/5/23   Shelbi Kelly APRN - CNP   albuterol sulfate HFA (PROVENTIL;VENTOLIN;PROAIR) 108 (90 Base) MCG/ACT inhaler Inhale 2 puffs into the lungs every 4 hours as needed for Wheezing 9/13/22   Shelbi Kelly APRN - CNP   cetirizine (ZYRTEC) 10 MG tablet Take 1 tablet by mouth 2 times daily as needed for Allergies or Rhinitis    Provider, MD Nan   fluticasone (FLONASE) 50 MCG/ACT nasal spray 2 sprays by Each Nostril route daily  Patient taking differently: 2 sprays by Each Nostril route daily as needed for Rhinitis or Allergies 12/3/21   Suze Mullins APRN - CNP   EPINEPHrine (EPIPEN 2-DINO) 0.3 MG/0.3ML SOAJ injection Inject 0.3 mLs into the muscle once for 1 dose Use as directed for allergic reaction, facial swelling 4/18/21 10/10/22  Dawit Bagley APRN - CNP        Investigations:      Laboratory Testing:  Recent Results (from the past 24 hour(s))   CBC    Collection Time: 01/04/24  2:25 PM   Result Value Ref Range    WBC 5.6 3.5 - 11.3 k/uL    RBC 4.81 3.95 - 5.11 m/uL    Hemoglobin 14.6 11.9 - 15.1 g/dL    Hematocrit 45.5 36.3 - 47.1 %    MCV 94.6 82.6 - 102.9 fL    MCH 30.4 25.2 - 33.5 pg    MCHC 32.1 28.4 - 34.8 g/dL    RDW 13.8 11.8 - 14.4 %    Platelets 250 138 - 453 k/uL    MPV 9.5 8.1 - 13.5 fL    NRBC Automated 0.0 0.0 per 100 WBC   EKG 12 Lead    Collection Time: 01/04/24  2:31 PM   Result Value Ref Range    Ventricular Rate 70 BPM    Atrial Rate 70 BPM    P-R Interval 170 ms    QRS Duration 86 ms    Q-T Interval 430 ms    QTc Calculation (Bazett) 464 ms    P Axis 45 degrees    R Axis -24 degrees    T Axis 28 degrees       Recent Labs     01/04/24  1425   HGB 14.6   HCT 45.5

## 2024-01-04 NOTE — PRE-PROCEDURE INSTRUCTIONS
On the Day of Your Surgery, Monday, 1/22/24, Dr. Llanos's office will call the week prior to surgery with your arrival time for the day of surgery.     Enter the hospital through the Main Entrance, take the lobby elevators to the second floor and check in at the Surgery Registration desk.     Continue to take your home medications as you normally do up to and including the night before surgery with the exception of blood thinning medications.    Blood Thinning Medications:  Please stop prescription blood thinning medications such as Apixaban (Eliquis); Clopidogrel (Plavix); Dabigatran (Pradaxa); Prasugrel (Effient); Rivaroxaban (Xarelto); Ticagrelor (Brilinta); Warfarin (Coumadin) only as directed by your surgeon and/or the prescribing physician    Some common examples of other medications that can thin your blood are: Aspirin, Ibuprofen (Advil, Motrin), Naproxen (Aleve), Meloxicam (Mobic), Celecoxib (Celebrex), Fish Oil, many Herbal Supplements.  These medications should usually be stopped at least 7 days prior to surgery.    Ibuprofen, Vitamins    Tylenol is OK to take for pain the week prior to surgery.    Failure to stop certain medications may interfere with your scheduled surgery.    If you receive instructions from your surgeon regarding what medications to stop prior to surgery, please follow those specific instructions.       Please take the following medication(s) the day of surgery with small sips of water:              Levothyroxine    Please use your inhaler(s) if needed and bring your inhaler(s) from home the day of surgery.    Showering Before Surgery:     You can play an important role in your own health by carefully washing before surgery. Shower the night before and the morning of surgery using the instructions below. If you are allergic to Chlorhexidine Gluconate (CHG) use antibacterial soap instead.    If you were given Chlorhexidine soap, please follow the instructions included with the soap to  swallow the water.    3. If you wear glasses bring a case for them if you have one.  No contacts should be worn the day of surgery.  You may also bring your hearing aids. If you have dentures, most surgical procedures involving anesthesia will require that you remove them prior to surgery.    4. If you sleep with a CPAP or BiPAP machine at home and plan on staying in the hospital overnight after surgery, please bring your machine with you.     5. Do not wear any jewelry or body piercings the day of surgery.  No nail polish on the operative extremity (arm/hand or leg/foot surgeries)     6. If you are staying overnight with us, you may bring a small bag of necessary personal items.     7. Please wear loose, comfortable clothing.  If you are potentially going to have a cast, sling, brace or bulky dressing, make sure to wear clothing that will fit over it.     8. In case of illness - If you have cold or flu like symptoms (high fever, runny nose, sore throat, cough, etc.) rash, nausea, vomiting, loose stools, and/or recent contact with someone who has a contagious disease (chicken pox, measles, COVID-19, etc.).  Please call your surgeon before coming to the hospital.    Transportation After Your Surgery/Procedure:     If you are going home the same day of surgery you need someone to drive you home.  Your  must be at least 18 years of age.  A taxi cab or other nonmedical public transportation is not acceptable unless you have someone to ride home in the vehicle with you.   For your safety, someone must remain with you for the first 24 hours after your surgery if you receive anesthesia or medication.  If you do not have someone to stay with you, your procedure may be cancelled.  As a patient at [facility] you can expect quality medical and nursing care that is centered on you individual needs.  Our goal is to make your surgical experience as comfortable as possible.    Any questions about preparing for

## 2024-01-05 LAB
MRSA, DNA, NASAL: NEGATIVE
SPECIMEN DESCRIPTION: NORMAL

## 2024-01-05 NOTE — FLOWSHEET NOTE
01/05/24 1110   Anesthesia PAT Clearance   Anesthesia Review Status Anes has reviewed patient for surgery   Is the patient cleared? Patient is cleared for surgery  (Dr. Alvarenga reviewed chart including EKG and medical history. No further intervention needed at this time.)

## 2024-01-09 DIAGNOSIS — M16.12 OSTEOARTHRITIS OF LEFT HIP, UNSPECIFIED OSTEOARTHRITIS TYPE: ICD-10-CM

## 2024-01-09 DIAGNOSIS — Z98.890 STATUS POST SURGERY: Primary | ICD-10-CM

## 2024-01-10 ENCOUNTER — OFFICE VISIT (OUTPATIENT)
Dept: INTERNAL MEDICINE CLINIC | Age: 54
End: 2024-01-10
Payer: COMMERCIAL

## 2024-01-10 VITALS
OXYGEN SATURATION: 98 % | SYSTOLIC BLOOD PRESSURE: 126 MMHG | HEART RATE: 95 BPM | HEIGHT: 69 IN | BODY MASS INDEX: 32.58 KG/M2 | WEIGHT: 220 LBS | DIASTOLIC BLOOD PRESSURE: 78 MMHG

## 2024-01-10 DIAGNOSIS — Z12.11 SCREEN FOR COLON CANCER: ICD-10-CM

## 2024-01-10 DIAGNOSIS — M32.9 LUPUS (HCC): ICD-10-CM

## 2024-01-10 DIAGNOSIS — M16.12 PRIMARY OSTEOARTHRITIS OF LEFT HIP: Primary | ICD-10-CM

## 2024-01-10 DIAGNOSIS — K29.40 ATROPHIC GASTRITIS WITHOUT HEMORRHAGE: ICD-10-CM

## 2024-01-10 PROCEDURE — 99214 OFFICE O/P EST MOD 30 MIN: CPT | Performed by: INTERNAL MEDICINE

## 2024-01-10 RX ORDER — PANTOPRAZOLE SODIUM 40 MG/1
40 TABLET, DELAYED RELEASE ORAL
Qty: 30 TABLET | Refills: 5 | Status: SHIPPED | OUTPATIENT
Start: 2024-01-10

## 2024-01-10 ASSESSMENT — ENCOUNTER SYMPTOMS
BLOOD IN STOOL: 0
ABDOMINAL DISTENTION: 0
TROUBLE SWALLOWING: 0
EYE PAIN: 0
DIARRHEA: 0
COUGH: 0
SHORTNESS OF BREATH: 0
COLOR CHANGE: 0
EYE DISCHARGE: 0
WHEEZING: 0

## 2024-01-10 ASSESSMENT — PATIENT HEALTH QUESTIONNAIRE - PHQ9
SUM OF ALL RESPONSES TO PHQ9 QUESTIONS 1 & 2: 0
1. LITTLE INTEREST OR PLEASURE IN DOING THINGS: 0
7. TROUBLE CONCENTRATING ON THINGS, SUCH AS READING THE NEWSPAPER OR WATCHING TELEVISION: 0
2. FEELING DOWN, DEPRESSED OR HOPELESS: 0
9. THOUGHTS THAT YOU WOULD BE BETTER OFF DEAD, OR OF HURTING YOURSELF: 0
SUM OF ALL RESPONSES TO PHQ QUESTIONS 1-9: 0
3. TROUBLE FALLING OR STAYING ASLEEP: 0
4. FEELING TIRED OR HAVING LITTLE ENERGY: 0
8. MOVING OR SPEAKING SO SLOWLY THAT OTHER PEOPLE COULD HAVE NOTICED. OR THE OPPOSITE, BEING SO FIGETY OR RESTLESS THAT YOU HAVE BEEN MOVING AROUND A LOT MORE THAN USUAL: 0
6. FEELING BAD ABOUT YOURSELF - OR THAT YOU ARE A FAILURE OR HAVE LET YOURSELF OR YOUR FAMILY DOWN: 0
SUM OF ALL RESPONSES TO PHQ QUESTIONS 1-9: 0
10. IF YOU CHECKED OFF ANY PROBLEMS, HOW DIFFICULT HAVE THESE PROBLEMS MADE IT FOR YOU TO DO YOUR WORK, TAKE CARE OF THINGS AT HOME, OR GET ALONG WITH OTHER PEOPLE: 0
5. POOR APPETITE OR OVEREATING: 0

## 2024-01-10 NOTE — PROGRESS NOTES
Visit Information    Have you changed or started any medications since your last visit including any over-the-counter medicines, vitamins, or herbal medicines? no   Are you having any side effects from any of your medications? -  no  Have you stopped taking any of your medications? Is so, why? -  no    Have you seen any other physician or provider since your last visit? No  Have you had any other diagnostic tests since your last visit? No  Have you been seen in the emergency room and/or had an admission to a hospital since we last saw you? No  Have you had your routine dental cleaning in the past 6 months? no    Have you activated your [employer] account? If not, what are your barriers? Yes     Patient Care Team:  Shelbi Kelly APRN - CNP as PCP - General (Internal Medicine)  Shelbi Kelly APRN - CNP as PCP - Empaneled Provider    Medical History Review  Past Medical, Family, and Social History reviewed and does contribute to the patient presenting condition    Health Maintenance   Topic Date Due    Hepatitis B vaccine (1 of 3 - 3-dose series) Never done    Pneumococcal 0-64 years Vaccine (1 - PCV) Never done    DTaP/Tdap/Td vaccine (1 - Tdap) Never done    Colorectal Cancer Screen  Never done    Shingles vaccine (1 of 2) Never done    Breast cancer screen  03/31/2023    Flu vaccine (1) 08/01/2023    COVID-19 Vaccine (3 - 2023-24 season) 09/01/2023    Depression Monitoring  01/10/2024    Lipids  08/31/2024    A1C test (Diabetic or Prediabetic)  01/04/2025    Hepatitis C screen  Completed    HIV screen  Completed    Hepatitis A vaccine  Aged Out    Hib vaccine  Aged Out    Polio vaccine  Aged Out    Meningococcal (ACWY) vaccine  Aged Out    Depression Screen  Discontinued    Diabetes screen  Discontinued

## 2024-01-10 NOTE — PROGRESS NOTES
[facility]  [address] [address]  Dept: [phone]  Dept Fax: [phone]    Kelsey Owen is a 53 y.o. female who presents today for her medical conditions/complaintsas noted below.  Kelsey Owen is c/o of   Chief Complaint   Patient presents with    Nausea    Pre-op Exam    Surgical Clearance         HPI:     Pre op ffor left hip replacement    Nausea vomiting gastritis  Otc not helping  For three months            Hemoglobin A1C (%)   Date Value   01/04/2024 5.4   05/30/2023 5.6   01/25/2022 5.7             ( goal A1Cis < 7)   No components found for: \"LABMICR\"  LDL Cholesterol (mg/dL)   Date Value   08/31/2023 174 (H)   02/08/2023 175 (H)   09/14/2022 226 (H)       (goal LDL is <100)   AST (U/L)   Date Value   01/04/2024 25     ALT (U/L)   Date Value   01/04/2024 25     BUN (mg/dL)   Date Value   01/04/2024 8     BP Readings from Last 3 Encounters:   01/10/24 126/78   01/04/24 121/89   12/13/23 126/66          (goal 120/80)    Past Medical History:   Diagnosis Date    Allergic rhinitis     Asthma     Attention deficit hyperactivity disorder (ADHD), predominantly inattentive type     early 90's    Autoimmune disorder (HCC) 3/2013    Chronic back pain     Bulging l5    COVID-19 01/05/2022    cough, body aches, fatigue, headache, vomiting, temp 99. X 6 DAYS   TESTED AT [facility].    COVID-19 10/2020    symptoms X 19 days    COVID-19     x 3    Fibromyalgia 11/2021    Vladislav's disease     causes intermittant rashes    Hashimoto's thyroiditis     Hip arthritis     Hyperlipidemia     Hypothyroidism     Lumbar disc disease     Lupus (HCC)     Menopausal symptoms 6/2023    Night hot flashes    Migraine 8/85    Migraine with aura and without status migrainosus, not intractable     Obesity     Obstruction of left tear duct     tears constantly    Osteoarthritis     Restless legs syndrome     Under care of team 01/25/2022    Shelbi Kelly CNP    Under care of

## 2024-01-11 ENCOUNTER — TELEPHONE (OUTPATIENT)
Dept: OBGYN CLINIC | Age: 54
End: 2024-01-11

## 2024-01-11 NOTE — TELEPHONE ENCOUNTER
Patient past due for appointment, called patient and spoke to her. Patient stated she did have an order but did not have the time to schedule at the moment. Central scheduling phone number was offered and accepted.

## 2024-01-21 NOTE — PLAN OF CARE
PROTOCOLS  NURSING IMPLEMENTED    TOTAL JOINT DVT/PE  VENOUS THROMBOEMBOLISM PROPHYLAXIS  (Nursing Automatically Implement)    Kelsey Owen  [id number]  [unfilled]  1/21/24    YES DVT RISK FACTOR SCORE YES MAJOR BLEEDING RISK FACTORS SCORE     [x] 50 years old or greater (1)   [] Hx. Easy Bleeding (1)      [] Heart failure (2)   [] NSAID Use in Last 5 Days (2)      [] Varicose veins - Hx. (1)   [] Gastrointestinal or Genitourinary bleeding in Last 14 Days (2)      [] Myocardial Infarction - Hx. (1)         [] Cancer - Hx. (2)         [] Atrial fibrillation - Hx. (1)         [] Ischemic Stroke - Hx. (1)         [] Diabetes Mellitus - Hx. (1)         [] Previous DVT/PE - Hx. (2)         [] Hormone Replacement Therapy (1)         [x] Obesity (1)         [] Paralysis (1)         [] Pregnancy (1)         [] Smoking (1)                   [] Thromophilia (1)   []   Mild to Moderate Bleeding (2)      [x] Total Hip Arthroplasty (1)   [] Active Bleeding (4)      [] Family history of PE or DVT? (4) (Consider the following labs to test for presence of inhibitor deficiency state:) Factor V Leiden, Prothrombin Gene Mutation, Protein S Deficiency, Protien C Deficiency, Antithrombin Deficiency   [] Malignant Hypertension (2)        [] Thrombocytopenia 20k to 100k (2)        [] Thrombocytopenia less than 20k (4)        [] Bleeding Diathesis (4)        [] \"Bloody Stick\" Epidural or Spinal (2)     TOTAL DVT SCORE   TOTAL BLEEDING SCORE      [x] CLASS A   Standard Risk DVT (0-3)    [x] CLASS X Standard Risk Bleeding (0-4)      [] CLASS B Elevated Risk DVT (greater than 3)    [] CLASS Y High Risk Bleeding (greater than 4)     FINAL MATRIX (e.g. AY)       *If allergic to ASA use Warfarin  *BY patient consider no treatment  **Consider venous filter with high risk PE  **If on Coumadin pre-op, then restart night of surgery      [x]  DVT Prophylaxis: Class AX, AY    Ecotrin 81 mg by mouth BID starting day of surgery for 6 weeks for all total

## 2024-01-22 ENCOUNTER — ANESTHESIA EVENT (OUTPATIENT)
Dept: OPERATING ROOM | Age: 54
End: 2024-01-22
Payer: COMMERCIAL

## 2024-01-22 ENCOUNTER — APPOINTMENT (OUTPATIENT)
Dept: GENERAL RADIOLOGY | Age: 54
End: 2024-01-22
Attending: ORTHOPAEDIC SURGERY
Payer: COMMERCIAL

## 2024-01-22 ENCOUNTER — ANESTHESIA (OUTPATIENT)
Dept: OPERATING ROOM | Age: 54
End: 2024-01-22
Payer: COMMERCIAL

## 2024-01-22 ENCOUNTER — HOSPITAL ENCOUNTER (OUTPATIENT)
Age: 54
Discharge: HOME | End: 2024-01-22
Attending: ORTHOPAEDIC SURGERY | Admitting: ORTHOPAEDIC SURGERY
Payer: COMMERCIAL

## 2024-01-22 VITALS
SYSTOLIC BLOOD PRESSURE: 122 MMHG | TEMPERATURE: 97.9 F | RESPIRATION RATE: 17 BRPM | BODY MASS INDEX: 32.98 KG/M2 | HEART RATE: 93 BPM | OXYGEN SATURATION: 96 % | HEIGHT: 69 IN | WEIGHT: 222.66 LBS | DIASTOLIC BLOOD PRESSURE: 71 MMHG

## 2024-01-22 DIAGNOSIS — Z96.642 STATUS POST TOTAL REPLACEMENT OF LEFT HIP: ICD-10-CM

## 2024-01-22 DIAGNOSIS — G89.18 POST-OP PAIN: Primary | ICD-10-CM

## 2024-01-22 PROCEDURE — 97110 THERAPEUTIC EXERCISES: CPT

## 2024-01-22 PROCEDURE — 6360000002 HC RX W HCPCS: Performed by: NURSE ANESTHETIST, CERTIFIED REGISTERED

## 2024-01-22 PROCEDURE — 6360000002 HC RX W HCPCS: Performed by: ORTHOPAEDIC SURGERY

## 2024-01-22 PROCEDURE — 6360000002 HC RX W HCPCS: Performed by: ANESTHESIOLOGY

## 2024-01-22 PROCEDURE — 97535 SELF CARE MNGMENT TRAINING: CPT

## 2024-01-22 PROCEDURE — 97162 PT EVAL MOD COMPLEX 30 MIN: CPT

## 2024-01-22 PROCEDURE — 3600000005 HC SURGERY LEVEL 5 BASE: Performed by: ORTHOPAEDIC SURGERY

## 2024-01-22 PROCEDURE — 2580000003 HC RX 258: Performed by: ANESTHESIOLOGY

## 2024-01-22 PROCEDURE — 2580000003 HC RX 258: Performed by: CHIROPRACTOR

## 2024-01-22 PROCEDURE — 7100000000 HC PACU RECOVERY - FIRST 15 MIN: Performed by: ORTHOPAEDIC SURGERY

## 2024-01-22 PROCEDURE — 3600000015 HC SURGERY LEVEL 5 ADDTL 15MIN: Performed by: ORTHOPAEDIC SURGERY

## 2024-01-22 PROCEDURE — 6370000000 HC RX 637 (ALT 250 FOR IP): Performed by: ANESTHESIOLOGY

## 2024-01-22 PROCEDURE — 2580000003 HC RX 258: Performed by: ORTHOPAEDIC SURGERY

## 2024-01-22 PROCEDURE — 97166 OT EVAL MOD COMPLEX 45 MIN: CPT

## 2024-01-22 PROCEDURE — 6360000002 HC RX W HCPCS: Performed by: CHIROPRACTOR

## 2024-01-22 PROCEDURE — 2709999900 HC NON-CHARGEABLE SUPPLY: Performed by: ORTHOPAEDIC SURGERY

## 2024-01-22 PROCEDURE — 2500000003 HC RX 250 WO HCPCS: Performed by: NURSE ANESTHETIST, CERTIFIED REGISTERED

## 2024-01-22 PROCEDURE — 97116 GAIT TRAINING THERAPY: CPT

## 2024-01-22 PROCEDURE — 3700000001 HC ADD 15 MINUTES (ANESTHESIA): Performed by: ORTHOPAEDIC SURGERY

## 2024-01-22 PROCEDURE — C1776 JOINT DEVICE (IMPLANTABLE): HCPCS | Performed by: ORTHOPAEDIC SURGERY

## 2024-01-22 PROCEDURE — 7100000001 HC PACU RECOVERY - ADDTL 15 MIN: Performed by: ORTHOPAEDIC SURGERY

## 2024-01-22 PROCEDURE — 3700000000 HC ANESTHESIA ATTENDED CARE: Performed by: ORTHOPAEDIC SURGERY

## 2024-01-22 PROCEDURE — 6370000000 HC RX 637 (ALT 250 FOR IP): Performed by: CHIROPRACTOR

## 2024-01-22 PROCEDURE — 2720000010 HC SURG SUPPLY STERILE: Performed by: ORTHOPAEDIC SURGERY

## 2024-01-22 PROCEDURE — 73502 X-RAY EXAM HIP UNI 2-3 VIEWS: CPT

## 2024-01-22 PROCEDURE — 2580000003 HC RX 258: Performed by: NURSE ANESTHETIST, CERTIFIED REGISTERED

## 2024-01-22 DEVICE — HC PE LINER 28 / DME
Type: IMPLANTABLE DEVICE | Site: HIP | Status: FUNCTIONAL
Brand: DOUBLE MOBILITY LINER

## 2024-01-22 DEVICE — MASTERLOC CEMENTLESS TI COATED LAT STEM # 6
Type: IMPLANTABLE DEVICE | Site: HIP | Status: FUNCTIONAL
Brand: MASTERLOC FEMORAL STEMS

## 2024-01-22 DEVICE — DOUBLE MOBILITY ACETABULAR SHELL Ø50
Type: IMPLANTABLE DEVICE | Site: HIP | Status: FUNCTIONAL
Brand: MPACT DOUBLE MOBILITY SHELLS

## 2024-01-22 RX ORDER — VANCOMYCIN HYDROCHLORIDE 1 G/20ML
INJECTION, POWDER, LYOPHILIZED, FOR SOLUTION INTRAVENOUS PRN
Status: DISCONTINUED | OUTPATIENT
Start: 2024-01-22 | End: 2024-01-22 | Stop reason: ALTCHOICE

## 2024-01-22 RX ORDER — TRANEXAMIC ACID 100 MG/ML
INJECTION, SOLUTION INTRAVENOUS
Status: DISCONTINUED
Start: 2024-01-22 | End: 2024-01-22 | Stop reason: HOSPADM

## 2024-01-22 RX ORDER — FENTANYL CITRATE 50 UG/ML
50 INJECTION, SOLUTION INTRAMUSCULAR; INTRAVENOUS EVERY 10 MIN PRN
Status: COMPLETED | OUTPATIENT
Start: 2024-01-22 | End: 2024-01-22

## 2024-01-22 RX ORDER — ACETAMINOPHEN 500 MG
1000 TABLET ORAL ONCE
Status: COMPLETED | OUTPATIENT
Start: 2024-01-22 | End: 2024-01-22

## 2024-01-22 RX ORDER — LIDOCAINE HYDROCHLORIDE 20 MG/ML
INJECTION, SOLUTION EPIDURAL; INFILTRATION; INTRACAUDAL; PERINEURAL PRN
Status: DISCONTINUED | OUTPATIENT
Start: 2024-01-22 | End: 2024-01-22 | Stop reason: SDUPTHER

## 2024-01-22 RX ORDER — HYDROMORPHONE HYDROCHLORIDE 1 MG/ML
0.5 INJECTION, SOLUTION INTRAMUSCULAR; INTRAVENOUS; SUBCUTANEOUS
Status: DISCONTINUED | OUTPATIENT
Start: 2024-01-22 | End: 2024-01-22 | Stop reason: HOSPADM

## 2024-01-22 RX ORDER — ONDANSETRON 2 MG/ML
4 INJECTION INTRAMUSCULAR; INTRAVENOUS
Status: COMPLETED | OUTPATIENT
Start: 2024-01-22 | End: 2024-01-22

## 2024-01-22 RX ORDER — OXYCODONE HYDROCHLORIDE AND ACETAMINOPHEN 5; 325 MG/1; MG/1
1-2 TABLET ORAL EVERY 6 HOURS PRN
Qty: 56 TABLET | Refills: 0 | Status: SHIPPED | OUTPATIENT
Start: 2024-01-22 | End: 2024-01-22 | Stop reason: HOSPADM

## 2024-01-22 RX ORDER — ASPIRIN 81 MG/1
81 TABLET ORAL 2 TIMES DAILY
Status: DISCONTINUED | OUTPATIENT
Start: 2024-01-23 | End: 2024-01-22 | Stop reason: HOSPADM

## 2024-01-22 RX ORDER — MIDAZOLAM HYDROCHLORIDE 1 MG/ML
INJECTION INTRAMUSCULAR; INTRAVENOUS PRN
Status: DISCONTINUED | OUTPATIENT
Start: 2024-01-22 | End: 2024-01-22 | Stop reason: SDUPTHER

## 2024-01-22 RX ORDER — ONDANSETRON 2 MG/ML
4 INJECTION INTRAMUSCULAR; INTRAVENOUS EVERY 6 HOURS PRN
Status: DISCONTINUED | OUTPATIENT
Start: 2024-01-22 | End: 2024-01-22 | Stop reason: HOSPADM

## 2024-01-22 RX ORDER — LIDOCAINE HYDROCHLORIDE 10 MG/ML
1 INJECTION, SOLUTION EPIDURAL; INFILTRATION; INTRACAUDAL; PERINEURAL
Status: DISCONTINUED | OUTPATIENT
Start: 2024-01-23 | End: 2024-01-22 | Stop reason: HOSPADM

## 2024-01-22 RX ORDER — SODIUM CHLORIDE 9 MG/ML
INJECTION, SOLUTION INTRAVENOUS CONTINUOUS
Status: DISCONTINUED | OUTPATIENT
Start: 2024-01-22 | End: 2024-01-22

## 2024-01-22 RX ORDER — SODIUM CHLORIDE, SODIUM LACTATE, POTASSIUM CHLORIDE, CALCIUM CHLORIDE 600; 310; 30; 20 MG/100ML; MG/100ML; MG/100ML; MG/100ML
INJECTION, SOLUTION INTRAVENOUS CONTINUOUS
Status: DISCONTINUED | OUTPATIENT
Start: 2024-01-22 | End: 2024-01-22

## 2024-01-22 RX ORDER — SODIUM CHLORIDE 9 MG/ML
INJECTION, SOLUTION INTRAVENOUS PRN
Status: DISCONTINUED | OUTPATIENT
Start: 2024-01-22 | End: 2024-01-22 | Stop reason: HOSPADM

## 2024-01-22 RX ORDER — FENTANYL CITRATE 50 UG/ML
INJECTION, SOLUTION INTRAMUSCULAR; INTRAVENOUS PRN
Status: DISCONTINUED | OUTPATIENT
Start: 2024-01-22 | End: 2024-01-22 | Stop reason: SDUPTHER

## 2024-01-22 RX ORDER — SODIUM CHLORIDE 0.9 % (FLUSH) 0.9 %
5-40 SYRINGE (ML) INJECTION PRN
Status: DISCONTINUED | OUTPATIENT
Start: 2024-01-22 | End: 2024-01-22 | Stop reason: HOSPADM

## 2024-01-22 RX ORDER — BISACODYL 5 MG/1
5 TABLET, DELAYED RELEASE ORAL DAILY PRN
Status: DISCONTINUED | OUTPATIENT
Start: 2024-01-22 | End: 2024-01-22 | Stop reason: HOSPADM

## 2024-01-22 RX ORDER — GABAPENTIN 300 MG/1
300 CAPSULE ORAL ONCE
Status: DISCONTINUED | OUTPATIENT
Start: 2024-01-22 | End: 2024-01-22 | Stop reason: HOSPADM

## 2024-01-22 RX ORDER — CYCLOBENZAPRINE HCL 10 MG
10 TABLET ORAL EVERY 12 HOURS PRN
Status: DISCONTINUED | OUTPATIENT
Start: 2024-01-22 | End: 2024-01-22 | Stop reason: HOSPADM

## 2024-01-22 RX ORDER — DEXAMETHASONE SODIUM PHOSPHATE 10 MG/ML
INJECTION, SOLUTION INTRAMUSCULAR; INTRAVENOUS PRN
Status: DISCONTINUED | OUTPATIENT
Start: 2024-01-22 | End: 2024-01-22 | Stop reason: SDUPTHER

## 2024-01-22 RX ORDER — CELECOXIB 200 MG/1
200 CAPSULE ORAL ONCE
Status: DISCONTINUED | OUTPATIENT
Start: 2024-01-22 | End: 2024-01-22 | Stop reason: HOSPADM

## 2024-01-22 RX ORDER — ACETAMINOPHEN 325 MG/1
650 TABLET ORAL EVERY 6 HOURS
Status: DISCONTINUED | OUTPATIENT
Start: 2024-01-22 | End: 2024-01-22 | Stop reason: HOSPADM

## 2024-01-22 RX ORDER — PROPOFOL 10 MG/ML
INJECTION, EMULSION INTRAVENOUS PRN
Status: DISCONTINUED | OUTPATIENT
Start: 2024-01-22 | End: 2024-01-22 | Stop reason: SDUPTHER

## 2024-01-22 RX ORDER — ASPIRIN 81 MG/1
81 TABLET ORAL 2 TIMES DAILY
Qty: 84 TABLET | Refills: 0 | Status: SHIPPED | OUTPATIENT
Start: 2024-01-22 | End: 2024-03-04

## 2024-01-22 RX ORDER — FENTANYL CITRATE 50 UG/ML
25 INJECTION, SOLUTION INTRAMUSCULAR; INTRAVENOUS EVERY 5 MIN PRN
Status: DISCONTINUED | OUTPATIENT
Start: 2024-01-22 | End: 2024-01-22 | Stop reason: HOSPADM

## 2024-01-22 RX ORDER — ONDANSETRON 2 MG/ML
INJECTION INTRAMUSCULAR; INTRAVENOUS PRN
Status: DISCONTINUED | OUTPATIENT
Start: 2024-01-22 | End: 2024-01-22 | Stop reason: SDUPTHER

## 2024-01-22 RX ORDER — HYDROCODONE BITARTRATE AND ACETAMINOPHEN 5; 325 MG/1; MG/1
1-2 TABLET ORAL EVERY 6 HOURS PRN
Qty: 56 TABLET | Refills: 0 | Status: SHIPPED | OUTPATIENT
Start: 2024-01-22 | End: 2024-01-22 | Stop reason: HOSPADM

## 2024-01-22 RX ORDER — OXYCODONE HYDROCHLORIDE 5 MG/1
5 TABLET ORAL EVERY 4 HOURS PRN
Status: DISCONTINUED | OUTPATIENT
Start: 2024-01-22 | End: 2024-01-22 | Stop reason: HOSPADM

## 2024-01-22 RX ORDER — ROCURONIUM BROMIDE 10 MG/ML
INJECTION, SOLUTION INTRAVENOUS PRN
Status: DISCONTINUED | OUTPATIENT
Start: 2024-01-22 | End: 2024-01-22 | Stop reason: SDUPTHER

## 2024-01-22 RX ORDER — ONDANSETRON 4 MG/1
4 TABLET, ORALLY DISINTEGRATING ORAL EVERY 8 HOURS PRN
Status: DISCONTINUED | OUTPATIENT
Start: 2024-01-22 | End: 2024-01-22 | Stop reason: HOSPADM

## 2024-01-22 RX ORDER — VANCOMYCIN HYDROCHLORIDE 1 G/20ML
INJECTION, POWDER, LYOPHILIZED, FOR SOLUTION INTRAVENOUS
Status: DISCONTINUED
Start: 2024-01-22 | End: 2024-01-22 | Stop reason: HOSPADM

## 2024-01-22 RX ORDER — KETAMINE HCL IN NACL, ISO-OSM 100MG/10ML
SYRINGE (ML) INJECTION PRN
Status: DISCONTINUED | OUTPATIENT
Start: 2024-01-22 | End: 2024-01-22 | Stop reason: SDUPTHER

## 2024-01-22 RX ORDER — OXYCODONE HYDROCHLORIDE 5 MG/1
5 TABLET ORAL
Status: DISCONTINUED | OUTPATIENT
Start: 2024-01-22 | End: 2024-01-22 | Stop reason: HOSPADM

## 2024-01-22 RX ORDER — SODIUM CHLORIDE 0.9 % (FLUSH) 0.9 %
5-40 SYRINGE (ML) INJECTION EVERY 12 HOURS SCHEDULED
Status: DISCONTINUED | OUTPATIENT
Start: 2024-01-22 | End: 2024-01-22 | Stop reason: HOSPADM

## 2024-01-22 RX ORDER — MAGNESIUM HYDROXIDE 1200 MG/15ML
LIQUID ORAL CONTINUOUS PRN
Status: COMPLETED | OUTPATIENT
Start: 2024-01-22 | End: 2024-01-22

## 2024-01-22 RX ORDER — OXYCODONE HYDROCHLORIDE 5 MG/1
10 TABLET ORAL EVERY 4 HOURS PRN
Status: DISCONTINUED | OUTPATIENT
Start: 2024-01-22 | End: 2024-01-22 | Stop reason: HOSPADM

## 2024-01-22 RX ORDER — HYDROCODONE BITARTRATE AND ACETAMINOPHEN 5; 325 MG/1; MG/1
1 TABLET ORAL EVERY 6 HOURS PRN
Qty: 56 TABLET | Refills: 0 | Status: SHIPPED | OUTPATIENT
Start: 2024-01-22 | End: 2024-01-29

## 2024-01-22 RX ADMIN — Medication 35 MG: at 07:48

## 2024-01-22 RX ADMIN — FENTANYL CITRATE 100 MCG: 50 INJECTION INTRAMUSCULAR; INTRAVENOUS at 07:30

## 2024-01-22 RX ADMIN — SUGAMMADEX 200 MG: 100 INJECTION, SOLUTION INTRAVENOUS at 08:56

## 2024-01-22 RX ADMIN — OXYCODONE HYDROCHLORIDE 10 MG: 5 TABLET ORAL at 15:35

## 2024-01-22 RX ADMIN — OXYCODONE HYDROCHLORIDE 10 MG: 5 TABLET ORAL at 11:28

## 2024-01-22 RX ADMIN — MIDAZOLAM 2 MG: 1 INJECTION INTRAMUSCULAR; INTRAVENOUS at 07:20

## 2024-01-22 RX ADMIN — ACETAMINOPHEN 1000 MG: 500 TABLET ORAL at 06:59

## 2024-01-22 RX ADMIN — TRANEXAMIC ACID 1 G: 100 INJECTION, SOLUTION INTRAVENOUS at 08:46

## 2024-01-22 RX ADMIN — FENTANYL CITRATE 50 MCG: 50 INJECTION INTRAMUSCULAR; INTRAVENOUS at 10:24

## 2024-01-22 RX ADMIN — FENTANYL CITRATE 50 MCG: 50 INJECTION INTRAMUSCULAR; INTRAVENOUS at 09:52

## 2024-01-22 RX ADMIN — SODIUM CHLORIDE, POTASSIUM CHLORIDE, SODIUM LACTATE AND CALCIUM CHLORIDE: 600; 310; 30; 20 INJECTION, SOLUTION INTRAVENOUS at 06:44

## 2024-01-22 RX ADMIN — Medication 15 MG: at 08:33

## 2024-01-22 RX ADMIN — SODIUM CHLORIDE, PRESERVATIVE FREE 10 ML: 5 INJECTION INTRAVENOUS at 12:32

## 2024-01-22 RX ADMIN — FENTANYL CITRATE 50 MCG: 50 INJECTION INTRAMUSCULAR; INTRAVENOUS at 10:14

## 2024-01-22 RX ADMIN — TRANEXAMIC ACID 1 G: 100 INJECTION, SOLUTION INTRAVENOUS at 07:51

## 2024-01-22 RX ADMIN — PROPOFOL 160 MG: 10 INJECTION, EMULSION INTRAVENOUS at 07:30

## 2024-01-22 RX ADMIN — ROCURONIUM BROMIDE 50 MG: 10 INJECTION, SOLUTION INTRAVENOUS at 07:30

## 2024-01-22 RX ADMIN — HYDROMORPHONE HYDROCHLORIDE 0.5 MG: 1 INJECTION, SOLUTION INTRAMUSCULAR; INTRAVENOUS; SUBCUTANEOUS at 12:32

## 2024-01-22 RX ADMIN — FENTANYL CITRATE 50 MCG: 50 INJECTION INTRAMUSCULAR; INTRAVENOUS at 10:02

## 2024-01-22 RX ADMIN — IBUPROFEN 600 MG: 200 TABLET, FILM COATED ORAL at 14:21

## 2024-01-22 RX ADMIN — DEXAMETHASONE SODIUM PHOSPHATE 10 MG: 10 INJECTION INTRAMUSCULAR; INTRAVENOUS at 07:35

## 2024-01-22 RX ADMIN — ONDANSETRON 4 MG: 2 INJECTION INTRAMUSCULAR; INTRAVENOUS at 10:04

## 2024-01-22 RX ADMIN — ACETAMINOPHEN 650 MG: 325 TABLET ORAL at 11:27

## 2024-01-22 RX ADMIN — ONDANSETRON 4 MG: 2 INJECTION INTRAMUSCULAR; INTRAVENOUS at 08:46

## 2024-01-22 RX ADMIN — Medication 2000 MG: at 07:36

## 2024-01-22 RX ADMIN — SODIUM CHLORIDE, POTASSIUM CHLORIDE, SODIUM LACTATE AND CALCIUM CHLORIDE: 600; 310; 30; 20 INJECTION, SOLUTION INTRAVENOUS at 10:23

## 2024-01-22 RX ADMIN — SODIUM CHLORIDE 2000 MG: 9 INJECTION, SOLUTION INTRAVENOUS at 15:38

## 2024-01-22 RX ADMIN — LIDOCAINE HYDROCHLORIDE 80 MG: 20 INJECTION, SOLUTION EPIDURAL; INFILTRATION; INTRACAUDAL; PERINEURAL at 07:30

## 2024-01-22 ASSESSMENT — ENCOUNTER SYMPTOMS: SHORTNESS OF BREATH: 0

## 2024-01-22 ASSESSMENT — PAIN DESCRIPTION - LOCATION
LOCATION: HIP

## 2024-01-22 ASSESSMENT — PAIN DESCRIPTION - DESCRIPTORS
DESCRIPTORS: SHARP
DESCRIPTORS: ACHING
DESCRIPTORS: ACHING
DESCRIPTORS: ACHING;SORE
DESCRIPTORS: ACHING
DESCRIPTORS: ACHING;BURNING;SHOOTING;STABBING
DESCRIPTORS: ACHING

## 2024-01-22 ASSESSMENT — PAIN SCALES - GENERAL
PAINLEVEL_OUTOF10: 8
PAINLEVEL_OUTOF10: 6
PAINLEVEL_OUTOF10: 10
PAINLEVEL_OUTOF10: 7
PAINLEVEL_OUTOF10: 3
PAINLEVEL_OUTOF10: 10
PAINLEVEL_OUTOF10: 10
PAINLEVEL_OUTOF10: 6
PAINLEVEL_OUTOF10: 1
PAINLEVEL_OUTOF10: 10

## 2024-01-22 ASSESSMENT — PAIN - FUNCTIONAL ASSESSMENT
PAIN_FUNCTIONAL_ASSESSMENT: PREVENTS OR INTERFERES SOME ACTIVE ACTIVITIES AND ADLS
PAIN_FUNCTIONAL_ASSESSMENT: 0-10
PAIN_FUNCTIONAL_ASSESSMENT: FACE, LEGS, ACTIVITY, CRY, AND CONSOLABILITY (FLACC)

## 2024-01-22 ASSESSMENT — PAIN DESCRIPTION - ORIENTATION
ORIENTATION: LEFT

## 2024-01-22 NOTE — ANESTHESIA POSTPROCEDURE EVALUATION
Department of Anesthesiology  Postprocedure Note    Patient: Kelsey Owen  MRN: [id number]  YOB: 1970  Date of evaluation: 1/22/2024    Procedure Summary       Date: 01/22/24 Room / Location: [address]    Anesthesia Start: 0722 Anesthesia Stop: 0915    Procedure: LEFT HIP TOTAL ARTHROPLASTY ANTERIOR APPROACH (Left: Hip) Diagnosis:       Arthritis of left hip      (Arthritis of left hip [M16.12])    Surgeons: Juanpablo Llanos DO Responsible Provider: Koki Alvarenga MD    Anesthesia Type: general ASA Status: 2            Anesthesia Type: No value filed.    Genny Phase I: Genny Score: 10    Genny Phase II:      Anesthesia Post Evaluation    Cardiovascular status: hemodynamically stable    No notable events documented.

## 2024-01-22 NOTE — ANESTHESIA PRE PROCEDURE
Department of Anesthesiology  Preprocedure Note       Name:  Kelsey Owen   Age:  53 y.o.  :  1970                                          MRN:  [id number]         Date:  2024      Surgeon: Surgeon(s):  Juanpablo Llanos DO    Procedure: Procedure(s):  LEFT HIP TOTAL ARTHROPLASTY ANTERIOR APPROACH    Medications prior to admission:   Prior to Admission medications    Medication Sig Start Date End Date Taking? Authorizing Provider   pantoprazole (PROTONIX) 40 MG tablet Take 1 tablet by mouth every morning (before breakfast) 1/10/24   Frankie Hermosillo MD   Multiple Vitamins-Minerals (THERAPEUTIC MULTIVITAMIN-MINERALS) tablet Take 1 tablet by mouth daily    Provider, MD Nan   levothyroxine (SYNTHROID) 175 MCG tablet take 1 tablet by mouth EVERY MORNING BEFORE BREAKFAST 23   Svetlana Mandujano MD   ondansetron (ZOFRAN-ODT) 4 MG disintegrating tablet Take 1 tablet by mouth 3 times daily as needed for Nausea 23   Shelbi Kelly APRN - CNP   tiZANidine (ZANAFLEX) 2 MG tablet Take 1 tablet by mouth 3 times daily as needed (back pain) 23   Shelbi Kelly APRN - CNP   ibuprofen (ADVIL;MOTRIN) 800 MG tablet Take 1 tablet by mouth 2 times daily as needed for Pain 23   Shelbi Kelly APRN - CNP   ergocalciferol (ERGOCALCIFEROL) 1.25 MG (97118 UT) capsule Take 1 capsule by mouth Twice a Week Indications: Tues, Sun 10/5/23 1/10/24  Shelbi Kelly APRN - CNP   amphetamine-dextroamphetamine (ADDERALL) 20 MG tablet Take 1 tablet by mouth 2 times daily for 30 days. 10/3/23 1/22/24  Shelbi Kelly APRN - CNP   atorvastatin (LIPITOR) 20 MG tablet Take 1 tablet by mouth daily 23   Shelbi Kelly APRN - CNP   albuterol sulfate HFA (PROVENTIL;VENTOLIN;PROAIR) 108 (90 Base) MCG/ACT inhaler Inhale 2 puffs into the lungs every 4 hours as needed for Wheezing  Patient not taking: Reported on 1/10/2024 9/13/22   Rumman, Shelbi A, APRN - CNP   cetirizine (ZYRTEC) 10 MG tablet Take 1 tablet

## 2024-01-22 NOTE — PROGRESS NOTES
CLINICAL PHARMACY NOTE: MEDS TO BEDS    Total # of Prescriptions Filled: 3   The following medications were delivered to the patient:  Percocet 5-325  Aspirin 81 mg  Pantoprazole 40mg    Additional Documentation:  
Orthopedic Coordinator Note    Patient s/p left total Hip replacement on 01/22/2024 with DR. BROWN.    The following appointments are currently scheduled:    Post-op with surgeon 02/06/2024 AT 1045 IN [place] WITH [facility].    Physical Therapy OPPT AT [facility] 01/25/2024 T 1130.      Wheeled walker order is not entered  Face to face documentation is N/A-PT HAS A FWW FROM [facility] 02/14/2022.    DVT Prophylaxis: 81MG EC ASA BID X 6 WEEKS. PT TO START ASA 01/23/2024.    PT IS A SAME DAY DISCHARGE.      Any questions please contact Kendra Mcclellan RN, MSN  [phone]      Electronically signed by: Kendra Mcclellan RN on 1/22/2024 at 8:40 AM     
Pt admitted to room from PACU  Oriented to room and call light/tv controls.  Bed in lowest position, wheels locked, 2/4 side rails up  Call light in reach, room free of clutter, adequate lighting provided.   
Pt ambulated to bathroom stated when she wiped herself she heard a loud pop. Denies pain ambulated without difficult back to bed with walker. Dr. Llanos notified.  
Pt discharged to home in stable condition with belongings  Discharge instructions given  \"Meds To Beds\" medication at bedside  Pt denies having any further questions at this time  Personal items given to patient at discharge  Patient  state they have everything they were admitted with.  Dressing change supplies and hibiclens sent home with patient   
SPIRITUAL CARE DEPARTMENT [facility]  PROGRESS NOTE    Room # 2004/2004-02   Name: Kelsey Owen            Restoration: Spiritualist     Reason for visit: Routine    I visited the patient and her family (parents, and son).    Admit Date & Time: 1/22/2024  5:51 AM    Assessment:  Kelsey Owen is a 53 y.o. female in the hospital because Status post total replacement of left hip. Upon entering the room The patient was sitting up in the bed with a great disposition. The patients Mother, Father ,and son were in the room.       Intervention:  I introduced myself and my title as  I offered space for the patient  to express feelings, needs, and concerns and provided a ministry presence. We talked, laughed and joked about the  being a part of their wonderful family. The  prayed for the patient, and all of the family members health, full recovery, and purpose being fulfilled by the son. A prayer card was left with the patient on, \"Footprints\", and an encouraging dialogue continued; as the Gladys left out of the room, and gave the prayer journal to the patient.     Outcome:  The patient was very thankful for the visit, and the prayer journal since she writes. The patient was encouraged.    Plan:  Chaplains will remain available to offer spiritual and emotional support as needed.    Electronically signed by Chaplain Kwadwo, on 1/22/2024 at 3:56 PM.  Spiritual Care Department  [facility]     
mobility, transfers, ambulation, balance, & decreased safety awareness at start of session. Pt seen for extended session demonstrating safe functional mobility, transfers/safe ambulation with R/walker & practiced platform step with R/W & LLE WBAT. Pt Ed on BROOKLYN ex's, precautions, safe functional mobility, transfers/gait with R/walker & issued written Pt ED. Pt appropriate Pt safe to D/C home with assist,  & requires cont'd OP PT at D/C in order to restore ROM/strength L hip & LLE,  independence with functional mobility, balance, safety awareness, & activity tolerance, & restore normal functional use LLE s/p L BROOKLYN  Therapy Prognosis: Good  Decision Making: Medium Complexity  Exam: ROM, MMT, functional mobility, activity tolerance, Balance, & [facility] AM-PAC 6 Clicks Basic Mobility  Clinical Presentation: stable  Requires PT Follow-Up: Yes  Activity Tolerance  Activity Tolerance: Patient tolerated treatment well     Plan   Physical Therapy Plan  General Plan: 2 times a day 7 days a week  Current Treatment Recommendations: Strengthening, ROM, Balance training, Functional mobility training, Transfer training, ADL/Self-care training, Endurance training, Gait training, Stair training, Positioning, Therapeutic activities, Home exercise program, Safety education & training, Patient/Caregiver education & training  Safety Devices  Type of Devices: Call light within reach, Gait belt, Heels elevated for pressure relief, Patient at risk for falls, Left in bed, Nurse notified, Bed alarm in place  Restraints  Restraints Initially in Place: No     Restrictions  Restrictions/Precautions  Restrictions/Precautions: General Precautions, Fall Risk  Position Activity Restriction  Other position/activity restrictions: Up in bedside chair as tolerated, BRP with assistance, FWBAT LLE, R hand IV     Subjective   General  Chart Reviewed: Yes  Patient assessed for rehabilitation services?: Yes  Additional Pertinent Hx: migraines, OA, 
Number of Steps: 3 (platform so can place walker on each step)  Bathroom Shower/Tub: Tub/Shower unit  Bathroom Toilet: Handicap height  Bathroom Equipment: Grab bars in shower, Toilet raiser, Grab bars around toilet  Home Equipment: Walker, rolling, Reacher, Sock aid  Receives Help From: Family (Pt has supportive adult son & parents)  ADL Assistance: Independent  Homemaking Assistance: Independent  Homemaking Responsibilities: Yes  Ambulation Assistance: Independent  Transfer Assistance: Independent  Active : Yes  Mode of Transportation: Car  Occupation: Full time employment  Type of Occupation: [employer]  Leisure & Hobbies: cats, puttering around house       Objective           Observation/Palpation  Posture: Good  Observation: Surgical dressing dry & intact over L BROOKLYN incision; R compression stocking donned prior to entry to room & L compression stocking donned prior to mobilizing; BMI 32.88  Scar: new L BROOK[place]N incision covered by post op dressing  Safety Devices  Type of Devices: Call light within reach;Gait belt;Heels elevated for pressure relief;Patient at risk for falls;Left in bed;Nurse notified;Bed alarm in place  Restraints  Restraints Initially in Place: No    Bed Mobility Training  Bed Mobility Training: Yes  Overall Level of Assistance: Stand-by assistance (Pt able to complete bed mobility tasks w/o significant difficulty. Educated pt on strategies for adhering to anterior BROOKLYN precautions during bed mobility. Pt initially dizzy with position change, which resolved ~1-2 mins.)  Interventions: Safety awareness training;Verbal cues  Rolling: Stand-by assistance  Supine to Sit: Stand-by assistance  Sit to Supine: Stand-by assistance  Scooting: Stand-by assistance    Balance  Sitting: Intact  Standing: With support (Cuca progressing to CGA w/ RW during session)    Transfer Training  Transfer Training: Yes  Overall Level of Assistance: Contact-guard assistance;Minimum assistance (Cuca progressing

## 2024-01-22 NOTE — PLAN OF CARE
Problem: Discharge Planning  Goal: Discharge to home or other facility with appropriate resources  Outcome: Completed  Flowsheets (Taken 1/22/2024 1100)  Discharge to home or other facility with appropriate resources:   Identify barriers to discharge with patient and caregiver   Arrange for needed discharge resources and transportation as appropriate   Identify discharge learning needs (meds, wound care, etc)   Refer to discharge planning if patient needs post-hospital services based on physician order or complex needs related to functional status, cognitive ability or social support system     Problem: Pain  Goal: Verbalizes/displays adequate comfort level or baseline comfort level  Outcome: Completed     Problem: ABCDS Injury Assessment  Goal: Absence of physical injury  Outcome: Completed     Problem: Safety - Adult  Goal: Free from fall injury  Outcome: Completed

## 2024-01-22 NOTE — DISCHARGE INSTRUCTIONS
recommended dosage.   Ice, rest and elevating the surgical limb also help with pain control.      When to call the Surgeon:  Increased redness, warmth, drainage, swelling or odor from incision site.  Temperature above 101 degrees.  Pain not controlled by prescribed medications.  Calf tenderness, swelling, or redness.  Shortness of breath or chest pain.  If you cannot urinate and have been consuming liquids  Any incision or surgical-related concerns.  Call surgeon with concerns PRIOR TO going to hospital.    Normal Conditions:  Swelling in the operative leg: this should reduce over time.  Bruising behind the knee and around surgical area.  Some post-operative pain.    Constipation related to pain medications/decreased mobility.  (Increase fiber & water intake.)   Slight warmth of operative leg.    Fatigue and moderate pain after therapy.    Numbness near the incision site.  Nausea - take pain medications with food.  Cut back on pain medication.    NOTE: Remember to go to follow-up orthopedic appointment with surgeon      Keep it Clean - Post-Operative Home instructions    These instructions are to help you have the best possible recovery after your surgical procedure. [facility] is here to support you. If you have questions, call [phone] Monday through Friday from 7:30AM to 8:30PM to speak to a nurse. If you need to speak to someone outside of these hours, call your physician.     Incision Do’s and Don’ts  Do wash hands before and after dressing changes or when you have had any contact with your incision. Use hand  or antibacterial soap.  Do keep your incision clean and dry. It’s OK to wash the skin around your incision with mild soap and water.  Do change your dressing as you were told.   Do notify your doctor if the dressing becomes wet or dirty.  Do use a clean washcloth every time when cleaning your incision.   Do sleep on clean linens.  Do keep pets away from incision site.  Don’t sit in a bathtub,

## 2024-01-22 NOTE — OP NOTE
1 gram vancomycin powder was placed deep within the joint.       The fascia was closed using absorbable suture, as was the subcutaneous  tissue.  Running subcuticular Stratafix suture was utilized for  subcuticular closure.  Dermabond was used cutaneously.  Sterile dressing  was applied.  Anesthesia was reversed.  The patient was taken to the  postoperative recovery room in stable condition.  There were no  immediate postoperative complications, and the procedure was tolerated  well.                         Electronically signed by Juanpablo Llanos DO on 1/22/2024 at 11:45 AM

## 2024-01-22 NOTE — H&P
Interval H&P Note    Pt Name: Kelsey Owen  MRN: [id number]  YOB: 1970  Date of evaluation: 1/22/2024      [x] I have reviewed ine epic the Internal medicine Preoperative Note by Dr Frankie Hermosillo dated 1/10/24 attached below for the Interval History and Physical note.     [x] I have examined  Kelsey Owen, a 53 y.o. female.There are no changes to the patient who is scheduled for LEFT HIP TOTAL ARTHROPLASTY ANTERIOR APPROACH by Juanpablo Llanos DO for Arthritis of left hip. The is nauseated now and did not take her Zofran today Advised to discuss with anesthesia. She stated with her right total hip 1/1022 postoperatively \"I developed severe pain and couldn't go home that day.\" Patient concerned the same will happen Advised to discuss with surgeon.  patient denies new health changes, fever, chills, wheezing, cough, increased SOB, chest pain, open sores or wounds.      01/05/24 1110    Anesthesia PAT Clearance   Anesthesia Review Status Anes has reviewed patient for surgery   Is the patient cleared? Patient is cleared for surgery  (Dr. Alvarenga reviewed chart including EKG and medical history. No further intervention needed at this time.)        Vital signs: /86   Pulse (!) 107   Temp 96.8 °F (36 °C)   Resp 18   SpO2 97%     Allergies:  Morphine and [employer] [macadamia nut oil]    Medications:    Prior to Admission medications    Medication Sig Start Date End Date Taking? Authorizing Provider   pantoprazole (PROTONIX) 40 MG tablet Take 1 tablet by mouth every morning (before breakfast) 1/10/24   Frankie Hermosillo MD   Multiple Vitamins-Minerals (THERAPEUTIC MULTIVITAMIN-MINERALS) tablet Take 1 tablet by mouth daily    ProviderNan MD   levothyroxine (SYNTHROID) 175 MCG tablet take 1 tablet by mouth EVERY MORNING BEFORE BREAKFAST 12/26/23   Svetlana Mandujano MD   ondansetron (ZOFRAN-ODT) 4 MG disintegrating tablet Take 1 tablet by mouth 3 times daily as needed for Nausea 12/19/23   Robin

## 2024-01-23 ENCOUNTER — CARE COORDINATION (OUTPATIENT)
Dept: OTHER | Facility: CLINIC | Age: 54
End: 2024-01-23

## 2024-01-23 NOTE — CARE COORDINATION
Ambulatory Care Coordination Note  2024    Patient Current Location:  Home: [address] [place] [address] [place] [address]    ACM contacted the patient by telephone. Verified name and  with patient as identifiers. Provided introduction to self, and explanation of the ACM role.     ACM: Ileana Price RN    Challenges to be reviewed by the provider   Additional needs identified to be addressed with provider: No  none               Method of communication with provider: none.    Ambulatory Care Manager (ACM) contacted the patient to introduce the Associate Care Management Program related to s/p left hip replacement on 24. ACM reviewed and updated CC Protocol , medication , and goals patient was agreeable to follow up Care Management calls.      Summary Note: Pt reports she is doing better today s/p left hip replacement on 24; reports no pain currently. Pt states she is taking norco every 6 hours as ordered, which is keeping pain controlled. Patient instructed may take Tylenol for breakthrough pain, while taking Norco; however, do not exceed 4000 mg of acetaminophen in 24 hour period.  Patient instructed Norco tablet has 325 mg of acetaminophen per tablet.  Patient verbalized understanding. Educated pt to take medication as directed, taking lowest dose possible to achieve pain relief, and to take before pain is at level 8/10. Educated to refrain from driving while taking narcotics. Patient verbalized understanding.  Pt reports dressing intact on left hip incision; states provider does not want pt to change dressing prior to f/u appt on 24. Pt denies any bleeding/drainage through or around dressing. Patient denies any s/s of infection.  Patient instructed on s/s of infection to report including but not limited to fever, chills, foul smelling drainage, increased warmth, redness or hardness around incision.  Patient instructed to refrain from picking at dermabond.  Patient instructed may shower but do not

## 2024-01-25 ENCOUNTER — HOSPITAL ENCOUNTER (OUTPATIENT)
Dept: PHYSICAL THERAPY | Age: 54
Setting detail: THERAPIES SERIES
Discharge: HOME | End: 2024-01-25
Attending: ORTHOPAEDIC SURGERY
Payer: COMMERCIAL

## 2024-01-25 PROCEDURE — 97110 THERAPEUTIC EXERCISES: CPT

## 2024-01-25 PROCEDURE — 97161 PT EVAL LOW COMPLEX 20 MIN: CPT

## 2024-01-25 NOTE — CONSULTS
community distances without AD.   Independent with Home Exercise Programs    LTG: (to be met in 12 treatments)  Strength: L hip strength to 4+/5 to improve joint stability in standing.   Patient is wyatt to perform left SLS for at least 10 seconds to demonstrate improved balance.   Patient can climb up/down 10 steps without UE support and reciprocal gait pattern.     Patient goals: Decrease pain and increase movement and activity.     Rehab Potential:  [x] Good  [] Fair  [] Poor   Suggested Professional Referral:  [x] No  [] Yes:  Barriers to Goal Achievement::  [x] No  [] Yes:  Domestic Concerns:  [x] No  [] Yes:    Treatment Plan:  [x] Therapeutic Exercise   24287  [x] Vasopneumatic cold with compression  73999    [x] Therapeutic Activity  16329 [x] Cold/hotpack    [x] Gait Training   77012 [] Lumbar/Cervical Traction  01897   [x] Neuromuscular Re-education  91394 [] Electrical Stimulation Unattended  38951   [x] Manual Therapy    21334 [] Electrical Stimulation Attended  44153   [] Iontophoresis: 4 mg/mL Dexamethasone Sodium Phosphate  mAmin  37146 []  Medication allergies reviewed for use of   Dexamethasone Sodium Phosphate 4mg/ml with iontophoresis treatments.Pt is not allergic.       Frequency:  2 x/week for 12 visits    Today’s Treatment:  Precautions: WBAT, avoid extreme extension and ER. Hold SLR x 6 weeks  Modalities:  GameReady offered but patient declined.   Exercises:  Access Code: [email or web address]  URL: [email or web address]  Date: 01/25/2024  Prepared by: Giovani Amado    Exercises  - Supine Gluteal Sets  - 1 x daily - 7 x weekly - 3 sets - 10 reps  - Supine Hip Adduction Isometric with Ball  - 1 x daily - 7 x weekly - 3 sets - 10 reps  - Supine Bridge  - 1 x daily - 7 x weekly - 3 sets - 10 reps  - Bent Knee Fallouts  - 1 x daily - 7 x weekly - 3 sets - 10 reps  - Seated Long Arc Quad  - 1 x daily - 7 x weekly - 3 sets - 10 reps  - Standing 3-way Hip with Walker  - 1 x daily - 7 x weekly - 3 sets

## 2024-01-26 ENCOUNTER — CARE COORDINATION (OUTPATIENT)
Dept: OTHER | Facility: CLINIC | Age: 54
End: 2024-01-26

## 2024-01-26 NOTE — CARE COORDINATION
Care Transitions Follow Up Call    [facility] attempted to reach patient for Care Transitions follow up call. HIPAA compliant message left requesting a return phone call at patient convenience.     Plan for follow-up call in 5-7 days    Future Appointments   Date Time Provider Department Center   1/31/2024  1:30 PM Shreyas Amado, PT [facility]   2/2/2024  2:00 PM Christine Lewis, PTA [facility]   2/6/2024 10:45 AM Jessica Sarkar PA-C [facility]   2/22/2024  1:30 PM Andria Ayala MD [facility]   4/9/2024  1:00 PM Shelbi Kelly, APRN - CNP [facility]      Zeina Price MSN, RN   Ambulatory Care Manager  Associate Care Management  Cell [phone]  [email or web address]

## 2024-01-31 ENCOUNTER — HOSPITAL ENCOUNTER (OUTPATIENT)
Dept: PHYSICAL THERAPY | Age: 54
Setting detail: THERAPIES SERIES
Discharge: HOME | End: 2024-01-31
Attending: ORTHOPAEDIC SURGERY
Payer: COMMERCIAL

## 2024-01-31 NOTE — FLOWSHEET NOTE
[x] [facility]  Outpatient Rehabilitation &  Therapy  [address]  [phone]  [phone] [] [facility]  Outpatient Rehabilitation &  Therapy  [address]  P: [phone]  F: [phone] [] [facility]  Outpatient Rehabilitation &  Therapy  [address]  P: [phone]  F: [phone] [] [facility]  Outpatient Rehabilitation &  Therapy  [address]  [phone]  [phone] [] [facility]  Outpatient Rehabilitation &  Therapy  [address]   P: [phone]  F: [phone]  [] [facility]  Outpatient Rehabilitation &  Therapy  [address]  P: [phone]  F: [phone] [] [facility]  Outpatient Rehabilitation &  Therapy  [address]  P: [phone]  F: [phone] [] [facility]  Outpatient Rehabilitation &  Therapy  [address]  P: [phone]  F: [phone] [] [facility]  Outpatient Rehabilitation &  Therapy  [address]  P: [phone]  F: [phone]  [] [facility] Outpatient Rehabilitation &  Therapy  [address]  P: [phone]  F: [phone]     Therapy Cancel/No Show note    Date: 2024  Patient: Kelsey ARMENDARIZ Brayden  : 1970  MRN: [id number]    Cancels/No Shows to date:     For today's appointment patient:    [x]  Cancelled    [] Rescheduled appointment    [] No-show     Reason given by patient:    []  Patient ill    []  Conflicting appointment    [] No transportation      [] Conflict with work    [] No reason given    [] Weather related    [] COVID-19    [] Other:      Comments:   CX LEFT VM SHE IS SICK.       [x] Next appointment was confirmed    Electronically signed by: Shreyas Amado PT

## 2024-02-01 ENCOUNTER — CARE COORDINATION (OUTPATIENT)
Dept: OTHER | Facility: CLINIC | Age: 54
End: 2024-02-01

## 2024-02-01 NOTE — CARE COORDINATION
Care Transitions Follow Up Call    ACM attempted to reach patient for Care Transitions follow up call. HIPAA compliant message left requesting a return phone call at patient convenience.   Lost to followup letter sent to pt via my chart.     Plan for follow-up call in 5-7 days    Future Appointments   Date Time Provider Department Center   2/2/2024  2:00 PM Christine Lewis, MARCY [facility]   2/6/2024 10:45 AM Jessica Sarkar PA-C [facility]   2/22/2024  1:30 PM Andria Ayala MD [facility]   4/9/2024  1:00 PM Shelbi Kelly, APRN - CNP [facility]      Zeina Price MSN, RN   Ambulatory Care Manager  Associate Care Management  Cell [phone]  [email or web address]

## 2024-02-02 ENCOUNTER — HOSPITAL ENCOUNTER (OUTPATIENT)
Dept: PHYSICAL THERAPY | Age: 54
Setting detail: THERAPIES SERIES
Discharge: HOME | End: 2024-02-02
Attending: ORTHOPAEDIC SURGERY
Payer: COMMERCIAL

## 2024-02-02 NOTE — FLOWSHEET NOTE
[x] [facility]  Outpatient Rehabilitation &  Therapy  [address]  [phone]  [phone]     Therapy Cancel/No Show note    Date: 2024  Patient: Kelsey Owen  : 1970  MRN: [id number]    Cancels/No Shows to date:     For today's appointment patient:    [x]  Cancelled    [] Rescheduled appointment    [] No-show     Reason given by patient:    [x]  Patient ill    []  Conflicting appointment    [] No transportation      [] Conflict with work    [] No reason given    [] Weather related    [] COVID-19    [] Other:      Comments:  Patient called stating she is sick. Rescheduled two appts next week.       [x] Next appointment was confirmed    Electronically signed by: Christine Lewis PTA

## 2024-02-06 ENCOUNTER — OFFICE VISIT (OUTPATIENT)
Dept: ORTHOPEDIC SURGERY | Age: 54
End: 2024-02-06

## 2024-02-06 ENCOUNTER — HOSPITAL ENCOUNTER (OUTPATIENT)
Dept: VASCULAR LAB | Age: 54
Discharge: HOME | End: 2024-02-08
Payer: COMMERCIAL

## 2024-02-06 VITALS — WEIGHT: 222 LBS | HEIGHT: 69 IN | BODY MASS INDEX: 32.88 KG/M2

## 2024-02-06 DIAGNOSIS — Z96.642 STATUS POST TOTAL REPLACEMENT OF LEFT HIP: Primary | ICD-10-CM

## 2024-02-06 DIAGNOSIS — M16.12 OSTEOARTHRITIS OF LEFT HIP, UNSPECIFIED OSTEOARTHRITIS TYPE: ICD-10-CM

## 2024-02-06 DIAGNOSIS — Z96.642 STATUS POST TOTAL REPLACEMENT OF LEFT HIP: ICD-10-CM

## 2024-02-06 DIAGNOSIS — M79.662 PAIN OF LEFT CALF: ICD-10-CM

## 2024-02-06 PROCEDURE — 99024 POSTOP FOLLOW-UP VISIT: CPT | Performed by: PHYSICIAN ASSISTANT

## 2024-02-06 PROCEDURE — 93971 EXTREMITY STUDY: CPT

## 2024-02-06 RX ORDER — HYDROCODONE BITARTRATE AND ACETAMINOPHEN 5; 325 MG/1; MG/1
1 TABLET ORAL EVERY 8 HOURS PRN
Qty: 21 TABLET | Refills: 0 | Status: SHIPPED | OUTPATIENT
Start: 2024-02-06 | End: 2024-02-13

## 2024-02-06 ASSESSMENT — ENCOUNTER SYMPTOMS
COUGH: 0
VOMITING: 0
SHORTNESS OF BREATH: 0
COLOR CHANGE: 0

## 2024-02-06 NOTE — PROGRESS NOTES
next 4 weeks for post-operative DVT prophylaxis. If the DVT scan comes back positive we will facilitate care with her PCP to start oral anticoagulation.  The patient will follow-up in 4 weeks with Dr Llanos at our [place] location, or sooner if needed.  The patient was instructed to call the office with any questions or concerns.  The patient voiced their understanding.         Follow up: Return in about 4 weeks (around 3/5/2024) for post-operative follow up.    Orders Placed This Encounter   Medications    HYDROcodone-acetaminophen (NORCO) 5-325 MG per tablet     Sig: Take 1 tablet by mouth every 8 hours as needed for Pain for up to 7 days. Intended supply: 7 days. Take lowest dose possible to manage pain Max Daily Amount: 3 tablets     Dispense:  21 tablet     Refill:  0     Reduce doses taken as pain becomes manageable       Orders Placed This Encounter   Procedures    XR HIP 2-3 VW W PELVIS LEFT     Standing Status:   Future     Number of Occurrences:   1     Standing Expiration Date:   2/2/2025    [facility] Physical Therapy [facility]     Referral Priority:   Routine     Referral Type:   Eval and Treat     Referral Reason:   Specialty Services Required     Requested Specialty:   Physical Therapist     Number of Visits Requested:   1    Vascular duplex lower extremity venous left     Standing Status:   Future     Standing Expiration Date:   2/6/2025       This note is created with the assistance of a speech recognition program.  While intending to generate a document that actually reflects the content of the visit, the document can still have some errors including those of syntax and sound a like substitutions which may escape proof reading.  In such instances, actual meaning can be extrapolated by contextual diversion.       Electronically signed by Jessica Sarkar PA-C on 2/6/2024 at 1:19 PM

## 2024-02-06 NOTE — FLOWSHEET NOTE
[x] [facility]  Outpatient Rehabilitation &  Therapy  [address]  [phone]  [phone] [] [facility]  Outpatient Rehabilitation &  Therapy  [address]  P: [phone]  F: [phone] [] [facility]  Outpatient Rehabilitation &  Therapy  [address]  P: [phone]  F: [phone] [] [facility]  Outpatient Rehabilitation &  Therapy  [address]  [phone]  [phone] [] [facility]  Outpatient Rehabilitation &  Therapy  [address]   P: [phone]  F: [phone]  [] [facility]  Outpatient Rehabilitation &  Therapy  [address]  P: [phone]  F: [phone] [] [facility]  Outpatient Rehabilitation &  Therapy  [address]  P: [phone]  F: [phone] [] [facility]  Outpatient Rehabilitation &  Therapy  [address]  P: [phone]  F: [phone] [] [facility]  Outpatient Rehabilitation &  Therapy  [address]  P: [phone]  F: [phone]  [] [facility] Outpatient Rehabilitation &  Therapy  [address]  P: [phone]  F: [phone]     THERAPY RESPONSIBILITY OF CARE TRANSFER FORM       PATIENT NAME: Kelsey Owen  MRN: [id number]   : 1970      TRANSFERRING FACILITY:    [] [place]   [] [facility]   [] [place]   [] [facility]   [] [facility] [] [place]   [x] [facility]  [] [facility]  [] [facility]   [] [place]  [] [place]   [] Other:          ACCEPTING FACILITY  [] [place]   [] [facility]   [x] [place]   [] [facility]   [] [facility] [] [place]   [] [facility]  [] [facility]  [] [facility]   [] [place]  [] [place]   [] Other:         REASON FOR TRANSFER: Clinic location closer to

## 2024-02-07 ENCOUNTER — APPOINTMENT (OUTPATIENT)
Dept: PHYSICAL THERAPY | Age: 54
End: 2024-02-07
Attending: ORTHOPAEDIC SURGERY
Payer: COMMERCIAL

## 2024-02-07 ENCOUNTER — HOSPITAL ENCOUNTER (OUTPATIENT)
Dept: PHYSICAL THERAPY | Facility: CLINIC | Age: 54
Setting detail: THERAPIES SERIES
Discharge: HOME | End: 2024-02-07
Attending: ORTHOPAEDIC SURGERY
Payer: COMMERCIAL

## 2024-02-07 ENCOUNTER — CARE COORDINATION (OUTPATIENT)
Dept: OTHER | Facility: CLINIC | Age: 54
End: 2024-02-07

## 2024-02-07 LAB — ECHO BSA: 2.21 M2

## 2024-02-07 PROCEDURE — 97016 VASOPNEUMATIC DEVICE THERAPY: CPT

## 2024-02-07 PROCEDURE — 97110 THERAPEUTIC EXERCISES: CPT

## 2024-02-07 NOTE — CARE COORDINATION
Care Transitions     Chart opened in errror.    Follow Up  Future Appointments   Date Time Provider Department Center   2/7/2024 11:00 AM Tiffany Forman, PT [facility]   2/12/2024 10:00 AM Emerita Chavarria, PTA [facility]   2/14/2024 11:00 AM Tiffany Forman, PT [facility]   2/22/2024  1:30 PM Andria Ayala MD [facility]   3/20/2024  1:45 PM Juanpablo Llanos, DO [facility]   4/9/2024  1:00 PM Shelbi Kelly, APRN - CNP [facility]     Zeina Price MSN, RN   Ambulatory Care Manager  Associate Care Management  Cell [phone]  [email or web address]

## 2024-02-07 NOTE — FLOWSHEET NOTE
arrived to physical therapy ambulating with rolling walker with L hip pain rated at 3/10.  Pt reported discussed with ortho regarding ongoing swelling, pain, and stiffness and was told \"therapy can do something about the swelling for you\".  Pt reported compliant with HEP without issue noted.  Pt reported has been taking Tylenol to manage pain.  Pt reported has been using cane to ambulate quad home - stated had R BROOKLYN few years ago therefore know how to use cane.      Objective:  Modalities: GAME READY x MIN pressure x 34 deg to L hip x15' (end of session)  Precautions:  Exercises:  Exercise  LLE unless specify  Reps/ Time Weight/ Level Comments   NuStep  5' Lvl 2 BLE ONLY          Supine:      Iso hip adduction  20x5s Playball     TrA + B alt knee fallout 2x10 ea      TrA + B heel walkout  10x ea      SAQ  20x     Heel slide  20x            Seated:       Marching  20x   Slightly lean back         Standing:      3 ways hip - B 10x ea     Squat - mini 2x10     Simulate step over/push off  20x   Over round small cone  Max cues for proper weight shift to step over limb                                        Other:    Specific Instructions for next treatment::Follow [place] Anterior BROOKLYN protocol below          Treatment Charges: Mins Units   []  Modalities     [x]  Ther Exercise 38 3   []  Manual Therapy     []  Ther Activities     []  Neuro Re-ed     [x]  Vasocompression 15 1   [] Gait 5 --   []  Other     Total Billable time 58 4       5 mins of prepping GAME READY - not billed 2/7      Assessment: [] Progressing toward goals.    [] No change.     [x] Other: 2nd visit since initial evaluation.  Began session with NuStep warm up BLE only to promote venous return and tissue extensibility.  Intermittent cues provided with mat exs to maximize core stabilizers strength with good carryover noted.  Pt performed all exs in slow-controlled movements with report of \"good stretch\" while doing heel slide with strap assist as needed.

## 2024-02-09 ENCOUNTER — APPOINTMENT (OUTPATIENT)
Dept: PHYSICAL THERAPY | Age: 54
End: 2024-02-09
Attending: ORTHOPAEDIC SURGERY
Payer: COMMERCIAL

## 2024-02-12 ENCOUNTER — HOSPITAL ENCOUNTER (OUTPATIENT)
Dept: PHYSICAL THERAPY | Facility: CLINIC | Age: 54
Setting detail: THERAPIES SERIES
Discharge: HOME | End: 2024-02-12
Attending: ORTHOPAEDIC SURGERY
Payer: COMMERCIAL

## 2024-02-12 PROCEDURE — 97016 VASOPNEUMATIC DEVICE THERAPY: CPT

## 2024-02-12 PROCEDURE — 97110 THERAPEUTIC EXERCISES: CPT

## 2024-02-12 NOTE — FLOWSHEET NOTE
[] [facility]  Outpatient Rehabilitation &  Therapy  [address]  [phone]  [phone] [x] [facility]  Outpatient Rehabilitation &  Therapy  [address]  P: [phone]  F: [phone] [] [facility]  Outpatient Rehabilitation &  Therapy  [address]  P: [phone]  F: [phone] [] [facility]  Outpatient Rehabilitation &  Therapy  [address]  [phone]  [phone] [] [facility]  Outpatient Rehabilitation &  Therapy  [address]   P: [phone]  F: [phone]  [] [facility]  Outpatient Rehabilitation &  Therapy  [address]  P: [phone]  F: [phone] [] [facility]  Outpatient Rehabilitation &  Therapy  [address]  P: [phone]  F: [phone] [] [facility]  Outpatient Rehabilitation &  Therapy  [address]  P: [phone]  F: [phone] [] [facility]  Outpatient Rehabilitation &  Therapy  [address]  P: [phone]  F: [phone]  [] [facility] Outpatient Rehabilitation &  Therapy  [address]  P: [phone]  F: [phone]     Physical Therapy Daily Treatment Note    Date:  2024  Patient Name:  Kesley Owen    :  1970  MRN: [id number]  Physician: Juanpablo Llanos DO         Insurance: UMR 30 visits  Medical Diagnosis: Status post surgery (Z98.890)  Osteoarthritis of left hip, unspecified osteoarthritis type (M16.12)     Rehab Codes: M25.552, M25.652, R26.89, R53.1  Onset Date: 24 (DOS)                                Next 's appt: 3/20/24    Visit# / total visits: 3/12     Cancels/No Shows: 2/0      Subjective:    Pain:  [x] Yes  [] No Location: L hip   Pain Rating: (0-10 scale) 3/10  Pain altered Tx:  [x] No  [] Yes  Action:  Comments: Pt

## 2024-02-14 ENCOUNTER — HOSPITAL ENCOUNTER (OUTPATIENT)
Dept: PHYSICAL THERAPY | Facility: CLINIC | Age: 54
Setting detail: THERAPIES SERIES
Discharge: HOME | End: 2024-02-14
Attending: ORTHOPAEDIC SURGERY
Payer: COMMERCIAL

## 2024-02-14 ENCOUNTER — CARE COORDINATION (OUTPATIENT)
Dept: OTHER | Facility: CLINIC | Age: 54
End: 2024-02-14

## 2024-02-14 NOTE — CARE COORDINATION
Ambulatory Care Coordination Note  2024    Patient Current Location:  Home: [address] [place] [address] [place] [address]     [facility] contacted the patient by telephone. Verified name and  with patient as identifiers.      Challenges to be reviewed by the provider   Additional needs identified to be addressed with provider: No  none               Method of communication with provider: none.    ACM: Ileana Price RN    ACM contacted the patient to follow up on progress, discuss new issues or concerns, and reinforce/provide patient education.     Summary Note: Pt contacted by [facility] for follow up care management call. Pt reports she feels like progression post op is 'slower than I want it to be.' Educated pt on slow progression and importance of attending post surgery PT sessions and continuing to do home therapy exercises. Pt states she completes home PT exercises twice daily and states she is scheduled for outpt PT on Mon/Wed.   Pt reports current pain level 4/10 and states gets up to 7/10 after PT sessions. Pt reports low back pain as well ;educated on importance of not crossing legs and proper posture when sitting and with ambulation. Pt states has been taking 800mg of ibuprofen with relief and states last took norco on 24 after PT. Patient educated to refrain from exceeding 3200 mg of ibuprofen in 24 hour period. Educated pt to take medication as directed, taking lowest dose possible to achieve pain relief, and to take before pain is at level 8/10. Educated to refrain from driving while taking narcotics. Patient verbalized understanding.  Pt reports provider removed incision site dressing on 24; states glue is starting to fall off; states still putting gauze dressing on left leg crease. Patient denies any s/s of infection.  Patient instructed on s/s of infection to report including but not limited to fever, chills, foul smelling drainage, increased warmth, redness or hardness around incision.  Patient instructed

## 2024-02-14 NOTE — FLOWSHEET NOTE
[] [facility]  Outpatient Rehabilitation &  Therapy  [address]  [phone]  [phone] [x] [facility]  Outpatient Rehabilitation &  Therapy  [address]  P: [phone]  F: [phone] [] [facility]  Outpatient Rehabilitation &  Therapy  [address]  P: [phone]  F: [phone] [] [facility]  Outpatient Rehabilitation &  Therapy  [address]  [phone]  [phone] [] [facility]  Outpatient Rehabilitation &  Therapy  [address]   P: [phone]  F: [phone]  [] [facility]  Outpatient Rehabilitation &  Therapy  [address]  P: [phone]  F: [phone] [] [facility]  Outpatient Rehabilitation &  Therapy  [address]  P: [phone]  F: [phone] [] [facility]  Outpatient Rehabilitation &  Therapy  [address]  P: [phone]  F: [phone] [] [facility]  Outpatient Rehabilitation &  Therapy  [address]  P: [phone]  F: [phone]  [] [facility] Outpatient Rehabilitation &  Therapy  [address]  P: [phone]  F: [phone]     Therapy Cancel/No Show note    Date: 2024  Patient: Kelsey Owen  : 1970  MRN: [id number]    Cancels/No Shows to date: 30    For today's appointment patient:    [x]  Cancelled    [] Rescheduled appointment    [] No-show     Reason given by patient:    [x]  Patient ill    []  Conflicting appointment    [] No transportation      [] Conflict with work    [] No reason given    [] Weather related    [] COVID-19    [] Other:      Comments:        [x] Next appointment was confirmed    Electronically signed by: Tiffany Forman PT

## 2024-02-19 ENCOUNTER — HOSPITAL ENCOUNTER (OUTPATIENT)
Dept: PHYSICAL THERAPY | Facility: CLINIC | Age: 54
Setting detail: THERAPIES SERIES
Discharge: HOME | End: 2024-02-19
Attending: ORTHOPAEDIC SURGERY
Payer: COMMERCIAL

## 2024-02-19 PROCEDURE — 97016 VASOPNEUMATIC DEVICE THERAPY: CPT

## 2024-02-19 PROCEDURE — 97110 THERAPEUTIC EXERCISES: CPT

## 2024-02-19 NOTE — FLOWSHEET NOTE
[] [facility]  Outpatient Rehabilitation &  Therapy  [address]  [phone]  [phone] [x] [facility]  Outpatient Rehabilitation &  Therapy  [address]  P: [phone]  F: [phone] [] [facility]  Outpatient Rehabilitation &  Therapy  [address]  P: [phone]  F: [phone] [] [facility]  Outpatient Rehabilitation &  Therapy  [address]  [phone]  [phone] [] [facility]  Outpatient Rehabilitation &  Therapy  [address]   P: [phone]  F: [phone]  [] [facility]  Outpatient Rehabilitation &  Therapy  [address]  P: [phone]  F: [phone] [] [facility]  Outpatient Rehabilitation &  Therapy  [address]  P: [phone]  F: [phone] [] [facility]  Outpatient Rehabilitation &  Therapy  [address]  P: [phone]  F: [phone] [] [facility]  Outpatient Rehabilitation &  Therapy  [address]  P: [phone]  F: [phone]  [] [facility] Outpatient Rehabilitation &  Therapy  [address]  P: [phone]  F: [phone]     Physical Therapy Daily Treatment Note    Date:  2024  Patient Name:  Kelsey Owen    :  1970  MRN: [id number]  Physician: Juanpablo Llanos DO         Insurance: UMR 30 visits  Medical Diagnosis: Status post surgery (Z98.890)  Osteoarthritis of left hip, unspecified osteoarthritis type (M16.12)     Rehab Codes: M25.552, M25.652, R26.89, R53.1  Onset Date: 24 (DOS)                                Next 's appt: 3/20/24    Visit# / total visits:      Cancels/No Shows: 3/0      Subjective:    Pain:  [x] Yes  [] No Location: L hip   Pain Rating: (0-10 scale) 6/10  Pain altered Tx:  [x] No  [] Yes  Action:  Comments: Pt

## 2024-02-21 ENCOUNTER — HOSPITAL ENCOUNTER (OUTPATIENT)
Dept: PHYSICAL THERAPY | Facility: CLINIC | Age: 54
Setting detail: THERAPIES SERIES
Discharge: HOME | End: 2024-02-21
Attending: ORTHOPAEDIC SURGERY
Payer: COMMERCIAL

## 2024-02-21 PROCEDURE — 97112 NEUROMUSCULAR REEDUCATION: CPT

## 2024-02-21 PROCEDURE — 97110 THERAPEUTIC EXERCISES: CPT

## 2024-02-21 NOTE — FLOWSHEET NOTE
arrived to physical therapy 8 mins late but able to accommodate.  Pt with c/o L hip pain radiating to femur area rated 4/10.  Pt reported feeling better than last therapy session.          Objective:  Modalities: GAME READY x MIN pressure x 34 deg to L hip x10' (end of session) - not today   Precautions:  Exercises: bold = completed 2/21/2024   Exercise  LLE unless specify  Reps/ Time Weight/ Level Comments   NuStep  5' Lvl 2 BLE ONLY          Supine:      Iso hip adduction  20x5s Playball     TrA + B alt knee fallout 2x10 ea      TrA + B heel walkout  10x ea      SAQ  20x 2# Wt added 2/12   Heel slide  20x   With min range hip flexion end range 2/12   Bridges + ball add 2x10  New 2/12; added playball 2/19   Supine hip abd 2x10  With slideboard new 2/12; blue 2/19    Glute sets 15x5\"     Hip abd 20 Blue  Hooklying 2/12; inc resistance 2/19          SL hip abduction 2x10 A New 2/19          Seated:       Marching  20x  2# Slightly lean back   LAQ 20x5\" 2#          Standing:      3 ways hip - B 2x10 Lime  (Ext ROM half range 2/12); added band 2/19    Squat - TRX 2x10  Progressed to TRX squat to improve weight distribution 2/19    Simulate step over/push off  20x   Over round small cone  Max cues for proper weight shift to step over limb    Heel/toe raises 20  New 2/12 March 20 ea Lime  New 2/12; added resistance 2/21    Side stepping  30ft ea  Lime above thigh  New 2/19; added band 2/21   Monster walk   Fwd/retro  30ft ea  Lime above thigh  New 2/19; added band 2/21    Calf stretch  2x30s  Slantboard  New 2/19    HF stretch  2x30s      Step up fwd/lat  2x10 6' New 2/21         Gait 4 laps  Cane height adjusted 2/12; no cane & inc distances 2/19          Balance       NBOS eyes close  30s   New 2/21-too easy   NBOS eyes close on foam  2x30s   New 2/21    Tandem stance  30s   New 2/21-too easy    Tandem stance on foam  2x30s ea   New 2/21    Other:    Specific Instructions for next treatment::Follow Llanos Anterior  Unknown if ever smoked

## 2024-02-26 ENCOUNTER — HOSPITAL ENCOUNTER (OUTPATIENT)
Dept: PHYSICAL THERAPY | Facility: CLINIC | Age: 54
Setting detail: THERAPIES SERIES
Discharge: HOME | End: 2024-02-26
Attending: ORTHOPAEDIC SURGERY
Payer: COMMERCIAL

## 2024-02-26 PROCEDURE — 97110 THERAPEUTIC EXERCISES: CPT

## 2024-02-26 PROCEDURE — 97016 VASOPNEUMATIC DEVICE THERAPY: CPT

## 2024-02-26 NOTE — FLOWSHEET NOTE
[] [facility]  Outpatient Rehabilitation &  Therapy  [address]  [phone]  [phone] [x] [facility]  Outpatient Rehabilitation &  Therapy  [address]  P: [phone]  F: [phone] [] [facility]  Outpatient Rehabilitation &  Therapy  [address]  P: [phone]  F: [phone] [] [facility]  Outpatient Rehabilitation &  Therapy  [address]  [phone]  [phone] [] [facility]  Outpatient Rehabilitation &  Therapy  [address]   P: [phone]  F: [phone]  [] [facility]  Outpatient Rehabilitation &  Therapy  [address]  P: [phone]  F: [phone] [] [facility]  Outpatient Rehabilitation &  Therapy  [address]  P: [phone]  F: [phone] [] [facility]  Outpatient Rehabilitation &  Therapy  [address]  P: [phone]  F: [phone] [] [facility]  Outpatient Rehabilitation &  Therapy  [address]  P: [phone]  F: [phone]  [] [facility] Outpatient Rehabilitation &  Therapy  [address]  P: [phone]  F: [phone]     Physical Therapy Daily Treatment Note    Date:  2024  Patient Name:  Kelsey Owen    :  1970  MRN: [id number]  Physician: Juanpablo Llanos DO         Insurance: UMR 30 visits  Medical Diagnosis: Status post surgery (Z98.890)  Osteoarthritis of left hip, unspecified osteoarthritis type (M16.12)     Rehab Codes: M25.552, M25.652, R26.89, R53.1  Onset Date: 24 (DOS)                                Next 's appt: 3/20/24    Visit# / total visits:      Cancels/No Shows: 3/0      Subjective:    Pain:  [x] Yes  [] No Location: L hip   Pain Rating: (0-10 scale) 6/10  Pain altered Tx:  [x] No  [] Yes  Action:  Comments: Pt

## 2024-02-28 ENCOUNTER — HOSPITAL ENCOUNTER (OUTPATIENT)
Dept: PHYSICAL THERAPY | Facility: CLINIC | Age: 54
Setting detail: THERAPIES SERIES
Discharge: HOME | End: 2024-02-28
Attending: ORTHOPAEDIC SURGERY
Payer: COMMERCIAL

## 2024-02-28 NOTE — FLOWSHEET NOTE
[] [facility]  Outpatient Rehabilitation &  Therapy  [address]  [phone]  [phone] [x] [facility]  Outpatient Rehabilitation &  Therapy  [address]  P: [phone]  F: [phone] [] [facility]  Outpatient Rehabilitation &  Therapy  [address]  P: [phone]  F: [phone] [] [facility]  Outpatient Rehabilitation &  Therapy  [address]  [phone]  [phone] [] [facility]  Outpatient Rehabilitation &  Therapy  [address]   P: [phone]  F: [phone]  [] [facility]  Outpatient Rehabilitation &  Therapy  [address]  P: [phone]  F: [phone] [] [facility]  Outpatient Rehabilitation &  Therapy  [address]  P: [phone]  F: [phone] [] [facility]  Outpatient Rehabilitation &  Therapy  [address]  P: [phone]  F: [phone] [] [facility]  Outpatient Rehabilitation &  Therapy  [address]  P: [phone]  F: [phone]  [] [facility] Outpatient Rehabilitation &  Therapy  [address]  P: [phone]  F: [phone]     THERAPY RESPONSIBILITY OF CARE TRANSFER FORM       PATIENT NAME: Kelsey Owen  MRN: [id number]   : 1970      TRANSFERRING FACILITY:    [] [place]   [] [facility]   [x] [place]   [] [place] [facility]   [] [facility] [] [place]   [] [facility]  [] [place] [facility]  [] [facility]   [] [place]  [] [place]   [] Other:          ACCEPTING FACILITY  [] [place]   [] [facility]   [x] [place]   [] [facility] [facility]   [] [facility] [] [place]   [] [facility]  [] [facility]  [] [facility]   [] [place]  [] [place]   [] Other:         REASON FOR TRANSFER: Primary PT transferring to

## 2024-02-28 NOTE — FLOWSHEET NOTE
[] [facility]  Outpatient Rehabilitation &  Therapy  [address]  [phone]  [phone] [x] [facility]  Outpatient Rehabilitation &  Therapy  [address]  P: [phone]  F: [phone] [] [facility]  Outpatient Rehabilitation &  Therapy  [address]  P: [phone]  F: [phone] [] [facility]  Outpatient Rehabilitation &  Therapy  [address]  [phone]  [phone] [] [facility]  Outpatient Rehabilitation &  Therapy  [address]   P: [phone]  F: [phone]  [] [facility]  Outpatient Rehabilitation &  Therapy  [address]  P: [phone]  F: [phone] [] [facility]  Outpatient Rehabilitation &  Therapy  [address]  P: [phone]  F: [phone] [] [facility]  Outpatient Rehabilitation &  Therapy  [address]  P: [phone]  F: [phone] [] [facility]  Outpatient Rehabilitation &  Therapy  [address]  P: [phone]  F: [phone]  [] [facility] Outpatient Rehabilitation &  Therapy  [address]  P: [phone]  F: [phone]     Therapy Cancel/No Show note    Date: 2024  Patient: Kelsey Owen  : 1970  MRN: [id number]    Cancels/No Shows to date: 0    For today's appointment patient:    [x]  Cancelled    [] Rescheduled appointment    [] No-show     Reason given by patient:    [x]  Patient ill    []  Conflicting appointment    [] No transportation      [] Conflict with work    [] No reason given    [] Weather related    [] COVID-19    [] Other:      Comments:        [x] Next appointment was confirmed      Electronically signed by: Tiffany Forman PT

## 2024-03-04 ENCOUNTER — TELEPHONE (OUTPATIENT)
Dept: ORTHOPEDIC SURGERY | Age: 54
End: 2024-03-04

## 2024-03-04 DIAGNOSIS — M79.652 LEFT THIGH PAIN: ICD-10-CM

## 2024-03-04 DIAGNOSIS — Z96.642 STATUS POST TOTAL REPLACEMENT OF LEFT HIP: Primary | ICD-10-CM

## 2024-03-04 DIAGNOSIS — E55.9 VITAMIN D DEFICIENCY: ICD-10-CM

## 2024-03-04 RX ORDER — HYDROCODONE BITARTRATE AND ACETAMINOPHEN 5; 325 MG/1; MG/1
1 TABLET ORAL EVERY 6 HOURS PRN
Qty: 12 TABLET | Refills: 0 | Status: SHIPPED | OUTPATIENT
Start: 2024-03-04 | End: 2024-03-07

## 2024-03-04 NOTE — TELEPHONE ENCOUNTER
----- Message from Kelsey Owen sent at 3/4/2024  1:39 PM EST -----  Regarding: Pain  Contact: [phone]  I am concerned about the amount of pain I am still in. The pain feels like it is the femur and knee when I do anything even walking, and 800mg of ibuprofen doesn’t even help much. By this point last time I was just building strength up but had little to no pain. Something just doesn’t seem right. Any thoughts of what I can do?     Thank you,     Kelsey Owen

## 2024-03-04 NOTE — TELEPHONE ENCOUNTER
I called the patient and was able to move up her appointment to Thurs 3/7/2024 with Dr Juanpablo Llanos DO. Patient had L BROOKLYN on 1/22/2024 and is currently 6 weeks post-op.    She notes that she is having discomfort into the left thigh that is present when she bears weight on the left lower extremity. She notes that the pain is \"dulled\" with Ibuprofen 800mg. She denies recent trauma or falls. She notes the pain does not seems to be improving and was not present after her R BROOKLYN in 2022.    A prescription for Norco 5-325mg, 1 tab every 6 hours as needed for pain x 3 days was prescribed today. She was encouraged to call our office if the pain increases prior to her appointment with Dr Llanos on 3/7/2024 in [place]. She noted her understanding.

## 2024-03-05 RX ORDER — IBUPROFEN 800 MG/1
800 TABLET ORAL 2 TIMES DAILY PRN
Qty: 60 TABLET | Refills: 0 | Status: SHIPPED | OUTPATIENT
Start: 2024-03-05

## 2024-03-05 RX ORDER — ERGOCALCIFEROL 1.25 MG/1
50000 CAPSULE ORAL
Qty: 8 CAPSULE | Refills: 2 | Status: SHIPPED | OUTPATIENT
Start: 2024-03-07 | End: 2024-05-30

## 2024-03-07 ENCOUNTER — HOSPITAL ENCOUNTER (OUTPATIENT)
Dept: PHYSICAL THERAPY | Facility: CLINIC | Age: 54
Setting detail: THERAPIES SERIES
Discharge: HOME | End: 2024-03-07
Attending: ORTHOPAEDIC SURGERY
Payer: COMMERCIAL

## 2024-03-07 ENCOUNTER — OFFICE VISIT (OUTPATIENT)
Dept: ORTHOPEDIC SURGERY | Age: 54
End: 2024-03-07

## 2024-03-07 VITALS — BODY MASS INDEX: 35.55 KG/M2 | WEIGHT: 240 LBS | HEIGHT: 69 IN | RESPIRATION RATE: 14 BRPM

## 2024-03-07 DIAGNOSIS — Z96.642 STATUS POST TOTAL REPLACEMENT OF LEFT HIP: Primary | ICD-10-CM

## 2024-03-07 DIAGNOSIS — M79.652 LEFT THIGH PAIN: ICD-10-CM

## 2024-03-07 PROCEDURE — 97110 THERAPEUTIC EXERCISES: CPT

## 2024-03-07 PROCEDURE — 97016 VASOPNEUMATIC DEVICE THERAPY: CPT

## 2024-03-07 PROCEDURE — 99024 POSTOP FOLLOW-UP VISIT: CPT | Performed by: ORTHOPAEDIC SURGERY

## 2024-03-07 ASSESSMENT — ENCOUNTER SYMPTOMS
ABDOMINAL PAIN: 0
SHORTNESS OF BREATH: 0
ROS SKIN COMMENTS: NEGATIVE FOR RASH
EYE DISCHARGE: 0

## 2024-03-07 NOTE — FLOWSHEET NOTE
[] [facility]  Outpatient Rehabilitation &  Therapy  [address]  [phone]  [phone] [x] [facility]  Outpatient Rehabilitation &  Therapy  [address]  P: [phone]  F: [phone] [] [facility]  Outpatient Rehabilitation &  Therapy  [address]  P: [phone]  F: [phone] [] [facility]  Outpatient Rehabilitation &  Therapy  [address]  [phone]  [phone] [] [facility]  Outpatient Rehabilitation &  Therapy  [address]   P: [phone]  F: [phone]  [] [facility]  Outpatient Rehabilitation &  Therapy  [address]  P: [phone]  F: [phone] [] [facility]  Outpatient Rehabilitation &  Therapy  [address]  P: [phone]  F: [phone] [] [facility]  Outpatient Rehabilitation &  Therapy  [address]  P: [phone]  F: [phone] [] [facility]  Outpatient Rehabilitation &  Therapy  [address]  P: [phone]  F: [phone]  [] [facility] Outpatient Rehabilitation &  Therapy  [address]  P: [phone]  F: [phone]     Physical Therapy Daily Treatment Note    Date:  3/7/2024  Patient Name:  Kelsey Owen    :  1970  MRN: [id number]  Physician: Juanpablo Llanos DO         Insurance: UMR 30 visits  Medical Diagnosis: Status post surgery (Z98.890)  Osteoarthritis of left hip, unspecified osteoarthritis type (M16.12)     Rehab Codes: M25.552, M25.652, R26.89, R53.1  Onset Date: 1/22/24 (DOS)                                Next 's appt: TBD after CT    Visit# / total visits:      Cancels/No Shows:       Subjective:    Pain:  [x] Yes  [] No Location: L hip   Pain Rating: (0-10 scale) 510  Pain altered Tx:  [] No  [x] Yes  Action: Held standing

## 2024-03-07 NOTE — PROGRESS NOTES
[facility] ORTHOPEDICS AND SPORTS MEDICINE  [address]  [address] [place], [place] [address]  Dept: [phone]  Dept Fax: [phone]        Postoperative follow-up note    Subjective:   Kelsey Owen is a 53 y.o. year old female who presents to our office today for postoperative followup regarding her   1. Status post total replacement of left hip    2. Left thigh pain    .    Chief Complaint   Patient presents with    Follow-up     Left BROOKLYN. DOS 1/22/24     Kelsey Owen is a 53-year-old female who presents the office today for recheck status post left total hip arthroplasty on 1/22/2024.  She has persistent left thigh pain seems to be worsening.  She has pain when she ambulates especially with weightbearing to the left lower extremity.  She did not have this with recovery of her right total hip arthroplasty previously.  She has had no falls or injuries.  She denies fevers, chills, nausea, vomiting, diarrhea.    Review of Systems   Constitutional:  Positive for activity change. Negative for fever.   HENT:  Negative for dental problem.    Eyes:  Negative for discharge.   Respiratory:  Negative for shortness of breath.    Cardiovascular:  Negative for chest pain.   Gastrointestinal:  Negative for abdominal pain.   Genitourinary: Negative.    Musculoskeletal:  Positive for arthralgias.   Skin:         Negative for rash   Neurological:  Positive for weakness.   Psychiatric/Behavioral:  Negative for confusion.        I have reviewed the CC, HPI, ROS, PMH, FHX, Social History, and if not present in this note, I have reviewed in the patient's chart.   I agree with the documentation provided by other staff and have reviewed their documentation prior to providing my signature indicating agreement.    Objective :   General: Kelsey Owen is a 53 y.o. female who is alert and oriented and sitting comfortably in our office.  Ortho Exam  MS:

## 2024-03-08 ENCOUNTER — CARE COORDINATION (OUTPATIENT)
Dept: OTHER | Facility: CLINIC | Age: 54
End: 2024-03-08

## 2024-03-08 RX ORDER — HYDROCODONE BITARTRATE AND ACETAMINOPHEN 5; 325 MG/1; MG/1
1 TABLET ORAL EVERY 6 HOURS PRN
COMMUNITY

## 2024-03-08 SDOH — ECONOMIC STABILITY: TRANSPORTATION INSECURITY
IN THE PAST 12 MONTHS, HAS LACK OF TRANSPORTATION KEPT YOU FROM MEETINGS, WORK, OR FROM GETTING THINGS NEEDED FOR DAILY LIVING?: NO

## 2024-03-08 SDOH — ECONOMIC STABILITY: TRANSPORTATION INSECURITY
IN THE PAST 12 MONTHS, HAS THE LACK OF TRANSPORTATION KEPT YOU FROM MEDICAL APPOINTMENTS OR FROM GETTING MEDICATIONS?: NO

## 2024-03-08 SDOH — HEALTH STABILITY: PHYSICAL HEALTH: ON AVERAGE, HOW MANY DAYS PER WEEK DO YOU ENGAGE IN MODERATE TO STRENUOUS EXERCISE (LIKE A BRISK WALK)?: 2 DAYS

## 2024-03-08 SDOH — ECONOMIC STABILITY: FOOD INSECURITY: WITHIN THE PAST 12 MONTHS, YOU WORRIED THAT YOUR FOOD WOULD RUN OUT BEFORE YOU GOT MONEY TO BUY MORE.: NEVER TRUE

## 2024-03-08 SDOH — ECONOMIC STABILITY: HOUSING INSECURITY: IN THE LAST 12 MONTHS, HOW MANY PLACES HAVE YOU LIVED?: 1

## 2024-03-08 SDOH — HEALTH STABILITY: PHYSICAL HEALTH: ON AVERAGE, HOW MANY MINUTES DO YOU ENGAGE IN EXERCISE AT THIS LEVEL?: 30 MIN

## 2024-03-08 SDOH — ECONOMIC STABILITY: INCOME INSECURITY: IN THE LAST 12 MONTHS, WAS THERE A TIME WHEN YOU WERE NOT ABLE TO PAY THE MORTGAGE OR RENT ON TIME?: NO

## 2024-03-08 SDOH — ECONOMIC STABILITY: FOOD INSECURITY: WITHIN THE PAST 12 MONTHS, THE FOOD YOU BOUGHT JUST DIDN'T LAST AND YOU DIDN'T HAVE MONEY TO GET MORE.: NEVER TRUE

## 2024-03-08 ASSESSMENT — SOCIAL DETERMINANTS OF HEALTH (SDOH)
DO YOU BELONG TO ANY CLUBS OR ORGANIZATIONS SUCH AS CHURCH GROUPS UNIONS, FRATERNAL OR ATHLETIC GROUPS, OR SCHOOL GROUPS?: NO
IN A TYPICAL WEEK, HOW MANY TIMES DO YOU TALK ON THE PHONE WITH FAMILY, FRIENDS, OR NEIGHBORS?: TWICE A WEEK
HOW OFTEN DO YOU GET TOGETHER WITH FRIENDS OR RELATIVES?: MORE THAN THREE TIMES A WEEK
HOW OFTEN DO YOU ATTEND CHURCH OR RELIGIOUS SERVICES?: MORE THAN 4 TIMES PER YEAR
HOW OFTEN DO YOU ATTENT MEETINGS OF THE CLUB OR ORGANIZATION YOU BELONG TO?: NEVER
HOW HARD IS IT FOR YOU TO PAY FOR THE VERY BASICS LIKE FOOD, HOUSING, MEDICAL CARE, AND HEATING?: HARD

## 2024-03-08 ASSESSMENT — LIFESTYLE VARIABLES
HOW MANY STANDARD DRINKS CONTAINING ALCOHOL DO YOU HAVE ON A TYPICAL DAY: 1 OR 2
HOW OFTEN DO YOU HAVE A DRINK CONTAINING ALCOHOL: MONTHLY OR LESS

## 2024-03-08 NOTE — CARE COORDINATION
Ambulatory Care Coordination Note  3/8/2024    Patient Current Location:  Home: [address] [place] [address] [place] [address]     ACM contacted the patient by telephone. Verified name and  with patient as identifiers.     Challenges to be reviewed by the provider   Additional needs identified to be addressed with provider: No  none               Method of communication with provider: none.    ACM: Ileana Price RN    ACM contacted the patient to follow up on progress, discuss new issues or concerns, and reinforce/provide patient education.     Summary Note: ACM spoke with pt for follow up care management call.  Pt states she continues to have a lot of pain in left femur and down into left knee; rates pain 2/10 at rest and 7-8/10 with movement. Pt states she is taking norco twice daily, morning and night and ibuprofen twice daily in between norco. Patient educated to refrain from exceeding 3200 mg of ibuprofen in 24 hour period. Educated pt to take medication as directed, taking lowest dose possible to achieve pain relief, and to take before pain is at level 8/10. Educated to refrain from driving while taking narcotics. Patient verbalized understanding. Pt states she had appt with zita Saunders, on 3//24, who has ordered CT, scheduled for 3/11/24. Pt states she continues to go to outpt PT , who has modified home exercises and is doing home exercise twice daily.   Pt reports left hip incision clean/dry/intact. Patient denies any s/s of infection.  Patient instructed on s/s of infection to report including but not limited to fever, chills, foul smelling drainage, increased warmth, redness or hardness around incision.  Patient instructed on high protein diet, Vitamin C and hydration for incision healing.   Pt denies any nausea/vomiting or constipation/diarrhea; pt states she takes magnesium daily to help with bowel movements; reports last BM this morning which was soft and formed.   Pt reports she recently had Influenza A;

## 2024-03-11 ENCOUNTER — HOSPITAL ENCOUNTER (OUTPATIENT)
Dept: PHYSICAL THERAPY | Facility: CLINIC | Age: 54
Setting detail: THERAPIES SERIES
Discharge: HOME | End: 2024-03-11
Attending: ORTHOPAEDIC SURGERY
Payer: COMMERCIAL

## 2024-03-11 ENCOUNTER — HOSPITAL ENCOUNTER (OUTPATIENT)
Dept: CT IMAGING | Age: 54
Discharge: HOME | End: 2024-03-13
Attending: ORTHOPAEDIC SURGERY
Payer: COMMERCIAL

## 2024-03-11 DIAGNOSIS — Z96.642 STATUS POST TOTAL REPLACEMENT OF LEFT HIP: ICD-10-CM

## 2024-03-11 DIAGNOSIS — M79.652 LEFT THIGH PAIN: ICD-10-CM

## 2024-03-11 PROCEDURE — 73700 CT LOWER EXTREMITY W/O DYE: CPT

## 2024-03-11 NOTE — FLOWSHEET NOTE
[] [facility]  Outpatient Rehabilitation &  Therapy  [address]  [phone]  [phone] [] [facility]  Outpatient Rehabilitation &  Therapy  [address]  P: [phone]  F: [phone] [] [facility]  Outpatient Rehabilitation &  Therapy  [address]  P: [phone]  F: [phone] [] [facility]  Outpatient Rehabilitation &  Therapy  [address]  [phone]  [phone] [] [facility]  Outpatient Rehabilitation &  Therapy  [address]   P: [phone]  F: [phone]  [] [facility]  Outpatient Rehabilitation &  Therapy  [address]  P: [phone]  F: [phone] [] [facility]  Outpatient Rehabilitation &  Therapy  [address]  P: [phone]  F: [phone] [] [facility]  Outpatient Rehabilitation &  Therapy  [address]  P: [phone]  F: [phone] [] [facility]  Outpatient Rehabilitation &  Therapy  [address]  P: [phone]  F: [phone]  [] [facility] Outpatient Rehabilitation &  Therapy  [address]  P: [phone]  F: [phone]     Therapy Cancel/No Show note    Date: 3/11/2024  Patient: Kelsey Owen  : 1970  MRN: [id number]    Cancels/No Shows to date:     For today's appointment patient:    [x]  Cancelled    [] Rescheduled appointment    [] No-show     Reason given by patient:    []  Patient ill    []  Conflicting appointment    [] No transportation      [] Conflict with work    [x] No reason given    [] Weather related    [] COVID-19    [] Other:      Comments:        [x] Next appointment was confirmed    Electronically signed by: Levi Jiménez PT

## 2024-03-19 ENCOUNTER — OFFICE VISIT (OUTPATIENT)
Dept: ORTHOPEDIC SURGERY | Age: 54
End: 2024-03-19
Payer: COMMERCIAL

## 2024-03-19 VITALS — RESPIRATION RATE: 14 BRPM | WEIGHT: 240 LBS | HEIGHT: 69 IN | BODY MASS INDEX: 35.55 KG/M2

## 2024-03-19 DIAGNOSIS — M76.50 PATELLAR TENDINOSIS: ICD-10-CM

## 2024-03-19 DIAGNOSIS — Z96.642 STATUS POST TOTAL REPLACEMENT OF LEFT HIP: Primary | ICD-10-CM

## 2024-03-19 DIAGNOSIS — M79.652 LEFT THIGH PAIN: ICD-10-CM

## 2024-03-19 DIAGNOSIS — M51.36 LUMBAR DEGENERATIVE DISC DISEASE: ICD-10-CM

## 2024-03-19 DIAGNOSIS — M25.562 LEFT KNEE PAIN, UNSPECIFIED CHRONICITY: ICD-10-CM

## 2024-03-19 PROCEDURE — 99024 POSTOP FOLLOW-UP VISIT: CPT | Performed by: PHYSICIAN ASSISTANT

## 2024-03-19 PROCEDURE — 20610 DRAIN/INJ JOINT/BURSA W/O US: CPT | Performed by: PHYSICIAN ASSISTANT

## 2024-03-19 RX ORDER — METHYLPREDNISOLONE ACETATE 80 MG/ML
80 INJECTION, SUSPENSION INTRA-ARTICULAR; INTRALESIONAL; INTRAMUSCULAR; SOFT TISSUE ONCE
Status: COMPLETED | OUTPATIENT
Start: 2024-03-19 | End: 2024-03-19

## 2024-03-19 RX ORDER — BUPIVACAINE HYDROCHLORIDE 2.5 MG/ML
2 INJECTION, SOLUTION INFILTRATION; PERINEURAL ONCE
Status: COMPLETED | OUTPATIENT
Start: 2024-03-19 | End: 2024-03-19

## 2024-03-19 RX ADMIN — BUPIVACAINE HYDROCHLORIDE 5 MG: 2.5 INJECTION, SOLUTION INFILTRATION; PERINEURAL at 13:09

## 2024-03-19 RX ADMIN — METHYLPREDNISOLONE ACETATE 80 MG: 80 INJECTION, SUSPENSION INTRA-ARTICULAR; INTRALESIONAL; INTRAMUSCULAR; SOFT TISSUE at 13:09

## 2024-03-19 NOTE — PROGRESS NOTES
patient recently had a CT scan of the left hip and we reviewed the results today in office. The patient's left BROOKLYN components are in good position without signs of loosening appreciated but she was found to have a suspected hematoma on the anterior aspect of her hip.    I discussed with the patient that the suspected hematoma will likely resolve over time and could take months to full resolve on its own, OR we can have interventional radiology aspirate the area and send it for pathology. She noted she was not interested in having the area aspirated and will let to continue to improve on its own.    We also discussed that her thigh pain could be continued post-operative discomfort, but also could be stemming from her back. She did have a MRI of the lumbar spine in November 2023 and had some Lumbar degenerative changes but has not been evaluated by a spine specialist. Therefore a referral to Neurosurgery was placed for the patient, and they can help distinguish if her thigh pain is stemming from her back.       In regards to the left knee pain, it seems to be stemming from patellar tendonitis/tendinosis and degenerative changes within the knee which could be exacerbated from her altered gait post-operatively. She requested a corticosteroid injection for the left knee and has never had one in the past - therefore she was given a left knee corticosteroid injection in the left knee today in office. She tolerated the injection without complication.    She is to continue water therapy on her own and continue exercises as given from PT.  The patient is to follow-up in 4 weeks with Dr Llanos for further evaluation.  At this appointment she will discuss returning to work - current RTW date is scheduled for 4/22/2024. The patient was instructed to call our office with any questions or concerns prior to the next appointment.  The patient noted her understanding.          Follow up: Return in about 4 weeks (around 4/16/2024) for

## 2024-03-20 ASSESSMENT — ENCOUNTER SYMPTOMS
COLOR CHANGE: 0
SHORTNESS OF BREATH: 0
COUGH: 0

## 2024-03-22 DIAGNOSIS — J45.20 MILD INTERMITTENT ASTHMA WITHOUT COMPLICATION: ICD-10-CM

## 2024-03-25 RX ORDER — ALBUTEROL SULFATE 90 UG/1
2 AEROSOL, METERED RESPIRATORY (INHALATION) EVERY 4 HOURS PRN
Qty: 18 G | Refills: 2 | Status: SHIPPED | OUTPATIENT
Start: 2024-03-25

## 2024-03-27 ENCOUNTER — HOSPITAL ENCOUNTER (OUTPATIENT)
Dept: MAMMOGRAPHY | Age: 54
Discharge: HOME | End: 2024-03-29
Payer: COMMERCIAL

## 2024-03-27 DIAGNOSIS — Z12.31 ENCOUNTER FOR SCREENING MAMMOGRAM FOR BREAST CANCER: ICD-10-CM

## 2024-03-27 PROCEDURE — 77063 BREAST TOMOSYNTHESIS BI: CPT

## 2024-03-28 ENCOUNTER — CARE COORDINATION (OUTPATIENT)
Dept: OTHER | Facility: CLINIC | Age: 54
End: 2024-03-28

## 2024-03-28 NOTE — CARE COORDINATION
Ambulatory Care Coordination Note    ACM attempted to reach patient for care management follow up call regarding let hip/knee pain post arthroplasty. HIPAA compliant message left requesting a return phone call at patient convenience.     Plan for follow-up call in 10-14 days    Future Appointments   Date Time Provider Department Center   4/2/2024 11:00 AM Court Allen, TAMARA Estrella CNP [facility]   4/9/2024  1:00 PM Shelbi Kelly APRN - CNP [facility]   4/11/2024 10:30 AM Juanpablo Llanos DO [facility]   4/18/2024  2:00 PM Andria Ayala MD [facility]   5/2/2024 11:30 AM Izzy Lundberg APRN - CNP [facility]      Zeina Price MSN, RN   Ambulatory Care Manager  Associate Care Management  Cell [phone]  [email or web address]     [email or web address]

## 2024-03-29 DIAGNOSIS — E06.3 HASHIMOTO'S THYROIDITIS: ICD-10-CM

## 2024-03-29 RX ORDER — LEVOTHYROXINE SODIUM 175 UG/1
TABLET ORAL
Qty: 90 TABLET | Refills: 0 | Status: SHIPPED | OUTPATIENT
Start: 2024-03-29

## 2024-03-31 NOTE — BRIEF OP NOTE
Brief Postoperative Note      Patient: Kelsey Owen  YOB: 1970  MRN: [id number]    Date of Procedure: 1/22/2024    Pre-Op Diagnosis Codes:  Osteoarthritis of Left Hip    Post-Op Diagnosis:   Osteoarthritis of Left Hip       Procedure(s):  Left total hip arthroplasty via anterior approach    Surgeon(s):  Juanpablo Llanos DO    Assistant:  Surgical Assistant: Darcy Navarro  Resident: Francisco Russell DO; Chang Barnes DO    Anesthesia: General    Estimated Blood Loss (mL): 200     Fluids (mL): 800 in crystalloids    Complications: None    Specimens:   * No specimens in log *    Implants:  Implant Name Type Inv. Item Serial No.  Lot No. LRB No. Used Action   SHELL ACET OD50MM LNR DME DBL MOBILITY MPACT - [id number]  SHELL ACET OD50MM LNR DME DBL MOBILITY MPACT  [employer] [facility] [id number] Left 1 Implanted   STEM FEM SZ 6 LAT HIP MECTAGRIP CEMENTLESS FLAT DBL TAPR - [id number]  STEM FEM SZ 6 LAT HIP MECTAGRIP CEMENTLESS FLAT DBL TAPR  [employer] [facility] [id number] Left 1 Implanted   LINER ACET SZ DME SHELL 50MM HD 28MM HIP HIGHCROSS DBL - [id number]  LINER ACET SZ DME SHELL 50MM HD 28MM HIP HIGHCROSS DBL  [employer] [facility] [id number] Left 1 Implanted   HEAD FEM SZ M [id number] FOR QUADRA HIP SYS BIOLOX DELT - [id number]  HEAD FEM SZ M [id number] FOR QUADRA HIP SYS BIOLOX DELT  [employer] [facility] [id number] Left 1 Implanted         Drains: * No LDAs found *    Findings: Osteoarthritis of Left Hip, See Op Note      Electronically signed by Chang Barnes DO on 1/22/2024 at 9:11 AM  
Speaking Coherently

## 2024-04-02 ENCOUNTER — OFFICE VISIT (OUTPATIENT)
Dept: NEUROSURGERY | Age: 54
End: 2024-04-02
Payer: COMMERCIAL

## 2024-04-02 VITALS
BODY MASS INDEX: 35.13 KG/M2 | WEIGHT: 237.2 LBS | SYSTOLIC BLOOD PRESSURE: 121 MMHG | HEART RATE: 83 BPM | DIASTOLIC BLOOD PRESSURE: 81 MMHG | HEIGHT: 69 IN

## 2024-04-02 DIAGNOSIS — M47.26 OTHER SPONDYLOSIS WITH RADICULOPATHY, LUMBAR REGION: Primary | ICD-10-CM

## 2024-04-02 DIAGNOSIS — R20.2 PARESTHESIA OF BOTH LOWER EXTREMITIES: ICD-10-CM

## 2024-04-02 DIAGNOSIS — M51.36 LUMBAR DEGENERATIVE DISC DISEASE: ICD-10-CM

## 2024-04-02 PROCEDURE — 99204 OFFICE O/P NEW MOD 45 MIN: CPT

## 2024-04-02 NOTE — PROGRESS NOTES
Pupils equal and reactive to light  Extraocular motion intact  Face and shrug symmetric  Tongue midline  No dysarthria  v1-3 sensation symmetric, masseter tone symmetric  Hearing symmetric    Sensation: decreased sensation right lower lateral leg    Motor  L deltoid 5/5; R deltoid 5/5  L biceps 5/5; R biceps 5/5  L triceps 5/5; R triceps 5/5  L wrist extension 5/5; R wrist extension 5/5  L intrinsics 5/5; R intrinsics 5/5     L hip flexion 4/5 , R hip flexion 5/5  L knee extension 5/5; R knee extension 5/5  L Dorsiflexion 5/5; R dorsiflexion 5/5  L Plantarflexion 5/5; R plantarflexion 5/5  L EHL 4/5; R EHL 4/5    Reflexes  L Brachioradialis 2+/4; R brachioradialis 2+/4  L Biceps 2+/4; R Biceps 2+/4  L Triceps 2+/4; R Triceps 2+/4  L Patellar 2+/4: R Patellar 2+/4  L Achilles 2+/4; R Achilles 2+/4    hoffmans L: neg  hoffmans R: neg  Clonus L: neg  Clonus R: neg  Babinski L: neg  Babinski R: neg    +SLR R outer leg  +SLR L outer and into the outer toes  -JHONATAN bilaterally  +TTP lower back    Studies Review:     11/28/23 MRI Lumbar Spine  FINDINGS:  BONES/ALIGNMENT: There is grade 1 L5 on S1 retrolisthesis.  The vertebral  body heights are maintained.  No fracture or destructive osseous lesion.  There is degenerative disc disease with disc desiccation, disc space  narrowing and endplate changes, most severe at L5-S1.     SPINAL CORD: The conus terminates normally.     SOFT TISSUES: No paraspinal mass identified.     L1-L2: There is no significant disc herniation, spinal canal stenosis or  neural foraminal narrowing.     L2-L3: There is no significant disc herniation, spinal canal stenosis or  neural foraminal narrowing.     L3-L4: There is no significant disc herniation, spinal canal stenosis or  neural foraminal narrowing.     L4-L5: There is disc bulge with minimal right foraminal narrowing.  No spinal  canal narrowing.     L5-S1: There is right paracentral disc protrusion, narrowing the right  lateral recess,

## 2024-04-09 ENCOUNTER — HOSPITAL ENCOUNTER (OUTPATIENT)
Facility: CLINIC | Age: 54
Discharge: HOME | End: 2024-04-11
Payer: COMMERCIAL

## 2024-04-09 ENCOUNTER — OFFICE VISIT (OUTPATIENT)
Dept: INTERNAL MEDICINE CLINIC | Age: 54
End: 2024-04-09
Payer: COMMERCIAL

## 2024-04-09 ENCOUNTER — HOSPITAL ENCOUNTER (OUTPATIENT)
Dept: GENERAL RADIOLOGY | Facility: CLINIC | Age: 54
Discharge: HOME | End: 2024-04-11
Payer: COMMERCIAL

## 2024-04-09 VITALS
HEIGHT: 69 IN | WEIGHT: 237 LBS | DIASTOLIC BLOOD PRESSURE: 84 MMHG | HEART RATE: 101 BPM | SYSTOLIC BLOOD PRESSURE: 120 MMHG | OXYGEN SATURATION: 97 % | BODY MASS INDEX: 35.1 KG/M2

## 2024-04-09 DIAGNOSIS — K13.70 ORAL LESION: ICD-10-CM

## 2024-04-09 DIAGNOSIS — M54.41 CHRONIC BILATERAL LOW BACK PAIN WITH RIGHT-SIDED SCIATICA: ICD-10-CM

## 2024-04-09 DIAGNOSIS — M25.552 LEFT HIP PAIN: Primary | ICD-10-CM

## 2024-04-09 DIAGNOSIS — G89.29 CHRONIC BILATERAL LOW BACK PAIN WITH RIGHT-SIDED SCIATICA: ICD-10-CM

## 2024-04-09 DIAGNOSIS — R20.2 PARESTHESIA OF BOTH LOWER EXTREMITIES: ICD-10-CM

## 2024-04-09 DIAGNOSIS — M25.552 LEFT HIP PAIN: ICD-10-CM

## 2024-04-09 DIAGNOSIS — E78.2 MIXED HYPERLIPIDEMIA: ICD-10-CM

## 2024-04-09 DIAGNOSIS — M51.36 LUMBAR DEGENERATIVE DISC DISEASE: ICD-10-CM

## 2024-04-09 DIAGNOSIS — F90.0 ATTENTION DEFICIT HYPERACTIVITY DISORDER (ADHD), PREDOMINANTLY INATTENTIVE TYPE: ICD-10-CM

## 2024-04-09 DIAGNOSIS — M47.26 OTHER SPONDYLOSIS WITH RADICULOPATHY, LUMBAR REGION: ICD-10-CM

## 2024-04-09 PROCEDURE — 72110 X-RAY EXAM L-2 SPINE 4/>VWS: CPT

## 2024-04-09 PROCEDURE — 99214 OFFICE O/P EST MOD 30 MIN: CPT | Performed by: NURSE PRACTITIONER

## 2024-04-09 PROCEDURE — 73502 X-RAY EXAM HIP UNI 2-3 VIEWS: CPT

## 2024-04-09 RX ORDER — TIZANIDINE 2 MG/1
2 TABLET ORAL 3 TIMES DAILY PRN
Qty: 60 TABLET | Refills: 0 | Status: SHIPPED | OUTPATIENT
Start: 2024-04-09

## 2024-04-09 RX ORDER — LIDOCAINE HYDROCHLORIDE 20 MG/ML
15 SOLUTION OROPHARYNGEAL PRN
Qty: 100 ML | Refills: 1 | Status: SHIPPED | OUTPATIENT
Start: 2024-04-09

## 2024-04-09 RX ORDER — DEXTROAMPHETAMINE SACCHARATE, AMPHETAMINE ASPARTATE, DEXTROAMPHETAMINE SULFATE AND AMPHETAMINE SULFATE 5; 5; 5; 5 MG/1; MG/1; MG/1; MG/1
20 TABLET ORAL 2 TIMES DAILY
Qty: 60 TABLET | Refills: 0 | Status: SHIPPED | OUTPATIENT
Start: 2024-04-09 | End: 2024-05-09

## 2024-04-09 RX ORDER — ATORVASTATIN CALCIUM 20 MG/1
20 TABLET, FILM COATED ORAL DAILY
Qty: 90 TABLET | Refills: 2 | Status: SHIPPED | OUTPATIENT
Start: 2024-04-09

## 2024-04-09 RX ORDER — IBUPROFEN 800 MG/1
800 TABLET ORAL 2 TIMES DAILY PRN
Qty: 60 TABLET | Refills: 0 | Status: SHIPPED | OUTPATIENT
Start: 2024-04-09

## 2024-04-09 NOTE — PROGRESS NOTES
Visit Information    Have you changed or started any medications since your last visit including any over-the-counter medicines, vitamins, or herbal medicines? no   Are you having any side effects from any of your medications? -  no  Have you stopped taking any of your medications? Is so, why? -  no    Have you seen any other physician or provider since your last visit? Yes - Records Obtained  Have you had any other diagnostic tests since your last visit? Yes - Records Obtained  Have you been seen in the emergency room and/or had an admission to a hospital since we last saw you? No  Have you had your routine dental cleaning in the past 6 months? no    Have you activated your [employer] account? If not, what are your barriers? Yes     Patient Care Team:  Shelbi Kelly APRN - CNP as PCP - General (Internal Medicine)  [facility]helbi Kelly APRN - CNP [facility]  Ileana Price RN as Ambulatory Care Manager    Medical History Review  Past Medical, Family, and Social History reviewed and does contribute to the patient presenting condition    Health Maintenance   Topic Date Due    Hepatitis B vaccine (1 of 3 - 3-dose series) Never done    Pneumococcal 0-64 years Vaccine (1 of 2 - PCV) Never done    DTaP/Tdap/Td vaccine (1 - Tdap) Never done    Colorectal Cancer Screen  Never done    Shingles vaccine (1 of 2) Never done    COVID-19 Vaccine (3 - 2023-24 season) 09/01/2023    Flu vaccine (Season Ended) 08/01/2024    Lipids  08/31/2024    A1C test (Diabetic or Prediabetic)  01/04/2025    Depression Monitoring  01/10/2025    Breast cancer screen  03/27/2025    Hepatitis C screen  Completed    HIV screen  Completed    Hepatitis A vaccine  Aged Out    Hib vaccine  Aged Out    Polio vaccine  Aged Out    Meningococcal (ACWY) vaccine  Aged Out    Depression Screen  Discontinued    Diabetes screen  Discontinued        [facility]  [address] [address]  Dept:

## 2024-04-10 ENCOUNTER — CARE COORDINATION (OUTPATIENT)
Dept: OTHER | Facility: CLINIC | Age: 54
End: 2024-04-10

## 2024-04-10 NOTE — CARE COORDINATION
Ambulatory Care Coordination Note  4/10/2024    Patient Current Location:  Home: [address] [place] [address] [place] [address]     ACM contacted the patient by telephone. Verified name and  with patient as identifiers.     Challenges to be reviewed by the provider   Additional needs identified to be addressed with provider: No  none               Method of communication with provider: none.    ACM: Ileana Price RN    ACM contacted the patient to follow up on progress, discuss new issues or concerns, and reinforce/provide patient education.     Summary Note: ACM contacted pt for follow up care management call. Pt states she is not doing great; [place] and heard pop in left hip area and states she has lump in left hip incision area with associated increased pain with movement; pt reports she had appt with pcp on 24; xray of left hip was negative and xray of lumbar spine showed degenerative changes. Pt states states pain is 0/10 at rest and worsens to 6-7/10 with weight bearing movements. Pain is in left groin area, with lump in area and pain radiates down to left knee; states has associated numbness of area and increased weakness of LLE. Pt reports she is taking ibuprofen 800mg with some relief. Pt previously educated to refrain from exceeding 3200 mg of ibuprofen in 24 hour period. Pt states had appt with Neuro surgery on 24 and is being referred to Pain Mgmt, Dr. Medrano; states is waiting to hear from office; encouraged pt to call office to schedule appt; pt states she will call.   Pt reports left hip incision site clean/dry/intact. Patient denies any s/s of infection.  Patient instructed on s/s of infection to report including but not limited to fever, chills, foul smelling drainage, increased warmth, redness or hardness around incision.  Patient instructed on high protein diet, Vitamin C and hydration for incision healing. Pt verbalized understanding.   Pt states PCP said to hold

## 2024-04-11 ENCOUNTER — OFFICE VISIT (OUTPATIENT)
Dept: ORTHOPEDIC SURGERY | Age: 54
End: 2024-04-11

## 2024-04-11 VITALS — BODY MASS INDEX: 35.1 KG/M2 | WEIGHT: 237 LBS | HEIGHT: 69 IN | RESPIRATION RATE: 14 BRPM

## 2024-04-11 DIAGNOSIS — M79.652 LEFT THIGH PAIN: ICD-10-CM

## 2024-04-11 DIAGNOSIS — Z96.642 STATUS POST TOTAL REPLACEMENT OF LEFT HIP: Primary | ICD-10-CM

## 2024-04-11 PROCEDURE — 99024 POSTOP FOLLOW-UP VISIT: CPT | Performed by: ORTHOPAEDIC SURGERY

## 2024-04-11 ASSESSMENT — ENCOUNTER SYMPTOMS
ROS SKIN COMMENTS: NEGATIVE FOR RASH
ABDOMINAL PAIN: 0
SHORTNESS OF BREATH: 0
EYE DISCHARGE: 0

## 2024-04-11 NOTE — PROGRESS NOTES
[facility]  [address] [address]  [address] [place], [place] [id number]  Dept: [phone]  Dept Fax: [phone]        Ambulatory Follow Up      Subjective:   Kelsey Owen is a 53 y.o. year old female who presents to our office today for routine followup regarding her   1. Status post total replacement of left hip    2. Left thigh pain    .    Chief Complaint   Patient presents with    Post-Op Check     Left BROOKLYN. DOS 1/22/24       HPI  Kelsey Owen is a 53-year-old female who presents the office today for recheck.  She underwent left total hip arthroplasty on 1/22/2024.  She notes that she was doing 85% better since her last visit on 3/19/2024 and was doing well with aquatic therapy at the [facility].  Recently she went on a walk felt a pop and had pain to her anterior thigh again.  She is currently in a lupus flareup and wants to start prednisone for that.    Review of Systems   Constitutional:  Positive for activity change. Negative for fever.   HENT:  Negative for dental problem.    Eyes:  Negative for discharge.   Respiratory:  Negative for shortness of breath.    Cardiovascular:  Negative for chest pain.   Gastrointestinal:  Negative for abdominal pain.   Genitourinary: Negative.    Musculoskeletal:  Positive for arthralgias.   Skin:         Negative for rash   Neurological:  Positive for weakness.   Psychiatric/Behavioral:  Negative for confusion.        I have reviewed the CC, HPI, ROS, PMH, FHX, Social History, and if not present in this note, I have reviewed in the patient's chart.   I agree with the documentation provided by other staff and have reviewed their documentation prior to providing my signature indicating agreement.    Objective :   Resp 14   Ht 1.753 m (5' 9.02\")   Wt 107.5 kg (237 lb)   BMI 34.98 kg/m²  Body mass index is 34.98 kg/m².  General: Kelsey Owen is a 53 y.o. female who is alert

## 2024-04-23 ENCOUNTER — CARE COORDINATION (OUTPATIENT)
Dept: OTHER | Facility: CLINIC | Age: 54
End: 2024-04-23

## 2024-04-23 ASSESSMENT — ENCOUNTER SYMPTOMS
COLOR CHANGE: 0
COUGH: 0
SHORTNESS OF BREATH: 0
WHEEZING: 0

## 2024-04-23 NOTE — CARE COORDINATION
Ambulatory Care Coordination Note     2024 1:45 PM     Patient Current Location:  Home: [address] [place] [address] [place] [address]     ACM contacted the patient by telephone. Verified name and  with patient as identifiers.         ACM: Ileana Price RN     Challenges to be reviewed by the provider   Additional needs identified to be addressed with provider No  none               Method of communication with provider: none.    Care Summary Note: ACM contacted pt for follow up care management call. Pt states had f/u appt with Cecil Saunders on 24; next appt 24; extended return to work date to 24. Pt has not heard from Pain Mgmt, Dr. Medrano, re: appt; pt states she has provider number and will call office for appt; referral was placed on 24. Pt states PCP also have referral to Dr. Murdock, Rheumatology, for Lupus flare up; pt states previous Rheum provider is now out of network. Pt states she will call Dr. Murdock office to schedule appt. ACM encouraged outreach to ACM if any barriers in getting appts scheduled.   Pt states she continues to have left hip pain/anterior thigh pain, rates 7/10 when weight bearing. Pt reports she is taking 800mg ibuprofen twice daily with some relief. Patient educated to refrain from exceeding 3200 mg of ibuprofen in 24 hour period. Patient verbalized understanding. Pt reports left hip incision site numbness is improving. Pt states she continues to use ice; educated on rest and elevation as able. Pt is using cane with ambulation; educated pt on safety fall precautions.   Pt reports left hip incision site clean/dry/intact. Patient denies any s/s of infection.  Patient instructed on s/s of infection to report including but not limited to fever, chills, foul smelling drainage, increased warmth, redness or hardness around incision.  Patient instructed on high protein diet, Vitamin C and hydration for incision healing.   Pt reports she has been having Lupus flare up; pt

## 2024-05-02 ENCOUNTER — OFFICE VISIT (OUTPATIENT)
Dept: PRIMARY CARE CLINIC | Age: 54
End: 2024-05-02

## 2024-05-02 VITALS
SYSTOLIC BLOOD PRESSURE: 118 MMHG | RESPIRATION RATE: 16 BRPM | BODY MASS INDEX: 33.83 KG/M2 | WEIGHT: 229.2 LBS | DIASTOLIC BLOOD PRESSURE: 88 MMHG | OXYGEN SATURATION: 97 % | HEART RATE: 101 BPM

## 2024-05-02 DIAGNOSIS — M32.9 LUPUS (HCC): ICD-10-CM

## 2024-05-02 DIAGNOSIS — Z91.89 AT HIGH RISK FOR BREAST CANCER: ICD-10-CM

## 2024-05-02 DIAGNOSIS — E06.3 HASHIMOTO'S THYROIDITIS: ICD-10-CM

## 2024-05-02 DIAGNOSIS — R73.03 PREDIABETES: ICD-10-CM

## 2024-05-02 DIAGNOSIS — Z76.89 ENCOUNTER TO ESTABLISH CARE: Primary | ICD-10-CM

## 2024-05-02 DIAGNOSIS — F90.0 ATTENTION DEFICIT HYPERACTIVITY DISORDER (ADHD), PREDOMINANTLY INATTENTIVE TYPE: ICD-10-CM

## 2024-05-02 DIAGNOSIS — M16.12 OSTEOARTHRITIS OF LEFT HIP, UNSPECIFIED OSTEOARTHRITIS TYPE: ICD-10-CM

## 2024-05-02 DIAGNOSIS — E78.2 MIXED HYPERLIPIDEMIA: ICD-10-CM

## 2024-05-02 DIAGNOSIS — Z96.642 STATUS POST TOTAL REPLACEMENT OF LEFT HIP: ICD-10-CM

## 2024-05-02 PROBLEM — Z82.69 FAMILY HISTORY OF SYSTEMIC LUPUS ERYTHEMATOSUS: Status: RESOLVED | Noted: 2021-04-18 | Resolved: 2024-05-02

## 2024-05-02 PROBLEM — K80.20 SYMPTOMATIC CHOLELITHIASIS: Status: RESOLVED | Noted: 2023-02-17 | Resolved: 2024-05-02

## 2024-05-02 PROBLEM — M16.11 ARTHRITIS OF RIGHT HIP: Status: RESOLVED | Noted: 2021-09-16 | Resolved: 2024-05-02

## 2024-05-02 PROBLEM — Z82.49 FAMILY HISTORY OF BRAIN ANEURYSM: Status: RESOLVED | Noted: 2021-04-18 | Resolved: 2024-05-02

## 2024-05-02 PROBLEM — Z96.641 H/O TOTAL HIP ARTHROPLASTY, RIGHT: Status: RESOLVED | Noted: 2022-02-14 | Resolved: 2024-05-02

## 2024-05-02 PROBLEM — M25.559 HIP PAIN: Status: RESOLVED | Noted: 2021-09-16 | Resolved: 2024-05-02

## 2024-05-02 RX ORDER — DEXTROAMPHETAMINE SACCHARATE, AMPHETAMINE ASPARTATE, DEXTROAMPHETAMINE SULFATE AND AMPHETAMINE SULFATE 5; 5; 5; 5 MG/1; MG/1; MG/1; MG/1
20 TABLET ORAL 2 TIMES DAILY
Qty: 60 TABLET | Refills: 0 | Status: SHIPPED | OUTPATIENT
Start: 2024-05-02 | End: 2024-06-01

## 2024-05-02 RX ORDER — PREDNISONE 20 MG/1
TABLET ORAL
Qty: 18 TABLET | Refills: 0 | Status: SHIPPED | OUTPATIENT
Start: 2024-05-02 | End: 2024-05-12

## 2024-05-02 SDOH — HEALTH STABILITY: PHYSICAL HEALTH: ON AVERAGE, HOW MANY DAYS PER WEEK DO YOU ENGAGE IN MODERATE TO STRENUOUS EXERCISE (LIKE A BRISK WALK)?: 3 DAYS

## 2024-05-02 SDOH — HEALTH STABILITY: PHYSICAL HEALTH: ON AVERAGE, HOW MANY MINUTES DO YOU ENGAGE IN EXERCISE AT THIS LEVEL?: 60 MIN

## 2024-05-02 ASSESSMENT — PATIENT HEALTH QUESTIONNAIRE - PHQ9
4. FEELING TIRED OR HAVING LITTLE ENERGY: NOT AT ALL
8. MOVING OR SPEAKING SO SLOWLY THAT OTHER PEOPLE COULD HAVE NOTICED. OR THE OPPOSITE, BEING SO FIGETY OR RESTLESS THAT YOU HAVE BEEN MOVING AROUND A LOT MORE THAN USUAL: NOT AT ALL
9. THOUGHTS THAT YOU WOULD BE BETTER OFF DEAD, OR OF HURTING YOURSELF: NOT AT ALL
1. LITTLE INTEREST OR PLEASURE IN DOING THINGS: NOT AT ALL
SUM OF ALL RESPONSES TO PHQ QUESTIONS 1-9: 0
7. TROUBLE CONCENTRATING ON THINGS, SUCH AS READING THE NEWSPAPER OR WATCHING TELEVISION: NOT AT ALL
10. IF YOU CHECKED OFF ANY PROBLEMS, HOW DIFFICULT HAVE THESE PROBLEMS MADE IT FOR YOU TO DO YOUR WORK, TAKE CARE OF THINGS AT HOME, OR GET ALONG WITH OTHER PEOPLE: NOT DIFFICULT AT ALL
6. FEELING BAD ABOUT YOURSELF - OR THAT YOU ARE A FAILURE OR HAVE LET YOURSELF OR YOUR FAMILY DOWN: NOT AT ALL
SUM OF ALL RESPONSES TO PHQ9 QUESTIONS 1 & 2: 0
2. FEELING DOWN, DEPRESSED OR HOPELESS: NOT AT ALL
SUM OF ALL RESPONSES TO PHQ QUESTIONS 1-9: 0
3. TROUBLE FALLING OR STAYING ASLEEP: NOT AT ALL

## 2024-05-02 ASSESSMENT — ENCOUNTER SYMPTOMS
CHEST TIGHTNESS: 0
WHEEZING: 0
TROUBLE SWALLOWING: 0
VOMITING: 0
COUGH: 0
SORE THROAT: 0
SHORTNESS OF BREATH: 0
SINUS PAIN: 0
EYE REDNESS: 0
SINUS PRESSURE: 0
EYE ITCHING: 0
DIARRHEA: 0
NAUSEA: 0
ABDOMINAL PAIN: 0
EYE DISCHARGE: 0

## 2024-05-02 NOTE — PROGRESS NOTES
Refill:  0    amphetamine-dextroamphetamine (ADDERALL) 20 MG tablet     Sig: Take 1 tablet by mouth 2 times daily for 30 days.     Dispense:  60 tablet     Refill:  0       Patient given educational materials - seepatient instructions.  Discussed use, benefit, and side effects of prescribed medications.All patient questions answered.  Pt voiced understanding. Reviewed health maintenance.Instructed to continue current medications, diet and exercise.  Patient agreedwith treatment plan. Follow up as directed.      Electronically signed by TAMARA Chapman CNP on 5/2/2024at 2:32 PM

## 2024-05-07 ENCOUNTER — CARE COORDINATION (OUTPATIENT)
Dept: OTHER | Facility: CLINIC | Age: 54
End: 2024-05-07

## 2024-05-16 ENCOUNTER — INITIAL CONSULT (OUTPATIENT)
Dept: ONCOLOGY | Age: 54
End: 2024-05-16
Payer: COMMERCIAL

## 2024-05-16 DIAGNOSIS — Z91.89 AT HIGH RISK FOR BREAST CANCER: ICD-10-CM

## 2024-05-16 DIAGNOSIS — Z80.3 FAMILY HISTORY OF BREAST CANCER: Primary | ICD-10-CM

## 2024-05-16 PROCEDURE — 96040 PR MEDICAL GENETICS COUNSELING EACH 30 MINUTES: CPT | Performed by: GENETIC COUNSELOR, MS

## 2024-05-16 NOTE — PROGRESS NOTES
[facility] Genetic Counseling Program   Hereditary Cancer Risk Assessment     Name: Kelsey Owen   YOB: 1970   Date of Consultation: 24   Referred by:   Izzy Lundberg APRN - CNP  [address] [place] [address]    Ms. Owen was seen at the [facility] for genetic counseling on 24.  Ms. Owen was referred by Izzy Lundberg APRN * due to her family history of cancer.     PERSONAL HISTORY   Ms. Owen is a 53 y.o.  female with no personal history of cancer.     She reports:     Menarche at age: 11y  First child at age: 20y   Menopause at age: Hysterectomy at 35, ovaries still intact might be power menopausal now   Last mammogram: 3/27/24, TC = 36.8%   Last breast MRI: Never   Breast biopsy: 2019 R side     FAMILY HISTORY  Ms. Owen has two sons (31 and 32y).     She has one full sister and one paternal half brother.     Her mother had breast cancer at age 66 and myelofibrosis. She had a total hysterectomy in her 30s.     Her mother had one sister and two brothers. Her sister (Ms. Owen's maternal aunt) had breast cancer at age 65. One of the brothers (Ms. Owen's maternal uncle)  of esophageal cancer.     Her father is living, no cancer history. Her father had three sisters and two brothers. One of the sisters (Ms. Owen's paternal aunt) had brain cancer. Her daughter (Ms. Owen's paternal first cousin) had brain and thyroid cancer.     Her paternal grandmother had breast cancer at an unknown age. Four of her grandmother's sisters (Ms. Owen's great aunt) had breast cancer.     Ms. Owen reports unknown ancestry and denies any known Ashkenazi Roman Catholic heritage.     RISK ASSESSMENT   We discussed that approximately 5-10% of cancers are due to a hereditary gene mutation which causes an increased risk for certain cancers. Hereditary cancers are typically diagnosed at younger ages (under age 50y) and occur in multiple

## 2024-05-21 ENCOUNTER — CARE COORDINATION (OUTPATIENT)
Dept: OTHER | Facility: CLINIC | Age: 54
End: 2024-05-21

## 2024-05-21 NOTE — CARE COORDINATION
Ambulatory Care Coordination Note    ACM attempted to reach patient for care management follow up call regarding f/up s/p hip surgery 1/22/24. HIPAA compliant message left requesting a return phone call at patient convenience.     Plan for follow-up call in 10-14 days    Future Appointments   Date Time Provider Department Center   7/11/2024  3:15 PM Andria Ayala MD [facility]   8/5/2024  8:30 AM Izzy Lundberg APRN - CNP [facility]

## 2024-06-04 ENCOUNTER — CARE COORDINATION (OUTPATIENT)
Dept: OTHER | Facility: CLINIC | Age: 54
End: 2024-06-04

## 2024-06-04 DIAGNOSIS — M25.552 LEFT HIP PAIN: ICD-10-CM

## 2024-06-04 RX ORDER — IBUPROFEN 800 MG/1
800 TABLET ORAL 2 TIMES DAILY PRN
Qty: 60 TABLET | Refills: 0 | OUTPATIENT
Start: 2024-06-04

## 2024-06-04 NOTE — CARE COORDINATION
Ambulatory Care Coordination Note    Associate Care Manager (ACM) attempted final outreach via My Chart.   Lost to follow up letter sent to patient via My Chart.      No further outreach scheduled with this ACM, patient has this ACM's contact information if future needs arise. ACM will sign off care team at this time.  Episode of Care resolved.      Future Appointments   Date Time Provider Department Center   7/11/2024  3:15 PM Andria Ayala MD [facility]   8/5/2024  8:30 AM Izzy Lundberg APRN - CNP [facility]

## 2024-06-10 ENCOUNTER — TELEPHONE (OUTPATIENT)
Dept: ONCOLOGY | Age: 54
End: 2024-06-10

## 2024-06-10 DIAGNOSIS — E55.9 VITAMIN D DEFICIENCY: ICD-10-CM

## 2024-06-10 RX ORDER — ERGOCALCIFEROL 1.25 MG/1
CAPSULE ORAL
Qty: 8 CAPSULE | Refills: 2 | Status: SHIPPED | OUTPATIENT
Start: 2024-06-10

## 2024-06-10 NOTE — TELEPHONE ENCOUNTER
[facility] Genetic Counseling Program   Hereditary Cancer Risk Assessment     Name: Kelsey Owen  YOB: 1970  Date of Results Disclosure: 06/10/24      HISTORY   Ms. Owen was seen for genetic counseling at the request of Izzy Lundberg APRN - CNP due to her family history of cancer.  At that time, Ms. Owen chose to pursue genetic testing via the CancerNext Expanded + RNA hereditary cancer gene panel. These results were discussed with Ms. Owen via telephone. A summary of Ms. Owen's results and recommendations are below.     RESULTS  [facility] picoChip CancerNext-Expanded Panel + RNAinsight: NEGATIVE - NO CLINICALLY SIGNIFICANT MUTATIONS DETECTED   This panel included the analysis of 71 genes associated with hereditary cancer including: AIP, ALK, APC, BRENNA, BAP1, BARD1, BMPR1A, BRCA1, BRCA2, BRIP1, CDC73, CDH1, CDK4, CDKN1B, CDKN2A, CHEK2, CTNNA1, DICER1, EGFR, EGLN1, EPCAM, FH, FLCN, GREM1, HOXB13, KIF1B, KIT1, LZTR1, MAX, MEN1, MET, MITF, MLH1, MSH2, MSH3, MSH6, MUTYH, NF1, NF2, NTHL1, PALB2, PDGFRA, PHOX2B, PMS2, POLD1, POLE, POT1, TKZNF9R, PTCH1, PTEN, RAD51C, RAD51D, RB1, RET, SDHA, SDHAF2, SDHB, SDHC, ,SDHD, SMAD4, SMARCA4, SMARCB1, SMARCE1, STK11, SUFU, QTWN387, TP53, TSC1, TSC2, VHL. In addition, no clinically relevant aberrant RNA transcripts were detected in select genes. Please refer to genetic test report for technical details.    Additional findings: VARIANT OF UNCERTAIN SIGNIFICANCE - NTHL1 Variant, p.P125S  A variant of uncertain significance (VUS) occurs when the laboratory does not have enough data to determine whether the genetic variant is benign (not associated with cancer) or pathogenic (associated with cancer). Because the significance of the NTHL1 gene VUS is unknown, medical management must be based on Ms. Owen's personal history and family history of cancer.     We discussed that Ms. Owen's negative test result greatly reduces the likelihood that she carries a

## 2024-06-10 NOTE — TELEPHONE ENCOUNTER
Left message for Kelsey notifying her that her genetic test results are available. Requested that she return my phone call at her earliest convenience to review results.

## 2024-07-07 DIAGNOSIS — M25.552 LEFT HIP PAIN: ICD-10-CM

## 2024-07-07 DIAGNOSIS — F90.0 ATTENTION DEFICIT HYPERACTIVITY DISORDER (ADHD), PREDOMINANTLY INATTENTIVE TYPE: ICD-10-CM

## 2024-07-07 DIAGNOSIS — E66.9 OBESITY (BMI 30.0-34.9): Primary | ICD-10-CM

## 2024-07-07 DIAGNOSIS — R11.0 NAUSEA: ICD-10-CM

## 2024-07-08 ENCOUNTER — PATIENT MESSAGE (OUTPATIENT)
Dept: PRIMARY CARE CLINIC | Age: 54
End: 2024-07-08

## 2024-07-08 DIAGNOSIS — M25.552 LEFT HIP PAIN: ICD-10-CM

## 2024-07-08 DIAGNOSIS — F90.0 ATTENTION DEFICIT HYPERACTIVITY DISORDER (ADHD), PREDOMINANTLY INATTENTIVE TYPE: ICD-10-CM

## 2024-07-08 DIAGNOSIS — R11.0 NAUSEA: ICD-10-CM

## 2024-07-08 RX ORDER — ONDANSETRON 4 MG/1
4 TABLET, ORALLY DISINTEGRATING ORAL 3 TIMES DAILY PRN
Qty: 30 TABLET | Refills: 2 | OUTPATIENT
Start: 2024-07-08

## 2024-07-08 RX ORDER — DEXTROAMPHETAMINE SACCHARATE, AMPHETAMINE ASPARTATE, DEXTROAMPHETAMINE SULFATE AND AMPHETAMINE SULFATE 5; 5; 5; 5 MG/1; MG/1; MG/1; MG/1
20 TABLET ORAL 2 TIMES DAILY
Qty: 60 TABLET | Refills: 0 | Status: SHIPPED | OUTPATIENT
Start: 2024-07-08 | End: 2024-07-09 | Stop reason: SDUPTHER

## 2024-07-08 RX ORDER — IBUPROFEN 800 MG/1
800 TABLET, FILM COATED ORAL 2 TIMES DAILY PRN
Qty: 60 TABLET | Refills: 0 | OUTPATIENT
Start: 2024-07-08

## 2024-07-09 RX ORDER — DEXTROAMPHETAMINE SACCHARATE, AMPHETAMINE ASPARTATE, DEXTROAMPHETAMINE SULFATE AND AMPHETAMINE SULFATE 5; 5; 5; 5 MG/1; MG/1; MG/1; MG/1
20 TABLET ORAL 2 TIMES DAILY
Qty: 60 TABLET | Refills: 0 | Status: SHIPPED | OUTPATIENT
Start: 2024-07-09 | End: 2024-08-08

## 2024-07-09 RX ORDER — IBUPROFEN 800 MG/1
800 TABLET ORAL 2 TIMES DAILY PRN
Qty: 60 TABLET | Refills: 0 | Status: SHIPPED | OUTPATIENT
Start: 2024-07-09

## 2024-07-09 RX ORDER — ONDANSETRON 4 MG/1
4 TABLET, ORALLY DISINTEGRATING ORAL 3 TIMES DAILY PRN
Qty: 30 TABLET | Refills: 2 | Status: SHIPPED | OUTPATIENT
Start: 2024-07-09

## 2024-07-09 NOTE — TELEPHONE ENCOUNTER
From: Kelsey Perez  To: Izzy Lundberg  Sent: 7/8/2024 11:23 PM EDT  Subject: Medication refills     Hello, could I please get a refill on my adderall, zofran, annd ibuprofen 800mg and have them sent to [facility] outpatient [facility]?     Thank you,   Kelsey perez [birth year]

## 2024-07-15 ENCOUNTER — TELEPHONE (OUTPATIENT)
Dept: GASTROENTEROLOGY | Age: 54
End: 2024-07-15

## 2024-07-15 NOTE — TELEPHONE ENCOUNTER
Writer called and had to leave a message for patient to call and let us know if she would like to be seen by one of our physicans she has had sever cancel's or no shows. So she has been left a message and letter sent home.

## 2024-08-05 ENCOUNTER — HOSPITAL ENCOUNTER (OUTPATIENT)
Age: 54
Setting detail: SPECIMEN
Discharge: HOME | End: 2024-08-05

## 2024-08-05 ENCOUNTER — OFFICE VISIT (OUTPATIENT)
Dept: PRIMARY CARE CLINIC | Age: 54
End: 2024-08-05
Payer: COMMERCIAL

## 2024-08-05 VITALS
BODY MASS INDEX: 32.77 KG/M2 | WEIGHT: 222 LBS | HEART RATE: 94 BPM | RESPIRATION RATE: 16 BRPM | DIASTOLIC BLOOD PRESSURE: 86 MMHG | OXYGEN SATURATION: 99 % | SYSTOLIC BLOOD PRESSURE: 134 MMHG

## 2024-08-05 DIAGNOSIS — Z13.6 ENCOUNTER FOR LIPID SCREENING FOR CARDIOVASCULAR DISEASE: ICD-10-CM

## 2024-08-05 DIAGNOSIS — T78.1XXA GASTROINTESTINAL FOOD SENSITIVITY: ICD-10-CM

## 2024-08-05 DIAGNOSIS — F90.0 ATTENTION DEFICIT HYPERACTIVITY DISORDER (ADHD), PREDOMINANTLY INATTENTIVE TYPE: ICD-10-CM

## 2024-08-05 DIAGNOSIS — Z13.220 ENCOUNTER FOR LIPID SCREENING FOR CARDIOVASCULAR DISEASE: ICD-10-CM

## 2024-08-05 DIAGNOSIS — K13.79 MOUTH SORES: ICD-10-CM

## 2024-08-05 DIAGNOSIS — M32.9 LUPUS (HCC): ICD-10-CM

## 2024-08-05 DIAGNOSIS — Z13.0 SCREENING FOR DEFICIENCY ANEMIA: ICD-10-CM

## 2024-08-05 DIAGNOSIS — E06.3 HASHIMOTO'S THYROIDITIS: ICD-10-CM

## 2024-08-05 DIAGNOSIS — E06.3 HASHIMOTO'S THYROIDITIS: Primary | ICD-10-CM

## 2024-08-05 LAB
ALBUMIN SERPL-MCNC: 4.7 G/DL (ref 3.5–5.2)
ALBUMIN/GLOB SERPL: 2 {RATIO} (ref 1–2.5)
ALP SERPL-CCNC: 90 U/L (ref 35–104)
ALT SERPL-CCNC: 18 U/L (ref 10–35)
ANION GAP SERPL CALCULATED.3IONS-SCNC: 15 MMOL/L (ref 9–16)
AST SERPL-CCNC: 21 U/L (ref 10–35)
BILIRUB SERPL-MCNC: 0.5 MG/DL (ref 0–1.2)
BUN SERPL-MCNC: 13 MG/DL (ref 6–20)
CALCIUM SERPL-MCNC: 9.4 MG/DL (ref 8.6–10.4)
CHLORIDE SERPL-SCNC: 102 MMOL/L (ref 98–107)
CHOLEST SERPL-MCNC: 346 MG/DL (ref 0–199)
CHOLESTEROL/HDL RATIO: 6
CO2 SERPL-SCNC: 25 MMOL/L (ref 20–31)
CREAT SERPL-MCNC: 0.9 MG/DL (ref 0.5–0.9)
ERYTHROCYTE [DISTWIDTH] IN BLOOD BY AUTOMATED COUNT: 14.2 % (ref 11.8–14.4)
GFR, ESTIMATED: 79 ML/MIN/1.73M2
GLUCOSE SERPL-MCNC: 93 MG/DL (ref 74–99)
HCT VFR BLD AUTO: 43.1 % (ref 36.3–47.1)
HDLC SERPL-MCNC: 58 MG/DL
HGB BLD-MCNC: 13.3 G/DL (ref 11.9–15.1)
LDLC SERPL CALC-MCNC: 236 MG/DL (ref 0–100)
MCH RBC QN AUTO: 29.6 PG (ref 25.2–33.5)
MCHC RBC AUTO-ENTMCNC: 30.9 G/DL (ref 28.4–34.8)
MCV RBC AUTO: 96 FL (ref 82.6–102.9)
NRBC BLD-RTO: 0 PER 100 WBC
PLATELET # BLD AUTO: 303 K/UL (ref 138–453)
PMV BLD AUTO: 10.7 FL (ref 8.1–13.5)
POTASSIUM SERPL-SCNC: 4 MMOL/L (ref 3.7–5.3)
PROT SERPL-MCNC: 7.5 G/DL (ref 6.6–8.7)
RBC # BLD AUTO: 4.49 M/UL (ref 3.95–5.11)
SODIUM SERPL-SCNC: 142 MMOL/L (ref 136–145)
T4 FREE SERPL-MCNC: 0.6 NG/DL (ref 0.92–1.68)
TRIGL SERPL-MCNC: 263 MG/DL
TSH SERPL DL<=0.05 MIU/L-ACNC: 59.8 UIU/ML (ref 0.27–4.2)
VLDLC SERPL CALC-MCNC: 53 MG/DL
WBC OTHER # BLD: 8.3 K/UL (ref 3.5–11.3)

## 2024-08-05 PROCEDURE — 99214 OFFICE O/P EST MOD 30 MIN: CPT | Performed by: NURSE PRACTITIONER

## 2024-08-05 ASSESSMENT — ENCOUNTER SYMPTOMS
EYE REDNESS: 0
VOMITING: 0
ABDOMINAL PAIN: 0
SORE THROAT: 0
SINUS PAIN: 0
SHORTNESS OF BREATH: 0
COUGH: 0
NAUSEA: 0
DIARRHEA: 0
TROUBLE SWALLOWING: 0
EYE DISCHARGE: 0
EYE ITCHING: 0
SINUS PRESSURE: 0
WHEEZING: 0
CHEST TIGHTNESS: 0

## 2024-08-05 ASSESSMENT — PATIENT HEALTH QUESTIONNAIRE - PHQ9: DEPRESSION UNABLE TO ASSESS: PT REFUSES

## 2024-08-05 NOTE — PROGRESS NOTES
[facility]  [facility]  [address] [place] [address] [address]  [address]  [facility] [address]  Dept: [phone]  Dept Fax: [phone]    Kelsey Owen is a 53 y.o. female who presents today for her medical conditions/complaintsas noted below.  Kelsey Owen is c/o of Medication Check (3 mo f/u) and Mouth Lesions (F/u, has not resolved, prednisone did help alittle but sores didn't go away)      HPI:     Patient presents for a follow-up  Blood pressure stable  Weight is down 7 pounds    Patient presents for a follow-up    Doing well with adderall- minimal side effects she has been on this for several years without issue    Mouth sores: started in January- thought lupus flare but they have last constant- sometimes increases but never goes away very sore all the time: prednisone helps a little but as soon as she stopped they returned stress seems to make the mouth sores worse: she thinks her high stress job makes this worse. Did have GI appointment but missed due to taking parents to appointment: does have magic mouthwash but doesn't always help: pepto is the most soothing to her.   Drinks at least 4 regular sodas a day.     Genetics saw: no data on one DNA that was found questionable- Last liz in April, due for MRI in sept/oct: recs for every 6 month scans- this is going to be ordered by genetics     Needs intermittent FMLA for her parents doctors appointments and herself d/t migraines.             Past Medical History:   Diagnosis Date    Allergic rhinitis     Arthritis of right hip 09/16/2021    Asthma     Attention deficit hyperactivity disorder (ADHD), predominantly inattentive type     early 90's    Autoimmune disorder (HCC) 3/2013    Chronic back pain     Bulging l5    COVID-19 01/05/2022    cough, body aches, fatigue, headache, vomiting, temp 99. X 6 DAYS   TESTED AT [facility].    COVID-19 10/2020    symptoms X 19 days    COVID-19     x 3    Family history of brain aneurysm

## 2024-08-07 DIAGNOSIS — E06.3 HASHIMOTO'S THYROIDITIS: ICD-10-CM

## 2024-08-07 LAB
GLIADIN IGA SER IA-ACNC: 0.8 U/ML
GLIADIN IGG SER IA-ACNC: <0.4 U/ML
IGA SERPL-MCNC: 238 MG/DL (ref 70–400)
THYROPEROXIDASE AB SERPL IA-ACNC: 17 IU/ML (ref 0–25)
TTG IGA SER IA-ACNC: 0.4 U/ML

## 2024-08-07 RX ORDER — LEVOTHYROXINE SODIUM 0.2 MG/1
200 TABLET ORAL DAILY
Qty: 90 TABLET | Refills: 1 | Status: SHIPPED | OUTPATIENT
Start: 2024-08-07

## 2024-08-07 RX ORDER — ROSUVASTATIN CALCIUM 10 MG/1
10 TABLET, COATED ORAL NIGHTLY
Qty: 90 TABLET | Refills: 3 | Status: SHIPPED | OUTPATIENT
Start: 2024-08-07

## 2024-08-27 DIAGNOSIS — G89.29 CHRONIC BILATERAL LOW BACK PAIN WITH RIGHT-SIDED SCIATICA: ICD-10-CM

## 2024-08-27 DIAGNOSIS — M54.41 CHRONIC BILATERAL LOW BACK PAIN WITH RIGHT-SIDED SCIATICA: ICD-10-CM

## 2024-08-27 DIAGNOSIS — F90.0 ATTENTION DEFICIT HYPERACTIVITY DISORDER (ADHD), PREDOMINANTLY INATTENTIVE TYPE: ICD-10-CM

## 2024-08-27 DIAGNOSIS — M25.552 LEFT HIP PAIN: ICD-10-CM

## 2024-08-27 RX ORDER — TIZANIDINE 2 MG/1
2 TABLET ORAL 3 TIMES DAILY PRN
Qty: 60 TABLET | Refills: 0 | Status: SHIPPED | OUTPATIENT
Start: 2024-08-27

## 2024-08-27 RX ORDER — IBUPROFEN 800 MG/1
800 TABLET, FILM COATED ORAL 2 TIMES DAILY PRN
Qty: 60 TABLET | Refills: 0 | Status: SHIPPED | OUTPATIENT
Start: 2024-08-27

## 2024-08-27 RX ORDER — DEXTROAMPHETAMINE SACCHARATE, AMPHETAMINE ASPARTATE, DEXTROAMPHETAMINE SULFATE AND AMPHETAMINE SULFATE 5; 5; 5; 5 MG/1; MG/1; MG/1; MG/1
20 TABLET ORAL 2 TIMES DAILY
Qty: 60 TABLET | Refills: 0 | Status: SHIPPED | OUTPATIENT
Start: 2024-08-27 | End: 2024-09-26

## 2024-10-01 ENCOUNTER — E-VISIT (OUTPATIENT)
Dept: PRIMARY CARE CLINIC | Age: 54
End: 2024-10-01
Payer: COMMERCIAL

## 2024-10-01 ENCOUNTER — PATIENT MESSAGE (OUTPATIENT)
Dept: PRIMARY CARE CLINIC | Age: 54
End: 2024-10-01

## 2024-10-01 DIAGNOSIS — G47.00 INSOMNIA, UNSPECIFIED TYPE: Primary | ICD-10-CM

## 2024-10-01 PROCEDURE — 99423 OL DIG E/M SVC 21+ MIN: CPT | Performed by: NURSE PRACTITIONER

## 2024-10-01 NOTE — PROGRESS NOTES
Kelsey Owen (1970) initiated an asynchronous digital communication through [employer]    HPI: per patient questionnaire     Exam: not applicable    Diagnoses and all orders for this visit:  Diagnoses and all orders for this visit:    Insomnia, unspecified type  -     temazepam (RESTORIL) 30 MG capsule; Take 1 capsule by mouth nightly as needed for Sleep for up to 14 days. Max Daily Amount: 30 mg      Issues sleeping. F/u scheduled. Willing to try restoril.     Time: EV3 - 21 or more minutes were spent on the digital evaluation and management of this patient.25 min     Izzy Lundberg, APRN - CNP

## 2024-10-02 RX ORDER — TEMAZEPAM 30 MG
30 CAPSULE ORAL NIGHTLY PRN
Qty: 14 CAPSULE | Refills: 0 | Status: SHIPPED | OUTPATIENT
Start: 2024-10-02 | End: 2024-10-16

## 2024-11-05 ENCOUNTER — OFFICE VISIT (OUTPATIENT)
Dept: PRIMARY CARE CLINIC | Age: 54
End: 2024-11-05
Payer: COMMERCIAL

## 2024-11-05 VITALS
HEART RATE: 78 BPM | HEIGHT: 69 IN | OXYGEN SATURATION: 97 % | BODY MASS INDEX: 32.91 KG/M2 | SYSTOLIC BLOOD PRESSURE: 118 MMHG | RESPIRATION RATE: 15 BRPM | DIASTOLIC BLOOD PRESSURE: 78 MMHG | WEIGHT: 222.2 LBS

## 2024-11-05 DIAGNOSIS — F90.0 ATTENTION DEFICIT HYPERACTIVITY DISORDER (ADHD), PREDOMINANTLY INATTENTIVE TYPE: Primary | ICD-10-CM

## 2024-11-05 DIAGNOSIS — R51.9 ACUTE NONINTRACTABLE HEADACHE, UNSPECIFIED HEADACHE TYPE: ICD-10-CM

## 2024-11-05 DIAGNOSIS — G47.00 INSOMNIA, UNSPECIFIED TYPE: ICD-10-CM

## 2024-11-05 DIAGNOSIS — K13.79 MOUTH SORES: ICD-10-CM

## 2024-11-05 DIAGNOSIS — Z91.89 AT HIGH RISK FOR BREAST CANCER: ICD-10-CM

## 2024-11-05 DIAGNOSIS — M25.552 LEFT HIP PAIN: ICD-10-CM

## 2024-11-05 PROCEDURE — 99214 OFFICE O/P EST MOD 30 MIN: CPT | Performed by: NURSE PRACTITIONER

## 2024-11-05 RX ORDER — DEXTROAMPHETAMINE SACCHARATE, AMPHETAMINE ASPARTATE, DEXTROAMPHETAMINE SULFATE AND AMPHETAMINE SULFATE 5; 5; 5; 5 MG/1; MG/1; MG/1; MG/1
20 TABLET ORAL 2 TIMES DAILY
Qty: 60 TABLET | Refills: 0 | Status: SHIPPED | OUTPATIENT
Start: 2024-11-05 | End: 2024-12-05

## 2024-11-05 RX ORDER — IBUPROFEN 800 MG/1
800 TABLET, FILM COATED ORAL 2 TIMES DAILY PRN
Qty: 60 TABLET | Refills: 0 | Status: SHIPPED | OUTPATIENT
Start: 2024-11-05

## 2024-11-05 RX ORDER — TEMAZEPAM 30 MG/1
30 CAPSULE ORAL NIGHTLY PRN
Qty: 30 CAPSULE | Refills: 0 | Status: SHIPPED | OUTPATIENT
Start: 2024-11-05 | End: 2024-12-05

## 2024-11-05 SDOH — ECONOMIC STABILITY: FOOD INSECURITY: WITHIN THE PAST 12 MONTHS, YOU WORRIED THAT YOUR FOOD WOULD RUN OUT BEFORE YOU GOT MONEY TO BUY MORE.: NEVER TRUE

## 2024-11-05 SDOH — ECONOMIC STABILITY: INCOME INSECURITY: HOW HARD IS IT FOR YOU TO PAY FOR THE VERY BASICS LIKE FOOD, HOUSING, MEDICAL CARE, AND HEATING?: NOT HARD AT ALL

## 2024-11-05 SDOH — ECONOMIC STABILITY: FOOD INSECURITY: WITHIN THE PAST 12 MONTHS, THE FOOD YOU BOUGHT JUST DIDN'T LAST AND YOU DIDN'T HAVE MONEY TO GET MORE.: NEVER TRUE

## 2024-11-05 ASSESSMENT — PATIENT HEALTH QUESTIONNAIRE - PHQ9
4. FEELING TIRED OR HAVING LITTLE ENERGY: NOT AT ALL
1. LITTLE INTEREST OR PLEASURE IN DOING THINGS: NOT AT ALL
5. POOR APPETITE OR OVEREATING: NOT AT ALL
9. THOUGHTS THAT YOU WOULD BE BETTER OFF DEAD, OR OF HURTING YOURSELF: NOT AT ALL
2. FEELING DOWN, DEPRESSED OR HOPELESS: NOT AT ALL
SUM OF ALL RESPONSES TO PHQ QUESTIONS 1-9: 0
SUM OF ALL RESPONSES TO PHQ QUESTIONS 1-9: 0
8. MOVING OR SPEAKING SO SLOWLY THAT OTHER PEOPLE COULD HAVE NOTICED. OR THE OPPOSITE, BEING SO FIGETY OR RESTLESS THAT YOU HAVE BEEN MOVING AROUND A LOT MORE THAN USUAL: NOT AT ALL
3. TROUBLE FALLING OR STAYING ASLEEP: NOT AT ALL
SUM OF ALL RESPONSES TO PHQ9 QUESTIONS 1 & 2: 0
7. TROUBLE CONCENTRATING ON THINGS, SUCH AS READING THE NEWSPAPER OR WATCHING TELEVISION: NOT AT ALL
SUM OF ALL RESPONSES TO PHQ QUESTIONS 1-9: 0
6. FEELING BAD ABOUT YOURSELF - OR THAT YOU ARE A FAILURE OR HAVE LET YOURSELF OR YOUR FAMILY DOWN: NOT AT ALL
10. IF YOU CHECKED OFF ANY PROBLEMS, HOW DIFFICULT HAVE THESE PROBLEMS MADE IT FOR YOU TO DO YOUR WORK, TAKE CARE OF THINGS AT HOME, OR GET ALONG WITH OTHER PEOPLE: NOT DIFFICULT AT ALL
SUM OF ALL RESPONSES TO PHQ QUESTIONS 1-9: 0

## 2024-11-05 ASSESSMENT — ENCOUNTER SYMPTOMS
TROUBLE SWALLOWING: 0
EYE REDNESS: 0
ABDOMINAL PAIN: 0
DIARRHEA: 0
COUGH: 0
SORE THROAT: 0
CHEST TIGHTNESS: 0
VOMITING: 0
SHORTNESS OF BREATH: 0
EYE ITCHING: 0
WHEEZING: 0
NAUSEA: 0
SINUS PRESSURE: 0
SINUS PAIN: 0
EYE DISCHARGE: 0

## 2024-11-05 NOTE — PROGRESS NOTES
Pulses: Normal pulses.      Heart sounds: Normal heart sounds. No murmur heard.  Pulmonary:      Effort: Pulmonary effort is normal. No respiratory distress.      Breath sounds: Normal breath sounds. No wheezing.   Abdominal:      General: Abdomen is flat. Bowel sounds are normal. There is no distension.      Palpations: Abdomen is soft.      Tenderness: There is no abdominal tenderness. There is no guarding.   Musculoskeletal:         General: Normal range of motion.      Cervical back: Normal range of motion and neck supple.   Skin:     General: Skin is warm and dry.      Capillary Refill: Capillary refill takes less than 2 seconds.   Neurological:      General: No focal deficit present.      Mental Status: She is alert and oriented to person, place, and time.      Motor: No weakness.      Coordination: Coordination normal.      Gait: Gait normal.   Psychiatric:         Mood and Affect: Mood normal.         Behavior: Behavior normal.         Thought Content: Thought content normal.       /78   Pulse 78   Resp 15   Ht 1.753 m (5' 9.02\")   Wt 100.8 kg (222 lb 3.2 oz)   SpO2 97%   BMI 32.79 kg/m²     Assessment:   Assessment & Plan    Diagnosis Orders   1. Attention deficit hyperactivity disorder (ADHD), predominantly inattentive type  amphetamine-dextroamphetamine (ADDERALL) 20 MG tablet      2. Left hip pain  ibuprofen (ADVIL;MOTRIN) 800 MG tablet    New, severe, s/p hip surgery. Will check Xray at her request but advised her to contact orthopedic surgeon for f/u      3. Insomnia, unspecified type  temazepam (RESTORIL) 30 MG capsule      4. At high risk for breast cancer  [facility]    MRI BREAST BILATERAL W WO CONTRAST      5. Mouth sores        6. Acute nonintractable headache, unspecified headache type            :      Return in about 3 months (around 2/5/2025) for med follow up.  1.  ADHD  Stable  Continue with Adderall 20 mg immediate release twice a day  2.  Left hip

## 2024-11-07 ENCOUNTER — TELEPHONE (OUTPATIENT)
Dept: PRIMARY CARE CLINIC | Age: 54
End: 2024-11-07

## 2024-11-07 NOTE — TELEPHONE ENCOUNTER
[facility] called in regards to recent referral. Patient was referred to them in May and genetic counseling completed. If PCP is wanting patient to see medical oncology and or general surgery for mri/mammogram then two separate referrals would need to be placed to the specialist.

## 2024-11-08 NOTE — TELEPHONE ENCOUNTER
Per the patient's previous Mammogram on 03/27/2024:    IMPRESSION:  No mammographic evidence of malignancy.     Based on the Tyrer Cuzick (EMIGDIO) model, this patient's lifetime risk for  developing breast cancer is 36.8%.     The American Cancer Society considers women with a 20% or greater lifetime  risk for developing breast cancer as \"high risk\" .  Women at risk for breast  cancer may benefit from additional screening, which may include an annual  screening mammogram alternating every six (6) months with a breast MRI.

## 2024-11-08 NOTE — TELEPHONE ENCOUNTER
I think there is confusion on what the [facility] even does. My intention was for them to make sure her mammogram and mri's are being ordered when due since she is high risk. It sounds like that is not what they do.     When is she due for her mammogram and then when is she due for her MRI? We will just take care of it. Thanks!

## 2024-12-30 ENCOUNTER — PATIENT MESSAGE (OUTPATIENT)
Dept: PRIMARY CARE CLINIC | Age: 54
End: 2024-12-30

## 2024-12-30 DIAGNOSIS — M25.552 LEFT HIP PAIN: ICD-10-CM

## 2024-12-30 DIAGNOSIS — G89.29 CHRONIC BILATERAL LOW BACK PAIN WITH RIGHT-SIDED SCIATICA: ICD-10-CM

## 2024-12-30 DIAGNOSIS — F90.0 ATTENTION DEFICIT HYPERACTIVITY DISORDER (ADHD), PREDOMINANTLY INATTENTIVE TYPE: ICD-10-CM

## 2024-12-30 DIAGNOSIS — E55.9 VITAMIN D DEFICIENCY: ICD-10-CM

## 2024-12-30 DIAGNOSIS — M54.41 CHRONIC BILATERAL LOW BACK PAIN WITH RIGHT-SIDED SCIATICA: ICD-10-CM

## 2024-12-30 DIAGNOSIS — G47.00 INSOMNIA, UNSPECIFIED TYPE: ICD-10-CM

## 2024-12-30 RX ORDER — DEXTROAMPHETAMINE SACCHARATE, AMPHETAMINE ASPARTATE, DEXTROAMPHETAMINE SULFATE AND AMPHETAMINE SULFATE 5; 5; 5; 5 MG/1; MG/1; MG/1; MG/1
20 TABLET ORAL 2 TIMES DAILY
Qty: 60 TABLET | Refills: 0 | Status: SHIPPED | OUTPATIENT
Start: 2024-12-30 | End: 2025-01-29

## 2024-12-30 RX ORDER — PANTOPRAZOLE SODIUM 40 MG/1
40 TABLET, DELAYED RELEASE ORAL
Qty: 30 TABLET | Refills: 5 | Status: SHIPPED | OUTPATIENT
Start: 2024-12-30

## 2024-12-30 RX ORDER — TEMAZEPAM 30 MG/1
30 CAPSULE ORAL NIGHTLY PRN
Qty: 30 CAPSULE | Refills: 0 | Status: SHIPPED | OUTPATIENT
Start: 2024-12-30 | End: 2025-01-29

## 2024-12-30 RX ORDER — IBUPROFEN 800 MG/1
800 TABLET, FILM COATED ORAL 2 TIMES DAILY PRN
Qty: 60 TABLET | Refills: 0 | Status: SHIPPED | OUTPATIENT
Start: 2024-12-30

## 2024-12-30 RX ORDER — TIZANIDINE 2 MG/1
2 TABLET ORAL 3 TIMES DAILY PRN
Qty: 60 TABLET | Refills: 0 | Status: SHIPPED | OUTPATIENT
Start: 2024-12-30

## 2024-12-30 RX ORDER — ERGOCALCIFEROL 1.25 MG/1
50000 CAPSULE, LIQUID FILLED ORAL WEEKLY
Qty: 8 CAPSULE | Refills: 2 | Status: SHIPPED | OUTPATIENT
Start: 2024-12-30

## 2025-02-05 ENCOUNTER — OFFICE VISIT (OUTPATIENT)
Dept: PRIMARY CARE CLINIC | Age: 55
End: 2025-02-05
Payer: COMMERCIAL

## 2025-02-05 ENCOUNTER — HOSPITAL ENCOUNTER (OUTPATIENT)
Age: 55
Discharge: HOME | End: 2025-02-05
Payer: COMMERCIAL

## 2025-02-05 VITALS
WEIGHT: 219.4 LBS | BODY MASS INDEX: 32.38 KG/M2 | SYSTOLIC BLOOD PRESSURE: 118 MMHG | DIASTOLIC BLOOD PRESSURE: 78 MMHG | RESPIRATION RATE: 14 BRPM | OXYGEN SATURATION: 98 % | HEART RATE: 91 BPM

## 2025-02-05 DIAGNOSIS — E06.3 HASHIMOTO'S THYROIDITIS: Primary | ICD-10-CM

## 2025-02-05 DIAGNOSIS — M32.9 SYSTEMIC LUPUS ERYTHEMATOSUS, UNSPECIFIED SLE TYPE, UNSPECIFIED ORGAN INVOLVEMENT STATUS (HCC): ICD-10-CM

## 2025-02-05 DIAGNOSIS — M25.562 CHRONIC PAIN OF LEFT KNEE: ICD-10-CM

## 2025-02-05 DIAGNOSIS — M54.41 CHRONIC BILATERAL LOW BACK PAIN WITH RIGHT-SIDED SCIATICA: ICD-10-CM

## 2025-02-05 DIAGNOSIS — E06.3 HASHIMOTO'S THYROIDITIS: ICD-10-CM

## 2025-02-05 DIAGNOSIS — G89.29 CHRONIC BILATERAL LOW BACK PAIN WITH RIGHT-SIDED SCIATICA: ICD-10-CM

## 2025-02-05 DIAGNOSIS — G89.29 CHRONIC PAIN OF LEFT KNEE: ICD-10-CM

## 2025-02-05 DIAGNOSIS — Z12.31 BREAST CANCER SCREENING BY MAMMOGRAM: ICD-10-CM

## 2025-02-05 LAB
T4 FREE SERPL-MCNC: 1 NG/DL (ref 0.92–1.68)
TSH SERPL DL<=0.05 MIU/L-ACNC: 19.3 UIU/ML (ref 0.27–4.2)

## 2025-02-05 PROCEDURE — 36415 COLL VENOUS BLD VENIPUNCTURE: CPT

## 2025-02-05 PROCEDURE — 84443 ASSAY THYROID STIM HORMONE: CPT

## 2025-02-05 PROCEDURE — 84439 ASSAY OF FREE THYROXINE: CPT

## 2025-02-05 PROCEDURE — 99214 OFFICE O/P EST MOD 30 MIN: CPT | Performed by: NURSE PRACTITIONER

## 2025-02-05 RX ORDER — TEMAZEPAM 30 MG/1
30 CAPSULE ORAL NIGHTLY PRN
COMMUNITY
Start: 2025-01-15

## 2025-02-05 RX ORDER — LEVOTHYROXINE SODIUM 200 UG/1
200 TABLET ORAL DAILY
Qty: 90 TABLET | Refills: 1 | Status: SHIPPED | OUTPATIENT
Start: 2025-02-05

## 2025-02-05 RX ORDER — LIOTHYRONINE SODIUM 5 UG/1
5 TABLET ORAL DAILY
Qty: 30 TABLET | Refills: 3 | Status: SHIPPED | OUTPATIENT
Start: 2025-02-05

## 2025-02-05 RX ORDER — TIZANIDINE 2 MG/1
2 TABLET ORAL 3 TIMES DAILY PRN
Qty: 60 TABLET | Refills: 0 | Status: SHIPPED | OUTPATIENT
Start: 2025-02-05

## 2025-02-05 SDOH — ECONOMIC STABILITY: FOOD INSECURITY: WITHIN THE PAST 12 MONTHS, THE FOOD YOU BOUGHT JUST DIDN'T LAST AND YOU DIDN'T HAVE MONEY TO GET MORE.: NEVER TRUE

## 2025-02-05 SDOH — ECONOMIC STABILITY: FOOD INSECURITY: WITHIN THE PAST 12 MONTHS, YOU WORRIED THAT YOUR FOOD WOULD RUN OUT BEFORE YOU GOT MONEY TO BUY MORE.: NEVER TRUE

## 2025-02-05 SDOH — ECONOMIC STABILITY: INCOME INSECURITY: IN THE LAST 12 MONTHS, WAS THERE A TIME WHEN YOU WERE NOT ABLE TO PAY THE MORTGAGE OR RENT ON TIME?: NO

## 2025-02-05 ASSESSMENT — ENCOUNTER SYMPTOMS
EYE REDNESS: 0
ABDOMINAL PAIN: 0
SINUS PRESSURE: 0
COUGH: 0
NAUSEA: 0
DIARRHEA: 0
EYE ITCHING: 0
CHEST TIGHTNESS: 0
SINUS PAIN: 0
SORE THROAT: 0
EYE DISCHARGE: 0
TROUBLE SWALLOWING: 0
WHEEZING: 0
SHORTNESS OF BREATH: 0
VOMITING: 0

## 2025-02-05 ASSESSMENT — PATIENT HEALTH QUESTIONNAIRE - PHQ9
2. FEELING DOWN, DEPRESSED OR HOPELESS: SEVERAL DAYS
4. FEELING TIRED OR HAVING LITTLE ENERGY: NEARLY EVERY DAY
1. LITTLE INTEREST OR PLEASURE IN DOING THINGS: MORE THAN HALF THE DAYS
2. FEELING DOWN, DEPRESSED OR HOPELESS: SEVERAL DAYS
10. IF YOU CHECKED OFF ANY PROBLEMS, HOW DIFFICULT HAVE THESE PROBLEMS MADE IT FOR YOU TO DO YOUR WORK, TAKE CARE OF THINGS AT HOME, OR GET ALONG WITH OTHER PEOPLE: SOMEWHAT DIFFICULT
8. MOVING OR SPEAKING SO SLOWLY THAT OTHER PEOPLE COULD HAVE NOTICED. OR THE OPPOSITE, BEING SO FIGETY OR RESTLESS THAT YOU HAVE BEEN MOVING AROUND A LOT MORE THAN USUAL: NOT AT ALL
SUM OF ALL RESPONSES TO PHQ QUESTIONS 1-9: 9
7. TROUBLE CONCENTRATING ON THINGS, SUCH AS READING THE NEWSPAPER OR WATCHING TELEVISION: NOT AT ALL
10. IF YOU CHECKED OFF ANY PROBLEMS, HOW DIFFICULT HAVE THESE PROBLEMS MADE IT FOR YOU TO DO YOUR WORK, TAKE CARE OF THINGS AT HOME, OR GET ALONG WITH OTHER PEOPLE: SOMEWHAT DIFFICULT
5. POOR APPETITE OR OVEREATING: NOT AT ALL
6. FEELING BAD ABOUT YOURSELF - OR THAT YOU ARE A FAILURE OR HAVE LET YOURSELF OR YOUR FAMILY DOWN: NOT AT ALL
SUM OF ALL RESPONSES TO PHQ QUESTIONS 1-9: 9
3. TROUBLE FALLING OR STAYING ASLEEP: NEARLY EVERY DAY
9. THOUGHTS THAT YOU WOULD BE BETTER OFF DEAD, OR OF HURTING YOURSELF: NOT AT ALL
SUM OF ALL RESPONSES TO PHQ QUESTIONS 1-9: 9
3. TROUBLE FALLING OR STAYING ASLEEP: NEARLY EVERY DAY
8. MOVING OR SPEAKING SO SLOWLY THAT OTHER PEOPLE COULD HAVE NOTICED. OR THE OPPOSITE - BEING SO FIDGETY OR RESTLESS THAT YOU HAVE BEEN MOVING AROUND A LOT MORE THAN USUAL: NOT AT ALL
6. FEELING BAD ABOUT YOURSELF - OR THAT YOU ARE A FAILURE OR HAVE LET YOURSELF OR YOUR FAMILY DOWN: NOT AT ALL
1. LITTLE INTEREST OR PLEASURE IN DOING THINGS: MORE THAN HALF THE DAYS
9. THOUGHTS THAT YOU WOULD BE BETTER OFF DEAD, OR OF HURTING YOURSELF: NOT AT ALL
SUM OF ALL RESPONSES TO PHQ9 QUESTIONS 1 & 2: 3
7. TROUBLE CONCENTRATING ON THINGS, SUCH AS READING THE NEWSPAPER OR WATCHING TELEVISION: NOT AT ALL
4. FEELING TIRED OR HAVING LITTLE ENERGY: NEARLY EVERY DAY
5. POOR APPETITE OR OVEREATING: NOT AT ALL
SUM OF ALL RESPONSES TO PHQ QUESTIONS 1-9: 9
SUM OF ALL RESPONSES TO PHQ QUESTIONS 1-9: 9

## 2025-02-09 DIAGNOSIS — E06.3 HASHIMOTO'S THYROIDITIS: ICD-10-CM

## 2025-02-10 RX ORDER — LEVOTHYROXINE SODIUM 200 UG/1
200 TABLET ORAL DAILY
Qty: 90 TABLET | Refills: 1 | Status: SHIPPED | OUTPATIENT
Start: 2025-02-10

## 2025-02-11 DIAGNOSIS — M25.562 LEFT KNEE PAIN, UNSPECIFIED CHRONICITY: Primary | ICD-10-CM

## 2025-02-13 ENCOUNTER — OFFICE VISIT (OUTPATIENT)
Dept: ORTHOPEDIC SURGERY | Age: 55
End: 2025-02-13

## 2025-02-13 VITALS — HEIGHT: 69 IN | BODY MASS INDEX: 32.44 KG/M2 | WEIGHT: 219 LBS | RESPIRATION RATE: 14 BRPM

## 2025-02-13 DIAGNOSIS — M25.562 LEFT KNEE PAIN, UNSPECIFIED CHRONICITY: Primary | ICD-10-CM

## 2025-02-13 RX ORDER — BUPIVACAINE HYDROCHLORIDE 2.5 MG/ML
2 INJECTION, SOLUTION INFILTRATION; PERINEURAL ONCE
Status: COMPLETED | OUTPATIENT
Start: 2025-02-13 | End: 2025-02-13

## 2025-02-13 RX ORDER — METHYLPREDNISOLONE ACETATE 80 MG/ML
80 INJECTION, SUSPENSION INTRA-ARTICULAR; INTRALESIONAL; INTRAMUSCULAR; SOFT TISSUE ONCE
Status: COMPLETED | OUTPATIENT
Start: 2025-02-13 | End: 2025-02-13

## 2025-02-13 RX ADMIN — BUPIVACAINE HYDROCHLORIDE 5 MG: 2.5 INJECTION, SOLUTION INFILTRATION; PERINEURAL at 08:57

## 2025-02-13 RX ADMIN — METHYLPREDNISOLONE ACETATE 80 MG: 80 INJECTION, SUSPENSION INTRA-ARTICULAR; INTRALESIONAL; INTRAMUSCULAR; SOFT TISSUE at 08:58

## 2025-02-13 SDOH — HEALTH STABILITY: PHYSICAL HEALTH: ON AVERAGE, HOW MANY MINUTES DO YOU ENGAGE IN EXERCISE AT THIS LEVEL?: 30 MIN

## 2025-02-13 SDOH — HEALTH STABILITY: PHYSICAL HEALTH: ON AVERAGE, HOW MANY DAYS PER WEEK DO YOU ENGAGE IN MODERATE TO STRENUOUS EXERCISE (LIKE A BRISK WALK)?: 4 DAYS

## 2025-02-13 NOTE — PROGRESS NOTES
[facility], [facility] ORTHOPED[facility] AND SPORTS MEDICINE  [address]  [address] [place], [place] [id number]  Dept: [phone]  Dept Fax: [phone]        Ambulatory Follow Up      Subjective:   Kelsey Owen is a 54 y.o. year old female who presents to our office today for routine followup regarding her left knee pain.  She was previously seen in clinic on 3/20/2024 for this left knee pain where she had the left knee aspirated and corticosteroid injected.  Patient states that she had great pain relief in the left knee up until 1 month ago.  She notes that the pain that she is experiencing tends to be after weightbearing or after being on her feet for several hours such as while at work.  She is continue to wear knee brace at work which does help some.  She states that stairs are not particularly painful.  She does indicate that most of her pain is infrapatellar.  No additional complaints.  1. Left knee pain, unspecified chronicity    .    Chief Complaint   Patient presents with    Knee Pain     left       HPI    Review of Systems    I have reviewed the CC, HPI, ROS, PMH, FHX, Social History, and if not present in this note, I have reviewed in the patient's chart.   I agree with the documentation provided by other staff and have reviewed their documentation prior to providing my signature indicating agreement.    Objective :   Resp 14   Ht 1.753 m (5' 9.02\")   Wt 99.3 kg (219 lb)   BMI 32.33 kg/m²  Body mass index is 32.33 kg/m².  General: Kelsey Owen is a 54 y.o. female who is alert and oriented and sitting comfortably in our office.  Ortho Exam  MS:    LLE:  Skin intact.  Moderate effusion present about the knee.  Mild tenderness palpation in the lateral joint space.  Nontender to palpation under the medial or lateral patellar facet,  Medial joint space.  No pain with patellar grind.  Active range of motion is 0 to 130 degrees of knee

## 2025-03-11 DIAGNOSIS — G47.00 INSOMNIA, UNSPECIFIED TYPE: Primary | ICD-10-CM

## 2025-03-11 DIAGNOSIS — F90.0 ATTENTION DEFICIT HYPERACTIVITY DISORDER (ADHD), PREDOMINANTLY INATTENTIVE TYPE: ICD-10-CM

## 2025-03-12 RX ORDER — DEXTROAMPHETAMINE SACCHARATE, AMPHETAMINE ASPARTATE, DEXTROAMPHETAMINE SULFATE AND AMPHETAMINE SULFATE 5; 5; 5; 5 MG/1; MG/1; MG/1; MG/1
20 TABLET ORAL 2 TIMES DAILY
Qty: 60 TABLET | Refills: 0 | Status: SHIPPED | OUTPATIENT
Start: 2025-03-12 | End: 2025-04-11

## 2025-03-12 RX ORDER — TEMAZEPAM 30 MG/1
30 CAPSULE ORAL NIGHTLY PRN
Qty: 30 CAPSULE | Refills: 0 | Status: SHIPPED | OUTPATIENT
Start: 2025-03-12 | End: 2025-04-11

## 2025-04-23 ENCOUNTER — PATIENT MESSAGE (OUTPATIENT)
Dept: PRIMARY CARE CLINIC | Age: 55
End: 2025-04-23

## 2025-04-23 DIAGNOSIS — G47.00 INSOMNIA, UNSPECIFIED TYPE: ICD-10-CM

## 2025-04-23 DIAGNOSIS — R11.0 NAUSEA: ICD-10-CM

## 2025-04-23 DIAGNOSIS — F90.0 ATTENTION DEFICIT HYPERACTIVITY DISORDER (ADHD), PREDOMINANTLY INATTENTIVE TYPE: ICD-10-CM

## 2025-04-24 ENCOUNTER — OFFICE VISIT (OUTPATIENT)
Dept: FAMILY MEDICINE CLINIC | Age: 55
End: 2025-04-24
Payer: COMMERCIAL

## 2025-04-24 VITALS
SYSTOLIC BLOOD PRESSURE: 114 MMHG | HEART RATE: 78 BPM | BODY MASS INDEX: 31.35 KG/M2 | WEIGHT: 219 LBS | DIASTOLIC BLOOD PRESSURE: 76 MMHG | TEMPERATURE: 97.6 F | OXYGEN SATURATION: 99 % | HEIGHT: 70 IN

## 2025-04-24 DIAGNOSIS — R50.9 FEVER AND CHILLS: ICD-10-CM

## 2025-04-24 DIAGNOSIS — J06.9 ACUTE URI: Primary | ICD-10-CM

## 2025-04-24 DIAGNOSIS — J02.9 SORE THROAT: ICD-10-CM

## 2025-04-24 LAB
Lab: NORMAL
QC PASS/FAIL: NORMAL
SARS-COV-2 RDRP RESP QL NAA+PROBE: NEGATIVE
STREP PYOGENES DNA, POC: NEGATIVE
VALID INTERNAL CONTROL, POC: NORMAL

## 2025-04-24 PROCEDURE — 87635 SARS-COV-2 COVID-19 AMP PRB: CPT | Performed by: NURSE PRACTITIONER

## 2025-04-24 PROCEDURE — 87651 STREP A DNA AMP PROBE: CPT | Performed by: NURSE PRACTITIONER

## 2025-04-24 PROCEDURE — 99213 OFFICE O/P EST LOW 20 MIN: CPT | Performed by: NURSE PRACTITIONER

## 2025-04-24 RX ORDER — AZITHROMYCIN 250 MG/1
TABLET, FILM COATED ORAL
Qty: 1 PACKET | Refills: 0 | Status: SHIPPED | OUTPATIENT
Start: 2025-04-24 | End: 2025-05-04

## 2025-04-24 RX ORDER — PREDNISONE 20 MG/1
20 TABLET ORAL 2 TIMES DAILY
Qty: 10 TABLET | Refills: 0 | Status: SHIPPED | OUTPATIENT
Start: 2025-04-24 | End: 2025-04-29

## 2025-04-24 RX ORDER — TEMAZEPAM 30 MG/1
30 CAPSULE ORAL NIGHTLY PRN
Qty: 30 CAPSULE | Refills: 0 | Status: SHIPPED | OUTPATIENT
Start: 2025-04-24 | End: 2025-05-24

## 2025-04-24 RX ORDER — ONDANSETRON 4 MG/1
4 TABLET, ORALLY DISINTEGRATING ORAL 3 TIMES DAILY PRN
Qty: 30 TABLET | Refills: 2 | Status: SHIPPED | OUTPATIENT
Start: 2025-04-24

## 2025-04-24 RX ORDER — DEXTROAMPHETAMINE SACCHARATE, AMPHETAMINE ASPARTATE, DEXTROAMPHETAMINE SULFATE AND AMPHETAMINE SULFATE 5; 5; 5; 5 MG/1; MG/1; MG/1; MG/1
20 TABLET ORAL 2 TIMES DAILY
Qty: 60 TABLET | Refills: 0 | Status: SHIPPED | OUTPATIENT
Start: 2025-04-24 | End: 2025-05-24

## 2025-04-24 ASSESSMENT — PATIENT HEALTH QUESTIONNAIRE - PHQ9
2. FEELING DOWN, DEPRESSED OR HOPELESS: NOT AT ALL
SUM OF ALL RESPONSES TO PHQ QUESTIONS 1-9: 0
1. LITTLE INTEREST OR PLEASURE IN DOING THINGS: NOT AT ALL

## 2025-04-24 NOTE — PROGRESS NOTES
[facility], [facility] WALK-[facility]  [address]  [address]  [facility] [address]  Dept: [phone]  Dept Fax: [phone]    Kelsey Owen is a 54 y.o. female who presents today for her medical conditions/complaints of   Chief Complaint   Patient presents with    Sore Throat     X 5 days, sore throat, fever, chills, body aches,           HPI:     /76 (BP Site: Left Upper Arm, Patient Position: Sitting, BP Cuff Size: Large Adult)   Pulse 78   Temp 97.6 °F (36.4 °C)   Ht 1.778 m (5' 10\")   Wt 99.3 kg (219 lb)   SpO2 99%   BMI 31.42 kg/m²       HPI  Pt presented to the walk in clinic today with c/o fever and chills. This is a new problem. The current episode started 5 days ago. Associated symptoms include: sore throat, body aches, congestion, cough.  Feels it is moving into her chest .  Pertinent negatives include: No SOB, chest pain, abdominal pain .  Pt has tried Dayquil and Nyquil with little improvement. She has asthma and using her Albuterol inhaler with temporary relief.  Grandkids had COVID.      Past Medical History:   Diagnosis Date    Allergic rhinitis     Arthritis of right hip 09/16/2021    Asthma     Attention deficit hyperactivity disorder (ADHD), predominantly inattentive type     early 90's    Autoimmune disorder 3/2013    Chronic back pain     Bulging l5    COVID-19 01/05/2022    cough, body aches, fatigue, headache, vomiting, temp 99. X 6 DAYS   TESTED AT [facility].    COVID-19 10/2020    symptoms X 19 days    COVID-19     x 3    Family history of brain aneurysm 04/18/2021    Family history of systemic lupus erythematosus 04/18/2021    Fibromyalgia 11/2021    Vladislav's disease     causes intermittant rashes    H/O total hip arthroplasty, right 02/14/2022    Hashimoto's thyroiditis     Hip arthritis     Hyperlipidemia     Hypothyroidism     Lumbar disc disease     Lupus     Menopausal symptoms 6/2023    Night hot

## 2025-04-28 ENCOUNTER — PATIENT MESSAGE (OUTPATIENT)
Dept: PRIMARY CARE CLINIC | Age: 55
End: 2025-04-28

## 2025-05-02 ENCOUNTER — HOSPITAL ENCOUNTER (OUTPATIENT)
Age: 55
Discharge: HOME | End: 2025-05-02
Payer: COMMERCIAL

## 2025-05-02 DIAGNOSIS — E06.3 HASHIMOTO'S THYROIDITIS: ICD-10-CM

## 2025-05-02 LAB
T3FREE SERPL-MCNC: 2.89 PG/ML (ref 2–4.4)
T4 FREE SERPL-MCNC: 1.2 NG/DL (ref 0.92–1.68)
TSH SERPL DL<=0.05 MIU/L-ACNC: 3.12 UIU/ML (ref 0.27–4.2)

## 2025-05-02 PROCEDURE — 84439 ASSAY OF FREE THYROXINE: CPT

## 2025-05-02 PROCEDURE — 84443 ASSAY THYROID STIM HORMONE: CPT

## 2025-05-02 PROCEDURE — 36415 COLL VENOUS BLD VENIPUNCTURE: CPT

## 2025-05-02 PROCEDURE — 84481 FREE ASSAY (FT-3): CPT

## 2025-05-03 ENCOUNTER — RESULTS FOLLOW-UP (OUTPATIENT)
Dept: PRIMARY CARE CLINIC | Age: 55
End: 2025-05-03

## 2025-05-05 ENCOUNTER — OFFICE VISIT (OUTPATIENT)
Dept: PRIMARY CARE CLINIC | Age: 55
End: 2025-05-05
Payer: COMMERCIAL

## 2025-05-05 VITALS
RESPIRATION RATE: 16 BRPM | OXYGEN SATURATION: 98 % | BODY MASS INDEX: 31.19 KG/M2 | WEIGHT: 217.4 LBS | DIASTOLIC BLOOD PRESSURE: 78 MMHG | SYSTOLIC BLOOD PRESSURE: 128 MMHG | HEART RATE: 103 BPM

## 2025-05-05 DIAGNOSIS — E06.3 HASHIMOTO'S THYROIDITIS: Primary | ICD-10-CM

## 2025-05-05 DIAGNOSIS — F90.0 ATTENTION DEFICIT HYPERACTIVITY DISORDER (ADHD), PREDOMINANTLY INATTENTIVE TYPE: ICD-10-CM

## 2025-05-05 DIAGNOSIS — G47.00 INSOMNIA, UNSPECIFIED TYPE: ICD-10-CM

## 2025-05-05 DIAGNOSIS — R05.1 ACUTE COUGH: ICD-10-CM

## 2025-05-05 PROCEDURE — 99214 OFFICE O/P EST MOD 30 MIN: CPT | Performed by: NURSE PRACTITIONER

## 2025-05-05 RX ORDER — CODEINE PHOSPHATE AND GUAIFENESIN 10; 100 MG/5ML; MG/5ML
10 SOLUTION ORAL EVERY 4 HOURS PRN
Qty: 240 ML | Refills: 0 | Status: SHIPPED | OUTPATIENT
Start: 2025-05-05 | End: 2025-05-08

## 2025-05-05 RX ORDER — GUAIFENESIN/DEXTROMETHORPHAN 100-10MG/5
5 SYRUP ORAL 3 TIMES DAILY PRN
Qty: 120 ML | Refills: 0 | Status: SHIPPED | OUTPATIENT
Start: 2025-05-05 | End: 2025-05-05

## 2025-05-05 SDOH — ECONOMIC STABILITY: FOOD INSECURITY: WITHIN THE PAST 12 MONTHS, YOU WORRIED THAT YOUR FOOD WOULD RUN OUT BEFORE YOU GOT MONEY TO BUY MORE.: NEVER TRUE

## 2025-05-05 SDOH — ECONOMIC STABILITY: FOOD INSECURITY: WITHIN THE PAST 12 MONTHS, THE FOOD YOU BOUGHT JUST DIDN'T LAST AND YOU DIDN'T HAVE MONEY TO GET MORE.: NEVER TRUE

## 2025-05-05 ASSESSMENT — PATIENT HEALTH QUESTIONNAIRE - PHQ9
8. MOVING OR SPEAKING SO SLOWLY THAT OTHER PEOPLE COULD HAVE NOTICED. OR THE OPPOSITE, BEING SO FIGETY OR RESTLESS THAT YOU HAVE BEEN MOVING AROUND A LOT MORE THAN USUAL: NOT AT ALL
SUM OF ALL RESPONSES TO PHQ QUESTIONS 1-9: 0
9. THOUGHTS THAT YOU WOULD BE BETTER OFF DEAD, OR OF HURTING YOURSELF: NOT AT ALL
10. IF YOU CHECKED OFF ANY PROBLEMS, HOW DIFFICULT HAVE THESE PROBLEMS MADE IT FOR YOU TO DO YOUR WORK, TAKE CARE OF THINGS AT HOME, OR GET ALONG WITH OTHER PEOPLE: NOT DIFFICULT AT ALL
SUM OF ALL RESPONSES TO PHQ QUESTIONS 1-9: 0
1. LITTLE INTEREST OR PLEASURE IN DOING THINGS: NOT AT ALL
5. POOR APPETITE OR OVEREATING: NOT AT ALL
2. FEELING DOWN, DEPRESSED OR HOPELESS: NOT AT ALL
4. FEELING TIRED OR HAVING LITTLE ENERGY: NOT AT ALL
7. TROUBLE CONCENTRATING ON THINGS, SUCH AS READING THE NEWSPAPER OR WATCHING TELEVISION: NOT AT ALL
6. FEELING BAD ABOUT YOURSELF - OR THAT YOU ARE A FAILURE OR HAVE LET YOURSELF OR YOUR FAMILY DOWN: NOT AT ALL
SUM OF ALL RESPONSES TO PHQ QUESTIONS 1-9: 0
3. TROUBLE FALLING OR STAYING ASLEEP: NOT AT ALL
SUM OF ALL RESPONSES TO PHQ QUESTIONS 1-9: 0

## 2025-05-05 ASSESSMENT — ENCOUNTER SYMPTOMS
EYE DISCHARGE: 0
EYE REDNESS: 0
SINUS PAIN: 0
DIARRHEA: 0
SINUS PRESSURE: 0
EYE ITCHING: 0
WHEEZING: 0
NAUSEA: 0
CHEST TIGHTNESS: 0
COUGH: 1
ABDOMINAL PAIN: 0
SHORTNESS OF BREATH: 0
VOMITING: 0
TROUBLE SWALLOWING: 0

## 2025-05-05 NOTE — PROGRESS NOTES
[facility]  [facility]  [address] [place] [address]  [address]  [facility] [address]  Dept: [phone]  Dept Fax: [phone]    Kelsey Owen is a 54 y.o. female who presentstoday for her medical conditions/complaints as noted below.  Kelsey Owen is c/o of  Chief Complaint   Patient presents with    3 Month Follow-Up     -thyroid lab update   -medication cehck          HPI:     History of Present Illness  The patient presents for evaluation of a persistent cough, thyroid disorder, and medication management.    She reports that her cough has not been alleviated by Delsym and that Tessalon Perles have been ineffective. The cough worsens when she is lying down, and she has been using her inhaler before bedtime to manage the symptoms.    She expresses surprise at her recent thyroid function test results, which she perceives as significantly improved. This improvement is attributed to the addition of Cytomel to her treatment regimen. She reports feeling better overall, except when she is ill.    She is currently taking Adderall 20 mg twice daily and has one more day left of her doxycycline course. Restoril continues to aid her sleep.    She just got off work. No other concerns today. Believes HR is elevated d/t getting off work   Hemoglobin A1C (%)   Date Value   01/04/2024 5.4   05/30/2023 5.6   01/25/2022 5.7             ( goal A1C is < 7)   No components found for: \"LABMICR\"  No components found for: \"LDLCHOLESTEROL\", \"LDLCALC\"    (goal LDL is <100)   AST (U/L)   Date Value   08/05/2024 21     ALT (U/L)   Date Value   08/05/2024 18     BUN (mg/dL)   Date Value   08/05/2024 13     BP Readings from Last 3 Encounters:   05/05/25 128/78   04/24/25 114/76   02/05/25 118/78          ([id number])    Past Medical History:   Diagnosis Date    Allergic rhinitis     Arthritis of right hip 09/16/2021    Asthma     Attention deficit hyperactivity disorder (ADHD), predominantly inattentive type

## 2025-05-22 LAB
CHOLEST SERPL-MCNC: 284 MG/DL (ref 0–199)
CHOLESTEROL/HDL RATIO: 4.6
GLUCOSE SERPL-MCNC: 94 MG/DL (ref 74–99)
HDLC SERPL-MCNC: 62 MG/DL
LDLC SERPL CALC-MCNC: 193 MG/DL (ref 0–100)
PATIENT FASTING?: YES
TRIGL SERPL-MCNC: 147 MG/DL
VLDLC SERPL CALC-MCNC: 29 MG/DL (ref 1–30)

## 2025-05-30 DIAGNOSIS — G89.29 CHRONIC BILATERAL LOW BACK PAIN WITH RIGHT-SIDED SCIATICA: ICD-10-CM

## 2025-05-30 DIAGNOSIS — M54.41 CHRONIC BILATERAL LOW BACK PAIN WITH RIGHT-SIDED SCIATICA: ICD-10-CM

## 2025-05-30 RX ORDER — PANTOPRAZOLE SODIUM 40 MG/1
40 TABLET, DELAYED RELEASE ORAL
Qty: 90 TABLET | Refills: 3 | Status: SHIPPED | OUTPATIENT
Start: 2025-05-30

## 2025-05-30 RX ORDER — TIZANIDINE 2 MG/1
2 TABLET ORAL 3 TIMES DAILY PRN
Qty: 60 TABLET | Refills: 0 | Status: SHIPPED | OUTPATIENT
Start: 2025-05-30

## 2025-06-13 ENCOUNTER — PATIENT MESSAGE (OUTPATIENT)
Dept: PRIMARY CARE CLINIC | Age: 55
End: 2025-06-13

## 2025-06-13 DIAGNOSIS — G47.00 INSOMNIA, UNSPECIFIED TYPE: ICD-10-CM

## 2025-06-13 DIAGNOSIS — E55.9 VITAMIN D DEFICIENCY: ICD-10-CM

## 2025-06-13 DIAGNOSIS — M25.552 LEFT HIP PAIN: ICD-10-CM

## 2025-06-13 DIAGNOSIS — E06.3 HASHIMOTO'S THYROIDITIS: ICD-10-CM

## 2025-06-13 DIAGNOSIS — F90.0 ATTENTION DEFICIT HYPERACTIVITY DISORDER (ADHD), PREDOMINANTLY INATTENTIVE TYPE: ICD-10-CM

## 2025-06-13 RX ORDER — DEXTROAMPHETAMINE SACCHARATE, AMPHETAMINE ASPARTATE, DEXTROAMPHETAMINE SULFATE AND AMPHETAMINE SULFATE 5; 5; 5; 5 MG/1; MG/1; MG/1; MG/1
20 TABLET ORAL 2 TIMES DAILY
Qty: 60 TABLET | Refills: 0 | Status: SHIPPED | OUTPATIENT
Start: 2025-06-13 | End: 2025-07-13

## 2025-06-13 RX ORDER — ERGOCALCIFEROL 1.25 MG/1
50000 CAPSULE, LIQUID FILLED ORAL WEEKLY
Qty: 8 CAPSULE | Refills: 2 | OUTPATIENT
Start: 2025-06-13

## 2025-06-13 RX ORDER — IBUPROFEN 800 MG/1
800 TABLET, FILM COATED ORAL 2 TIMES DAILY PRN
Qty: 60 TABLET | Refills: 0 | Status: SHIPPED | OUTPATIENT
Start: 2025-06-13

## 2025-06-13 RX ORDER — LIOTHYRONINE SODIUM 5 UG/1
5 TABLET ORAL DAILY
Qty: 30 TABLET | Refills: 3 | Status: SHIPPED | OUTPATIENT
Start: 2025-06-13

## 2025-06-13 RX ORDER — TEMAZEPAM 30 MG/1
30 CAPSULE ORAL NIGHTLY PRN
Qty: 30 CAPSULE | Refills: 0 | Status: SHIPPED | OUTPATIENT
Start: 2025-06-13 | End: 2025-07-13

## 2025-08-05 ENCOUNTER — OFFICE VISIT (OUTPATIENT)
Dept: PRIMARY CARE CLINIC | Age: 55
End: 2025-08-05
Payer: COMMERCIAL

## 2025-08-05 VITALS
SYSTOLIC BLOOD PRESSURE: 118 MMHG | BODY MASS INDEX: 31.16 KG/M2 | RESPIRATION RATE: 18 BRPM | DIASTOLIC BLOOD PRESSURE: 66 MMHG | WEIGHT: 217.2 LBS | OXYGEN SATURATION: 100 % | HEART RATE: 97 BPM

## 2025-08-05 DIAGNOSIS — R73.03 PREDIABETES: Primary | ICD-10-CM

## 2025-08-05 DIAGNOSIS — E34.9 HORMONE IMBALANCE: ICD-10-CM

## 2025-08-05 DIAGNOSIS — G47.00 INSOMNIA, UNSPECIFIED TYPE: ICD-10-CM

## 2025-08-05 DIAGNOSIS — E06.3 HASHIMOTO'S THYROIDITIS: ICD-10-CM

## 2025-08-05 DIAGNOSIS — F90.0 ATTENTION DEFICIT HYPERACTIVITY DISORDER (ADHD), PREDOMINANTLY INATTENTIVE TYPE: ICD-10-CM

## 2025-08-05 LAB — HBA1C MFR BLD: 5.5 %

## 2025-08-05 PROCEDURE — 83036 HEMOGLOBIN GLYCOSYLATED A1C: CPT | Performed by: NURSE PRACTITIONER

## 2025-08-05 PROCEDURE — 99214 OFFICE O/P EST MOD 30 MIN: CPT | Performed by: NURSE PRACTITIONER

## 2025-08-05 RX ORDER — TEMAZEPAM 30 MG/1
30 CAPSULE ORAL NIGHTLY PRN
COMMUNITY

## 2025-08-05 SDOH — ECONOMIC STABILITY: FOOD INSECURITY: WITHIN THE PAST 12 MONTHS, THE FOOD YOU BOUGHT JUST DIDN'T LAST AND YOU DIDN'T HAVE MONEY TO GET MORE.: NEVER TRUE

## 2025-08-05 SDOH — ECONOMIC STABILITY: FOOD INSECURITY: WITHIN THE PAST 12 MONTHS, YOU WORRIED THAT YOUR FOOD WOULD RUN OUT BEFORE YOU GOT MONEY TO BUY MORE.: NEVER TRUE

## 2025-08-05 ASSESSMENT — ENCOUNTER SYMPTOMS
DIARRHEA: 0
EYE ITCHING: 0
ABDOMINAL PAIN: 0
CHEST TIGHTNESS: 0
SORE THROAT: 0
COLOR CHANGE: 1
COUGH: 0
EYE REDNESS: 0
TROUBLE SWALLOWING: 0
WHEEZING: 0
NAUSEA: 0
VOMITING: 0
SHORTNESS OF BREATH: 0
SINUS PAIN: 0
SINUS PRESSURE: 0
EYE DISCHARGE: 0

## 2025-08-05 ASSESSMENT — PATIENT HEALTH QUESTIONNAIRE - PHQ9
SUM OF ALL RESPONSES TO PHQ QUESTIONS 1-9: 0
SUM OF ALL RESPONSES TO PHQ QUESTIONS 1-9: 0
2. FEELING DOWN, DEPRESSED OR HOPELESS: NOT AT ALL
1. LITTLE INTEREST OR PLEASURE IN DOING THINGS: NOT AT ALL
SUM OF ALL RESPONSES TO PHQ QUESTIONS 1-9: 0
SUM OF ALL RESPONSES TO PHQ QUESTIONS 1-9: 0

## 2025-08-12 DIAGNOSIS — G47.00 INSOMNIA, UNSPECIFIED TYPE: Primary | ICD-10-CM

## 2025-08-12 DIAGNOSIS — M25.552 LEFT HIP PAIN: ICD-10-CM

## 2025-08-12 DIAGNOSIS — F90.0 ATTENTION DEFICIT HYPERACTIVITY DISORDER (ADHD), PREDOMINANTLY INATTENTIVE TYPE: ICD-10-CM

## 2025-08-12 RX ORDER — IBUPROFEN 800 MG/1
800 TABLET, FILM COATED ORAL 2 TIMES DAILY PRN
Qty: 60 TABLET | Refills: 0 | Status: SHIPPED | OUTPATIENT
Start: 2025-08-12

## 2025-08-12 RX ORDER — DEXTROAMPHETAMINE SACCHARATE, AMPHETAMINE ASPARTATE, DEXTROAMPHETAMINE SULFATE AND AMPHETAMINE SULFATE 5; 5; 5; 5 MG/1; MG/1; MG/1; MG/1
TABLET ORAL
Qty: 60 TABLET | Refills: 0 | Status: SHIPPED | OUTPATIENT
Start: 2025-08-12 | End: 2025-09-11

## 2025-08-12 RX ORDER — TEMAZEPAM 30 MG/1
CAPSULE ORAL
Qty: 30 CAPSULE | Refills: 0 | Status: SHIPPED | OUTPATIENT
Start: 2025-08-12 | End: 2025-09-11

## 2025-08-17 DIAGNOSIS — M25.552 LEFT HIP PAIN: ICD-10-CM

## 2025-08-18 RX ORDER — IBUPROFEN 800 MG/1
800 TABLET, FILM COATED ORAL 2 TIMES DAILY PRN
Qty: 60 TABLET | Refills: 5 | Status: SHIPPED | OUTPATIENT
Start: 2025-08-18

## (undated) DEVICE — SOLUTION IV 250ML 0.9% SOD CHL PH 5 INJ USP VIAFLX PLAS

## (undated) DEVICE — BLADE ES L6IN ELASTOMERIC COAT EXT DURABLE BEND UPTO 90DEG

## (undated) DEVICE — DRESSING FOAM W4XL10IN AG SIL ADH ANTIMIC POSTOP OPTIFOAM

## (undated) DEVICE — ELECTRODE PT RET AD L9FT HI MOIST COND ADH HYDRGEL CORDED

## (undated) DEVICE — 4-PORT MANIFOLD: Brand: NEPTUNE 2

## (undated) DEVICE — SUTURE STRATAFIX SPRL SZ 2-0 L9IN ABSRB VLT MH L36MM 1/2 SXPD2B408

## (undated) DEVICE — SUTURE ABSORBABLE MONOFILAMENT 2-0 CT-1 24 CM 36 MM VIO PDS+

## (undated) DEVICE — COVER,TABLE,HEAVY DUTY,50"X90",STRL: Brand: MEDLINE

## (undated) DEVICE — STAZ MAJOR BASIN: Brand: MEDLINE INDUSTRIES, INC.

## (undated) DEVICE — GLOVE SURG SZ 8 CRM LTX FREE POLYISOPRENE POLYMER BEAD ANTI

## (undated) DEVICE — SUTURE STRATAFIX SPRL SZ 3-0 L5IN ABSRB UD FS L26MM 3/8 CIR SXMP2B411

## (undated) DEVICE — BIPOLAR SEALER 23-112-1 AQM 6.0: Brand: AQUAMANTYS ®

## (undated) DEVICE — SPONGE LAP W18XL18IN WHT COT 4 PLY FLD STRUNG RADPQ DISP ST

## (undated) DEVICE — APPLICATOR MEDICATED 26 CC SOLUTION HI LT ORNG CHLORAPREP

## (undated) DEVICE — ANCHOR TISSUE RETRIEVAL SYSTEM, BAG SIZE 125 ML, PORT SIZE 8 MM: Brand: ANCHOR TISSUE RETRIEVAL SYSTEM

## (undated) DEVICE — MHPB BASIC LAP PACK: Brand: MEDLINE INDUSTRIES, INC.

## (undated) DEVICE — OSCILLATING TIP SAW CARTRIDGE: Brand: PRECISION FALCON

## (undated) DEVICE — ADHESIVE SKIN CLOSURE TOP 36 CC HI VISC DERMBND MINI

## (undated) DEVICE — BLADELESS OBTURATOR: Brand: WECK VISTA

## (undated) DEVICE — LIMB HOLDER, QUICK RELEASE, 60" STRAP: Brand: MEDLINE

## (undated) DEVICE — TOWEL,OR,DSP,ST,BLUE,DLX,XR,4/PK,20PK/CS: Brand: MEDLINE

## (undated) DEVICE — GLOVE SURG SZ 85 CRM LTX FREE POLYISOPRENE POLYMER BEAD ANTI

## (undated) DEVICE — SYRINGE MED 30ML STD CLR PLAS LUERLOCK TIP N CTRL DISP

## (undated) DEVICE — ZIPPERED TOGA, 2X LARGE: Brand: FLYTE, SURGICOOL

## (undated) DEVICE — ERASECAUTI INTERMIT TRAY: Brand: MEDLINE INDUSTRIES, INC.

## (undated) DEVICE — SURGICAL PROCEDURE KIT BASIN MAJ SET UP

## (undated) DEVICE — SYRINGE MED 10ML LUERLOCK TIP W/O SFTY DISP

## (undated) DEVICE — 6619 2 PTNT ISO SYS INCISE AREA&LT;(&GT;&&LT;)&GT;P: Brand: STERI-DRAPE™ IOBAN™ 2

## (undated) DEVICE — WOUND RETRACTOR AND PROTECTOR: Brand: ALEXIS WOUND PROTECTOR-RETRACTOR

## (undated) DEVICE — NEEDLE INSUF L120MM DIA2MM DISP FOR PNEUMOPERI ENDOPATH

## (undated) DEVICE — BANDAGE COBAN 4 IN COMPR W4INXL5YD FOAM COHESIVE QUIK STK SELF ADH SFT

## (undated) DEVICE — COVER,MAYO STAND,XL,STERILE: Brand: MEDLINE

## (undated) DEVICE — STERILE PATIENT PROTECTIVE PAD FOR IMP® KNEE POSITIONERS & COHESIVE WRAP (10 / CASE): Brand: DE MAYO KNEE POSITIONER®

## (undated) DEVICE — LIQUIBAND RAPID ADHESIVE 36/CS 0.8ML: Brand: MEDLINE

## (undated) DEVICE — GLOVE ORANGE PI 7   MSG9070

## (undated) DEVICE — CANNULA SEAL

## (undated) DEVICE — BANDAGE COBAN 6 IN WND 6INX5YD FOAM

## (undated) DEVICE — 450 ML BOTTLE OF 0.05% CHLORHEXIDINE GLUCONATE IN 99.95% STERILE WATER FOR IRRIGATION, USP AND APPLICATOR.: Brand: IRRISEPT ANTIMICROBIAL WOUND LAVAGE

## (undated) DEVICE — SUTURE ABSORBABLE MONOFILAMENT 1 CTX 36 CM 48 MM VIO PDS +

## (undated) DEVICE — CONTAINER,SPECIMEN,OR STERILE,4OZ: Brand: MEDLINE

## (undated) DEVICE — PAD PT POS 36 IN SURGYPAD DISP

## (undated) DEVICE — SOLUTION IV IRRIG POUR BRL 0.9% SODIUM CHL 2F7124

## (undated) DEVICE — SUTURE ABSRB BRAID COAT UD CP NO 2 27IN VCRL J195H

## (undated) DEVICE — SUTURE VCRL SZ 0 L36IN ABSRB UD L36MM CT-1 1/2 CIR J946H

## (undated) DEVICE — SYRINGE IRRIG 60ML SFT PLIABLE BLB EZ TO GRP 1 HND USE W/

## (undated) DEVICE — SUTURE STRATAFIX SPRL SZ 1 L14IN ABSRB VLT L48CM CTX 1/2 SXPD2B405

## (undated) DEVICE — YANKAUER,FLEXIBLE HANDLE,REGLR CAPACITY: Brand: MEDLINE INDUSTRIES, INC.

## (undated) DEVICE — NEPTUNE E-SEP 165MM SUCTION SLEEVE: Brand: NEPTUNE E-SEP

## (undated) DEVICE — GLOVE SURG SZ 75 CRM LTX FREE POLYISOPRENE POLYMER BEAD ANTI

## (undated) DEVICE — NEEDLE SPNL 18GA L3.5IN W/ QNCKE SHARPER BVL DURA CLICK

## (undated) DEVICE — SOLUTION IV IRRIG 500ML 0.9% SODIUM CHL 2F7123

## (undated) DEVICE — C-ARM: Brand: UNBRANDED

## (undated) DEVICE — TIP COVER ACCESSORY

## (undated) DEVICE — STRAP,POSITIONING,KNEE/BODY,FOAM,4X60": Brand: MEDLINE

## (undated) DEVICE — ARM DRAPE

## (undated) DEVICE — SOLUTION ANTIFOG VIS SYS CLEARIFY LAPSCP

## (undated) DEVICE — GLOVE SURG SZ 75 L12IN FNGR THK79MIL GRN LTX FREE

## (undated) DEVICE — GOWN,SIRUS,NONRNF,SETINSLV,XL,20/CS: Brand: MEDLINE

## (undated) DEVICE — BLANKET WRM W29.9XL79.1IN UP BODY FORC AIR MISTRAL-AIR

## (undated) DEVICE — DECANTER BAG 9": Brand: MEDLINE INDUSTRIES, INC.

## (undated) DEVICE — SUTURE MCRYL + SZ 4-0 L27IN ABSRB UD L19MM PS-2 3/8 CIR MCP426H